# Patient Record
Sex: MALE | Race: WHITE | NOT HISPANIC OR LATINO | Employment: FULL TIME | URBAN - METROPOLITAN AREA
[De-identification: names, ages, dates, MRNs, and addresses within clinical notes are randomized per-mention and may not be internally consistent; named-entity substitution may affect disease eponyms.]

---

## 2017-02-02 ENCOUNTER — OFFICE VISIT (OUTPATIENT)
Dept: FAMILY MEDICINE | Facility: CLINIC | Age: 52
End: 2017-02-02
Payer: COMMERCIAL

## 2017-02-02 VITALS
HEART RATE: 98 BPM | BODY MASS INDEX: 35.74 KG/M2 | WEIGHT: 255.3 LBS | HEIGHT: 71 IN | TEMPERATURE: 98.2 F | OXYGEN SATURATION: 98 % | SYSTOLIC BLOOD PRESSURE: 122 MMHG | DIASTOLIC BLOOD PRESSURE: 88 MMHG

## 2017-02-02 DIAGNOSIS — E34.9 TESTOSTERONE DEFICIENCY: ICD-10-CM

## 2017-02-02 DIAGNOSIS — E78.2 MIXED HYPERLIPIDEMIA: ICD-10-CM

## 2017-02-02 DIAGNOSIS — L71.9 ROSACEA: ICD-10-CM

## 2017-02-02 DIAGNOSIS — M25.561 ACUTE PAIN OF RIGHT KNEE: Primary | ICD-10-CM

## 2017-02-02 DIAGNOSIS — R68.82 LOW LIBIDO: ICD-10-CM

## 2017-02-02 DIAGNOSIS — E78.2 MIXED HYPERLIPIDEMIA: Primary | ICD-10-CM

## 2017-02-02 DIAGNOSIS — J01.01 ACUTE RECURRENT MAXILLARY SINUSITIS: ICD-10-CM

## 2017-02-02 PROCEDURE — 84403 ASSAY OF TOTAL TESTOSTERONE: CPT | Performed by: FAMILY MEDICINE

## 2017-02-02 PROCEDURE — 84270 ASSAY OF SEX HORMONE GLOBUL: CPT | Performed by: FAMILY MEDICINE

## 2017-02-02 PROCEDURE — 99214 OFFICE O/P EST MOD 30 MIN: CPT | Performed by: FAMILY MEDICINE

## 2017-02-02 PROCEDURE — 36415 COLL VENOUS BLD VENIPUNCTURE: CPT | Performed by: FAMILY MEDICINE

## 2017-02-02 RX ORDER — DOXYCYCLINE 50 MG/1
50 CAPSULE ORAL 2 TIMES DAILY
Qty: 60 CAPSULE | Refills: 11 | Status: SHIPPED | OUTPATIENT
Start: 2017-02-02 | End: 2017-08-07

## 2017-02-02 RX ORDER — ROSUVASTATIN CALCIUM 10 MG/1
10 TABLET, COATED ORAL DAILY
Qty: 30 TABLET | Refills: 11 | Status: SHIPPED | OUTPATIENT
Start: 2017-02-02 | End: 2017-02-02

## 2017-02-02 RX ORDER — ROSUVASTATIN CALCIUM 10 MG/1
TABLET, COATED ORAL
Qty: 90 TABLET | Refills: 3 | Status: SHIPPED | OUTPATIENT
Start: 2017-02-02 | End: 2017-02-25

## 2017-02-02 NOTE — PROGRESS NOTES
SUBJECTIVE:                                                    David Marino is a 52 year old male who presents to clinic today for the following health issues:      Chief Complaint   Patient presents with     Recheck Medication     discuss BP and depression medications     Knee Pain     R knee, took anti-inflammatory with some improvement     Eye Problem     issue with both eyes     Medication Request     topical testosterone     Several issues today  First of all he needs a recheck on blood pressure and depression medications,   He has hyperlipidemia and we discussed the use of Crestor    He's been having more problems with his right knee, and discussed the use of anti-inflammatories  He had surgery on this knee and still not great  He be interested in referral for physical therapy    He needs a refill on the medication he uses for rosacea, starting this seems to have resolved the inflammatory problem he was having with both of his eyes which is good    on previous visits we discussed low testosterone  He has a history of hypo-testosterone and for years was maintaining with injections  He hasn't done this for quite a long time and since then has noticed some decreased libido, decreased erections and muscle mass  We reviewed the indications for testosterone replacement including true low testosterone levels and symptoms of hypo-androgenemia including low libido decrease muscle mass and constitutional symptoms such as fatigue  We reviewed the potential risks of testosterone therapy, which according to studies may include a theoretical increased risk of heart disease  Because of this we reviewed cardiovascular risk factors today      Current Outpatient Prescriptions   Medication     doxycycline Monohydrate 50 MG CAPS capsule     buPROPion (WELLBUTRIN XL) 150 MG 24 hr tablet     valsartan (DIOVAN) 160 MG tablet     Desvenlafaxine Succinate (PRISTIQ) 100 MG TB24 24 hr tablet     traZODone (DESYREL) 50 MG tablet      "montelukast (SINGULAIR) 10 MG tablet     metroNIDAZOLE (METROGEL) 0.75 % gel     glycopyrrolate (ROBINUL-FORTE) 2 MG TABS     NONFORMULARY     TRANSDERM-SCOP, 1.5 MG, (1.5mg base/3day) patch     testosterone (ANDROGEL) 50 MG/5GM (1%) topical gel     cyclobenzaprine (FLEXERIL) 10 MG tablet     rosuvastatin (CRESTOR) 10 MG tablet     No current facility-administered medications for this visit.      I have reviewed the patient's medical history in detail; there are no changes to the history as noted in EpicCare.    ROS: As per HPI.  Constitutional: no fevers/sweats/chills  : no dysuria, normal urinary pattern    EXAM  /88  Pulse 98  Temp 98.2  F (36.8  C) (Oral)  Ht 5' 11\" (1.803 m)  Wt 255 lb 4.8 oz (115.8 kg)  SpO2 98%  BMI 35.61 kg/m2  Gen: Healthy appearing male in no apparent distress  Head: Normocephalic, Atraumatic  Skin: No rashes  Heme: No bruising or petechiae  Lymph: No adenopathy  MS: Antalgic gait    ASSESSMENT/PLAN:    ICD-10-CM    1. Acute pain of right knee M25.561 [facility] PT, HAND, AND CHIROPRACTIC REFERRAL   2. Rosacea L71.9 doxycycline Monohydrate 50 MG CAPS capsule   3. Testosterone deficiency E29.1 Testosterone Free and Total   4. Low libido R68.82 Testosterone Free and Total   5. Mixed hyperlipidemia E78.2 DISCONTINUED: rosuvastatin (CRESTOR) 10 MG tablet   6. Acute recurrent maxillary sinusitis J01.01      Referral to physical therapy for worsening right knee problem even after surgery  Renewal of medication for rosacea  Discussed the use of lipid medication  And discussed the possibility of restarting testosterone  He had one previous low testosterone and we will repeat  2 hypo-gonadal level testosterones should be enough to justify treatment    Jus Oswald MD MPH    "

## 2017-02-02 NOTE — NURSING NOTE
"Chief Complaint   Patient presents with     Recheck Medication     discuss BP and depression medications     Knee Pain     R knee, took anti-inflammatory with some improvement     Eye Problem     issue with both eyes     Medication Request     topical testosterone     /88 mmHg  Pulse 98  Temp(Src) 98.2  F (36.8  C) (Oral)  Ht 5' 11\" (1.803 m)  Wt 255 lb 4.8 oz (115.803 kg)  BMI 35.62 kg/m2  SpO2 98% Estimated body mass index is 35.62 kg/(m^2) as calculated from the following:    Height as of this encounter: 5' 11\" (1.803 m).    Weight as of this encounter: 255 lb 4.8 oz (115.803 kg).  BP completed using cuff size: large       Health Maintenance due pending provider review:  NONE    n/a    Alissa Burnett CMA    "

## 2017-02-02 NOTE — MR AVS SNAPSHOT
After Visit Summary   2/2/2017    David Marino    MRN: [id number]           Patient Information     Date Of Birth          1965        Visit Information        Provider Department      2/2/2017 12:30 PM Jus Oswald MD [facility]        Today's Diagnoses     Acute pain of right knee    -  1     Rosacea         Testosterone deficiency         Low libido         Mixed hyperlipidemia         Acute recurrent maxillary sinusitis            Follow-ups after your visit        Additional Services     [facility] PT, HAND, AND CHIROPRACTIC REFERRAL       **This order will print in the [facility] Scheduling Office**    Physical Therapy, Hand Therapy and Chiropractic Care are available through:    *[facility]  *[facility]  *[facility]    Call one number to schedule at any of the above locations: [phone].    Your provider has referred you to: Physical Therapy at [facility] or [facility]    Indication/Reason for Referral: Knee Pain  Onset of Illness:   Therapy Orders: Evaluate and Treat  Special Programs: None  Special Request: None    Rehan Van      Additional Comments for the Therapist or Chiropractor:     Please be aware that coverage of these services is subject to the terms and limitations of your health insurance plan.  Call member services at your health plan with any benefit or coverage questions.      Please bring the following to your appointment:    *Your personal calendar for scheduling future appointments  *Comfortable clothing                  Who to contact     If you have questions or need follow up information about today's clinic visit or your schedule please contact [facility] directly at [phone].  Normal or non-critical lab and imaging results will be communicated to you by MyChart, letter or phone within 4 business days after the clinic has received the results. If you do not hear from us within 7 days,  "please contact the clinic through Appfolio or phone. If you have a critical or abnormal lab result, we will notify you by phone as soon as possible.  Submit refill requests through Appfolio or call your pharmacy and they will forward the refill request to us. Please allow 3 business days for your refill to be completed.          Additional Information About Your Visit        CoreDialharAdvebs Information     Appfolio gives you secure access to your electronic health record. If you see a primary care provider, you can also send messages to your care team and make appointments. If you have questions, please call your primary care clinic.  If you do not have a primary care provider, please call [phone] and they will assist you.        Care EveryWhere ID     This is your Care EveryWhere ID. This could be used by other organizations to access your [facility] medical records  [id number]        Your Vitals Were     Pulse Temperature Height BMI (Body Mass Index) Pulse Oximetry       98 98.2  F (36.8  C) (Oral) 5' 11\" (1.803 m) 35.62 kg/m2 98%        Blood Pressure from Last 3 Encounters:   02/02/17 122/88   11/28/16 142/101   10/14/16 138/99    Weight from Last 3 Encounters:   02/02/17 255 lb 4.8 oz (115.803 kg)   11/28/16 244 lb (110.678 kg)   10/14/16 248 lb 11.2 oz (112.81 kg)              We Performed the Following     [facility] PT, HAND, AND CHIROPRACTIC REFERRAL     Testosterone Free and Total          Today's Medication Changes          These changes are accurate as of: 2/2/17  1:04 PM.  If you have any questions, ask your nurse or doctor.               Start taking these medicines.        Dose/Directions    rosuvastatin 10 MG tablet   Commonly known as:  CRESTOR   Used for:  Mixed hyperlipidemia   Replaces:  pravastatin 20 MG tablet   Started by:  Jus Oswald MD        Dose:  10 mg   Take 1 tablet (10 mg) by mouth daily   Quantity:  30 tablet   Refills:  11         These medicines have changed or have updated " prescriptions.        Dose/Directions    doxycycline Monohydrate 50 MG Caps capsule   This may have changed:  See the new instructions.   Used for:  Rosacea   Changed by:  Jus Oswald MD        Dose:  50 mg   Take 1 capsule (50 mg) by mouth 2 times daily   Quantity:  60 capsule   Refills:  11         Stop taking these medicines if you haven't already. Please contact your care team if you have questions.     meloxicam 15 MG tablet   Commonly known as:  MOBIC   Stopped by:  Jus Oswald MD           pravastatin 20 MG tablet   Commonly known as:  PRAVACHOL   Replaced by:  rosuvastatin 10 MG tablet   Stopped by:  Jus Oswald MD           tobramycin 0.3 % ophthalmic solution   Commonly known as:  TOBREX   Stopped by:  Jus Oswald MD                Where to get your medicines      These medications were sent to [id number] - [place], [place] - [address] AT [facility] [address]  [facility] [address], [place] [address]    Hours:  24-hours Phone:  [phone]    - doxycycline Monohydrate 50 MG Caps capsule  - rosuvastatin 10 MG tablet             Primary Care Provider Office Phone # Fax #    Jus Oswald [phone]       [facility] [address] [address]        Thank you!     Thank you for choosing [facility]  for your care. Our goal is always to provide you with excellent care. Hearing back from our patients is one way we can continue to improve our services. Please take a few minutes to complete the written survey that you may receive in the mail after your visit with us. Thank you!             Your Updated Medication List - Protect others around you: Learn how to safely use, store and throw away your medicines at www.disposemymeds.org.          This list is accurate as of: 2/2/17  1:04 PM.  Always use your most recent med list.                   Brand Name Dispense  Instructions for use    buPROPion 150 MG 24 hr tablet    WELLBUTRIN XL    30 tablet    Take 1 tablet (150 mg) by mouth every morning       desvenlafaxine succinate  MG 24 hr tablet    PRISTIQ    90 tablet    Take 1 tablet (100 mg) by mouth daily       doxycycline Monohydrate 50 MG Caps capsule     60 capsule    Take 1 capsule (50 mg) by mouth 2 times daily       glycopyrrolate 2 MG Tabs    ROBINUL-FORTE    90 tablet    1 tab po q day       metroNIDAZOLE 0.75 % topical gel    METROGEL    45 g    Apply topically 2 times daily       montelukast 10 MG tablet    SINGULAIR    90 tablet    Take 1 tablet (10 mg) by mouth At Bedtime       NONFORMULARY          rosuvastatin 10 MG tablet    CRESTOR    30 tablet    Take 1 tablet (10 mg) by mouth daily       TRANSDERM-SCOP (1.5 MG) 72 hr patch   Generic drug:  scopolamine          traZODone 50 MG tablet    DESYREL    90 tablet    Take 1 tablet (50 mg) by mouth At Bedtime       valsartan 160 MG tablet    DIOVAN    90 tablet    Take 1 tablet (160 mg) by mouth daily

## 2017-02-02 NOTE — PATIENT INSTRUCTIONS
Pharmacy requesting 90 day supply instead.  Prescription approved per [facility] Refill Protocol.  Monika RODRIGUES RN    Pending Prescriptions:                       Disp   Refills    rosuvastatin (CRESTOR) 10 MG tablet [Pharm*90 tab*11       Sig: TAKE 1 TABLET(10 MG) BY MOUTH DAILY         Last Written Prescription Date: 2/2/2017  Last Fill Quantity: 30, # refills: 11  Last Office Visit with [facility], [facility] or [facility] prescribing provider:     Future Office Visit:      CHOLESTEROL   Date Value Ref Range Status   09/13/2016 226* <200 mg/dL Final     Comment:     Desirable:       <200 mg/dl     HDL CHOLESTEROL   Date Value Ref Range Status   09/13/2016 40 >39 mg/dL Final     LDL CHOLESTEROL CALCULATED   Date Value Ref Range Status   09/13/2016 134* <100 mg/dL Final     Comment:     Above desirable:  100-129 mg/dl   Borderline High:  130-159 mg/dL   High:             160-189 mg/dL   Very high:       >189 mg/dl       TRIGLYCERIDES   Date Value Ref Range Status   09/13/2016 262* <150 mg/dL Final     Comment:     Borderline high:  150-199 mg/dl   High:             200-499 mg/dl   Very high:       >499 mg/dl

## 2017-02-04 LAB
SHBG SERPL-SCNC: 16 NMOL/L (ref 11–80)
TESTOST FREE SERPL-MCNC: 5.33 NG/DL (ref 4.7–24.4)
TESTOST SERPL-MCNC: 197 NG/DL (ref 240–950)

## 2017-02-13 ENCOUNTER — OFFICE VISIT (OUTPATIENT)
Dept: FAMILY MEDICINE | Facility: CLINIC | Age: 52
End: 2017-02-13
Payer: COMMERCIAL

## 2017-02-13 VITALS
SYSTOLIC BLOOD PRESSURE: 143 MMHG | HEART RATE: 80 BPM | WEIGHT: 253 LBS | BODY MASS INDEX: 35.42 KG/M2 | TEMPERATURE: 98 F | HEIGHT: 71 IN | DIASTOLIC BLOOD PRESSURE: 101 MMHG

## 2017-02-13 DIAGNOSIS — S39.012A LOW BACK STRAIN, INITIAL ENCOUNTER: Primary | ICD-10-CM

## 2017-02-13 DIAGNOSIS — I10 BENIGN ESSENTIAL HYPERTENSION: ICD-10-CM

## 2017-02-13 DIAGNOSIS — E34.9 TESTOSTERONE DEFICIENCY: ICD-10-CM

## 2017-02-13 PROCEDURE — 99214 OFFICE O/P EST MOD 30 MIN: CPT | Performed by: FAMILY MEDICINE

## 2017-02-13 RX ORDER — CYCLOBENZAPRINE HCL 10 MG
5-10 TABLET ORAL 3 TIMES DAILY PRN
Qty: 30 TABLET | Refills: 1 | Status: SHIPPED | OUTPATIENT
Start: 2017-02-13 | End: 2017-08-07

## 2017-02-13 RX ORDER — TESTOSTERONE 12.5 MG/1.25G
50 GEL TOPICAL DAILY
Qty: 150 G | Refills: 1 | Status: SHIPPED | OUTPATIENT
Start: 2017-02-13 | End: 2017-02-15

## 2017-02-13 NOTE — NURSING NOTE
"Chief Complaint   Patient presents with     Musculoskeletal Problem     lower right back.     Other     Testosterone      BP (!) 143/101 (Cuff Size: Adult Large)  Pulse 80  Temp 98  F (36.7  C) (Oral)  Ht 5' 11\" (1.803 m)  Wt 253 lb (114.8 kg)  BMI 35.29 kg/m2 Estimated body mass index is 35.29 kg/(m^2) as calculated from the following:    Height as of this encounter: 5' 11\" (1.803 m).    Weight as of this encounter: 253 lb (114.8 kg).  BP completed using cuff size: large       Health Maintenance due pending provider review:  BP was high, used pink card, recheck manually    n/a      Regine Coffman CMA  "

## 2017-02-13 NOTE — PROGRESS NOTES
SUBJECTIVE:                                                    David Marino is a 52 year old male who presents to clinic today for the following health issues:    Musculoskeletal problem/pain      Duration: 8-9 days    Description  Location: lower back right side    Intensity:  mild    Accompanying signs and symptoms: dull pain - worse with movement     History  Previous similar problem: no   Previous evaluation:  none    Precipitating or alleviating factors:  Trauma or overuse: YES-  walking on treadmill,  Maybe over compensated with balance with knee  Aggravating factors include: sitting, standing, walking and bending     Therapies tried and outcome: [facility] 2/8/2017  - RX  steroid pack, Diazepam - they helped       Also Chiro, and then Urgent care    Also on previous visits we discussed low testosterone  He has a history of hypo-testosterone and for years was maintaining with injections  He hasn't done this for quite a long time and since then has noticed some decreased libido, decreased erections and muscle mass  We reviewed the indications for testosterone replacement including true low testosterone levels and symptoms of hypo-androgenemia including low libido decrease muscle mass and constitutional symptoms such as fatigue  We reviewed the potential risks of testosterone therapy, which according to studies may include a theoretical increased risk of heart disease  Because of this we reviewed cardiovascular risk factors today      ROS: As per HPI.  Skin: no changing lesions, no other concerns  Heme: no abnormal bruising or bleeding problems  Neuro: no significant weakness, numbness, tingling.  Patient denies red flag history elements:  Cancer, Unexplained weight loss,, Fever, Bladder or bowel incontinence, Urinary retention (with overflow incontinence)    Current Outpatient Prescriptions   Medication     doxycycline Monohydrate 50 MG CAPS capsule     rosuvastatin (CRESTOR) 10 MG tablet     buPROPion  "(WELLBUTRIN XL) 150 MG 24 hr tablet     valsartan (DIOVAN) 160 MG tablet     Desvenlafaxine Succinate (PRISTIQ) 100 MG TB24 24 hr tablet     traZODone (DESYREL) 50 MG tablet     montelukast (SINGULAIR) 10 MG tablet     metroNIDAZOLE (METROGEL) 0.75 % gel     glycopyrrolate (ROBINUL-FORTE) 2 MG TABS     TRANSDERM-SCOP, 1.5 MG, (1.5mg base/3day) patch     NONFORMULARY     No current facility-administered medications for this visit.      I have reviewed the patient's medical history in detail; there are no changes to the history as noted in EpicCare.  Social History     Social History     Marital status: Single     Spouse name: N/A     Number of children: N/A     Years of education: N/A     Social History Main Topics     Smoking status: Former Smoker     Smokeless tobacco: Never Used      Comment: smoker 30+ years ago     Alcohol use 0.0 oz/week     0 Standard drinks or equivalent per week      Comment: occ 1-2 x month     Drug use: No     Sexual activity: Yes     Partners: Male     Other Topics Concern     Parent/Sibling W/ Cabg, Mi Or Angioplasty Before 65f 55m? No     Social History Narrative       EXAM  BP (!) 143/101 (Cuff Size: Adult Large)  Pulse 80  Temp 98  F (36.7  C) (Oral)  Ht 5' 11\" (1.803 m)  Wt 253 lb (114.8 kg)  BMI 35.29 kg/m2  Gen: Healthy appearing male in no apparent distress  Spine:Painful and reduced LS ROM noted.   Extremities: Normal R & L lower extremity joints for ROM symmetry & tone/ no tenderness/ no effusions/ no crepitus or deformities.  Neurological exam reveals normal without focal findings, muscle tone and strength normal and symmetric, reflexes normal and symmetric, sensation grossly normal, gait and station normal    ASSESSMENT/PLAN:    ICD-10-CM    1. Low back strain, initial encounter S39.012A MASSAGE THERAPY REFERRAL     cyclobenzaprine (FLEXERIL) 10 MG tablet   2. Testosterone deficiency E29.1 testosterone (ANDROGEL) 50 MG/5GM (1%) topical gel   3. Benign essential hypertension " I10      Standard patient information handout given with instructions on home treatment including OTC medications and follow up.  Prescription for massage and cyclobenzaprine  Call or return to clinic as needed if these symptoms worsen or fail to improve as anticipated.    Reviewed indications for testosterone therapy, and increased risk for potential for heart side effects  Even so he would like to proceed  We will try AndroGel or similar topical gel first  No previously has had some success with injectable    Blood pressure little higher today but probably because he is in pain  We will recheck at the next visit    After starting testosterone lab work within 1-3 months    Jus Oswald MD MPH

## 2017-02-13 NOTE — MR AVS SNAPSHOT
After Visit Summary   2/13/2017    David Marino    MRN: [id number]           Patient Information     Date Of Birth          1965        Visit Information        Provider Department      2/13/2017 12:30 PM Jus Oswald MD [facility]        Today's Diagnoses     Low back strain, initial encounter    -  1    Testosterone deficiency        Benign essential hypertension           Follow-ups after your visit        Additional Services     MASSAGE THERAPY REFERRAL       Massage twice weekly 12 sessions, with renewal possible                  Follow-up notes from your care team     Return in about 3 months (around 5/13/2017), or if symptoms worsen or fail to improve, for Lab Work.      Who to contact     If you have questions or need follow up information about today's clinic visit or your schedule please contact [facility] directly at [phone].  Normal or non-critical lab and imaging results will be communicated to you by Twigmorehart, letter or phone within 4 business days after the clinic has received the results. If you do not hear from us within 7 days, please contact the clinic through Twigmorehart or phone. If you have a critical or abnormal lab result, we will notify you by phone as soon as possible.  Submit refill requests through StatusPage or call your pharmacy and they will forward the refill request to us. Please allow 3 business days for your refill to be completed.          Additional Information About Your Visit        MyChart Information     StatusPage gives you secure access to your electronic health record. If you see a primary care provider, you can also send messages to your care team and make appointments. If you have questions, please call your primary care clinic.  If you do not have a primary care provider, please call [phone] and they will assist you.        Care EveryWhere ID     This is your Care EveryWhere ID. This could be used by other  "organizations to access your [facility] medical records  [id number]        Your Vitals Were     Pulse Temperature Height BMI (Body Mass Index)          80 98  F (36.7  C) (Oral) 5' 11\" (1.803 m) 35.29 kg/m2         Blood Pressure from Last 3 Encounters:   02/13/17 (!) 143/101   02/02/17 122/88   11/28/16 (!) 142/101    Weight from Last 3 Encounters:   02/13/17 253 lb (114.8 kg)   02/02/17 255 lb 4.8 oz (115.8 kg)   11/28/16 244 lb (110.7 kg)              We Performed the Following     MASSAGE THERAPY REFERRAL          Today's Medication Changes          These changes are accurate as of: 2/13/17  4:22 PM.  If you have any questions, ask your nurse or doctor.               Start taking these medicines.        Dose/Directions    cyclobenzaprine 10 MG tablet   Commonly known as:  FLEXERIL   Used for:  Low back strain, initial encounter   Started by:  Jus Oswald MD        Dose:  5-10 mg   Take 0.5-1 tablets (5-10 mg) by mouth 3 times daily as needed for muscle spasms   Quantity:  30 tablet   Refills:  1       testosterone 50 MG/5GM (1%) topical gel   Commonly known as:  ANDROGEL   Used for:  Testosterone deficiency   Started by:  Jus Oswald MD        Dose:  50 mg   Place 1 packet (50 mg) onto the skin daily Apply to the clean, dry intact skin of the shoulders, upper arms or abdomen.   Quantity:  150 g   Refills:  1            Where to get your medicines      Some of these will need a paper prescription and others can be bought over the counter.  Ask your nurse if you have questions.     Bring a paper prescription for each of these medications     cyclobenzaprine 10 MG tablet    testosterone 50 MG/5GM (1%) topical gel                Primary Care Provider Office Phone # Fax #    Jus Oswald [phone]       [facility] [address] [place] [address]        Thank you!     Thank you for choosing [facility]  for your care. Our goal is " always to provide you with excellent care. Hearing back from our patients is one way we can continue to improve our services. Please take a few minutes to complete the written survey that you may receive in the mail after your visit with us. Thank you!             Your Updated Medication List - Protect others around you: Learn how to safely use, store and throw away your medicines at www.disposemymeds.org.          This list is accurate as of: 2/13/17  4:22 PM.  Always use your most recent med list.                   Brand Name Dispense Instructions for use    buPROPion 150 MG 24 hr tablet    WELLBUTRIN XL    30 tablet    Take 1 tablet (150 mg) by mouth every morning       cyclobenzaprine 10 MG tablet    FLEXERIL    30 tablet    Take 0.5-1 tablets (5-10 mg) by mouth 3 times daily as needed for muscle spasms       desvenlafaxine succinate  MG 24 hr tablet    PRISTIQ    90 tablet    Take 1 tablet (100 mg) by mouth daily       doxycycline Monohydrate 50 MG Caps capsule     60 capsule    Take 1 capsule (50 mg) by mouth 2 times daily       glycopyrrolate 2 MG Tabs    ROBINUL-FORTE    90 tablet    1 tab po q day       metroNIDAZOLE 0.75 % topical gel    METROGEL    45 g    Apply topically 2 times daily       montelukast 10 MG tablet    SINGULAIR    90 tablet    Take 1 tablet (10 mg) by mouth At Bedtime       NONFORMULARY          rosuvastatin 10 MG tablet    CRESTOR    90 tablet    TAKE 1 TABLET(10 MG) BY MOUTH DAILY       testosterone 50 MG/5GM (1%) topical gel    ANDROGEL    150 g    Place 1 packet (50 mg) onto the skin daily Apply to the clean, dry intact skin of the shoulders, upper arms or abdomen.       TRANSDERM-SCOP (1.5 MG) 72 hr patch   Generic drug:  scopolamine          traZODone 50 MG tablet    DESYREL    90 tablet    Take 1 tablet (50 mg) by mouth At Bedtime       valsartan 160 MG tablet    DIOVAN    90 tablet    Take 1 tablet (160 mg) by mouth daily

## 2017-02-15 DIAGNOSIS — E34.9 TESTOSTERONE DEFICIENCY: ICD-10-CM

## 2017-02-15 NOTE — TELEPHONE ENCOUNTER
MACIEL,  Pharm is requesting a 90 day supply and they notes this as a request for dispense 450 instead of 150.  Please authorize/advise if appropriate.  Thanks,  Jennifer Arias RN

## 2017-02-16 ENCOUNTER — TELEPHONE (OUTPATIENT)
Dept: FAMILY MEDICINE | Facility: CLINIC | Age: 52
End: 2017-02-16

## 2017-02-16 RX ORDER — TESTOSTERONE 12.5 MG/1.25G
50 GEL TOPICAL DAILY
Qty: 450 G | Refills: 3 | Status: SHIPPED | OUTPATIENT
Start: 2017-02-16 | End: 2017-08-07

## 2017-02-16 NOTE — TELEPHONE ENCOUNTER
Completed form faxed to [employer] at [phone] and sent to scanning. Will await reply.MICHAEL Cox, CMA

## 2017-02-16 NOTE — TELEPHONE ENCOUNTER
Form faxed to us with questions and other medication options.  Put in JF's box.  Vesta West M.A.

## 2017-02-25 DIAGNOSIS — E78.2 MIXED HYPERLIPIDEMIA: ICD-10-CM

## 2017-02-27 RX ORDER — ROSUVASTATIN CALCIUM 10 MG/1
TABLET, COATED ORAL
Qty: 90 TABLET | Refills: 1 | Status: SHIPPED | OUTPATIENT
Start: 2017-02-27 | End: 2017-08-07

## 2017-02-27 NOTE — TELEPHONE ENCOUNTER
Crestor      Last Written Prescription Date: 02/02/2017  Last Fill Quantity: 90, # refills: 3  [place] [facility] [place] [facility] or [facility] prescribing provider: 02/13/2017       Lab Results   Component Value Date    CHOL 226 09/13/2016     Lab Results   Component Value Date    HDL 40 09/13/2016     Lab Results   Component Value Date     09/13/2016     Lab Results   Component Value Date    TRIG 262 09/13/2016     No results found for: CHOLARMINDA

## 2017-02-27 NOTE — TELEPHONE ENCOUNTER
Prescription approved per [facility] Refill Protocol.  Brenda Massey RN

## 2017-04-11 DIAGNOSIS — E34.9 TESTOSTERONE DEFICIENCY: ICD-10-CM

## 2017-04-11 NOTE — TELEPHONE ENCOUNTER
Pending Prescriptions:                       Disp   Refills    testosterone (ANDROGEL) 50 MG/5GM (1%) to*450 g  3            Sig: Place 1 packet (50 mg) onto the skin daily Apply           to the clean, dry intact skin of the shoulders,           upper arms or abdomen.          Last Written Prescription Date: 02/16/2017  Last Fill Quantity: 450g,  # refills: 3   Last Office Visit with [facility] or [facility] prescribing provider: 02/13/2017

## 2017-04-12 RX ORDER — TESTOSTERONE 12.5 MG/1.25G
50 GEL TOPICAL DAILY
Start: 2017-04-12

## 2017-07-11 ENCOUNTER — OFFICE VISIT (OUTPATIENT)
Dept: SURGERY | Facility: CLINIC | Age: 52
End: 2017-07-11
Payer: COMMERCIAL

## 2017-07-11 VITALS
OXYGEN SATURATION: 97 % | TEMPERATURE: 97.5 F | SYSTOLIC BLOOD PRESSURE: 144 MMHG | DIASTOLIC BLOOD PRESSURE: 102 MMHG | WEIGHT: 248.6 LBS | HEART RATE: 86 BPM | HEIGHT: 70 IN | BODY MASS INDEX: 35.59 KG/M2

## 2017-07-11 DIAGNOSIS — Z13.29 SCREENING FOR ENDOCRINE, NUTRITIONAL, METABOLIC AND IMMUNITY DISORDER: ICD-10-CM

## 2017-07-11 DIAGNOSIS — E66.9 OBESITY (BMI 30-39.9): Primary | ICD-10-CM

## 2017-07-11 DIAGNOSIS — Z13.21 SCREENING FOR ENDOCRINE, NUTRITIONAL, METABOLIC AND IMMUNITY DISORDER: ICD-10-CM

## 2017-07-11 DIAGNOSIS — E66.9 OBESITY (BMI 30-39.9): ICD-10-CM

## 2017-07-11 DIAGNOSIS — Z13.0 SCREENING FOR IRON DEFICIENCY ANEMIA: ICD-10-CM

## 2017-07-11 DIAGNOSIS — G47.9 SLEEP DISTURBANCE: ICD-10-CM

## 2017-07-11 DIAGNOSIS — Z13.0 SCREENING FOR ENDOCRINE, NUTRITIONAL, METABOLIC AND IMMUNITY DISORDER: ICD-10-CM

## 2017-07-11 DIAGNOSIS — Z13.228 SCREENING FOR ENDOCRINE, NUTRITIONAL, METABOLIC AND IMMUNITY DISORDER: ICD-10-CM

## 2017-07-11 PROCEDURE — 99204 OFFICE O/P NEW MOD 45 MIN: CPT | Performed by: PHYSICIAN ASSISTANT

## 2017-07-11 PROCEDURE — 97802 MEDICAL NUTRITION INDIV IN: CPT | Performed by: DIETITIAN, REGISTERED

## 2017-07-11 RX ORDER — GLYCOPYRROLATE 1 MG/5ML
SOLUTION ORAL ONCE
COMMUNITY
End: 2017-08-07

## 2017-07-11 NOTE — PROGRESS NOTES
[facility]  [address]  [place] [address]  Phone [phone] Fax [phone]    Date: 2017  RE: AMADO MALIK  MR#: [id number]  : 1965    Dear Jus Oswald MD,   I had the pleasure of seeing your patient, AMADO MALIK, to evaluate his obesity and consider him for possible weight loss surgery. As you know, AMADO is 52 years old. He has been overweight since late teens early 20's. He has been obese for the last 0 years and has been unable to achieve long-term success with weight loss attempts, such as: Weight Watchers, HCG, or HMR Fasting.   Comorbidities of obesity from his past medical history include: Asthma, High cholesterol - on meds, High blood pressure - on meds, Chest pain (non-angina), GERD - on daily meds, Low back pain, Plantar fasciitis, Obstructive sleep apnea (see below).  The symptoms related to his obesity include Reflux, heartburn, Knee pain, Swelling of legs, Back pain, Arthritis, Awaken from sleep to catch breath, Morning headache, Loud snoring, Shortness of Breath with activity, Stop breathing while asleep, Leg swelling. The following ADLs are hindered due to his weight: Transfer in/out of shower/tub, Lower body dressing, Not enough energy to complete routine daily tasks, Shortness of breath with little activity.    Non-Obesity Related Past Medical History:  Anxiety  Depression - on 2 daily meds. Denies any history of SI or suicidal attempts. Has seen therapists in the past. The last time was 4 year ago. Does not feel like needs to see one now. Stable.  Binge Eating - Feels like this means there is no restriction to eating. Feels like does this once weekly.  Past Surgical History:  Appendix removal  Other, Tonsillectomy, [place] Teeth extracted, Knee surgery  Family History:  Father- Cancer;High blood pressure;High cholesterol;Obese: Cancer type: Prostate and skin cancer  Mother- Diabetes;gall bladder problems;High blood pressure;High  cholesterol;Obese  PGF- High blood pressure;High cholesterol;Obese  PGM- gall bladder problems;High blood pressure;High cholesterol;Obese  MGF- High blood pressure;High cholesterol  MGM- gall bladder problems;High blood pressure;High cholesterol;Obese  Sibling 1- gall bladder problems;High blood pressure;High cholesterol;Obese  Sibling 2- High blood pressure;High cholesterol;Obese  Sibling 3- Obese  Sibling 4- High cholesterol    Social History:  He is Single. 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  Ethnicity: white/  ETOH: Monthly - will have one beer or alcoholic drink  Illicit Drug Use: None  Tobacco Use: No - quit 1999  Support system: boyfriend, younger brother, my friend Shahida, and my friend Panfilo will be available to help the patient in the early post op period. My boyfriend, my brother, and several friends is who the patient can count on for support throughout his weight loss journey.  Can you afford three meals per day? Yes  ?Can you afford the $50-60 per month for vitamins? Yes    A 14 point review of system shows:  Pulmonary: Shortness of Breath with activity,  Gastrointestinal: Reflux / heartburn - takes twice daily otc Prilosec   Neurologic: Dizziness,  Musculoskeletal: Knee pain, Swelling of legs, Back pain, Arthritis,  Psychological: Anxiety, Depression,  Pulmonary: Awaken from sleep to catch breath, Morning headaches, Snoring, Stop breathing while asleep, - Was diagnosed with ZARIA about 15 years ago. Used the CPAP just for a couple of weeks. Never again.   Skin: Leg swelling    Vital Signs: Ht: 70 in, Wt: 248.6 lbs., BMI: 35.6, BP:144 /100 , Pulse: 86, RR: 20, Temp: 97.5 O2 Sat: 97%  PHYSICAL EXAMINATION:  GENERAL: Alert and oriented x3. NAD  HEENT: Normocephalic, atraumatic, EOMI, Oral dentition adequate for chewing  CARDIOVASCULAR: Regular rate and rhythm, No murmurs, rubs or gallops  RESPIRATORY: Lung sounds clear to auscultation bilaterally  GASTROINTESTINAL: Soft, Obese, Nontender  LOWER  EXTREMITIES: No edema  MUSCULOSKELETAL: Examination gait was normal  NEUROLOGIC: Alert and oriented x 3, no gross defect  SKIN: dry, warm to touch    In summary, AMADO is obese with a body mass index of 35.6 kg/m2 and the comorbidities stated above. He attended an informational seminar and is a candidate for Laparoscopic Yamilet-en-Y Gastric Bypass. He will have to complete the following pre-requisites:    Received weight loss goal of 5 lb prior to surgery.  Attend a bariatric support group  A total of 6 structured dietitian weight loss visits are required due to your insurance. Please schedule these with our dietitians.  Achieve clearance from dietitian to see surgeon.  Follow up with your primary care provider regarding hypertension  Get Sleep study.  Have preoperative laboratory tests drawn.  Psychological Evaluation with MMPI and clearance for weight loss surgery.     Discussed patients medical history and the process to surgery. Will have him return next month and discuss the surgeries themselves. A goal sheet and support group handout were given to the patient.  Once the patient has completed the requirements set forth in paragraph one, and there are no further recommendations, he will be allowed to see the surgeon of his choice for consultation on the Laparoscopic Yamilet-en-Y Gastric Bypass or the gastric sleeve surgery.     Thank you for allowing us to participate in his care.  Time spent with patient 45 minutes. More than half in consultation    Sincerely,  Cosme Foote MS PAWesleyC    At [facility] we are committed to providing you exceptional care. Our surgeons specialize in performing the following minimally invasive weight-loss surgeries:  Yamilet-en-Y gastric bypass  Laparoscopic adjustable gastric band  Sleeve gastrectomy    If you would like to review aspects of our weight loss surgery program, please visit our website at [email or web address]. If you have any questions, please do not hesitate to  contact us at [phone].

## 2017-07-11 NOTE — MR AVS SNAPSHOT
MRN:[id number]                      After Visit Summary   7/11/2017    David Marino    MRN: [id number]           Visit Information        Provider Department      7/11/2017 3:00 PM 3, Sh Wl Diet, RD [facility]      Your next 10 appointments already scheduled     Aug 16, 2017  1:00 PM CDT   Weight Loss Visit with Cosme Foote PA-C   [facility] - [facility])    [facility] [address] [id number] [phone]            Aug 16, 2017  1:30 PM CDT   Weight Loss Visit with Saranya Wl Diet 1, RD   [facility] [place] ([facility])    [facility] [address] [id number] [phone]              MyChart Information     [employer] gives you secure access to your electronic health record. If you see a primary care provider, you can also send messages to your care team and make appointments. If you have questions, please call your primary care clinic.  If you do not have a primary care provider, please call [phone] and they will assist you.        Care EveryWhere ID     This is your Care EveryWhere ID. This could be used by other organizations to access your [place] medical records  [id number]        Equal Access to Services     SATINDER XIE : Hadii jaye pope hadasho Soomaali, waaxda luqadaha, qaybta kaalmada adelawson, lara moody. So [facility] [phone].    ATENCIÓN: Si habla español, tiene a obrien disposición servicios gratuitos de asistencia lingüística. Llame al [phone].    We comply with applicable federal civil rights laws and [place] laws. We do not discriminate on the basis of race, color, national origin, age, disability sex, sexual orientation or gender identity.

## 2017-07-11 NOTE — MR AVS SNAPSHOT
After Visit Summary   7/11/2017    David Marino    MRN: [id number]           Patient Information     Date Of Birth          1965        Visit Information        Provider Department      7/11/2017 2:00 PM Cosme Foote PA-C [facility]      Today's Diagnoses     Obesity (BMI 30-39.9)    -  1    Sleep disturbance        Screening for iron deficiency anemia        Screening for endocrine, nutritional, metabolic and immunity disorder          Care Instructions    PLAN:    1.  Start working on task list items  2.  Schedule with PA and diet for next month          Follow-ups after your visit        Additional Services     NUTRITION REFERRAL       Type of nutrition instruction needed: Weight Loss  Your provider has referred you to:     [facility] Dieticians            SLEEP EVALUATION & MANAGEMENT REFERRAL - ADULT       Bariatric Fast Track  BMI:35  Symptoms:  obesity, witnessed apnea, snoring, ZARIA, nocturnal dyspnea, AM headaches, Elevated BP, HTN, Diabetes  SpO2:   CMP is pending in EPIC                  Follow-up notes from your care team     Return in 1 month (on 8/11/2017).      Future tests that were ordered for you today     Open Future Orders        Priority Expected Expires Ordered    CBC with platelets Routine 7/11/2017 1/7/2018 7/11/2017    Comprehensive metabolic panel Routine 7/11/2017 1/7/2018 7/11/2017    Hemoglobin A1c Routine 7/11/2017 1/7/2018 7/11/2017    Vitamin D Deficiency Routine 7/11/2017 1/7/2018 7/11/2017    SLEEP EVALUATION & MANAGEMENT REFERRAL - ADULT Routine  7/11/2018 7/11/2017            Who to contact     If you have questions or need follow up information about today's clinic visit or your schedule please contact [place] [facility] directly at [phone].  Normal or non-critical lab and imaging results will be communicated to you by  "MyChart, letter or phone within 4 business days after the clinic has received the results. If you do not hear from us within 7 days, please contact the clinic through [employer] or phone. If you have a critical or abnormal lab result, we will notify you by phone as soon as possible.  Submit refill requests through [employer] or call your pharmacy and they will forward the refill request to us. Please allow 3 business days for your refill to be completed.          Additional Information About Your Visit        [employer] Information     [employer] gives you secure access to your electronic health record. If you see a primary care provider, you can also send messages to your care team and make appointments. If you have questions, please call your primary care clinic.  If you do not have a primary care provider, please call [phone] and they will assist you.        Care EveryWhere ID     This is your Care EveryWhere ID. This could be used by other organizations to access your [place] medical records  [id number]        Your Vitals Were     Pulse Temperature Height Pulse Oximetry BMI (Body Mass Index)       86 97.5  F (36.4  C) 5' 10\" (1.778 m) 97% 35.67 kg/m2        Blood Pressure from Last 3 Encounters:   07/11/17 (!) 144/102   02/13/17 (!) 143/101   02/02/17 122/88    Weight from Last 3 Encounters:   07/11/17 248 lb 9.6 oz (112.8 kg)   02/13/17 253 lb (114.8 kg)   02/02/17 255 lb 4.8 oz (115.8 kg)              We Performed the Following     NUTRITION REFERRAL     OP ROOMING NOTE TO MICHELLE        Primary Care Provider Office Phone # Fax #    Jus Woodrow Oswald [phone]       [facility] [address] [address]        Equal Access to Services     SATINDER XIE : Jaime Barnes, favio ying, hetal walkerallady branch, lara moody. So [facility] [phone].    ATENCIÓN: Si habla español, tiene a obrien disposición servicios gratuitos " de asistencia lingüística. Karen al [phone].    We comply with applicable federal civil rights laws and [place] laws. We do not discriminate on the basis of race, color, national origin, age, disability sex, sexual orientation or gender identity.            Thank you!     Thank you for choosing [place] [facility]  for your care. Our goal is always to provide you with excellent care. Hearing back from our patients is one way we can continue to improve our services. Please take a few minutes to complete the written survey that you may receive in the mail after your visit with us. Thank you!             Your Updated Medication List - Protect others around you: Learn how to safely use, store and throw away your medicines at www.disposemymeds.org.          This list is accurate as of: 7/11/17  3:36 PM.  Always use your most recent med list.                   Brand Name Dispense Instructions for use Diagnosis    buPROPion 150 MG 24 hr tablet    WELLBUTRIN XL    30 tablet    Take 1 tablet (150 mg) by mouth every morning    Adjustment disorder with depressed mood       cyclobenzaprine 10 MG tablet    FLEXERIL    30 tablet    Take 0.5-1 tablets (5-10 mg) by mouth 3 times daily as needed for muscle spasms    Low back strain, initial encounter       desvenlafaxine succinate 100 MG 24 hr tablet    PRISTIQ    90 tablet    Take 1 tablet (100 mg) by mouth daily    Adjustment disorder with depressed mood       doxycycline Monohydrate 50 MG Caps capsule     60 capsule    Take 1 capsule (50 mg) by mouth 2 times daily    Rosacea       * glycopyrrolate 1 MG/5ML solution    CUVPOSA     Take by mouth once        * glycopyrrolate 2 MG Tabs    ROBINUL-FORTE    90 tablet    1 tab po q day    Generalized hyperhidrosis       Ipratropium-Albuterol  MCG/ACT inhaler    COMBIVENT RESPIMAT     Inhale 1 puff into the lungs 4 times daily        metroNIDAZOLE 0.75 % topical gel    METROGEL    45 g    Apply  topically 2 times daily    Rosacea       montelukast 10 MG tablet    SINGULAIR    90 tablet    Take 1 tablet (10 mg) by mouth At Bedtime    Environmental allergies       PRILOSEC OTC PO      Take 20 capsules by mouth 2 times daily        rosuvastatin 10 MG tablet    CRESTOR    90 tablet    TAKE 1 TABLET(10 MG) BY MOUTH DAILY    Mixed hyperlipidemia       testosterone 50 MG/5GM (1%) topical gel    ANDROGEL    450 g    Place 1 packet (50 mg) onto the skin daily Apply to the clean, dry intact skin of the shoulders, upper arms or abdomen.    Testosterone deficiency       TRANSDERM-SCOP (1.5 MG) 72 hr patch   Generic drug:  scopolamine           traZODone 50 MG tablet    DESYREL    90 tablet    Take 1 tablet (50 mg) by mouth At Bedtime    Other insomnia       valsartan 160 MG tablet    DIOVAN    90 tablet    Take 1 tablet (160 mg) by mouth daily    Benign essential hypertension       * Notice:  This list has 2 medication(s) that are the same as other medications prescribed for you. Read the directions carefully, and ask your doctor or other care provider to review them with you.

## 2017-07-12 NOTE — PROGRESS NOTES
"Name: AMADO MALIK  : 1965  Gender: Male  MRN: [id number]  Age: 52  INITIAL BARIATRIC NUTRITION ASSESSMENT  REASON FOR VISIT:  AMADO MALIK is a 52 year old Male presents today for initial nutrition visit for bariatric surgery.  DIAGNOSIS:  Class II Obesity  ANTHROPOMETRICS:  Height: 70 inches  Weight: 248 lbs  BMI: 35.6 kg/m2  CURRENT SUPPLEMENTS:  Multivitamin/Mineral: Multivitamin and Vitamin D   WEIGHT LOSS ATTEMPTS:  Other, Weight Watchers, HCG  Prescription diet medications:  Adipex (phentermine);Other  NUTRITION HISTORY:  Meals per day: 4  Snacking: Yes  Breakfast: McDonalds - Mcgriddle, hashbrown, donut or muffin, coffee, soda  Lunch: Lasagna, breadsticks, salad, cookie   Supper: pizza, chicken wings, ice cream cone  Snacks: donuts, candy bars, starbursts, peanuts/pistachios, chips  Drinks: coffee, soda, water  Emotional eating: Yes  Binge eating: Yes  Night eating: No  Can you afford three meals per day? Yes  Can you afford the $50-60 per month for vitamins? Yes  Comments: Emotional eating d/t stress and boredom. Answered \"yes\" to binge eating however seems to be simply overeating. Denies any feelings of compulsiveness, shame, etc associated with episodes. Has been thinking about surgery for about a year, knows a couple other people who have had surgery and been fairly successful.  DINING OUT HISTORY:  Frequency per week: Nearly every day  Location: fast food  Type of food: burgers, malts, fries, chicken sandwiches,   PHYSICAL ACTIVITY:  Type: Walking;Other  Frequency: < 1x/week  Duration (min): 30  NUTRITION DIAGNOSIS:  Patient with excessive oral food/beverage intake related to intake of calorically dense food/beverages at meals and/or snacks as evidenced by BMI of 35.6kg/m2.   INTERVENTION:  Nutrition Prescription: Recommend nutrient/ energy modification   Goals:  Begin taking calcium per guidelines.   Check multivitamin and vitamin D for appropriateness.   Do not exceed 4 cups of coffee/day. " Eliminate soda.   Aim for 64oz of water each day.   walk for 30 minutes, 3x/week in additional to playing softball each week.   Implementation:  Provided written guidelines for Laparoscopic Yamilet-en-Y Gastric Bypass surgery.  Provided weight loss suggestions.  Explained diet changes that would occur after surgery.  Emphasized importance of diet and lifestyle modifications.  Discussed necessity for chewable multivitamin/ mineral supplements, calcium with vitamin D, vitamin B-12, vitamin D, Iron and vitamin C supplements.  NUTRITION MONITORING AND EVALUATION:  Verbalizes understanding of surgery diet guidelines.  Anticipated compliance: Good    FOLLOW UP: 1 month  Recommend 5 follow up visits to assist with lifestyle changes, per insurance requirements.  RD name and number provided.  TIME SPENT WITH PATIENT: 55 minutes  Lizbeth Campos RD, LD  Clinical Dietitian

## 2017-07-14 DIAGNOSIS — L71.9 ROSACEA: ICD-10-CM

## 2017-07-14 RX ORDER — METRONIDAZOLE 7.5 MG/G
GEL TOPICAL
Qty: 45 G | Refills: 0 | Status: SHIPPED | OUTPATIENT
Start: 2017-07-14 | End: 2017-11-13

## 2017-07-14 RX ORDER — METRONIDAZOLE 7.5 MG/G
GEL TOPICAL
Qty: 45 G | Refills: 0 | Status: SHIPPED | OUTPATIENT
Start: 2017-07-14 | End: 2017-08-07

## 2017-07-14 NOTE — TELEPHONE ENCOUNTER
Pt returning call, notified that he needs to make an appointment for further refills.     Anu Jacques MA

## 2017-08-07 ENCOUNTER — OFFICE VISIT (OUTPATIENT)
Dept: FAMILY MEDICINE | Facility: CLINIC | Age: 52
End: 2017-08-07
Payer: COMMERCIAL

## 2017-08-07 VITALS
WEIGHT: 252 LBS | HEIGHT: 70 IN | BODY MASS INDEX: 36.08 KG/M2 | HEART RATE: 87 BPM | SYSTOLIC BLOOD PRESSURE: 140 MMHG | RESPIRATION RATE: 16 BRPM | OXYGEN SATURATION: 98 % | DIASTOLIC BLOOD PRESSURE: 90 MMHG | TEMPERATURE: 97.8 F

## 2017-08-07 DIAGNOSIS — Z13.0 SCREENING FOR ENDOCRINE, NUTRITIONAL, METABOLIC AND IMMUNITY DISORDER: ICD-10-CM

## 2017-08-07 DIAGNOSIS — L71.9 ROSACEA: ICD-10-CM

## 2017-08-07 DIAGNOSIS — Z13.29 SCREENING FOR ENDOCRINE, NUTRITIONAL, METABOLIC AND IMMUNITY DISORDER: ICD-10-CM

## 2017-08-07 DIAGNOSIS — F43.21 ADJUSTMENT DISORDER WITH DEPRESSED MOOD: ICD-10-CM

## 2017-08-07 DIAGNOSIS — I10 BENIGN ESSENTIAL HYPERTENSION: Primary | ICD-10-CM

## 2017-08-07 DIAGNOSIS — Z13.21 SCREENING FOR ENDOCRINE, NUTRITIONAL, METABOLIC AND IMMUNITY DISORDER: ICD-10-CM

## 2017-08-07 DIAGNOSIS — G47.09 OTHER INSOMNIA: ICD-10-CM

## 2017-08-07 DIAGNOSIS — R61 GENERALIZED HYPERHIDROSIS: ICD-10-CM

## 2017-08-07 DIAGNOSIS — K21.9 GASTROESOPHAGEAL REFLUX DISEASE WITHOUT ESOPHAGITIS: ICD-10-CM

## 2017-08-07 DIAGNOSIS — Z91.09 ENVIRONMENTAL ALLERGIES: ICD-10-CM

## 2017-08-07 DIAGNOSIS — Z13.228 SCREENING FOR ENDOCRINE, NUTRITIONAL, METABOLIC AND IMMUNITY DISORDER: ICD-10-CM

## 2017-08-07 DIAGNOSIS — E78.2 MIXED HYPERLIPIDEMIA: ICD-10-CM

## 2017-08-07 DIAGNOSIS — Z13.0 SCREENING FOR IRON DEFICIENCY ANEMIA: ICD-10-CM

## 2017-08-07 LAB
ERYTHROCYTE [DISTWIDTH] IN BLOOD BY AUTOMATED COUNT: 14 % (ref 10–15)
HBA1C MFR BLD: 5.6 % (ref 4.3–6)
HCT VFR BLD AUTO: 41 % (ref 40–53)
HGB BLD-MCNC: 13.8 G/DL (ref 13.3–17.7)
MCH RBC QN AUTO: 28.5 PG (ref 26.5–33)
MCHC RBC AUTO-ENTMCNC: 33.7 G/DL (ref 31.5–36.5)
MCV RBC AUTO: 85 FL (ref 78–100)
PLATELET # BLD AUTO: 172 10E9/L (ref 150–450)
RBC # BLD AUTO: 4.84 10E12/L (ref 4.4–5.9)
WBC # BLD AUTO: 6.2 10E9/L (ref 4–11)

## 2017-08-07 PROCEDURE — 82306 VITAMIN D 25 HYDROXY: CPT | Performed by: FAMILY MEDICINE

## 2017-08-07 PROCEDURE — 80053 COMPREHEN METABOLIC PANEL: CPT | Performed by: FAMILY MEDICINE

## 2017-08-07 PROCEDURE — 85027 COMPLETE CBC AUTOMATED: CPT | Performed by: FAMILY MEDICINE

## 2017-08-07 PROCEDURE — 36415 COLL VENOUS BLD VENIPUNCTURE: CPT | Performed by: FAMILY MEDICINE

## 2017-08-07 PROCEDURE — 99214 OFFICE O/P EST MOD 30 MIN: CPT | Performed by: FAMILY MEDICINE

## 2017-08-07 PROCEDURE — 83036 HEMOGLOBIN GLYCOSYLATED A1C: CPT | Performed by: FAMILY MEDICINE

## 2017-08-07 RX ORDER — OMEPRAZOLE 40 MG/1
40 CAPSULE, DELAYED RELEASE ORAL DAILY
Qty: 90 CAPSULE | Refills: 3 | Status: SHIPPED | OUTPATIENT
Start: 2017-08-07 | End: 2018-03-05

## 2017-08-07 RX ORDER — MONTELUKAST SODIUM 10 MG/1
10 TABLET ORAL AT BEDTIME
Qty: 90 TABLET | Refills: 3 | Status: SHIPPED | OUTPATIENT
Start: 2017-08-07 | End: 2017-12-13

## 2017-08-07 RX ORDER — METRONIDAZOLE 7.5 MG/G
GEL TOPICAL 2 TIMES DAILY
Qty: 45 G | Refills: 11 | Status: SHIPPED | OUTPATIENT
Start: 2017-08-07 | End: 2018-03-05

## 2017-08-07 RX ORDER — DESVENLAFAXINE 100 MG/1
100 TABLET, EXTENDED RELEASE ORAL DAILY
Qty: 90 TABLET | Refills: 3 | Status: SHIPPED | OUTPATIENT
Start: 2017-08-07 | End: 2017-11-13

## 2017-08-07 RX ORDER — DOXYCYCLINE 50 MG/1
50 CAPSULE ORAL 2 TIMES DAILY
Qty: 60 CAPSULE | Refills: 11 | Status: SHIPPED | OUTPATIENT
Start: 2017-08-07 | End: 2018-03-05

## 2017-08-07 RX ORDER — TRAZODONE HYDROCHLORIDE 50 MG/1
50 TABLET, FILM COATED ORAL AT BEDTIME
Qty: 90 TABLET | Refills: 3 | Status: SHIPPED | OUTPATIENT
Start: 2017-08-07 | End: 2018-03-05

## 2017-08-07 RX ORDER — ROSUVASTATIN CALCIUM 10 MG/1
TABLET, COATED ORAL
Qty: 90 TABLET | Refills: 3 | Status: SHIPPED | OUTPATIENT
Start: 2017-08-07 | End: 2018-03-05

## 2017-08-07 RX ORDER — GLYCOPYRROLATE 2 MG/1
TABLET ORAL
Qty: 90 TABLET | Refills: 3 | Status: SHIPPED | OUTPATIENT
Start: 2017-08-07 | End: 2017-12-13

## 2017-08-07 RX ORDER — BUPROPION HYDROCHLORIDE 150 MG/1
150 TABLET ORAL EVERY MORNING
Qty: 90 TABLET | Refills: 3 | Status: SHIPPED | OUTPATIENT
Start: 2017-08-07 | End: 2017-12-13

## 2017-08-07 RX ORDER — VALSARTAN 320 MG/1
320 TABLET ORAL DAILY
Qty: 90 TABLET | Refills: 3 | Status: SHIPPED | OUTPATIENT
Start: 2017-08-07 | End: 2018-03-05

## 2017-08-07 ASSESSMENT — PATIENT HEALTH QUESTIONNAIRE - PHQ9: SUM OF ALL RESPONSES TO PHQ QUESTIONS 1-9: 4

## 2017-08-07 NOTE — PROGRESS NOTES
SUBJECTIVE:                                                    David Marino is a 52 year old male who presents to clinic today for the following health issues:      Recheck bp for bariatric surgery and medication refills  Previously BP has been high.    BP Readings from Last 6 Encounters:   08/07/17 140/90   07/11/17 (!) 144/102   02/13/17 (!) 143/101   02/02/17 122/88   11/28/16 (!) 142/101   10/14/16 (!) 138/99     Could use some improvement certainly    Also reviewed other chronic conditions, see below      ROS: As per HPI.  Constitutional: no recent illness, no fevers/sweats/chills  Skin: no rash    I have reviewed and updated the patient's past medical history in the EMR. Current problems are:    Patient Active Problem List   Diagnosis     Chromhidrosis     Environmental allergies     Gastroesophageal reflux disease     Hiatal hernia     Mixed hyperlipidemia     Hypertriglyceridemia     Benign essential hypertension     Insomnia     Testosterone deficiency     Gastric polyposis     Adiposity     Rib pain     Rosacea     Adjustment disorder with depressed mood     Low back strain, initial encounter     Obesity (BMI 30-39.9)     Sleep disturbance     Past Surgical History:   Procedure Laterality Date     APPENDECTOMY       AS REMOVAL OF TONSILS,<14 Y/O       C SYMPATHECTOMY,CERVICAL  2003       Social History   Substance Use Topics     Smoking status: Former Smoker     Smokeless tobacco: Never Used      Comment: smoker 30+ years ago     Alcohol use 0.0 oz/week     0 Standard drinks or equivalent per week      Comment: occ 1-2 x month     Family History   Problem Relation Age of Onset     DIABETES Mother      Coronary Artery Disease Mother      Hypertension Mother      Depression Mother      Asthma Mother      Coronary Artery Disease Father      Hypertension Father      Hyperlipidemia Father      Depression Maternal Grandmother      CEREBROVASCULAR DISEASE No family hx of      Breast Cancer No family hx of       Colon Cancer No family hx of      Prostate Cancer No family hx of      Other Cancer No family hx of      Anxiety Disorder No family hx of      MENTAL ILLNESS No family hx of      Substance Abuse No family hx of      Anesthesia Reaction No family hx of      OSTEOPOROSIS No family hx of      Genetic Disorder No family hx of      Thyroid Disease No family hx of      Obesity No family hx of      Unknown/Adopted No family hx of          Allergies, reviewed:     Allergies   Allergen Reactions     Dust Mite Extract      Honey      Pollen Extract      Sulfa Drugs        Current Outpatient Prescriptions   Medication Sig Dispense Refill     rosuvastatin (CRESTOR) 10 MG tablet TAKE 1 TABLET(10 MG) BY MOUTH DAILY 90 tablet 3     doxycycline Monohydrate 50 MG CAPS capsule Take 1 capsule (50 mg) by mouth 2 times daily 60 capsule 11     buPROPion (WELLBUTRIN XL) 150 MG 24 hr tablet Take 1 tablet (150 mg) by mouth every morning 90 tablet 3     valsartan (DIOVAN) 320 MG tablet Take 1 tablet (320 mg) by mouth daily 90 tablet 3     desvenlafaxine succinate (PRISTIQ) 100 MG 24 hr tablet Take 1 tablet (100 mg) by mouth daily 90 tablet 3     traZODone (DESYREL) 50 MG tablet Take 1 tablet (50 mg) by mouth At Bedtime 90 tablet 3     montelukast (SINGULAIR) 10 MG tablet Take 1 tablet (10 mg) by mouth At Bedtime 90 tablet 3     metroNIDAZOLE (METROGEL) 0.75 % topical gel Apply topically 2 times daily 45 g 11     omeprazole (PRILOSEC) 40 MG capsule Take 1 capsule (40 mg) by mouth daily Take 30-60 minutes before a meal. 90 capsule 3     glycopyrrolate (ROBINUL-FORTE) 2 MG TABS 1 tab po q day 90 tablet 3     metroNIDAZOLE (METROGEL) 0.75 % topical gel APPLY TWICE DAILY 45 g 0     [DISCONTINUED] rosuvastatin (CRESTOR) 10 MG tablet TAKE 1 TABLET(10 MG) BY MOUTH DAILY 90 tablet 1     [DISCONTINUED] buPROPion (WELLBUTRIN XL) 150 MG 24 hr tablet Take 1 tablet (150 mg) by mouth every morning 30 tablet 6     [DISCONTINUED] valsartan (DIOVAN) 160  "MG tablet Take 1 tablet (160 mg) by mouth daily 90 tablet 3     [DISCONTINUED] Desvenlafaxine Succinate (PRISTIQ) 100 MG TB24 24 hr tablet Take 1 tablet (100 mg) by mouth daily 90 tablet 3     [DISCONTINUED] traZODone (DESYREL) 50 MG tablet Take 1 tablet (50 mg) by mouth At Bedtime 90 tablet 3     [DISCONTINUED] montelukast (SINGULAIR) 10 MG tablet Take 1 tablet (10 mg) by mouth At Bedtime 90 tablet 3     TRANSDERM-SCOP, 1.5 MG, (1.5mg base/3day) patch   3       Objective:  /90 (BP Location: Left arm, Cuff Size: Adult Large)  Pulse 87  Temp 97.8  F (36.6  C) (Oral)  Resp 16  Ht 5' 10\" (1.778 m)  Wt 252 lb (114.3 kg)  SpO2 98%  BMI 36.16 kg/m2  General Appearance: Pleasant, alert, WN/WD in no acute respiratory distress.  Neurologic Exam: Nonfocal, no tremor. Normal gait.  Psychiatric Exam: Alert - appropriate, normal affect    ASSESSMENT/PLAN:  1. Benign essential hypertension    Needs improvement increase    - valsartan (DIOVAN) 320 MG tablet; Take 1 tablet (320 mg) by mouth daily  Dispense: 90 tablet; Refill: 3    2. Mixed hyperlipidemia  Stable  - rosuvastatin (CRESTOR) 10 MG tablet; TAKE 1 TABLET(10 MG) BY MOUTH DAILY  Dispense: 90 tablet; Refill: 3    3. Rosacea  Stable  - doxycycline Monohydrate 50 MG CAPS capsule; Take 1 capsule (50 mg) by mouth 2 times daily  Dispense: 60 capsule; Refill: 11  - metroNIDAZOLE (METROGEL) 0.75 % topical gel; Apply topically 2 times daily  Dispense: 45 g; Refill: 11    4. Adjustment disorder with depressed mood  Stable  - buPROPion (WELLBUTRIN XL) 150 MG 24 hr tablet; Take 1 tablet (150 mg) by mouth every morning  Dispense: 90 tablet; Refill: 3  - desvenlafaxine succinate (PRISTIQ) 100 MG 24 hr tablet; Take 1 tablet (100 mg) by mouth daily  Dispense: 90 tablet; Refill: 3    5. Other insomnia  Stable  - traZODone (DESYREL) 50 MG tablet; Take 1 tablet (50 mg) by mouth At Bedtime  Dispense: 90 tablet; Refill: 3    6. Environmental allergies  Stable  - montelukast " (SINGULAIR) 10 MG tablet; Take 1 tablet (10 mg) by mouth At Bedtime  Dispense: 90 tablet; Refill: 3    7. Gastroesophageal reflux disease without esophagitis  Stable  - omeprazole (PRILOSEC) 40 MG capsule; Take 1 capsule (40 mg) by mouth daily Take 30-60 minutes before a meal.  Dispense: 90 capsule; Refill: 3    8. Generalized hyperhidrosis  Stable  - glycopyrrolate (ROBINUL-FORTE) 2 MG TABS; 1 tab po q day  Dispense: 90 tablet; Refill: 3    Return to clinic as needed or for follow-up in preparation for bariatric surgery      Jus Oswald MD MPH

## 2017-08-07 NOTE — MR AVS SNAPSHOT
After Visit Summary   8/7/2017    David Marino    MRN: [id number]           Patient Information     Date Of Birth          1965        Visit Information        Provider Department      8/7/2017 3:00 PM Jus Oswald MD [facility]        Today's Diagnoses     Benign essential hypertension    -  1    Mixed hyperlipidemia        Rosacea        Adjustment disorder with depressed mood        Other insomnia        Environmental allergies        Gastroesophageal reflux disease without esophagitis        Generalized hyperhidrosis           Follow-ups after your visit        Follow-up notes from your care team     Return if symptoms worsen or fail to improve.      Your next 10 appointments already scheduled     Aug 22, 2017  1:30 PM CDT   Weight Loss Visit with Saranya Schroeder Diet 1, RD   [facility] ([facility]    [address] [place] [id number] [phone]            Aug 22, 2017  2:00 PM CDT   Weight Loss Visit with Cosme Foote PA-C   [facility] ([facility])    [address] [place] [id number] [phone]              Who to contact     If you have questions or need follow up information about today's clinic visit or your schedule please contact [facility] directly at [phone].  Normal or non-critical lab and imaging results will be communicated to you by MyChart, letter or phone within 4 business days after the clinic has received the results. If you do not hear from us within 7 days, please contact the clinic through MyChart or phone. If you have a critical or abnormal lab result, we will notify you by phone as soon as possible.  Submit refill requests through [employer] or call your pharmacy and they will forward the refill request to us. Please allow 3 business days for your refill to be  "completed.          Additional Information About Your Visit        "[employer]hart Information     [employer] gives you secure access to your electronic health record. If you see a primary care provider, you can also send messages to your care team and make appointments. If you have questions, please call your primary care clinic.  If you do not have a primary care provider, please call [phone] and they will assist you.        Care EveryWhere ID     This is your Care EveryWhere ID. This could be used by other organizations to access your [facility] medical records  [id number]        Your Vitals Were     Pulse Temperature Respirations Height Pulse Oximetry BMI (Body Mass Index)    87 97.8  F (36.6  C) (Oral) 16 5' 10\" (1.778 m) 98% 36.16 kg/m2       Blood Pressure from Last 3 Encounters:   08/07/17 140/90   07/11/17 (!) 144/102   02/13/17 (!) 143/101    Weight from Last 3 Encounters:   08/07/17 252 lb (114.3 kg)   07/11/17 248 lb 9.6 oz (112.8 kg)   02/13/17 253 lb (114.8 kg)              Today, you had the following     No orders found for display         Today's Medication Changes          These changes are accurate as of: 8/7/17  3:35 PM.  If you have any questions, ask your nurse or doctor.               Start taking these medicines.        Dose/Directions    omeprazole 40 MG capsule   Commonly known as:  priLOSEC   Used for:  Gastroesophageal reflux disease without esophagitis   Replaces:  PRILOSEC OTC PO   Started by:  Jus Oswald MD        Dose:  40 mg   Take 1 capsule (40 mg) by mouth daily Take 30-60 minutes before a meal.   Quantity:  90 capsule   Refills:  3         These medicines have changed or have updated prescriptions.        Dose/Directions    glycopyrrolate 2 MG Tabs   Commonly known as:  ROBINUL-FORTE   This may have changed:  Another medication with the same name was removed. Continue taking this medication, and follow the directions you see here.   Used for:  Generalized hyperhidrosis "   Changed by:  Jus Oswald MD        1 tab po q day   Quantity:  90 tablet   Refills:  3       * metroNIDAZOLE 0.75 % topical gel   Commonly known as:  METROGEL   This may have changed:  Another medication with the same name was changed. Make sure you understand how and when to take each.   Used for:  Rosacea   Changed by:  Meron Quijano MD        APPLY TWICE DAILY   Quantity:  45 g   Refills:  0       * metroNIDAZOLE 0.75 % topical gel   Commonly known as:  METROGEL   This may have changed:  See the new instructions.   Used for:  Rosacea   Changed by:  Jus Oswald MD        Apply topically 2 times daily   Quantity:  45 g   Refills:  11       rosuvastatin 10 MG tablet   Commonly known as:  CRESTOR   This may have changed:  See the new instructions.   Used for:  Mixed hyperlipidemia   Changed by:  Jus Oswald MD        TAKE 1 TABLET(10 MG) BY MOUTH DAILY   Quantity:  90 tablet   Refills:  3       valsartan 320 MG tablet   Commonly known as:  DIOVAN   This may have changed:    - medication strength  - how much to take   Used for:  Benign essential hypertension   Changed by:  Jus Oswald MD        Dose:  320 mg   Take 1 tablet (320 mg) by mouth daily   Quantity:  90 tablet   Refills:  3       * Notice:  This list has 2 medication(s) that are the same as other medications prescribed for you. Read the directions carefully, and ask your doctor or other care provider to review them with you.      Stop taking these medicines if you haven't already. Please contact your care team if you have questions.     cyclobenzaprine 10 MG tablet   Commonly known as:  FLEXERIL   Stopped by:  Jus Oswald MD           Ipratropium-Albuterol  MCG/ACT inhaler   Commonly known as:  COMBIVENT RESPIMAT   Stopped by:  Jus Oswald MD           PRILOSEC OTC PO   Replaced by:  omeprazole 40 MG capsule   Stopped by:  Jus Oswald MD            testosterone 50 MG/5GM (1%) topical gel   Commonly known as:  ANDROGEL   Stopped by:  Jus Oswald MD                Where to get your medicines      These medications were sent to [id number] - [place], [place] - [address] AT [facility]  [address], [place] [facility] [address]    Hours:  24-hours Phone:  [phone]     buPROPion 150 MG 24 hr tablet    desvenlafaxine succinate 100 MG 24 hr tablet    doxycycline Monohydrate 50 MG Caps capsule    glycopyrrolate 2 MG Tabs    metroNIDAZOLE 0.75 % topical gel    montelukast 10 MG tablet    omeprazole 40 MG capsule    rosuvastatin 10 MG tablet    traZODone 50 MG tablet    valsartan 320 MG tablet                Primary Care Provider Office Phone # Fax #    Jus Woodrow Oswald [phone]       [facility] [address] [facility] [address]        Equal Access to Services     EMY XIE AH: Hadii aad ku hadasho Soomaali, waaxda luqadaha, qaybta kaalmada adeegyada, waxay idiin hayaan adeeg kharash lailana . So [facility] [phone].    ATENCIÓN: Si habla español, tiene a obrien disposición servicios gratuitos de asistencia lingüística. [facility] [phone].    We comply with applicable federal civil rights laws and [place] laws. We do not discriminate on the basis of race, color, national origin, age, disability sex, sexual orientation or gender identity.            Thank you!     Thank you for choosing [facility]  for your care. Our goal is always to provide you with excellent care. Hearing back from our patients is one way we can continue to improve our services. Please take a few minutes to complete the written survey that you may receive in the mail after your visit with us. Thank you!             Your Updated Medication List - Protect others around you: Learn how to safely use, store and throw away your medicines at www.disposemymeds.org.          This list is accurate  as of: 8/7/17  3:35 PM.  Always use your most recent med list.                   Brand Name Dispense Instructions for use Diagnosis    buPROPion 150 MG 24 hr tablet    WELLBUTRIN XL    90 tablet    Take 1 tablet (150 mg) by mouth every morning    Adjustment disorder with depressed mood       desvenlafaxine succinate 100 MG 24 hr tablet    PRISTIQ    90 tablet    Take 1 tablet (100 mg) by mouth daily    Adjustment disorder with depressed mood       doxycycline Monohydrate 50 MG Caps capsule     60 capsule    Take 1 capsule (50 mg) by mouth 2 times daily    Rosacea       glycopyrrolate 2 MG Tabs    ROBINUL-FORTE    90 tablet    1 tab po q day    Generalized hyperhidrosis       * metroNIDAZOLE 0.75 % topical gel    METROGEL    45 g    APPLY TWICE DAILY    Rosacea       * metroNIDAZOLE 0.75 % topical gel    METROGEL    45 g    Apply topically 2 times daily    Rosacea       montelukast 10 MG tablet    SINGULAIR    90 tablet    Take 1 tablet (10 mg) by mouth At Bedtime    Environmental allergies       omeprazole 40 MG capsule    priLOSEC    90 capsule    Take 1 capsule (40 mg) by mouth daily Take 30-60 minutes before a meal.    Gastroesophageal reflux disease without esophagitis       rosuvastatin 10 MG tablet    CRESTOR    90 tablet    TAKE 1 TABLET(10 MG) BY MOUTH DAILY    Mixed hyperlipidemia       TRANSDERM-SCOP (1.5 MG) 72 hr patch   Generic drug:  scopolamine           traZODone 50 MG tablet    DESYREL    90 tablet    Take 1 tablet (50 mg) by mouth At Bedtime    Other insomnia       valsartan 320 MG tablet    DIOVAN    90 tablet    Take 1 tablet (320 mg) by mouth daily    Benign essential hypertension       * Notice:  This list has 2 medication(s) that are the same as other medications prescribed for you. Read the directions carefully, and ask your doctor or other care provider to review them with you.

## 2017-08-07 NOTE — NURSING NOTE
"Chief Complaint   Patient presents with     Hypertension     Refill Request     initial /90 (BP Location: Left arm, Cuff Size: Adult Large)  Pulse 87  Temp 97.8  F (36.6  C) (Oral)  Resp 16  Ht 5' 10\" (1.778 m)  Wt 252 lb (114.3 kg)  SpO2 98%  BMI 36.16 kg/m2 Estimated body mass index is 36.16 kg/(m^2) as calculated from the following:    Height as of this encounter: 5' 10\" (1.778 m).    Weight as of this encounter: 252 lb (114.3 kg).  BP completed using cuff size: large.  L  arm      Health Maintenance that is potentially due pending provider review:  NONE    n/a    Jose Ha ma  "

## 2017-08-08 LAB
ALBUMIN SERPL-MCNC: 4.1 G/DL (ref 3.4–5)
ALP SERPL-CCNC: 91 U/L (ref 40–150)
ALT SERPL W P-5'-P-CCNC: 84 U/L (ref 0–70)
ANION GAP SERPL CALCULATED.3IONS-SCNC: 9 MMOL/L (ref 3–14)
AST SERPL W P-5'-P-CCNC: 39 U/L (ref 0–45)
BILIRUB SERPL-MCNC: 0.5 MG/DL (ref 0.2–1.3)
BUN SERPL-MCNC: 15 MG/DL (ref 7–30)
CALCIUM SERPL-MCNC: 9.4 MG/DL (ref 8.5–10.1)
CHLORIDE SERPL-SCNC: 105 MMOL/L (ref 94–109)
CO2 SERPL-SCNC: 26 MMOL/L (ref 20–32)
CREAT SERPL-MCNC: 1.15 MG/DL (ref 0.66–1.25)
DEPRECATED CALCIDIOL+CALCIFEROL SERPL-MC: 62 UG/L (ref 20–75)
GFR SERPL CREATININE-BSD FRML MDRD: 67 ML/MIN/1.7M2
GLUCOSE SERPL-MCNC: 100 MG/DL (ref 70–99)
POTASSIUM SERPL-SCNC: 4.3 MMOL/L (ref 3.4–5.3)
PROT SERPL-MCNC: 7.5 G/DL (ref 6.8–8.8)
SODIUM SERPL-SCNC: 140 MMOL/L (ref 133–144)

## 2017-08-08 ASSESSMENT — ASTHMA QUESTIONNAIRES: ACT_TOTALSCORE: 23

## 2017-08-11 ENCOUNTER — MYC MEDICAL ADVICE (OUTPATIENT)
Dept: SURGERY | Facility: CLINIC | Age: 52
End: 2017-08-11

## 2017-08-15 NOTE — TELEPHONE ENCOUNTER
Called and left message for patient stating a letter is ready for her and if she has a fax number it can be sent over.  Asked that she call clinic.

## 2017-08-22 ENCOUNTER — OFFICE VISIT (OUTPATIENT)
Dept: SURGERY | Facility: CLINIC | Age: 52
End: 2017-08-22
Payer: COMMERCIAL

## 2017-08-22 VITALS
SYSTOLIC BLOOD PRESSURE: 140 MMHG | HEART RATE: 73 BPM | DIASTOLIC BLOOD PRESSURE: 92 MMHG | BODY MASS INDEX: 36.45 KG/M2 | WEIGHT: 254.6 LBS | HEIGHT: 70 IN

## 2017-08-22 VITALS — BODY MASS INDEX: 36.53 KG/M2 | WEIGHT: 254.6 LBS

## 2017-08-22 DIAGNOSIS — E66.9 OBESITY (BMI 30-39.9): ICD-10-CM

## 2017-08-22 DIAGNOSIS — E66.9 OBESITY (BMI 30-39.9): Primary | ICD-10-CM

## 2017-08-22 PROCEDURE — 97803 MED NUTRITION INDIV SUBSEQ: CPT | Performed by: DIETITIAN, REGISTERED

## 2017-08-22 PROCEDURE — 99214 OFFICE O/P EST MOD 30 MIN: CPT | Performed by: PHYSICIAN ASSISTANT

## 2017-08-22 NOTE — PROGRESS NOTES
"PRE-SURGICAL WEIGHT LOSS NUTRITION APPOINTMENT  DATE OF VISIT: 2017  Name: AMADO MALIK  : 1965  Gender: Male  MRN: [id number]  Age: 52  ASSESSMENT  REASON FOR VISIT:  AMADO MALIK is a 52 year old Male presents today for a pre-surgical weight loss follow-up appointment.  DIAGNOSIS:  Class II Obesity.    ANTHROPOMETRICS:  Height: 70 inches  Initial Weight: 248 lbs  Weight last visit: 248 lbs  Current Weight: 254.6 lbs  BMI: 36.5 kg/m2    NUTRITION HISTORY:  Breakfast: [employer] - Mcgriddle, hash brown (less often) or oatmeal with brown sugar and skim milk-within 1 hour of waking  Lunch: Lasagna, breadsticks, salad, cookie or salad bar with light dressing work  Supper: pizza, chicken wings, ice cream cone  Snacks: 1 handful of starbursts or 1 handful peanuts/pistachios   Drinks: coffee, water  Consuming liquid calories: rare  Eating 3 meals per day: Yes  Eating slower: No  Chewing foods thoroughly: Chew foods thoroughly  Take 30 minutes to consume each meal: No  Fluids and meals separate by at least 30 minutes: No  Comments: Emotional eating d/t stress and boredom. Answered \"yes\" to binge eating however seems to be simply overeating. Denies any feelings of compulsiveness, shame, etc associated with episodes. Has been thinking about surgery for about a year, knows a couple other people who have had surgery and been fairly successful.  17: patient admits he has been struggling with thinking there will be some food he may never be able to eat again; over the past month pt has done extensive research on weight loss surgery; says he is in the contemplation phase of thinking about weight loss surgery; soft ball has ended so pt wants to start intentional exercise this month; shared that he is glad he will need 6 months of structured weight loss to prepare for surgery  Desired Surgical Procedure: gastric bypass  PHYSICAL ACTIVITY:  Type: Walking;Other  Frequency: 3-4x/week  Duration (min): " 30  DIAGNOSIS:  Previous Nutrition Diagnosis: Obesity related to excess energy intake as evidenced by BMI of 35.6  no change, modified below  Previous goals:  Begin taking calcium per guidelines-not met  Check multivitamin and vitamin D for appropriateness-not met  Do not exceed 4 cups of coffee/day-met  Eliminate soda-met  Aim for 64oz of water each day-not met  walk for 30 minutes, 3x/week in additional to playing softball each week-not met  Current Nutrition Diagnosis: Obesity related to excess energy intake as evidenced by BMI of 36.5  IMPLEMENTATION:  Nutrition Prescription: Recommended energy/nutrient modification  Goals:  Start 9605-6062 mg calcium with vitamin D (2-3 separate doses)  Aim to drink 48-64 oz of fluid per day  Exercise 3 X per week for 30 minutes  Implementation:  - Discussed progress towards previous goals  - Reinforced importance of making behavior changes in preparation for bariatric surgery.  NUTRITION MONITORING AND EVALUATION:  Anticipated compliance: Fair to good  Patient verbalized good understanding of bariatric diet guidelines.    Follow up: 1 month  # of visits needed: 4  Cleared by RD: No  TIME SPENT WITH PATIENT: 25 minutes  Bryan Aiken RD, LD  [facility]  [phone]

## 2017-08-22 NOTE — MR AVS SNAPSHOT
After Visit Summary   8/22/2017    David Marino    MRN: [id number]           Patient Information     Date Of Birth          1965        Visit Information        Provider Department      8/22/2017 2:00 PM Cosme Foote PA-C [facility]      Today's Diagnoses     Obesity (BMI 30-39.9)    -  1       Follow-ups after your visit        Your next 10 appointments already scheduled     Sep 25, 2017  8:30 AM CDT   Weight Loss Visit with Saranya Schroeder Diet 1, RD   [facility] ([facility])    [address] [place] [address] [phone]              Who to contact     If you have questions or need follow up information about today's clinic visit or your schedule please contact [facility] directly at [phone].  Normal or non-critical lab and imaging results will be communicated to you by [employer], letter or phone within 4 business days after the clinic has received the results. If you do not hear from us within 7 days, please contact the clinic through [employer] or phone. If you have a critical or abnormal lab result, we will notify you by phone as soon as possible.  Submit refill requests through [employer] or call your pharmacy and they will forward the refill request to us. Please allow 3 business days for your refill to be completed.          Additional Information About Your Visit        MyChart Information     [employer] gives you secure access to your electronic health record. If you see a primary care provider, you can also send messages to your care team and make appointments. If you have questions, please call your primary care clinic.  If you do not have a primary care provider, please call [phone] and they will assist you.        Care EveryWhere ID     This is your Care EveryWhere ID. This could be  "used by other organizations to access your [facility] medical records  [id number]        Your Vitals Were     Pulse Height BMI (Body Mass Index)             73 5' 10\" (1.778 m) 36.53 kg/m2          Blood Pressure from Last 3 Encounters:   08/22/17 (!) 140/92   08/07/17 140/90   07/11/17 (!) 144/102    Weight from Last 3 Encounters:   08/22/17 254 lb 9.6 oz (115.5 kg)   08/22/17 254 lb 9.6 oz (115.5 kg)   08/07/17 252 lb (114.3 kg)              We Performed the Following     OP ROOMING NOTE TO MICHELLE        Primary Care Provider Office Phone # Fax #    Jus Woodrow Oswald [phone] [address] [facility] [address]        Equal Access to Services     [facility]: Hadii jaye pope hadasho [place], waaxda luqadaha, qaybta kaalmada adeegyada, lara haney haykushal danielson . So [facility] [phone].    ATENCIÓN: Si habla español, tiene a obrien disposición servicios gratuitos de asistencia lingüística. Llame al [phone].    We comply with applicable federal civil rights laws and [place] laws. We do not discriminate on the basis of race, color, national origin, age, disability sex, sexual orientation or gender identity.            Thank you!     Thank you for choosing [facility]  for your care. Our goal is always to provide you with excellent care. Hearing back from our patients is one way we can continue to improve our services. Please take a few minutes to complete the written survey that you may receive in the mail after your visit with us. Thank you!             Your Updated Medication List - Protect others around you: Learn how to safely use, store and throw away your medicines at www.disposemymeds.org.          This list is accurate as of: 8/22/17  2:52 PM.  Always use your most recent med list.                   Brand Name Dispense Instructions for use Diagnosis    buPROPion 150 MG 24 hr tablet    WELLBUTRIN XL    90 tablet    Take 1 " tablet (150 mg) by mouth every morning    Adjustment disorder with depressed mood       desvenlafaxine succinate 100 MG 24 hr tablet    PRISTIQ    90 tablet    Take 1 tablet (100 mg) by mouth daily    Adjustment disorder with depressed mood       doxycycline Monohydrate 50 MG Caps capsule     60 capsule    Take 1 capsule (50 mg) by mouth 2 times daily    Rosacea       glycopyrrolate 2 MG Tabs    ROBINUL-FORTE    90 tablet    1 tab po q day    Generalized hyperhidrosis       * metroNIDAZOLE 0.75 % topical gel    METROGEL    45 g    APPLY TWICE DAILY    Rosacea       * metroNIDAZOLE 0.75 % topical gel    METROGEL    45 g    Apply topically 2 times daily    Rosacea       montelukast 10 MG tablet    SINGULAIR    90 tablet    Take 1 tablet (10 mg) by mouth At Bedtime    Environmental allergies       omeprazole 40 MG capsule    priLOSEC    90 capsule    Take 1 capsule (40 mg) by mouth daily Take 30-60 minutes before a meal.    Gastroesophageal reflux disease without esophagitis       rosuvastatin 10 MG tablet    CRESTOR    90 tablet    TAKE 1 TABLET(10 MG) BY MOUTH DAILY    Mixed hyperlipidemia       TRANSDERM-SCOP (1.5 MG) 72 hr patch   Generic drug:  scopolamine           traZODone 50 MG tablet    DESYREL    90 tablet    Take 1 tablet (50 mg) by mouth At Bedtime    Other insomnia       valsartan 320 MG tablet    DIOVAN    90 tablet    Take 1 tablet (320 mg) by mouth daily    Benign essential hypertension       * Notice:  This list has 2 medication(s) that are the same as other medications prescribed for you. Read the directions carefully, and ask your doctor or other care provider to review them with you.

## 2017-08-22 NOTE — PROGRESS NOTES
"[facility]    2017    RE: AMADO MALIK  MR#: [id number]  : 1965    Patient is in today to continue the discussion regarding bariatric surgery that began last month at his initial evaluation.  Today reviewed his task list items below:    Received weight loss goal of 5 lb prior to surgery. -COMPLETED  Attend a bariatric support group  A total of 6 structured dietitian weight loss visits are required due to your insurance. Please schedule these with our dietitians.  Achieve clearance from dietitian to see surgeon.  Follow up with your primary care provider regarding hypertension - COMPLETED. Just started increased medication dose today.  Get Sleep study.  Have preoperative laboratory tests drawn. - COMPLETED  Psychological Evaluation with MMPI and clearance for weight loss surgery.  Scheduled with SUSAN Green  Patient switched to omeprazole bid and not working. Needs to see primary - ADDED TODAY    BP (!) 140/92  Pulse 73  Ht 5' 10\" (1.778 m)  Wt 254 lb 9.6 oz (115.5 kg)  BMI 36.53 kg/m2  General:  NAD.  Alert and oriented    Today in the office we discussed laparoscopic gastric bypass. Preoperative, perioperative, and postoperative processes, management, and follow up were addressed. Risks and benefits were outlined including the risk of death, PE, DVT, ulcer, N/V, stricture, hernia, wound infection, and vitamin deficiencies. All questions were answered.   Once the patient has completed the requirements set forth in paragraph one, and there are no further recommendations, he will be allowed to see the surgeon of her choice for consultation on the Laparoscopic Gastric Sleeve surgery. Patient verbalizes understanding of the process to surgery and expectations for the postoperative period including the need for lifelong lifestyle changes, vitamin supplementation, and laboratory monitoring.    This was a 25 minute visit with greater than 50% spent on counseling.    "

## 2017-08-22 NOTE — MR AVS SNAPSHOT
MRN:[id number]                      After Visit Summary   8/22/2017    David Marino    MRN: [id number]           Visit Information        Provider Department      8/22/2017 1:30 PM 1, Saranya Barton, TAVARES [facility]      Your next 10 appointments already scheduled     Sep 25, 2017  8:30 AM CDT   Weight Loss Visit with Sh Wl Diet 1, RD   [facility] ([facility])    [address] [place] [address] [phone]              MyChart Information     [email or web address] gives you secure access to your electronic health record. If you see a primary care provider, you can also send messages to your care team and make appointments. If you have questions, please call your primary care clinic.  If you do not have a primary care provider, please call [phone] and they will assist you.        Care EveryWhere ID     This is your Care EveryWhere ID. This could be used by other organizations to access your [facility] medical records  [id number]        Equal Access to Services     [facility] : Hadii jaye pope hadasho [place], waaxda luqadaha, qaybta kaalmada adeegcari, lara moody. So [facility] [phone].    ATENCIÓN: Si habla español, tiene a obrien disposición servicios gratuitos de asistencia lingüística. Llame al [phone].    We comply with applicable federal civil rights laws and [place] laws. We do not discriminate on the basis of race, color, national origin, age, disability sex, sexual orientation or gender identity.

## 2017-08-28 ENCOUNTER — MYC MEDICAL ADVICE (OUTPATIENT)
Dept: FAMILY MEDICINE | Facility: CLINIC | Age: 52
End: 2017-08-28

## 2017-09-13 ENCOUNTER — MYC MEDICAL ADVICE (OUTPATIENT)
Dept: FAMILY MEDICINE | Facility: CLINIC | Age: 52
End: 2017-09-13

## 2017-09-13 NOTE — TELEPHONE ENCOUNTER
JF,  Please see below.  Are you able to make sence of this or the next step?  When I spoke to pt 9/11 he had already been told many different things but it appeared to have been resolved with the behavioral assessment order from PCP.  Does info below, ordering code, sound familiar?  Please advise.  Thanks,  Jennifer Arias RN

## 2017-09-15 NOTE — TELEPHONE ENCOUNTER
Patient needed referral sent to [employer] for pre counseling before weight loss surgery. Sent referral to [employer].    Kecia San  Referral Coordinator

## 2017-09-25 ENCOUNTER — OFFICE VISIT (OUTPATIENT)
Dept: SURGERY | Facility: CLINIC | Age: 52
End: 2017-09-25
Payer: COMMERCIAL

## 2017-09-25 DIAGNOSIS — E66.9 OBESITY (BMI 30-39.9): ICD-10-CM

## 2017-09-25 PROCEDURE — 97803 MED NUTRITION INDIV SUBSEQ: CPT | Performed by: DIETITIAN, REGISTERED

## 2017-09-25 NOTE — PROGRESS NOTES
"PRE-SURGICAL WEIGHT LOSS NUTRITION APPOINTMENT  DATE OF VISIT: 2017  Name: AMADO MALIK  : 1965  Gender: Male  MRN: [id number]  Age: 52  ASSESSMENT  REASON FOR VISIT:  AMADO MALIK is a 52 year old Male presents today for a pre-surgical weight loss follow-up appointment.  DIAGNOSIS:  Class III Obesity.    ANTHROPOMETRICS:  Height: 70 inches  Initial Weight: 248 lbs  Weight last visit: 254 lbs  Current Weight: 252.2 lbs  BMI: 36.2 kg/m2    NUTRITION HISTORY:  Breakfast: oatmeal + brown sugar + skim milk-within 1 hour of waking  Lunch: salad bar with lots of veggies, chicken breast, light dressing work  Supper: pizza or chicken wings  Snacks: black licorice or hard candies-less snacking in the past month  Drinks: 2 cups decaf coffee, water  Consuming liquid calories: coffee creamer  Eating 3 meals per day: Yes  Eating slower: No  Chewing foods thoroughly: No  Take 30 minutes to consume each meal: Sometimes  Fluids and meals separate by at least 30 minutes: No  Comments: Emotional eating d/t stress and boredom. Answered \"yes\" to binge eating however seems to be simply overeating. Denies any feelings of compulsiveness, shame, etc associated with episodes. Has been thinking about surgery for about a year, knows a couple other people who have had surgery and been fairly successful.  17: patient admits he has been struggling with thinking there will be some food he may never be able to eat again; over the past month pt has done extensive research on weight loss surgery; says he is in the contemplation phase of thinking about weight loss surgery; soft ball has ended so pt wants to start intentional exercise this month  17- pt feels like he has been more focused on moving forward with weight loss surgery his past month' less \"food funerals;\" pt and his partner have eliminated all fast food  Desired Surgical Procedure: gastric bypass  PHYSICAL ACTIVITY:  Type: Walking;Other  Frequency: " 3-4x/week  Duration (min): 30  DIAGNOSIS:  Previous Nutrition Diagnosis: Obesity related to excess energy intake as evidenced by BMI of 36.4  no change, modified below  Previous goals:  Start 3020-6858 mg calcium with vitamin D (2-3 separate doses)-met  Aim to drink 48-64 oz of fluid per day-met  Exercise 3 X per week for 30 minutes-met  Current Nutrition Diagnosis: Obesity related to excess energy intake as evidenced by BMI of 36.2  IMPLEMENTATION:  Nutrition Prescription: Recommended energy/nutrient modification  Goals:  Preplan 4 dinner meals for the week on the weekend  Consistently take 20 minute for each meal  Chew all food to applesauce texture  Implementation:  - Discussed progress towards previous goals  - Reinforced importance of making behavior changes in preparation for bariatric surgery.  NUTRITION MONITORING AND EVALUATION:  Anticipated compliance: good  Patient verbalized good understanding of bariatric diet guidelines.  Patient has met prebariatric surgery diet requirements.  Follow up: 1 month  # of visits needed: 3  Cleared by RD: No  TIME SPENT WITH PATIENT: 25 minutes  Bryan Aiken RD, LD  [facility]  [phone]

## 2017-09-25 NOTE — MR AVS SNAPSHOT
MRN:[id number]                      After Visit Summary   9/25/2017    David Marino    MRN: [id number]           Visit Information        Provider Department      9/25/2017 8:30 AM 1, Sh Elda Diet, RD [facility]      Your next 10 appointments already scheduled     Sep 29, 2017 10:00 AM CDT   MyChart Short with Jus Oswald MD   [facility] ([facility])    [address] [facility] [address] [phone]            Oct 27, 2017 11:30 AM CDT   Weight Loss Visit with Sh Wl Diet 2, RD   [facility] ([facility])    [facility] [address] [phone]              MyChart Information     [employer] gives you secure access to your electronic health record. If you see a primary care provider, you can also send messages to your care team and make appointments. If you have questions, please call your primary care clinic.  If you do not have a primary care provider, please call [phone] and they will assist you.        Care EveryWhere ID     This is your Care EveryWhere ID. This could be used by other organizations to access your [facility] medical records  [id number]        Equal Access to Services     [facility] : Hadii jaye pope hadasho [place], waaxda luqadaha, qaybta kaalmada adeegyada, lara moody. So [facility] [phone].    ATENCIÓN: Si habla español, tiene a obrien disposición servicios gratuitos de asistencia lingüística. Llame al [phone].    We comply with applicable federal civil rights laws and [place] laws. We do not discriminate on the basis of race, color, national origin, age, disability sex, sexual orientation or gender identity.

## 2017-10-18 ENCOUNTER — OFFICE VISIT (OUTPATIENT)
Dept: SLEEP MEDICINE | Facility: CLINIC | Age: 52
End: 2017-10-18
Attending: PHYSICIAN ASSISTANT
Payer: COMMERCIAL

## 2017-10-18 ENCOUNTER — TRANSFERRED RECORDS (OUTPATIENT)
Dept: HEALTH INFORMATION MANAGEMENT | Facility: CLINIC | Age: 52
End: 2017-10-18

## 2017-10-18 VITALS
HEART RATE: 64 BPM | OXYGEN SATURATION: 95 % | RESPIRATION RATE: 16 BRPM | HEIGHT: 70 IN | SYSTOLIC BLOOD PRESSURE: 135 MMHG | BODY MASS INDEX: 35.93 KG/M2 | WEIGHT: 251 LBS | DIASTOLIC BLOOD PRESSURE: 91 MMHG

## 2017-10-18 DIAGNOSIS — G47.9 SLEEP DISTURBANCE: ICD-10-CM

## 2017-10-18 DIAGNOSIS — G47.33 OSA (OBSTRUCTIVE SLEEP APNEA): Primary | ICD-10-CM

## 2017-10-18 PROCEDURE — 99211 OFF/OP EST MAY X REQ PHY/QHP: CPT | Mod: ZF

## 2017-10-18 NOTE — MR AVS SNAPSHOT
After Visit Summary   10/18/2017    David Marino    MRN: [id number]           Patient Information     Date Of Birth          1965        Visit Information        Provider Department      10/18/2017 8:00 AM David Ellis MD [facility], [place], Sleep Study        Today's Diagnoses     ZARIA (obstructive sleep apnea)    -  1    Sleep disturbance          Care Instructions      Your BMI is Body mass index is 36.01 kg/(m^2).  Weight management is a personal decision.  If you are interested in exploring weight loss strategies, the following discussion covers the approaches that may be successful. Body mass index (BMI) is one way to tell whether you are at a healthy weight, overweight, or obese. It measures your weight in relation to your height.  A BMI of 18.5 to 24.9 is in the healthy range. A person with a BMI of 25 to 29.9 is considered overweight, and someone with a BMI of 30 or greater is considered obese. More than two-thirds of American adults are considered overweight or obese.  Being overweight or obese increases the risk for further weight gain. Excess weight may lead to heart disease and diabetes.  Creating and following plans for healthy eating and physical activity may help you improve your health.  Weight control is part of healthy lifestyle and includes exercise, emotional health, and healthy eating habits. Careful eating habits lifelong are the mainstay of weight control. Though there are significant health benefits from weight loss, long-term weight loss with diet alone may be very difficult to achieve- studies show long-term success with dietary management in less than 10% of people. Attaining a healthy weight may be especially difficult to achieve in those with severe obesity. In some cases, medications, devices and surgical management might be considered.  What can you do?  If you are overweight or obese and are interested in methods for weight loss, you should  discuss this with your provider.     Consider reducing daily calorie intake by 500 calories.     Keep a food journal.     Avoiding skipping meals, consider cutting portions instead.    Diet combined with exercise helps maintain muscle while optimizing fat loss. Strength training is particularly important for building and maintaining muscle mass. Exercise helps reduce stress, increase energy, and improves fitness. Increasing exercise without diet control, however, may not burn enough calories to loose weight.       Start walking three days a week 10-20 minutes at a time    Work towards walking thirty minutes five days a week     Eventually, increase the speed of your walking for 1-2 minutes at time    In addition, we recommend that you review healthy lifestyles and methods for weight loss available through the National Institutes of Health patient information sites:  http://win.niddk.nih.gov/publications/index.htm    And look into health and wellness programs that may be available through your health insurance provider, employer, local community center, or gabriella club.    Weight management plan: Patient was referred to their PCP to discuss a diet and exercise plan.     Your blood pressure was checked while you were in clinic today.  Please read the guidelines below about what these numbers mean and what you should do about them.  Your systolic blood pressure is the top number.  This is the pressure when the heart is pumping.  Your diastolic blood pressure is the bottom number.  This is the pressure in between beats.  If your systolic blood pressure is less than 120 and your diastolic blood pressure is less than 80, then your blood pressure is normal. There is nothing more that you need to do about it  If your systolic blood pressure is 120-139 or your diastolic blood pressure is 80-89, your blood pressure may be higher than it should be.  You should have your blood pressure re-checked within a year by a primary care  "provider.  If your systolic blood pressure is 140 or greater or your diastolic blood pressure is 90 or greater, you may have high blood pressure.  High blood pressure is treatable, but if left untreated over time it can put you at risk for heart attack, stroke, or kidney failure.  You should have your blood pressure re-checked by a primary care provider within the next four weeks.      MY TREATMENT INFORMATION FOR SLEEP APNEA-  David Marino    DOCTOR : David Ellis  SLEEP CENTER :      MY CONTACT NUMBER:     Am I having a sleep study at a sleep center?  Make sure you have an appointment for the study before you leave!    Am I having a home sleep study?  Watch this video:  https://www.AllFreed.com/watch?v=CteI_GhyP9g&list=PLC4F_[email or web address]    Frequently asked questions:  1. What is Obstructive Sleep Apnea (ZARIA)? ZARIA is the most common type of sleep apnea. Apnea means, \"without breath.\"  Apnea is most often caused by narrowing or collapse of the upper airway as muscles relax during sleep.   Almost everyone has occasional apneas. Most people with sleep apnea have had brief interruptions at night frequently for many years.  The severity of sleep apnea is related to how frequent and severe the events are.   2. What are the consequences of ZARIA? Symptoms include: feeling sleepy during the day, snoring loudly, gasping or stopping of breathing, trouble sleeping, and occasionally morning headaches or heartburn at night.  Sleepiness can be serious and even increase the risk of falling asleep while driving. Other health consequences may include development of high blood pressure and other cardiovascular disease in persons who are susceptible. Untreated ZARIA  can contribute to heart disease, stroke and diabetes.   3. What are the treatment options? In most situations, sleep apnea is a lifelong disease that must be managed with daily therapy. Medications are not effective for sleep apnea " and surgery is generally not considered until other therapies have been tried. Your treatment is your choice . Continuous Positive Airway (CPAP) works right away and is the therapy that is effective in nearly everyone. An oral device to hold your jaw forward is usually the next most reliable option. Other options include postioning devices (to keep you off your back), weight loss, and surgery including a tongue pacing device. There is more detail about some of these options below.    Important tips for using CPAP and similar devices   Know your equipment:  CPAP is continuous positive airway pressure that prevents obstructive sleep apnea by keeping the throat from collapsing while you are sleeping. In most cases, the device is  smart  and can slowly self-adjusts if your throat collapses and keeps a record every day of how well you are treated-this information is available to you and your care team.  BPAP is bilevel positive airway pressure that keeps your throat open and also assists each breath with a pressure boost to maintain adequate breathing.  Special kinds of BPAP are used in patients who have inadequate breathing from lung or heart disease. In most cases, the device is  smart  and can slowly self-adjusts to assist breathing. Like CPAP, the device keeps a record of how well you are treated.  Your mask is your connection to the device. You get to choose what feels most comfortable and the staff will help to make sure if fits. Here: are some examples of the different masks that are available:       Key points to remember on your journey with sleep apnea:  1. Sleep study.  PAP devices often need to be adjusted during a sleep study to show that they are effective and adjusted right.  2. Good tips to remember: Try wearing just the mask during a quiet time during the day so your body adapts to wearing it. A humidifier is recommended for comfort in most cases to prevent drying of your nose and throat. Allergy  medication from your provider may help you if you are having nasal congestion.  3. Getting settled-in. It takes more than one night for most of us to get used to wearing a mask. Try wearing just the mask during a quiet time during the day so your body adapts to wearing it. A humidifier is recommended for comfort in most cases. Our team will work with you carefully on the first day and will be in contact within 4 days and again at 2 and 4 weeks for advice and remote device adjustments. Your therapy is evaluated by the device each day.   4. Use it every night. The more you are able to sleep naturally for 7-8 hours, the more likely you will have good sleep and to prevent health risks or symptoms from sleep apnea. Even if you use it 4 hours it helps. Occasionally all of us are unable to use a medical therapy, in sleep apnea, it is not dangerous to miss one night.   5. Communicate. Call our skilled team on the number provided on the first day if your visit for problems that make it difficult to wear the device. Over 2 out of 3 patients can learn to wear the device long-term with help from our team. Remember to call our team or your sleep providers if you are unable to wear the device as we may have other solutions for those who cannot adapt to mask CPAP therapy. It is recommended that you sleep your sleep provider within the first 3 months and yearly after that if you are not having problems.   Take care of your equipment. Make sure you clean your mask and tubing using directions every day and that your filter and mask are replaced as recommended or if they are not working.     BESIDES CPAP, WHAT OTHER THERAPIES ARE THERE?    Positioning Device  Positioning devices are generally used when sleep apnea is mild and only occurs on your back.This example shows a pillow that straps around the waist. It may be appropriate for those whose sleep study shows milder sleep apnea that occurs primarily when lying flat on one's back.  Preliminary studies have shown benefit but effectiveness at home may need to be verified by a home sleep test. These devices are generally not covered by medical insurance.  Examples of devices that maintain sleeping on the back to prevent snoring and mild sleep apnea.    Belt type body positioner  Http://[employer]/    Electronic reminder  Http://nightshifttherapy.com/  http://www.[facility].Style on Screen.au/product.html    http://[employer]/    Oral Appliance  What is oral appliance therapy?  An oral appliance device fits on your teeth at night like a retainer used after having braces. The device is made by a specialized dentist and requires several visits over 1-2 months before a manufactured device is made to fit your teeth and is adjusted to prevent your sleep apnea. Once an oral device is working properly, snoring should be improved. A home sleep test may be recommended at that time if to determine whether the sleep apnea is adequately treated.       Some things to remember:  -Oral devices are often, but not always, covered by your medical insurance. Be sure to check with your insurance provider.   -If you are referred for oral therapy, you will be given a list of specialized dentists to consider or you may choose to visit the Web site of the American Academy of Dental Sleep Medicine  -Oral devices are less likely to work if you have severe sleep apnea or are extremely overweight.     More detailed information  An oral appliance is a small acrylic device that fits over the upper and lower teeth  (similar to a retainer or a mouth guard). This device slightly moves jaw forward, which moves the base of the tongue forward, opens the airway, improves breathing for effective treat snoring and obstructive sleep apnea in perhaps 7 out of 10 people .  The best working devices are custom-made by a dental device  after a mold is made of the teeth 1, 2, 3.  When is an oral appliance indicated?  Oral appliance therapy  is recommended as a first-line treatment for patients with primary snoring, mild sleep apnea, and for patients with moderate sleep apnea who prefer appliance therapy to use of CPAP4, 5. Severity of sleep apnea is determined by sleep testing and is based on the number of respiratory events per hour of sleep.   How successful is oral appliance therapy?  The success rate of oral appliance therapy in patients with mild sleep apnea is 75-80% while in patients with moderate sleep apnea it is 50-70%. The chance of success in patients with severe sleep apnea is 40-50%. The research also shows that oral appliances have a beneficial effect on the cardiovascular health of ZARIA patients at the same magnitude as CPAP therapy7.  Oral appliances should be a second-line treatment in cases of severe sleep apnea, but if not completely successful then a combination therapy utilizing CPAP plus oral appliance therapy may be effective. Oral appliances tend to be effective in a broad range of patients although studies show that the patients who have the highest success are females, younger patients, those with milder disease, and less severe obesity. 3, 6.   Finding a dentist that practices dental sleep medicine  Specific training is available through the American Academy of Dental Sleep Medicine for dentists interested in working in the field of sleep. To find a dentist who is educated in the field of sleep and the use of oral appliances, near you, visit the Web site of the American Academy of Dental Sleep Medicine.    References  1. Yazan et al. Objectively measured vs self-reported compliance during oral appliance therapy for sleep-disordered breathing. Chest 2013; 144(5): 0882-4189.  2. Audra et al. Objective measurement of compliance during oral appliance therapy for sleep-disordered breathing. Thorax 2013; 68(1): 91-96.  3. David et al. Mandibular advancement devices in 620 men and women with ZARIA and snoring:  tolerability and predictors of treatment success. Chest 2004; 125: 7405-2073.  4. Alisa et al. Oral appliances for snoring and ZARIA: a review. Sleep 2006; 29: 244-262.  5. Patti et al. Oral appliance treatment for ZARIA: an update. J Clin Sleep Med 2014; 10(2): 215-227.  6. Corin et al. Predictors of OSAH treatment outcome. J Dent Res 2007; 86: 3256-0616.      Weight Loss:    Weight loss is a long-term strategy that may improve sleep apnea in some patients.    Weight management is a personal decision.  If you are interested in exploring weight loss strategies, the following discussion covers the impact on weight loss on sleep apnea and the approaches that may be successful.    Weight loss decreases severity of sleep apnea in most people with obesity. For those with mild obesity who have developed snoring with weight gain, even 15-30 pound weight loss can improve and occasionally eliminate sleep apnea.  Structured and life-long dietary and health habits are necessary to lose weight and keep healthier weight levels.     Though there may be significant health benefits from weight loss, long-term weight loss is very difficult to achieve- studies show success with dietary management in less than 10% of people. In addition, substantial weight loss may require years of dietary control and may be difficult if patients have severe obesity. In these cases, surgical management may be considered.  Finally, older individuals who have tolerated obesity without health complications may be less likely to benefit from weight loss strategies.        Your BMI is Body mass index is 36.01 kg/(m^2).  Weight management is a personal decision.  If you are interested in exploring weight loss strategies, the following discussion covers the approaches that may be successful. Body mass index (BMI) is one way to tell whether you are at a healthy weight, overweight, or obese. It measures your weight in relation to your height.  A BMI  of 18.5 to 24.9 is in the healthy range. A person with a BMI of 25 to 29.9 is considered overweight, and someone with a BMI of 30 or greater is considered obese. More than two-thirds of American adults are considered overweight or obese.  Being overweight or obese increases the risk for further weight gain. Excess weight may lead to heart disease and diabetes.  Creating and following plans for healthy eating and physical activity may help you improve your health.  Weight control is part of healthy lifestyle and includes exercise, emotional health, and healthy eating habits. Careful eating habits lifelong are the mainstay of weight control. Though there are significant health benefits from weight loss, long-term weight loss with diet alone may be very difficult to achieve- studies show long-term success with dietary management in less than 10% of people. Attaining a healthy weight may be especially difficult to achieve in those with severe obesity. In some cases, medications, devices and surgical management might be considered.  What can you do?  If you are overweight or obese and are interested in methods for weight loss, you should discuss this with your provider.     Consider reducing daily calorie intake by 500 calories.     Keep a food journal.     Avoiding skipping meals, consider cutting portions instead.    Diet combined with exercise helps maintain muscle while optimizing fat loss. Strength training is particularly important for building and maintaining muscle mass. Exercise helps reduce stress, increase energy, and improves fitness. Increasing exercise without diet control, however, may not burn enough calories to loose weight.       Start walking three days a week 10-20 minutes at a time    Work towards walking thirty minutes five days a week     Eventually, increase the speed of your walking for 1-2 minutes at time    In addition, we recommend that you review healthy lifestyles and methods for weight loss  available through the National Institutes of Health patient information sites:  http://win.niddk.nih.gov/publications/index.htm    And look into health and wellness programs that may be available through your health insurance provider, employer, local community center, or gabriella club.          Surgery:    Surgery for obstructive sleep apnea is considered generally only when other therapies fail to work. Surgery may be discussed with you if you are having a difficult time tolerating CPAP and or when there is an abnormal structure that requires surgical correction.  Nose and throat surgeries often enlarge the airway to prevent collapse.  Most of these surgeries create pain for 1-2 weeks and up to half of the most common surgeries are not effective throughout life.  You should carefully discuss the benefits and drawbacks to surgery with your sleep provider and surgeon to determine if it is the best solution for you.   More information  Surgery for ZARIA is directed at areas that are responsible for narrowing or complete obstruction of the airway during sleep.  There are a wide range of procedures available to enlarge and/or stabilize the airway to prevent blockage of breathing in the three major areas where it can occur: the palate, tongue, and nasal regions.  Successful surgical treatment depends on the accurate identification of the factors responsible for obstructive sleep apnea in each person.  A personalized approach is required because there is no single treatment that works well for everyone.  Because of anatomic variation, consultation with an examination by a sleep surgeon is a critical first step in determining what surgical options are best for each patient.  In some cases, examination during sedation may be recommended in order to guide the selection of procedures.  Patients will be counseled about risks and benefits as well as the typical recovery course after surgery. Surgery is typically not a cure for a  person s ZARIA.  However, surgery will often significantly improve one s ZARIA severity (termed  success rate ).  Even in the absence of a cure, surgery will decrease the cardiovascular risk associated with OSA7; improve overall quality of life8 (sleepiness, functionality, sleep quality, etc).      Palate Procedures:  Patients with ZARIA often have narrowing of their airway in the region of their tonsils and uvula.  The goals of palate procedures are to widen the airway in this region as well as to help the tissues resist collapse.  Modern palate procedure techniques focus on tissue conservation and soft tissue rearrangement, rather than tissue removal.  Often the uvula is preserved in this procedure. Residual sleep apnea is common in patient after pharyngoplasty with an average reduction in sleep apnea events of 33%2.      Tongue Procedures:  ExamWhile patients are awake, the muscles that surround the throat are active and keep this region open for breathing. These muscles relax during sleep, allowing the tongue and other structures to collapse and block breathing.  There are several different tongue procedures available.  Selection of a tongue base procedure depends on characteristics seen on physical exam.  Generally, procedures are aimed at removing bulky tissues in this area or preventing the back of the tongue from falling back during sleep.  Success rates for tongue surgery range from 50-62%3.    Hypoglossal Nerve Stimulation:  Hypoglossal nerve stimulation has recently received approval from the [place] Food and Drug Administration for the treatment of obstructive sleep apnea.  This is based on research showing that the system was safe and effective in treating sleep apnea6.  Results showed that the median AHI score decreased 68%, from 29.3 to 9.0. This therapy uses an implant system that senses breathing patterns and delivers mild stimulation to airway muscles, which keeps the airway open during sleep.  The  system consists of three fully implanted components: a small generator (similar in size to a pacemaker), a breathing sensor, and a stimulation lead.  Using a small handheld remote, a patient turns the therapy on before bed and off upon awakening.    Candidates for this device must be greater than 22 years of age, have moderate to severe ZARIA (AHI between 20-65), BMI less than 32, have tried CPAP/oral appliance without success, and have appropriate upper airway anatomy (determined by a sleep endoscopy performed by Dr. Mckay).    Hypoglossal Nerve Stimulation Pathway:    The sleep surgeon s office will work with the patient through the insurance prior-authorization process (including communications and appeals).    Nasal Procedures:  Nasal obstruction can interfere with nasal breathing during the day and night.  Studies have shown that relief of nasal obstruction can improve the ability of some patients to tolerate positive airway pressure therapy for obstructive sleep apnea1.  Treatment options include medications such as nasal saline, topical corticosteroid and antihistamine sprays, and oral medications such as antihistamines or decongestants. Non-surgical treatments can include external nasal dilators for selected patients. If these are not successful by themselves, surgery can improve the nasal airway either alone or in combination with these other options.      Combination Procedures:  Combination of surgical procedures and other treatments may be recommended, particularly if patients have more than one area of narrowing or persistent positional disease.  The success rate of combination surgery ranges from 66-80%2,3.    References  1. Veronica HERRERA. The Role of the Nose in Snoring and Obstructive Sleep Apnoea: An Update.  Eur Arch Otorhinolaryngol. 2011; 268: 1365-73.  2.  Dagoberto SM; Jasmine JA; Christina JR; Pallanch JF; Supriya FORBES; Jorge BHATT; Harvinder KNUTSON. Surgical modifications of the upper airway for obstructive sleep  apnea in adults: a systematic review and meta-analysis. SLEEP 2010;33(10):5700-1169. Reed BHATT. Hypopharyngeal surgery in obstructive sleep apnea: an evidence-based medicine review.  Arch Otolaryngol Head Neck Surg. 2006 Feb;132(2):206-13.  3. Thomas CORDON, Massiel Y, Francis GIOVANNI. The efficacy of anatomically based multilevel surgery for obstructive sleep apnea. Otolaryngol Head Neck Surg. 2003 Oct;129(4):327-35.  4. Kezirian E, Goldberg A. Hypopharyngeal Surgery in Obstructive Sleep Apnea: An Evidence-Based Medicine Review. Arch Otolaryngol Head Neck Surg. 2006 Feb;132(2):206-13.  5. Augustin SOTOMAYOR et al. Upper-Airway Stimulation for Obstructive Sleep Apnea.  N Engl J Med. 2014 Jan 9;370(2):139-49.  6. Radha Y et al. Increased Incidence of Cardiovascular Disease in Middle-aged Men with Obstructive Sleep Apnea. Am J Respir Crit Care Med; 2002 166: 159-165  7. Diandra CHAUHAN et al. Studying Life Effects and Effectiveness of Palatopharyngoplasty (SLEEP) study: Subjective Outcomes of Isolated Uvulopalatopharyngoplasty. Otolaryngol Head Neck Surg. 2011; 144: 623-631.  Please do not drive if drowsy or sleepy;  pull over if drowsy.                    Follow-ups after your visit        Follow-up notes from your care team     Return in about 1 week (around 10/25/2017).      Your next 10 appointments already scheduled     Oct 27, 2017 11:30 AM CDT   Weight Loss Visit with Saranya Barton 2, RD   [facility] ([facility])    [address] [place] [id number] [phone]              Future tests that were ordered for you today     Open Future Orders        Priority Expected Expires Ordered    HST-Home Sleep Apnea Test Routine  4/19/2018 10/18/2017            Who to contact     If you have questions or need follow up information about today's clinic visit or your schedule please contact [facility], [place] directly at [phone].  Normal or non-critical  "lab and imaging results will be communicated to you by MyChart, letter or phone within 4 business days after the clinic has received the results. If you do not hear from us within 7 days, please contact the clinic through [employer] or phone. If you have a critical or abnormal lab result, we will notify you by phone as soon as possible.  Submit refill requests through [employer] or call your pharmacy and they will forward the refill request to us. Please allow 3 business days for your refill to be completed.          Additional Information About Your Visit        [employer] Information     [employer] gives you secure access to your electronic health record. If you see a primary care provider, you can also send messages to your care team and make appointments. If you have questions, please call your primary care clinic.  If you do not have a primary care provider, please call [phone] and they will assist you.        Care EveryWhere ID     This is your Care EveryWhere ID. This could be used by other organizations to access your [facility] medical records  [id number]        Your Vitals Were     Pulse Respirations Height Pulse Oximetry BMI (Body Mass Index)       64 16 1.778 m (5' 10\") 95% 36.01 kg/m2        Blood Pressure from Last 3 Encounters:   10/18/17 (!) 135/91   08/22/17 (!) 140/92   08/07/17 140/90    Weight from Last 3 Encounters:   10/18/17 113.9 kg (251 lb)   08/22/17 115.5 kg (254 lb 9.6 oz)   08/22/17 115.5 kg (254 lb 9.6 oz)              We Performed the Following     SLEEP EVALUATION & MANAGEMENT REFERRAL - ADULT        Primary Care Provider Office Phone # Fax #    Jus Woodrow Oswald [phone] [address] [facility] [address]        Equal Access to Services     SATINDER XIE : Jaime Barnes, favio ying, lara ruffin. So [facility] [phone].    ATENCIÓN: Si habla español, tiene a obrien disposición " servicios gratuitos de asistencia lingüística. Karen garza [phone].    We comply with applicable federal civil rights laws and [place] laws. We do not discriminate on the basis of race, color, national origin, age, disability, sex, sexual orientation, or gender identity.            Thank you!     Thank you for choosing [facility], [place], [facility]EP [facility]  for your care. Our goal is always to provide you with excellent care. Hearing back from our patients is one way we can continue to improve our services. Please take a few minutes to complete the written survey that you may receive in the mail after your visit with us. Thank you!             Your Updated Medication List - Protect others around you: Learn how to safely use, store and throw away your medicines at www.disposemymeds.org.          This list is accurate as of: 10/18/17  8:55 AM.  Always use your most recent med list.                   Brand Name Dispense Instructions for use Diagnosis    buPROPion 150 MG 24 hr tablet    WELLBUTRIN XL    90 tablet    Take 1 tablet (150 mg) by mouth every morning    Adjustment disorder with depressed mood       desvenlafaxine succinate 100 MG 24 hr tablet    PRISTIQ    90 tablet    Take 1 tablet (100 mg) by mouth daily    Adjustment disorder with depressed mood       doxycycline monohydrate 50 MG capsule     60 capsule    Take 1 capsule (50 mg) by mouth 2 times daily    Rosacea       glycopyrrolate 2 MG Tabs    ROBINUL-FORTE    90 tablet    1 tab po q day    Generalized hyperhidrosis       * metroNIDAZOLE 0.75 % topical gel    METROGEL    45 g    APPLY TWICE DAILY    Rosacea       * metroNIDAZOLE 0.75 % topical gel    METROGEL    45 g    Apply topically 2 times daily    Rosacea       montelukast 10 MG tablet    SINGULAIR    90 tablet    Take 1 tablet (10 mg) by mouth At Bedtime    Environmental allergies       omeprazole 40 MG capsule    priLOSEC    90 capsule    Take 1 capsule (40 mg) by mouth daily Take 30-60 minutes before a  meal.    Gastroesophageal reflux disease without esophagitis       rosuvastatin 10 MG tablet    CRESTOR    90 tablet    TAKE 1 TABLET(10 MG) BY MOUTH DAILY    Mixed hyperlipidemia       TRANSDERM-SCOP (1.5 MG) 72 hr patch   Generic drug:  scopolamine           traZODone 50 MG tablet    DESYREL    90 tablet    Take 1 tablet (50 mg) by mouth At Bedtime    Other insomnia       valsartan 320 MG tablet    DIOVAN    90 tablet    Take 1 tablet (320 mg) by mouth daily    Benign essential hypertension       * Notice:  This list has 2 medication(s) that are the same as other medications prescribed for you. Read the directions carefully, and ask your doctor or other care provider to review them with you.

## 2017-10-18 NOTE — PROGRESS NOTES
"Sleep Consultation Note:    Date on this visit: 10/18/2017    David Marino is sent by Cosme Foote for a sleep consultation regarding re-evaluation of previously diagnosed obstructive sleep apnea, as pre-operative requirement prior to bariatric surgery.    Primary Physician: Jus Oswald     CC:  \"re-evaluation of previously diagnosed obstructive sleep apnea, as pre-operative requirement prior to bariatric surgery\".      David Marino 52 year old with PMH of ZARIA , hypertension dysthymia and hyperlipidemia who presents to the sleep clinic today for re-evaluation of previously diagnosed obstructive sleep apnea, as pre-operative requirement prior to bariatric surgery.    He had a previous sleep study at [facility] in [facility] in 1999/2000 the reports of which are currently unavailable. He was then diagnosed with obstructive sleep apnea and was provided with the CPAP device. He used the CPAP device is intermittently for a few months and discontinued the treatment due to intolerance. In 2009 he underwent upper airway surgery tonsillectomy/uvulectomy. Following the surgery, the symptoms of snoring and apnea episodes have not been of concern.    Sleep Disordered Breathing  David does not complain of snoring, snort arousals, choking/gasping for air, witnessed apneas, dry mouth, morning headaches, non-refreshing sleep, daytime sleepiness/fatigue.  David has gained significant weight in the past 4 years. At the time of his previous sleep study he reports that he weighed 180 pounds.    Sleep Schedule/Sleep Complaints  He does not complain of difficulty with falling asleep. During the weekdays, David goes to bed at 930 PM, and it usually takes 30 minutes to fall asleep. He wakes up at 6:30 AM.  During that days off he goes to bed at 930 PM, and wakes up at 7:30 AM. He reports feeling refreshed most mornings.     David doesnot complain of restlessness feelings in the legs.  He does not " complain of spontaneous leg movements/jerks in the middle of the night.    Patient does use electronics in bed.   Patient does have a regular bed partner.  Patient sleeps on his back and side.  Patient does not have any pets in the bedroom at night during sleep.  He does not use a sleep aid.    He does not complain of difficulty with staying asleep.  He wakes up once throughout the night,  to void.    Patient does not complain of chronic pain, ruminating thoughts, stress/anxiety, depression, which affects the maintenance of sleep.  After awakening, He is able to fall back asleep after 15 minutes.    Patient is a morning person.  He would naturally to go to sleep at 9:30 PM and wake up at 6:30 AM.    Sleep Behaviors  He denies any cataplexy, sleep paralysis, sleep hallucinations.    He denies any night time behaviors - sleep walking, sleep talking, sleep eating.    He does not complain acting out dreams.    Daytime Functioning  David naps once a week for 1.5 hours, feels refreshed after naps.    He does not doze off during the day.     He denies dozing while driving.    Patient's [place] Sleepiness score 4/24.    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  He works Monday through Friday, 7:30 AM to 630 or 7 PM almost working 60 hours per week.   He has cut down on caffeine consumption since June 2017 in the process of preparing for bariatric surgery does only drink one cup of coffee in the morning . Last caffeine intake is  not within 6 hours of bed time.  He does not drink alcohol.  Patient is a  former smoker - for 10 years, quit at age 30.  Patient does not use illicit drugs.  Bariatric surgery tentatively planned for January 2018.    Allergies:    Allergies   Allergen Reactions     Dust Mite Extract      Honey      Pollen Extract      Sulfa Drugs        Medications:    Current Outpatient Prescriptions   Medication Sig Dispense Refill     rosuvastatin  (CRESTOR) 10 MG tablet TAKE 1 TABLET(10 MG) BY MOUTH DAILY 90 tablet 3     doxycycline Monohydrate 50 MG CAPS capsule Take 1 capsule (50 mg) by mouth 2 times daily 60 capsule 11     buPROPion (WELLBUTRIN XL) 150 MG 24 hr tablet Take 1 tablet (150 mg) by mouth every morning 90 tablet 3     valsartan (DIOVAN) 320 MG tablet Take 1 tablet (320 mg) by mouth daily 90 tablet 3     desvenlafaxine succinate (PRISTIQ) 100 MG 24 hr tablet Take 1 tablet (100 mg) by mouth daily 90 tablet 3     traZODone (DESYREL) 50 MG tablet Take 1 tablet (50 mg) by mouth At Bedtime 90 tablet 3     montelukast (SINGULAIR) 10 MG tablet Take 1 tablet (10 mg) by mouth At Bedtime 90 tablet 3     metroNIDAZOLE (METROGEL) 0.75 % topical gel Apply topically 2 times daily 45 g 11     omeprazole (PRILOSEC) 40 MG capsule Take 1 capsule (40 mg) by mouth daily Take 30-60 minutes before a meal. 90 capsule 3     glycopyrrolate (ROBINUL-FORTE) 2 MG TABS 1 tab po q day 90 tablet 3     metroNIDAZOLE (METROGEL) 0.75 % topical gel APPLY TWICE DAILY 45 g 0     TRANSDERM-SCOP, 1.5 MG, (1.5mg base/3day) patch   3       Problem List:  Patient Active Problem List    Diagnosis Date Noted     Obesity (BMI 30-39.9) 07/11/2017     Priority: Medium     Sleep disturbance 07/11/2017     Priority: Medium     Low back strain, initial encounter 02/13/2017     Priority: Medium     Adjustment disorder with depressed mood 05/06/2016     Priority: Medium     Rosacea 04/15/2015     Priority: Medium     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  Failed robinul, capsaicin.       Environmental allergies 02/04/2015     Priority: Medium     Mixed hyperlipidemia 02/04/2015     Priority: Medium     Hypertriglyceridemia 02/04/2015     Priority: Medium     Benign essential hypertension 02/04/2015     Priority: Medium     Insomnia 02/04/2015     Priority: Medium     Testosterone deficiency 02/04/2015     Priority: Medium      Adiposity 02/04/2015     Priority: Medium     Rib pain 02/04/2015     Priority: Medium     Gastroesophageal reflux disease 03/20/2013     Priority: Medium     Hiatal hernia 03/20/2013     Priority: Medium     Overview:   small       Gastric polyposis 03/20/2013     Priority: Medium        Past Medical/Surgical History:  Past Medical History:   Diagnosis Date     Hypertension      Past Surgical History:   Procedure Laterality Date     APPENDECTOMY       AS REMOVAL OF TONSILS,<12 Y/O       C SYMPATHECTOMY,CERVICAL  2003       Social History:  Social History     Social History     Marital status: Single     Spouse name: N/A     Number of children: N/A     Years of education: N/A     Occupational History     Not on file.     Social History Main Topics     Smoking status: Former Smoker     Smokeless tobacco: Never Used      Comment: smoker 30+ years ago     Alcohol use 0.0 oz/week     0 Standard drinks or equivalent per week      Comment: occ 1-2 x month     Drug use: No     Sexual activity: Yes     Partners: Male     Other Topics Concern     Parent/Sibling W/ Cabg, Mi Or Angioplasty Before 65f 55m? No     Social History Narrative       Family History:  Family History   Problem Relation Age of Onset     DIABETES Mother      Coronary Artery Disease Mother      Hypertension Mother      Depression Mother      Asthma Mother      Coronary Artery Disease Father      Hypertension Father      Hyperlipidemia Father      Depression Maternal Grandmother      CEREBROVASCULAR DISEASE No family hx of      Breast Cancer No family hx of      Colon Cancer No family hx of      Prostate Cancer No family hx of      Other Cancer No family hx of      Anxiety Disorder No family hx of      MENTAL ILLNESS No family hx of      Substance Abuse No family hx of      Anesthesia Reaction No family hx of      OSTEOPOROSIS No family hx of      Genetic Disorder No family hx of      Thyroid Disease No family hx of      Obesity No family hx of       "Unknown/Adopted No family hx of    Family history pertinent to sleep disorders:  nothing contradictory    Review of Systems:  A complete review of systems reviewed by me is negative with the exeption of what has been mentioned in the history of present illness and symptoms highlighted in red below:  CONSTITUTIONAL: weight gain; NEGATIVE for /loss, fever, chills, sweats or night sweats, drug allergies.  EYES: NEGATIVE for changes in vision, blind spots, double vision.  ENT: NEGATIVE for ear pain, sore throat, sinus pain, post-nasal drip, runny nose, bloody nose  CARDIAC: high blood pressure ; NEGATIVE for fast heartbeats or fluttering in chest, chest pain or pressure, breathlessness when lying flat, swollen legs or swollen feet.  NEUROLOGIC: NEGATIVE headaches, weakness or numbness in the arms or legs.  DERMATOLOGIC: NEGATIVE for rashes, new moles or change in mole(s)  PULMONARY: NEGATIVE SOB at rest, SOB with activity, dry cough, productive cough, coughing up blood, wheezing or whistling when breathing.    GASTROINTESTINAL: NEGATIVE for nausea or vomitting, loose or watery stools, fat or grease in stools, constipation, abdominal pain, bowel movements black in color or blood noted.  GENITOURINARY: NEGATIVE for pain during urination, blood in urine, urinating more frequently than usual  MUSCULOSKELETAL: NEGATIVE for muscle pain, bone or joint pain, swollen joints.  ENDOCRINE: NEGATIVE for increased thirst or urination, diabetes.  LYMPHATIC: NEGATIVE for swollen lymph nodes, lumps or bumps in the breasts or nipple discharge.  PSYCHE:depression which he reports is controlled well with the current medications;  NEGATIVE for anxiety    Physical Examination:  Vitals: BP (!) 135/91  Pulse 64  Resp 16  Ht 1.778 m (5' 10\")  Wt 113.9 kg (251 lb)  SpO2 95%  BMI 36.01 kg/m2  BMI= Body mass index is 36.01 kg/(m^2).    Neck Cir (cm): 46 cm    [place] Total Score 10/18/2017   Total score - [place] 4       General: No apparent " distress, appropriately groomed  Head: Normocephalic, atraumatic  Eyes: no icterus, PERRL  Nose: Nares patent. No exudate/erythema. No septal deviation noted.  Mouth: op pink and moist  Orophraynx: Opening is narrowed, uvula: absent(surgically removed)   Mallampati Class: II.   Tonsillar Stage: 0  Neck: Supple, Circumference: 18 inches  Cardiac: Regular rate and rhythm  Chest: Symmetric air movement, lungs clear to auscultation bilaterally  Extremities: no calf tenderness / edema   Skin: Warm, dry, intact  Psych: Mood pleasant, affect congruent  Neuro: Awake, alert, attentive, oriented. Speech: normal  Gait: normal; no focal neurological deficit.    Impression/Plan:    1. Previously diagnosed obstructive sleep apnea, with h/o previous intolerance to CPAP and is status post upper airway surgery 2009. Though the patient reports significant weight gain since his PSG, he denies any symptoms suggestive of obstructive sleep apnea. He presents for a re-evaluation of the ZARIA as a preoperative requirement for the upcoming bariatric surgery.    STOP BANG score is 5.    Recommend HST as patient is high risk for ZARIA without any significant comorbid conditions. We discussed the home sleep test. We also discussed that in the HST is inconclusive we will consider pursuing a supervised sleep study for evaluation of the sleep apnea.    Diagnosis and treatment for ZAIRA have been discussed. Complications of untreated ZARIA have also been discussed.    2. Hypertension: patient reports compliant with the medications and  he was recommended to continue monitoring his blood pressure readings and  follow-up with the primary care provider for optimizing the management of hypertension. We discussed the association of ZARIA and hypertension.    We discussed optimizing sleep hygiene.    Patient is a  former smoker and has been encouraged to continue to not smoke.    Encourage weight loss, healthy diet, and exercise.    Patient was strongly advised  "to avoid driving, operating any heavy machinery or other hazardous situations while drowsy or sleepy.  Patient was counseled on the importance of driving while alert, to pull over if drowsy, or nap before getting into the vehicle if sleepy.      The results of the home sleep test will be communicated via My Chart.    CC: Cosme Foote    Chart documentation done in part with Dragon Voice recognition Software. Although reviewed after completion, some word and grammatical error may remain.    \"I spent a total of 60  minutes face to face with David Marino during today's office visit. Over 50% of this time was spent counseling the patient and  coordinating care regarding obstructive sleep apnea , home sleep test, the need for the preoperative assessment for sleep apnea\"       David Ellis MD   of Medicine,  Division of Pulmonary/Sleep Medicine  [facility].              "

## 2017-10-18 NOTE — PATIENT INSTRUCTIONS
Your BMI is Body mass index is 36.01 kg/(m^2).  Weight management is a personal decision.  If you are interested in exploring weight loss strategies, the following discussion covers the approaches that may be successful. Body mass index (BMI) is one way to tell whether you are at a healthy weight, overweight, or obese. It measures your weight in relation to your height.  A BMI of 18.5 to 24.9 is in the healthy range. A person with a BMI of 25 to 29.9 is considered overweight, and someone with a BMI of 30 or greater is considered obese. More than two-thirds of American adults are considered overweight or obese.  Being overweight or obese increases the risk for further weight gain. Excess weight may lead to heart disease and diabetes.  Creating and following plans for healthy eating and physical activity may help you improve your health.  Weight control is part of healthy lifestyle and includes exercise, emotional health, and healthy eating habits. Careful eating habits lifelong are the mainstay of weight control. Though there are significant health benefits from weight loss, long-term weight loss with diet alone may be very difficult to achieve- studies show long-term success with dietary management in less than 10% of people. Attaining a healthy weight may be especially difficult to achieve in those with severe obesity. In some cases, medications, devices and surgical management might be considered.  What can you do?  If you are overweight or obese and are interested in methods for weight loss, you should discuss this with your provider.     Consider reducing daily calorie intake by 500 calories.     Keep a food journal.     Avoiding skipping meals, consider cutting portions instead.    Diet combined with exercise helps maintain muscle while optimizing fat loss. Strength training is particularly important for building and maintaining muscle mass. Exercise helps reduce stress, increase energy, and improves fitness.  Increasing exercise without diet control, however, may not burn enough calories to loose weight.       Start walking three days a week 10-20 minutes at a time    Work towards walking thirty minutes five days a week     Eventually, increase the speed of your walking for 1-2 minutes at time    In addition, we recommend that you review healthy lifestyles and methods for weight loss available through the National Institutes of Health patient information sites:  http://win.niddk.nih.gov/publications/index.htm    And look into health and wellness programs that may be available through your health insurance provider, employer, local community center, or gabriella club.    Weight management plan: Patient was referred to their PCP to discuss a diet and exercise plan.     Your blood pressure was checked while you were in clinic today.  Please read the guidelines below about what these numbers mean and what you should do about them.  Your systolic blood pressure is the top number.  This is the pressure when the heart is pumping.  Your diastolic blood pressure is the bottom number.  This is the pressure in between beats.  If your systolic blood pressure is less than 120 and your diastolic blood pressure is less than 80, then your blood pressure is normal. There is nothing more that you need to do about it  If your systolic blood pressure is 120-139 or your diastolic blood pressure is 80-89, your blood pressure may be higher than it should be.  You should have your blood pressure re-checked within a year by a primary care provider.  If your systolic blood pressure is 140 or greater or your diastolic blood pressure is 90 or greater, you may have high blood pressure.  High blood pressure is treatable, but if left untreated over time it can put you at risk for heart attack, stroke, or kidney failure.  You should have your blood pressure re-checked by a primary care provider within the next four weeks.      MY TREATMENT INFORMATION FOR  "SLEEP APNEA-  David Marino    DOCTOR : [facility]  SLEEP CENTER :      MY CONTACT NUMBER:     Am I having a sleep study at a sleep center?  Make sure you have an appointment for the study before you leave!    Am I having a home sleep study?  Watch this video:  https://www.Che[employer].com/watch?v=CteI_GhyP9g&list=PLC4F_[email or web address]    Frequently asked questions:  1. What is Obstructive Sleep Apnea (ZARIA)? ZARIA is the most common type of sleep apnea. Apnea means, \"without breath.\"  Apnea is most often caused by narrowing or collapse of the upper airway as muscles relax during sleep.   Almost everyone has occasional apneas. Most people with sleep apnea have had brief interruptions at night frequently for many years.  The severity of sleep apnea is related to how frequent and severe the events are.   2. What are the consequences of ZARIA? Symptoms include: feeling sleepy during the day, snoring loudly, gasping or stopping of breathing, trouble sleeping, and occasionally morning headaches or heartburn at night.  Sleepiness can be serious and even increase the risk of falling asleep while driving. Other health consequences may include development of high blood pressure and other cardiovascular disease in persons who are susceptible. Untreated ZARIA  can contribute to heart disease, stroke and diabetes.   3. What are the treatment options? In most situations, sleep apnea is a lifelong disease that must be managed with daily therapy. Medications are not effective for sleep apnea and surgery is generally not considered until other therapies have been tried. Your treatment is your choice . Continuous Positive Airway (CPAP) works right away and is the therapy that is effective in nearly everyone. An oral device to hold your jaw forward is usually the next most reliable option. Other options include postioning devices (to keep you off your back), weight loss, and surgery including a tongue " pacing device. There is more detail about some of these options below.    Important tips for using CPAP and similar devices   Know your equipment:  CPAP is continuous positive airway pressure that prevents obstructive sleep apnea by keeping the throat from collapsing while you are sleeping. In most cases, the device is  smart  and can slowly self-adjusts if your throat collapses and keeps a record every day of how well you are treated-this information is available to you and your care team.  BPAP is bilevel positive airway pressure that keeps your throat open and also assists each breath with a pressure boost to maintain adequate breathing.  Special kinds of BPAP are used in patients who have inadequate breathing from lung or heart disease. In most cases, the device is  smart  and can slowly self-adjusts to assist breathing. Like CPAP, the device keeps a record of how well you are treated.  Your mask is your connection to the device. You get to choose what feels most comfortable and the staff will help to make sure if fits. Here: are some examples of the different masks that are available:       Key points to remember on your journey with sleep apnea:  1. Sleep study.  PAP devices often need to be adjusted during a sleep study to show that they are effective and adjusted right.  2. Good tips to remember: Try wearing just the mask during a quiet time during the day so your body adapts to wearing it. A humidifier is recommended for comfort in most cases to prevent drying of your nose and throat. Allergy medication from your provider may help you if you are having nasal congestion.  3. Getting settled-in. It takes more than one night for most of us to get used to wearing a mask. Try wearing just the mask during a quiet time during the day so your body adapts to wearing it. A humidifier is recommended for comfort in most cases. Our team will work with you carefully on the first day and will be in contact within 4 days  and again at 2 and 4 weeks for advice and remote device adjustments. Your therapy is evaluated by the device each day.   4. Use it every night. The more you are able to sleep naturally for 7-8 hours, the more likely you will have good sleep and to prevent health risks or symptoms from sleep apnea. Even if you use it 4 hours it helps. Occasionally all of us are unable to use a medical therapy, in sleep apnea, it is not dangerous to miss one night.   5. Communicate. Call our skilled team on the number provided on the first day if your visit for problems that make it difficult to wear the device. Over 2 out of 3 patients can learn to wear the device long-term with help from our team. Remember to call our team or your sleep providers if you are unable to wear the device as we may have other solutions for those who cannot adapt to mask CPAP therapy. It is recommended that you sleep your sleep provider within the first 3 months and yearly after that if you are not having problems.   Take care of your equipment. Make sure you clean your mask and tubing using directions every day and that your filter and mask are replaced as recommended or if they are not working.     BESIDES CPAP, WHAT OTHER THERAPIES ARE THERE?    Positioning Device  Positioning devices are generally used when sleep apnea is mild and only occurs on your back.This example shows a pillow that straps around the waist. It may be appropriate for those whose sleep study shows milder sleep apnea that occurs primarily when lying flat on one's back. Preliminary studies have shown benefit but effectiveness at home may need to be verified by a home sleep test. These devices are generally not covered by medical insurance.  Examples of devices that maintain sleeping on the back to prevent snoring and mild sleep apnea.    Belt type body positioner  Http://iValidate.me/    Electronic  reminder  Http://nightshifttherapy.com/  http://www.Gel[employer]iasco.[employer].au/product.html    http://[employer]/    Oral Appliance  What is oral appliance therapy?  An oral appliance device fits on your teeth at night like a retainer used after having braces. The device is made by a specialized dentist and requires several visits over 1-2 months before a manufactured device is made to fit your teeth and is adjusted to prevent your sleep apnea. Once an oral device is working properly, snoring should be improved. A home sleep test may be recommended at that time if to determine whether the sleep apnea is adequately treated.       Some things to remember:  -Oral devices are often, but not always, covered by your medical insurance. Be sure to check with your insurance provider.   -If you are referred for oral therapy, you will be given a list of specialized dentists to consider or you may choose to visit the Web site of the American Academy of Dental Sleep Medicine  -Oral devices are less likely to work if you have severe sleep apnea or are extremely overweight.     More detailed information  An oral appliance is a small acrylic device that fits over the upper and lower teeth  (similar to a retainer or a mouth guard). This device slightly moves jaw forward, which moves the base of the tongue forward, opens the airway, improves breathing for effective treat snoring and obstructive sleep apnea in perhaps 7 out of 10 people .  The best working devices are custom-made by a dental device  after a mold is made of the teeth 1, 2, 3.  When is an oral appliance indicated?  Oral appliance therapy is recommended as a first-line treatment for patients with primary snoring, mild sleep apnea, and for patients with moderate sleep apnea who prefer appliance therapy to use of CPAP4, 5. Severity of sleep apnea is determined by sleep testing and is based on the number of respiratory events per hour of sleep.   How successful is oral  appliance therapy?  The success rate of oral appliance therapy in patients with mild sleep apnea is 75-80% while in patients with moderate sleep apnea it is 50-70%. The chance of success in patients with severe sleep apnea is 40-50%. The research also shows that oral appliances have a beneficial effect on the cardiovascular health of ZARIA patients at the same magnitude as CPAP therapy7.  Oral appliances should be a second-line treatment in cases of severe sleep apnea, but if not completely successful then a combination therapy utilizing CPAP plus oral appliance therapy may be effective. Oral appliances tend to be effective in a broad range of patients although studies show that the patients who have the highest success are females, younger patients, those with milder disease, and less severe obesity. 3, 6.   Finding a dentist that practices dental sleep medicine  Specific training is available through the American Academy of Dental Sleep Medicine for dentists interested in working in the field of sleep. To find a dentist who is educated in the field of sleep and the use of oral appliances, near you, visit the Web site of the American Academy of Dental Sleep Medicine.    References  1. Yazan et al. Objectively measured vs self-reported compliance during oral appliance therapy for sleep-disordered breathing. Chest 2013; 144(5): 9959-2049.  2. Audra et al. Objective measurement of compliance during oral appliance therapy for sleep-disordered breathing. Thorax 2013; 68(1): 91-96.  3. David, et al. Mandibular advancement devices in 620 men and women with ZARIA and snoring: tolerability and predictors of treatment success. Chest 2004; 125: 2075-5749.  4. Alisa, et al. Oral appliances for snoring and ZARIA: a review. Sleep 2006; 29: 244-262.  5. Patti et al. Oral appliance treatment for ZARIA: an update. J Clin Sleep Med 2014; 10(2): 215-227.  6. Corin et al. Predictors of OSAH treatment outcome. J  Dent Res 2007; 86: 9496-5671.      Weight Loss:    Weight loss is a long-term strategy that may improve sleep apnea in some patients.    Weight management is a personal decision.  If you are interested in exploring weight loss strategies, the following discussion covers the impact on weight loss on sleep apnea and the approaches that may be successful.    Weight loss decreases severity of sleep apnea in most people with obesity. For those with mild obesity who have developed snoring with weight gain, even 15-30 pound weight loss can improve and occasionally eliminate sleep apnea.  Structured and life-long dietary and health habits are necessary to lose weight and keep healthier weight levels.     Though there may be significant health benefits from weight loss, long-term weight loss is very difficult to achieve- studies show success with dietary management in less than 10% of people. In addition, substantial weight loss may require years of dietary control and may be difficult if patients have severe obesity. In these cases, surgical management may be considered.  Finally, older individuals who have tolerated obesity without health complications may be less likely to benefit from weight loss strategies.        Your BMI is Body mass index is 36.01 kg/(m^2).  Weight management is a personal decision.  If you are interested in exploring weight loss strategies, the following discussion covers the approaches that may be successful. Body mass index (BMI) is one way to tell whether you are at a healthy weight, overweight, or obese. It measures your weight in relation to your height.  A BMI of 18.5 to 24.9 is in the healthy range. A person with a BMI of 25 to 29.9 is considered overweight, and someone with a BMI of 30 or greater is considered obese. More than two-thirds of American adults are considered overweight or obese.  Being overweight or obese increases the risk for further weight gain. Excess weight may lead to  heart disease and diabetes.  Creating and following plans for healthy eating and physical activity may help you improve your health.  Weight control is part of healthy lifestyle and includes exercise, emotional health, and healthy eating habits. Careful eating habits lifelong are the mainstay of weight control. Though there are significant health benefits from weight loss, long-term weight loss with diet alone may be very difficult to achieve- studies show long-term success with dietary management in less than 10% of people. Attaining a healthy weight may be especially difficult to achieve in those with severe obesity. In some cases, medications, devices and surgical management might be considered.  What can you do?  If you are overweight or obese and are interested in methods for weight loss, you should discuss this with your provider.     Consider reducing daily calorie intake by 500 calories.     Keep a food journal.     Avoiding skipping meals, consider cutting portions instead.    Diet combined with exercise helps maintain muscle while optimizing fat loss. Strength training is particularly important for building and maintaining muscle mass. Exercise helps reduce stress, increase energy, and improves fitness. Increasing exercise without diet control, however, may not burn enough calories to loose weight.       Start walking three days a week 10-20 minutes at a time    Work towards walking thirty minutes five days a week     Eventually, increase the speed of your walking for 1-2 minutes at time    In addition, we recommend that you review healthy lifestyles and methods for weight loss available through the National Institutes of Health patient information sites:  http://win.niddk.nih.gov/publications/index.htm    And look into health and wellness programs that may be available through your health insurance provider, employer, local community center, or gabriella club.          Surgery:    Surgery for obstructive  sleep apnea is considered generally only when other therapies fail to work. Surgery may be discussed with you if you are having a difficult time tolerating CPAP and or when there is an abnormal structure that requires surgical correction.  Nose and throat surgeries often enlarge the airway to prevent collapse.  Most of these surgeries create pain for 1-2 weeks and up to half of the most common surgeries are not effective throughout life.  You should carefully discuss the benefits and drawbacks to surgery with your sleep provider and surgeon to determine if it is the best solution for you.   More information  Surgery for ZARIA is directed at areas that are responsible for narrowing or complete obstruction of the airway during sleep.  There are a wide range of procedures available to enlarge and/or stabilize the airway to prevent blockage of breathing in the three major areas where it can occur: the palate, tongue, and nasal regions.  Successful surgical treatment depends on the accurate identification of the factors responsible for obstructive sleep apnea in each person.  A personalized approach is required because there is no single treatment that works well for everyone.  Because of anatomic variation, consultation with an examination by a sleep surgeon is a critical first step in determining what surgical options are best for each patient.  In some cases, examination during sedation may be recommended in order to guide the selection of procedures.  Patients will be counseled about risks and benefits as well as the typical recovery course after surgery. Surgery is typically not a cure for a person s ZARIA.  However, surgery will often significantly improve one s ZARIA severity (termed  success rate ).  Even in the absence of a cure, surgery will decrease the cardiovascular risk associated with OSA7; improve overall quality of life8 (sleepiness, functionality, sleep quality, etc).      Palate Procedures:  Patients with  ZARIA often have narrowing of their airway in the region of their tonsils and uvula.  The goals of palate procedures are to widen the airway in this region as well as to help the tissues resist collapse.  Modern palate procedure techniques focus on tissue conservation and soft tissue rearrangement, rather than tissue removal.  Often the uvula is preserved in this procedure. Residual sleep apnea is common in patient after pharyngoplasty with an average reduction in sleep apnea events of 33%2.      Tongue Procedures:  ExamWhile patients are awake, the muscles that surround the throat are active and keep this region open for breathing. These muscles relax during sleep, allowing the tongue and other structures to collapse and block breathing.  There are several different tongue procedures available.  Selection of a tongue base procedure depends on characteristics seen on physical exam.  Generally, procedures are aimed at removing bulky tissues in this area or preventing the back of the tongue from falling back during sleep.  Success rates for tongue surgery range from 50-62%3.    Hypoglossal Nerve Stimulation:  Hypoglossal nerve stimulation has recently received approval from the [place] Food and Drug Administration for the treatment of obstructive sleep apnea.  This is based on research showing that the system was safe and effective in treating sleep apnea6.  Results showed that the median AHI score decreased 68%, from 29.3 to 9.0. This therapy uses an implant system that senses breathing patterns and delivers mild stimulation to airway muscles, which keeps the airway open during sleep.  The system consists of three fully implanted components: a small generator (similar in size to a pacemaker), a breathing sensor, and a stimulation lead.  Using a small handheld remote, a patient turns the therapy on before bed and off upon awakening.    Candidates for this device must be greater than 22 years of age, have moderate  to severe ZARIA (AHI between 20-65), BMI less than 32, have tried CPAP/oral appliance without success, and have appropriate upper airway anatomy (determined by a sleep endoscopy performed by Dr. Mckay).    Hypoglossal Nerve Stimulation Pathway:    The sleep surgeon s office will work with the patient through the insurance prior-authorization process (including communications and appeals).    Nasal Procedures:  Nasal obstruction can interfere with nasal breathing during the day and night.  Studies have shown that relief of nasal obstruction can improve the ability of some patients to tolerate positive airway pressure therapy for obstructive sleep apnea1.  Treatment options include medications such as nasal saline, topical corticosteroid and antihistamine sprays, and oral medications such as antihistamines or decongestants. Non-surgical treatments can include external nasal dilators for selected patients. If these are not successful by themselves, surgery can improve the nasal airway either alone or in combination with these other options.      Combination Procedures:  Combination of surgical procedures and other treatments may be recommended, particularly if patients have more than one area of narrowing or persistent positional disease.  The success rate of combination surgery ranges from 66-80%2,3.    References  1. Veronica HERRERA. The Role of the Nose in Snoring and Obstructive Sleep Apnoea: An Update.  Eur Arch Otorhinolaryngol. 2011; 268: 1365-73.  2.  Dagoberto SM; Jasmine JA; Christina JR; Pallanch JF; Supriya MB; Jorge SG; Harvinder JENKINSD. Surgical modifications of the upper airway for obstructive sleep apnea in adults: a systematic review and meta-analysis. SLEEP 2010;33(10):8967-8236. Reed BHATT. Hypopharyngeal surgery in obstructive sleep apnea: an evidence-based medicine review.  Arch Otolaryngol Head Neck Surg. 2006 Feb;132(2):206-13.  3. Thomas YH1, Massiel Y, Francis GIOVANNI. The efficacy of anatomically based multilevel surgery  for obstructive sleep apnea. Otolaryngol Head Neck Surg. 2003 Oct;129(4):327-35.  4. Reed BHATT, Goldberg A. Hypopharyngeal Surgery in Obstructive Sleep Apnea: An Evidence-Based Medicine Review. Arch Otolaryngol Head Neck Surg. 2006 Feb;132(2):206-13.  5. Augustin SOTOMAYOR et al. Upper-Airway Stimulation for Obstructive Sleep Apnea.  N Engl J Med. 2014 Jan 9;370(2):139-49.  6. Radha Y et al. Increased Incidence of Cardiovascular Disease in Middle-aged Men with Obstructive Sleep Apnea. Am J Respir Crit Care Med; 2002 166: 159-165  7. Jim EM et al. Studying Life Effects and Effectiveness of Palatopharyngoplasty (SLEEP) study: Subjective Outcomes of Isolated Uvulopalatopharyngoplasty. Otolaryngol Head Neck Surg. 2011; 144: 623-631.  Please do not drive if drowsy or sleepy;  pull over if drowsy.

## 2017-10-18 NOTE — NURSING NOTE
"Chief Complaint   Patient presents with     Consult       Initial BP (!) 135/91  Pulse 64  Resp 16  Ht 1.778 m (5' 10\")  Wt 113.9 kg (251 lb)  SpO2 95%  BMI 36.01 kg/m2 Estimated body mass index is 36.01 kg/(m^2) as calculated from the following:    Height as of this encounter: 1.778 m (5' 10\").    Weight as of this encounter: 113.9 kg (251 lb).  Medication Reconciliation: complete   Ana Lilia Ta CMA       "

## 2017-10-23 ENCOUNTER — MYC MEDICAL ADVICE (OUTPATIENT)
Dept: FAMILY MEDICINE | Facility: CLINIC | Age: 52
End: 2017-10-23

## 2017-10-27 ENCOUNTER — OFFICE VISIT (OUTPATIENT)
Dept: SURGERY | Facility: CLINIC | Age: 52
End: 2017-10-27
Payer: COMMERCIAL

## 2017-10-27 DIAGNOSIS — E66.9 OBESITY (BMI 30-39.9): ICD-10-CM

## 2017-10-27 PROCEDURE — 97803 MED NUTRITION INDIV SUBSEQ: CPT | Performed by: DIETITIAN, REGISTERED

## 2017-10-27 NOTE — MR AVS SNAPSHOT
MRN:[id number]                      After Visit Summary   10/27/2017    David Marino    MRN: [id number]           Visit Information        Provider Department      10/27/2017 11:30 AM 2, Saranya Schroeder Diet, RD [facility]      Your next 10 appointments already scheduled     Oct 31, 2017 11:00 AM CDT   Office Visit with Jus Oswald MD   [facility]    [facility] [address] [phone]           Bring a current list of meds and any records pertaining to this visit. For Physicals, please bring immunization records and any forms needing to be filled out. Please arrive 10 minutes early to complete paperwork.            Nov 27, 2017  8:30 AM CST   Weight Loss Visit with Sh Wl Diet 1, RD   [facility] ([facility])    [address] [place] [address] [phone]            Dec 26, 2017  8:30 AM CST   Weight Loss Visit with Sh Wl Diet 3, RD   [facility] ([facility])    [address] [place] [id number] [phone]              MyChart Information     [facility] gives you secure access to your electronic health record. If you see a primary care provider, you can also send messages to your care team and make appointments. If you have questions, please call your primary care clinic.  If you do not have a primary care provider, please call [phone] and they will assist you.        Care EveryWhere ID     This is your Care EveryWhere ID. This could be used by other organizations to access your [facility] medical records  [id number]        Equal Access to Services     SATINDER XIE : Jaime Barnes, favio ying, hetal branch, lara leach  la'kushal ah. So [facility] [phone].    ATENCIÓN: Si habla español, tiene a obrien disposición servicios gratuitos de asistencia lingüística. Llame al [phone].    We comply with applicable federal civil rights laws and [place] laws. We do not discriminate on the basis of race, color, national origin, age, disability, sex, sexual orientation, or gender identity.

## 2017-10-27 NOTE — PROGRESS NOTES
"DATE OF VISIT: 10/27/2017  Name: AMADO MALIK  : 1965  Gender: Male  MRN: [id number]  Age: 52  ASSESSMENT  REASON FOR VISIT:  AMADO MALIK is a 52 year old Male presents today for a pre-surgical weight loss follow-up appointment.  DIAGNOSIS:  Class II  Obesity.    ANTHROPOMETRICS:  Height: 70 inches  Initial Weight: 248 lbs  Weight last visit: 252.2 lbs  Current Weight: 252.8 lbs  BMI: 36.3 kg/m2    NUTRITION HISTORY:  Breakfast: oatmeal + brown sugar + skim milk-within 1 hour of waking  Lunch: salad bar with lots of veggies, chicken breast, light dressing work  Supper: pizza or chicken wings  Snacks: black licorice or hard candies-less snacking in the past month  Drinks: 2 cups decaf coffee, water  Consuming liquid calories: coffee creamer  Eating 3 meals per day: Yes  Eating slower: No  Chewing foods thoroughly: Yes  Take 30 minutes to consume each meal: Sometimes  Fluids and meals separate by at least 30 minutes: No  Comments: Emotional eating d/t stress and boredom. Answered \"yes\" to binge eating however seems to be simply overeating. Denies any feelings of compulsiveness, shame, etc associated with episodes. Has been thinking about surgery for about a year, knows a couple other people who have had surgery and been fairly successful. 17: patient admits he has been struggling with thinking there will be some food he may never be able to eat again; over the past month pt has done extensive research on weight loss surgery; says he is in the contemplation phase of thinking about weight loss surgery; soft ball has ended so pt wants to start intentional exercise this month 17- pt feels like he has been more focused on moving forward with weight loss surgery his past month' less \"food funerals;\" pt and his partner have eliminated all fast food  10/27/17 Patient attended bariatric weight loss support group and found it very helpful. Patient making mindful choices about food and decisions regarding " surgery.   Desired Surgical Procedure: gastric bypass  PHYSICAL ACTIVITY:  Type: Walking;Other  Frequency: 3-4x/week  Duration (min): 30  DIAGNOSIS:  Previous Nutrition Diagnosis: Obesity related to excess energy intake as evidenced by BMI of 36.2 kg/m2-no change   Previous goals:  Preplan 4 dinner meals for the week on the weekend-improving  Consistently take 20minute for each meal-met  Chew all food to applesauce texture-improving  Current Nutrition Diagnosis: Obesity related to excess energy intake as evidenced by BMI of 36.3 kg/m2  IMPLEMENTATION:  Nutrition Prescription: Recommended energy/nutrient modification  Goals:  Aim for 10 servings of fruits and vegetables per day (pt determined)  Drink 64 oz. water per day  Drink protein shake for breakfast  Exercise 3-4 times per week  Implementation:  - Discussed progress towards previous goals  - Reinforced importance of making behavior changes in preparation for bariatric surgery.  NUTRITION MONITORING AND EVALUATION:  Anticipated compliance: Fair to good  Patient verbalized good understanding of bariatric diet guidelines.    Follow up: 1 month  # of visits needed: 2  Cleared by RD: No  TIME SPENT WITH PATIENT: 25 minutes  Jesse Ortiz, RD, LD  [facility] Outpatient Dietitian  [phone] (office phone)

## 2017-11-13 ENCOUNTER — OFFICE VISIT (OUTPATIENT)
Dept: FAMILY MEDICINE | Facility: CLINIC | Age: 52
End: 2017-11-13
Payer: COMMERCIAL

## 2017-11-13 VITALS
BODY MASS INDEX: 36.58 KG/M2 | SYSTOLIC BLOOD PRESSURE: 135 MMHG | WEIGHT: 255.5 LBS | TEMPERATURE: 97.4 F | HEART RATE: 75 BPM | HEIGHT: 70 IN | OXYGEN SATURATION: 98 % | DIASTOLIC BLOOD PRESSURE: 87 MMHG

## 2017-11-13 DIAGNOSIS — L75.1 CHROMHIDROSIS: ICD-10-CM

## 2017-11-13 DIAGNOSIS — M79.675 PAIN OF TOE OF LEFT FOOT: ICD-10-CM

## 2017-11-13 DIAGNOSIS — F43.21 ADJUSTMENT DISORDER WITH DEPRESSED MOOD: Primary | ICD-10-CM

## 2017-11-13 DIAGNOSIS — Z23 FLU VACCINE NEED: ICD-10-CM

## 2017-11-13 DIAGNOSIS — H04.123 DRY EYES: ICD-10-CM

## 2017-11-13 PROCEDURE — 90471 IMMUNIZATION ADMIN: CPT | Performed by: FAMILY MEDICINE

## 2017-11-13 PROCEDURE — 99214 OFFICE O/P EST MOD 30 MIN: CPT | Mod: 25 | Performed by: FAMILY MEDICINE

## 2017-11-13 PROCEDURE — 90686 IIV4 VACC NO PRSV 0.5 ML IM: CPT | Performed by: FAMILY MEDICINE

## 2017-11-13 RX ORDER — DULOXETIN HYDROCHLORIDE 30 MG/1
30 CAPSULE, DELAYED RELEASE ORAL 2 TIMES DAILY
Qty: 60 CAPSULE | Refills: 6 | Status: SHIPPED | OUTPATIENT
Start: 2017-11-13 | End: 2017-12-10

## 2017-11-13 ASSESSMENT — ANXIETY QUESTIONNAIRES
GAD7 TOTAL SCORE: 3
1. FEELING NERVOUS, ANXIOUS, OR ON EDGE: NOT AT ALL
2. NOT BEING ABLE TO STOP OR CONTROL WORRYING: SEVERAL DAYS
7. FEELING AFRAID AS IF SOMETHING AWFUL MIGHT HAPPEN: NOT AT ALL
5. BEING SO RESTLESS THAT IT IS HARD TO SIT STILL: NOT AT ALL
IF YOU CHECKED OFF ANY PROBLEMS ON THIS QUESTIONNAIRE, HOW DIFFICULT HAVE THESE PROBLEMS MADE IT FOR YOU TO DO YOUR WORK, TAKE CARE OF THINGS AT HOME, OR GET ALONG WITH OTHER PEOPLE: SOMEWHAT DIFFICULT
3. WORRYING TOO MUCH ABOUT DIFFERENT THINGS: SEVERAL DAYS
6. BECOMING EASILY ANNOYED OR IRRITABLE: SEVERAL DAYS

## 2017-11-13 ASSESSMENT — PATIENT HEALTH QUESTIONNAIRE - PHQ9
SUM OF ALL RESPONSES TO PHQ QUESTIONS 1-9: 4
5. POOR APPETITE OR OVEREATING: NOT AT ALL

## 2017-11-13 NOTE — PROGRESS NOTES
SUBJECTIVE:   David Marino is a 52 year old male who presents to clinic today for the following health issues:      Depression Followup    Status since last visit: Improved with meds     See PHQ-9 for current symptoms.  Other associated symptoms: increased appetite, would like to change meds     Complicating factors:   Significant life event:  No   Current substance abuse:  None  Anxiety or Panic symptoms:  No    PHQ-9 Score and MyChart F/U Questions 10/14/2016 12/13/2016 8/7/2017   Total Score 19 3 4   Q9: Suicide Ideation Not at all Not at all Not at all       PHQ-9  English  PHQ-9   Any Language  Suicide Assessment Five-step Evaluation and Treatment (SAFE-T)      Amount of exercise or physical activity: 2-3 days/week for an average of 15-30 minutes    Problems taking medications regularly: No    Medication side effects: increased appetite     Diet: regular (no restrictions)    Hx of being on wellbutrin and Pristiq  Stopped wellbutrin 5 weeks ago  Depression is well managed, but blames increased appetite on it    On 6 month period for bariatric surgery.  Has gained 5 pounds    BP Readings from Last 6 Encounters:   11/13/17 135/87   10/18/17 (!) 135/91   08/22/17 (!) 140/92   08/07/17 140/90   07/11/17 (!) 144/102   02/13/17 (!) 143/101       Also bump on head, benign thing, keeps coming back    Had a pain in left toe and wonders if it is gout    We also discussed whether or not there is a oral medication he could take for dry eyes        Current Outpatient Prescriptions   Medication     DULoxetine (CYMBALTA) 30 MG EC capsule     rosuvastatin (CRESTOR) 10 MG tablet     doxycycline Monohydrate 50 MG CAPS capsule     valsartan (DIOVAN) 320 MG tablet     traZODone (DESYREL) 50 MG tablet     metroNIDAZOLE (METROGEL) 0.75 % topical gel     omeprazole (PRILOSEC) 40 MG capsule     TRANSDERM-SCOP, 1.5 MG, (1.5mg base/3day) patch     buPROPion (WELLBUTRIN XL) 150 MG 24 hr tablet     montelukast (SINGULAIR) 10 MG tablet  "    glycopyrrolate (ROBINUL-FORTE) 2 MG TABS     No current facility-administered medications for this visit.      I have reviewed the patient's medical history in detail; there are no changes to the history as noted in EpicCare.    ROS: As per HPI.  Constitutional: no fevers/sweats/chills  CV: no chest pain  RESP: no shortness of breath/wheezing  GI: no nausea/vomiting/diarrhea  : no dysuria, normal urinary pattern    EXAM  /87  Pulse 75  Temp 97.4  F (36.3  C) (Oral)  Ht 5' 10\" (1.778 m)  Wt 255 lb 8 oz (115.9 kg)  SpO2 98%  BMI 36.66 kg/m2  Gen: Healthy appearing male in no apparent distress  Head: Normocephalic, Atraumatic  Eyes: PERRL, no photophobia, no icterus  Skin: No rashes  Heme: No bruising or petechiae  Lymph: No adenopathy  Psych: anxious    ASSESSMENT/PLAN:    ICD-10-CM    1. Adjustment disorder with depressed mood F43.21 DULoxetine (CYMBALTA) 30 MG EC capsule   2. Dry eyes H04.123    3. Pain of toe of left foot M79.675    4. 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 Flu vaccine need Z23 HC FLU VAC PRESRV FREE QUAD SPLIT VIR 3+YRS IM    patient be interested in making a lateral switch her medication we could try Cymbalta which is somewhat similar to the Pristiq that he previously found helpful  We discussed using different kinds of eyedrops for dry eyes and oral medication unlikely to be very helpful and/or might cause more side effects than good  Pain in his toe doesn't appear to be gout he is reassured by this  Discussed chromhidrosis and current treatment  Also flu vaccine      Jus Oswald MD MPH      "

## 2017-11-13 NOTE — MR AVS SNAPSHOT
After Visit Summary   11/13/2017    David Marino    MRN: [id number]           Patient Information     Date Of Birth          1965        Visit Information        Provider Department      11/13/2017 1:30 PM Jus Oswald MD [facility]        Today's Diagnoses     Adjustment disorder with depressed mood    -  1    Dry eyes        Pain of toe of left foot        Chromhidrosis        Flu vaccine need           Follow-ups after your visit        Follow-up notes from your care team     Return in about 1 year (around 11/13/2018), or if symptoms worsen or fail to improve.      Your next 10 appointments already scheduled     Nov 27, 2017  8:30 AM CST   Weight Loss Visit with Saranya Wl Diet 1, RD   [facility] ([facility])    [address] [place] [id number] [phone]            Dec 26, 2017  8:30 AM CST   Weight Loss Visit with Saranya Wl Diet 3, RD   [facility] ([facility])    [address] [place] [id number] [phone]              Who to contact     If you have questions or need follow up information about today's clinic visit or your schedule please contact [facility] directly at [phone].  Normal or non-critical lab and imaging results will be communicated to you by MyChart, letter or phone within 4 business days after the clinic has received the results. If you do not hear from us within 7 days, please contact the clinic through MyChart or phone. If you have a critical or abnormal lab result, we will notify you by phone as soon as possible.  Submit refill requests through [employer] or call your pharmacy and they will forward the refill request to us. Please allow 3 business days for your refill to be completed.          Additional Information About Your Visit        MyChart Information      "[facility] gives you secure access to your electronic health record. If you see a primary care provider, you can also send messages to your care team and make appointments. If you have questions, please call your primary care clinic.  If you do not have a primary care provider, please call [phone] and they will assist you.        Care EveryWhere ID     This is your Care EveryWhere ID. This could be used by other organizations to access your [facility] medical records  [id number]        Your Vitals Were     Pulse Temperature Height Pulse Oximetry BMI (Body Mass Index)       75 97.4  F (36.3  C) (Oral) 5' 10\" (1.778 m) 98% 36.66 kg/m2        Blood Pressure from Last 3 Encounters:   11/13/17 135/87   10/18/17 (!) 135/91   08/22/17 (!) 140/92    Weight from Last 3 Encounters:   11/13/17 255 lb 8 oz (115.9 kg)   10/18/17 251 lb (113.9 kg)   08/22/17 254 lb 9.6 oz (115.5 kg)              We Performed the Following     HC FLU VAC PRESRV FREE QUAD SPLIT VIR 3+YRS IM          Today's Medication Changes          These changes are accurate as of: 11/13/17 11:59 PM.  If you have any questions, ask your nurse or doctor.               Start taking these medicines.        Dose/Directions    DULoxetine 30 MG EC capsule   Commonly known as:  CYMBALTA   Used for:  Adjustment disorder with depressed mood   Replaces:  desvenlafaxine succinate 100 MG 24 hr tablet   Started by:  Jus Oswald MD        Dose:  30 mg   Take 1 capsule (30 mg) by mouth 2 times daily   Quantity:  60 capsule   Refills:  6         Stop taking these medicines if you haven't already. Please contact your care team if you have questions.     desvenlafaxine succinate 100 MG 24 hr tablet   Commonly known as:  PRISTIQ   Replaced by:  DULoxetine 30 MG EC capsule   Stopped by:  Jus Oswald MD                Where to get your medicines      These medications were sent to [id number] - [place], [place] - [address] AT [facility] [address]  [address], [place] [address]    Hours:  24-hours Phone:  [phone]     DULoxetine 30 MG EC capsule                Primary Care Provider Office Phone # Fax #    Jus Woodrow Oswald [phone] [address] [facility] [address]        Equal Access to Services     [facility] HospitalADRY : Hadii aad ku hadasho Soomaali, waaxda luqadaha, qaybta kaalmada adeegyada, waxay idiin hayaan adeeg kharash la'aan ah. So [facility] [phone].    ATENCIÓN: Si habla español, tiene a obrien disposición servicios gratuitos de asistencia lingüística. [facility] [phone].    We comply with applicable federal civil rights laws and [place] laws. We do not discriminate on the basis of race, color, national origin, age, disability, sex, sexual orientation, or gender identity.            Thank you!     Thank you for choosing [facility]  for your care. Our goal is always to provide you with excellent care. Hearing back from our patients is one way we can continue to improve our services. Please take a few minutes to complete the written survey that you may receive in the mail after your visit with us. Thank you!             Your Updated Medication List - Protect others around you: Learn how to safely use, store and throw away your medicines at www.disposemymeds.org.          This list is accurate as of: 11/13/17 11:59 PM.  Always use your most recent med list.                   Brand Name Dispense Instructions for use Diagnosis    buPROPion 150 MG 24 hr tablet    WELLBUTRIN XL    90 tablet    Take 1 tablet (150 mg) by mouth every morning    Adjustment disorder with depressed mood       doxycycline monohydrate 50 MG capsule     60 capsule    Take 1 capsule (50 mg) by mouth 2 times daily    Rosacea       DULoxetine 30 MG EC capsule    CYMBALTA    60 capsule    Take 1 capsule (30 mg) by mouth 2 times daily    Adjustment disorder with depressed mood       glycopyrrolate 2 MG  Tabs    ROBINUL-FORTE    90 tablet    1 tab po q day    Generalized hyperhidrosis       metroNIDAZOLE 0.75 % topical gel    METROGEL    45 g    Apply topically 2 times daily    Rosacea       montelukast 10 MG tablet    SINGULAIR    90 tablet    Take 1 tablet (10 mg) by mouth At Bedtime    Environmental allergies       omeprazole 40 MG capsule    priLOSEC    90 capsule    Take 1 capsule (40 mg) by mouth daily Take 30-60 minutes before a meal.    Gastroesophageal reflux disease without esophagitis       rosuvastatin 10 MG tablet    CRESTOR    90 tablet    TAKE 1 TABLET(10 MG) BY MOUTH DAILY    Mixed hyperlipidemia       TRANSDERM-SCOP (1.5 MG) 72 hr patch   Generic drug:  scopolamine           traZODone 50 MG tablet    DESYREL    90 tablet    Take 1 tablet (50 mg) by mouth At Bedtime    Other insomnia       valsartan 320 MG tablet    DIOVAN    90 tablet    Take 1 tablet (320 mg) by mouth daily    Benign essential hypertension

## 2017-11-13 NOTE — NURSING NOTE
"Chief Complaint   Patient presents with     Depression       Initial /87  Pulse 75  Temp 97.4  F (36.3  C) (Oral)  Ht 5' 10\" (1.778 m)  Wt 255 lb 8 oz (115.9 kg)  SpO2 98%  BMI 36.66 kg/m2 Estimated body mass index is 36.66 kg/(m^2) as calculated from the following:    Height as of this encounter: 5' 10\" (1.778 m).    Weight as of this encounter: 255 lb 8 oz (115.9 kg).  Medication Reconciliation: complete      Health Maintenance that is potentially due pending provider review:  NONE    n/a    AURELIANO Davis  "

## 2017-11-14 ASSESSMENT — ANXIETY QUESTIONNAIRES: GAD7 TOTAL SCORE: 3

## 2017-11-27 ENCOUNTER — OFFICE VISIT (OUTPATIENT)
Dept: SURGERY | Facility: CLINIC | Age: 52
End: 2017-11-27
Payer: COMMERCIAL

## 2017-11-27 DIAGNOSIS — E66.9 OBESITY (BMI 30-39.9): ICD-10-CM

## 2017-11-27 PROCEDURE — 97803 MED NUTRITION INDIV SUBSEQ: CPT | Performed by: DIETITIAN, REGISTERED

## 2017-11-27 NOTE — PROGRESS NOTES
"PRE-SURGICAL WEIGHT LOSS NUTRITION APPOINTMENT  DATE OF VISIT: 2017  Name: AMADO MALIK  : 1965  Gender: Male  MRN: [id number]  Age: 52  ASSESSMENT  REASON FOR VISIT:  AMADO MALIK is a 52 year old Male presents today for a pre-surgical weight loss follow-up appointment.  DIAGNOSIS:  Class II Obesity.    ANTHROPOMETRICS:  Height: 70 inches  Initial Weight: 248 lbs  Weight last visit: 252.8 lbs  Current Weight: 258.4 lbs  BMI: 37.1 kg/m2    NUTRITION HISTORY:  Breakfast: oatmeal + brown sugar + skim milk-within 1 hour of waking  Lunch: salad bar with lots of veggies, chicken breast, light dressing work or leftovers from dinner  Supper: chicken or pork chop, cooked veggie  Snacks: protein bar  Drinks: 2 cups decaf coffee, water, herbal tea, cranberry juice, occasional coffee   Consuming liquid calories: coffee creamer  Eating 3 meals per day: Yes  Eating slower: No  Chewing foods thoroughly: Yes  Take 30 minutes to consume each meal: Yes  Fluids and meals separate by at least 30 minutes: Yes  Comments: Emotional eating d/t stress and boredom. Answered \"yes\" to binge eating however seems to be simply overeating. Denies any feelings of compulsiveness, shame, etc associated with episodes. Has been thinking about surgery for about a year, knows a couple other people who have had surgery and been fairly successful. 17: patient admits he has been struggling with thinking there will be some food he may never be able to eat again; over the past month pt has done extensive research on weight loss surgery; says he is in the contemplation phase of thinking about weight loss surgery; soft ball has ended so pt wants to start intentional exercise this month 17- pt feels like he has been more focused on moving forward with weight loss surgery his past month' less \"food funerals;\" pt and his partner have eliminated all fast food 17- Pt. feels like his weight is up due to 3 days of over eating during " the Thanksgiving holiday; started a new anti depressant this past month (pt not sure what it is); pt is frustrated weight trending up over the past several months; suggested trail of pre-surgery eight loss meal plan to help expedite weight loss  Desired Surgical Procedure: gastric bypass  PHYSICAL ACTIVITY:  Type: Walking;Other  Frequency: 3-4x/week  Duration (min): 30  DIAGNOSIS:  Previous Nutrition Diagnosis: Obesity related to excess energy intake as evidenced by BMI of 36.3  no change, modified below  Previous goals:  Aim for 10 servings of fruits and vegetables per day (pt determined)-not met  Drink 64 oz. water per day-met  Drink protein shake for breakfast -not met  Exercise 3-4 times per week-met  Current Nutrition Diagnosis: Obesity related to excess energy intake as evidenced by BMI of 37.1  IMPLEMENTATION:  Nutrition Prescription: Recommended energy/nutrient modification  Goals:  Follow pre-surgery weight loss meal plan (written information provided)  Focus on pre- surgical weight goal of 243.6 pounds  Schedule sleep study   Implementation:  - Discussed progress towards previous goals  - Reinforced importance of making behavior changes in preparation for bariatric surgery.  NUTRITION MONITORING AND EVALUATION:  Anticipated compliance: fair-good  Patient verbalized fair-good understanding of bariatric diet guidelines.    Follow up: 1 month  # of visits needed: 1 (will depend on progress with weight loss)  Cleared by RD: No  TIME SPENT WITH PATIENT: 23 minutes  Bryan Aiken RD, LD  [facility]  [phone]

## 2017-11-27 NOTE — MR AVS SNAPSHOT
MRN:[id number]                      After Visit Summary   11/27/2017    David Marino    MRN: [id number]           Visit Information        Provider Department      11/27/2017 8:30 AM 1, Saranya Barton, TAVARES [facility]      Your next 10 appointments already scheduled     Dec 26, 2017  8:30 AM CST   Weight Loss Visit with Sh Wl Diet 3, RD   [facility] ([facility])    [address] [place] [id number] [phone]              MyChart Information     [facility] gives you secure access to your electronic health record. If you see a primary care provider, you can also send messages to your care team and make appointments. If you have questions, please call your primary care clinic.  If you do not have a primary care provider, please call [phone] and they will assist you.        Care EveryWhere ID     This is your Care EveryWhere ID. This could be used by other organizations to access your [facility] medical records  [id number]        Equal Access to Services     SATINDER XIE : Hadii aad ku hadasho [place], waaxda luqadaha, qaybta kaalmada adeegcari, lara moody. So [facility] [phone].    ATENCIÓN: Si habla español, tiene a obrien disposición servicios gratuitos de asistencia lingüística. Llame al [phone].    We comply with applicable federal civil rights laws and [place] laws. We do not discriminate on the basis of race, color, national origin, age, disability, sex, sexual orientation, or gender identity.

## 2017-12-06 ENCOUNTER — OFFICE VISIT (OUTPATIENT)
Dept: SLEEP MEDICINE | Facility: CLINIC | Age: 52
End: 2017-12-06
Attending: INTERNAL MEDICINE
Payer: COMMERCIAL

## 2017-12-06 DIAGNOSIS — G47.33 OSA (OBSTRUCTIVE SLEEP APNEA): ICD-10-CM

## 2017-12-06 PROCEDURE — G0399 HOME SLEEP TEST/TYPE 3 PORTA: HCPCS | Mod: ZF

## 2017-12-06 PROCEDURE — G0399 HOME SLEEP TEST/TYPE 3 PORTA: HCPCS | Mod: ZF | Performed by: INTERNAL MEDICINE

## 2017-12-06 NOTE — Clinical Note
URGENT HST with AHI 51.6 is ready for interp. It is a Jaxon pt but she will not be able to interp until next Monday.

## 2017-12-06 NOTE — PATIENT INSTRUCTIONS
My home sleep study:    ______I will activate the device as shown on the video    ___X___My device is programmed to start automatically at     ______9:30 pm________ Time   on ___12/06/17___________  Day/Date    My contact number if I have problems is _______[phone]____________________________      I will watch the video before I hook it up at night: https://[email or web address]    In case of an emergency call 911

## 2017-12-06 NOTE — PROGRESS NOTES
"Patient presented to clinic for  and demonstration of the \"HST Device\". Patient was set up and instructed use. Patient verbalized understanding and will be returning device after 10 am.       Patient was given HST sleep logs and written instructions for use.        AURELIANO Corbin                      "

## 2017-12-07 ENCOUNTER — APPOINTMENT (OUTPATIENT)
Dept: SLEEP MEDICINE | Facility: CLINIC | Age: 52
End: 2017-12-07
Attending: INTERNAL MEDICINE
Payer: COMMERCIAL

## 2017-12-07 ENCOUNTER — MYC MEDICAL ADVICE (OUTPATIENT)
Dept: SLEEP MEDICINE | Facility: CLINIC | Age: 52
End: 2017-12-07

## 2017-12-07 DIAGNOSIS — G47.33 OSA (OBSTRUCTIVE SLEEP APNEA): ICD-10-CM

## 2017-12-07 NOTE — PROGRESS NOTES
This HST performed using a Noxturnal T3 device which recorded snore, sound, movement activity, body position, nasal pressure, oronasal thermal airflow, pulse, oximetry and both chest and abdominal respiratory effort. HST data was confined to the time patients states they were in bed.   Patient was score using 1B rules. Patient respiratory events showed an AHI of 51.6 with frequent loud snoring. Bed partner's snoring was also heard and seen within snore signal while pt was having obstructive apneas. Overall signal quality was good.    Pt will follow up with sleep provider to determine appropriate therapy.

## 2017-12-07 NOTE — PROCEDURES
"HOME SLEEP STUDY INTERPRETATION    Patient: David Marino  MRN: [id number]  YOB: 1965  Study Date: 2017  Referring Provider: Jus Oswald  Ordering Provider: Mariaelena Ellis MD     Indications for Home Study: David Marino is a 52 year old male with a history of obstructive sleep apnea (not on therapy), hypertension, obesity,  who presents with symptoms suggestive of obstructive sleep apnea-pre bariatric surgery evaluation.    Estimated body mass index is 36.66 kg/(m^2) as calculated from the following:    Height as of 17: 1.778 m (5' 10\").    Weight as of 17: 115.9 kg (255 lb 8 oz).  Total score - Lindsay: 4 (10/18/2017  8:02 AM)  STOP-BAN/8    Data: A full night home sleep study was performed recording the standard physiologic parameters including body position, movement, sound, nasal pressure, thermal oral airflow, chest and abdominal movements with respiratory inductance plethysmography, and oxygen saturation by pulse oximetry. Pulse rate was estimated by oximetry recording. This study was considered adequate based on > 4 hours of quality oximetry and respiratory recording. As specified by the AASM Manual for the Scoring of Sleep and Associated events, version 2.3, Rule VIII.D 1B, 4% oxygen desaturation scoring for hypopneas is used as a standard of care on all home sleep apnea testing.    Analysis Time:  449 minutes    Respiration:   Sleep Associated Hypoxemia: sustained hypoxemia was present. Baseline oxygen saturation was 94%.  Time with saturation less than or equal to 88% was 16 minutes. The lowest oxygen saturation was 82%.   Snoring: Snoring was present.  Respiratory events: The home study revealed a presence of 139 obstructive apneas and 160 mixed and central apneas. There were 87 hypopneas resulting in a combined apnea/hypopnea index [AHI] of 51.6 events per hour.  AHI was 61.1 per hour supine, 48.3 per hour on left side, and 53.2 per hour on " right side.   Pattern: Excluding events noted above, respiratory rate and pattern was Normal.    Position: Percent of time spent: supine - 4%, prone - 0%, on left - 39%, on right - 57%.    Heart Rate: By pulse oximetry normal rate was noted.     Assessment:   Severe obstructive sleep apnea.  Sleep associated hypoxemia was present.    Recommendations:  Consider auto-CPAP at 5-16 cmH2O.  Suggest optimizing sleep hygiene and avoiding sleep deprivation.  Weight management.    Diagnosis Code(s): Obstructive Sleep Apnea G47.33, Hypoxemia G47.36      Akanksha Beth MD, December 7, 2017   Diplomate, American Board of Internal Medicine, Sleep Medicine

## 2017-12-08 ENCOUNTER — TELEPHONE (OUTPATIENT)
Dept: SLEEP MEDICINE | Facility: CLINIC | Age: 52
End: 2017-12-08

## 2017-12-08 ENCOUNTER — DOCUMENTATION ONLY (OUTPATIENT)
Dept: SLEEP MEDICINE | Facility: CLINIC | Age: 52
End: 2017-12-08

## 2017-12-08 PROBLEM — G47.33 OSA (OBSTRUCTIVE SLEEP APNEA): Status: ACTIVE | Noted: 2017-12-08

## 2017-12-08 NOTE — TELEPHONE ENCOUNTER
Pt called back-review psg. Pt interested in CPAP, needs machine, mask fitting. Bariatric surgery may be as soon as Feb.    Needs urgent set up [facility].    Akanksha Beth M.D.  Pulmonary/Critical Care/Sleep Medicine

## 2017-12-08 NOTE — TELEPHONE ENCOUNTER
Patient returned call. Scheduled setup for today at 2 pm in [facility].

## 2017-12-08 NOTE — TELEPHONE ENCOUNTER
Left patient voicemail to try to get patient into [facility] for setup today. Informed patient to call me directly as soon as possible.

## 2017-12-08 NOTE — PROGRESS NOTES
Patient was offered choice of vendor and chose [facility].  Patient David Marino was set up at [place] on December 8, 2017. Patient received a Resmed AirSense 10 Auto. Pressures were set at 5-16 cm H2O.   Patient s ramp is 5 cm H2O for Off and FLEX/EPR is 2.  Patient received a Resmed Mask name: Airfit N20  Nasal mask Size Large, heated tubing and heated humidifier.  Patient is enrolled in the STM Program and does need to meet compliance. Patient has a follow up on TBD with Dr. Ellis.    Tricia Greenberg

## 2017-12-10 DIAGNOSIS — F43.21 ADJUSTMENT DISORDER WITH DEPRESSED MOOD: ICD-10-CM

## 2017-12-11 ENCOUNTER — DOCUMENTATION ONLY (OUTPATIENT)
Dept: SLEEP MEDICINE | Facility: CLINIC | Age: 52
End: 2017-12-11

## 2017-12-11 RX ORDER — DULOXETIN HYDROCHLORIDE 30 MG/1
CAPSULE, DELAYED RELEASE ORAL
Qty: 180 CAPSULE | Refills: 1 | Status: SHIPPED | OUTPATIENT
Start: 2017-12-11 | End: 2018-03-05

## 2017-12-11 NOTE — PROGRESS NOTES
3 DAY STM VISIT    Patient contacted for 3 day STM visit  Message left for patient to return call     Device type: Auto-CPAP  PAP settings: CPAP min 5 cm  H20     CPAP max 16 cm  H20    Assessment: Nightly usage, most nights over four hours, patient AHI elevated will wait for returned call.   Action plan: Pt to have f/u 14 day STM visit.  Diagnostic AHI:  51.6

## 2017-12-11 NOTE — NURSING NOTE
"Chief Complaint   Patient presents with     Sleep Problem     STM        Initial There were no vitals taken for this visit. Estimated body mass index is 36.66 kg/(m^2) as calculated from the following:    Height as of 11/13/17: 1.778 m (5' 10\").    Weight as of 11/13/17: 115.9 kg (255 lb 8 oz).  Medication Reconciliation: unable or not appropriate to perform     AURELIANO Corbin        "

## 2017-12-11 NOTE — TELEPHONE ENCOUNTER
Rx sent 11/13/2017 #60 with 6 refills  Pharmacy requesting 90 day Rx  Prescription approved per [facility] Refill Protocol.  Monika RODRIGUES RN    Requested Prescriptions   Pending Prescriptions Disp Refills     DULoxetine (CYMBALTA) 30 MG EC capsule [Pharmacy Med Name: DULOXETINE DR 30MG CAPSULES] 180 capsule 6     Sig: TAKE 1 CAPSULE(30 MG) BY MOUTH TWICE DAILY    Serotonin-Norepinephrine Reuptake Inhibitors  Passed    12/10/2017 10:35 PM       Passed - Blood pressure under 140/90    BP Readings from Last 3 Encounters:   11/13/17 135/87   10/18/17 (!) 135/91   08/22/17 (!) 140/92                Passed - Recent or future visit with authorizing provider's specialty    Patient had office visit in the last year or has a visit in the next 30 days with authorizing provider.  See chart review.              Passed - Patient is age 18 or older

## 2017-12-12 NOTE — PROGRESS NOTES
Patient called back and left message stating everything was going well with CPAP and he had no questions or concerns.

## 2017-12-13 ENCOUNTER — OFFICE VISIT (OUTPATIENT)
Dept: SLEEP MEDICINE | Facility: CLINIC | Age: 52
End: 2017-12-13
Attending: INTERNAL MEDICINE
Payer: COMMERCIAL

## 2017-12-13 VITALS
DIASTOLIC BLOOD PRESSURE: 91 MMHG | OXYGEN SATURATION: 96 % | WEIGHT: 262 LBS | SYSTOLIC BLOOD PRESSURE: 132 MMHG | HEIGHT: 70 IN | HEART RATE: 83 BPM | BODY MASS INDEX: 37.51 KG/M2

## 2017-12-13 DIAGNOSIS — G47.33 OSA (OBSTRUCTIVE SLEEP APNEA): Primary | ICD-10-CM

## 2017-12-13 PROCEDURE — 99211 OFF/OP EST MAY X REQ PHY/QHP: CPT | Mod: ZF

## 2017-12-13 NOTE — MR AVS SNAPSHOT
After Visit Summary   12/13/2017    David Marino    MRN: [id number]           Patient Information     Date Of Birth          1965        Visit Information        Provider Department      12/13/2017 8:00 AM David Ellis MD [facility] [place], Sleep Study        Today's Diagnoses     ZARIA (obstructive sleep apnea)    -  1      Care Instructions      Your BMI is Body mass index is 37.59 kg/(m^2).  Weight management is a personal decision.  If you are interested in exploring weight loss strategies, the following discussion covers the approaches that may be successful. Body mass index (BMI) is one way to tell whether you are at a healthy weight, overweight, or obese. It measures your weight in relation to your height.  A BMI of 18.5 to 24.9 is in the healthy range. A person with a BMI of 25 to 29.9 is considered overweight, and someone with a BMI of 30 or greater is considered obese. More than two-thirds of American adults are considered overweight or obese.  Being overweight or obese increases the risk for further weight gain. Excess weight may lead to heart disease and diabetes.  Creating and following plans for healthy eating and physical activity may help you improve your health.  Weight control is part of healthy lifestyle and includes exercise, emotional health, and healthy eating habits. Careful eating habits lifelong are the mainstay of weight control. Though there are significant health benefits from weight loss, long-term weight loss with diet alone may be very difficult to achieve- studies show long-term success with dietary management in less than 10% of people. Attaining a healthy weight may be especially difficult to achieve in those with severe obesity. In some cases, medications, devices and surgical management might be considered.  What can you do?  If you are overweight or obese and are interested in methods for weight loss, you should discuss this with your  provider.     Consider reducing daily calorie intake by 500 calories.     Keep a food journal.     Avoiding skipping meals, consider cutting portions instead.    Diet combined with exercise helps maintain muscle while optimizing fat loss. Strength training is particularly important for building and maintaining muscle mass. Exercise helps reduce stress, increase energy, and improves fitness. Increasing exercise without diet control, however, may not burn enough calories to loose weight.       Start walking three days a week 10-20 minutes at a time    Work towards walking thirty minutes five days a week     Eventually, increase the speed of your walking for 1-2 minutes at time    In addition, we recommend that you review healthy lifestyles and methods for weight loss available through the National Institutes of Health patient information sites:  http://win.niddk.nih.gov/publications/index.htm    And look into health and wellness programs that may be available through your health insurance provider, employer, local community center, or gabriella club.    Weight management plan: Patient was referred to their PCP to discuss a diet and exercise plan.     Your blood pressure was checked while you were in clinic today.  Please read the guidelines below about what these numbers mean and what you should do about them.  Your systolic blood pressure is the top number.  This is the pressure when the heart is pumping.  Your diastolic blood pressure is the bottom number.  This is the pressure in between beats.  If your systolic blood pressure is less than 120 and your diastolic blood pressure is less than 80, then your blood pressure is normal. There is nothing more that you need to do about it  If your systolic blood pressure is 120-139 or your diastolic blood pressure is 80-89, your blood pressure may be higher than it should be.  You should have your blood pressure re-checked within a year by a primary care provider.  If your  systolic blood pressure is 140 or greater or your diastolic blood pressure is 90 or greater, you may have high blood pressure.  High blood pressure is treatable, but if left untreated over time it can put you at risk for heart attack, stroke, or kidney failure.  You should have your blood pressure re-checked by a primary care provider within the next four weeks.                Follow-ups after your visit        Follow-up notes from your care team     Return in about 1 month (around 1/13/2018).      Your next 10 appointments already scheduled     Dec 29, 2017  9:00 AM CST   Weight Loss Visit with Saranya Schroeder Diet 3, RD   [facility] ([facility])    [address]  [address]  [facility] [id number] [phone]            Jan 24, 2018  9:00 AM CST   Return Sleep Patient with David Ellis MD   [facility]    [address] [place] [id number] [phone]              Future tests that were ordered for you today     Open Future Orders        Priority Expected Expires Ordered    Overnight oximetry study Routine  6/11/2018 12/13/2017            Who to contact     If you have questions or need follow up information about today's clinic visit or your schedule please contact [facility] directly at [phone].  Normal or non-critical lab and imaging results will be communicated to you by MyChart, letter or phone within 4 business days after the clinic has received the results. If you do not hear from us within 7 days, please contact the clinic through MyChart or phone. If you have a critical or abnormal lab result, we will notify you by phone as soon as possible.  Submit refill requests through [employer] or call your pharmacy and they will forward the refill request to us. Please allow 3 business days for your refill to be completed.     "      Additional Information About Your Visit        MyChart Information     [facility] gives you secure access to your electronic health record. If you see a primary care provider, you can also send messages to your care team and make appointments. If you have questions, please call your primary care clinic.  If you do not have a primary care provider, please call [phone] and they will assist you.        Care EveryWhere ID     This is your Care EveryWhere ID. This could be used by other organizations to access your [facility] medical records  [id number]        Your Vitals Were     Pulse Height Pulse Oximetry BMI (Body Mass Index)          83 1.778 m (5' 10\") 96% 37.59 kg/m2         Blood Pressure from Last 3 Encounters:   12/13/17 (!) 132/91   11/13/17 135/87   10/18/17 (!) 135/91    Weight from Last 3 Encounters:   12/13/17 118.8 kg (262 lb)   11/13/17 115.9 kg (255 lb 8 oz)   10/18/17 113.9 kg (251 lb)              We Performed the Following     Comprehensive DME        Primary Care Provider Office Phone # Fax #    Jus Woodrow Oswald [phone] [address] [address]        Equal Access to Services     SATINDER XIE : Hadii aad ku hadasho Soomaali, waaxda luqadaha, qaybta kaalmada adeegyada, waxay idiin hayaan antelmoeg hany la'carolinen ah. So [facility] [phone].    ATENCIÓN: Si habla español, tiene a obrien disposición servicios gratuitos de asistencia lingüística. Llluisa al [phone].    We comply with applicable federal civil rights laws and [place] laws. We do not discriminate on the basis of race, color, national origin, age, disability, sex, sexual orientation, or gender identity.            Thank you!     Thank you for choosing [facility], SLEEP STUDY  for your care. Our goal is always to provide you with excellent care. Hearing back from our patients is one way we can continue to improve our services. Please take a few minutes to complete the written survey " that you may receive in the mail after your visit with us. Thank you!             Your Updated Medication List - Protect others around you: Learn how to safely use, store and throw away your medicines at www.disposemymeds.org.          This list is accurate as of: 12/13/17  1:37 PM.  Always use your most recent med list.                   Brand Name Dispense Instructions for use Diagnosis    doxycycline monohydrate 50 MG capsule     60 capsule    Take 1 capsule (50 mg) by mouth 2 times daily    Rosacea       DULoxetine 30 MG EC capsule    CYMBALTA    180 capsule    TAKE 1 CAPSULE(30 MG) BY MOUTH TWICE DAILY    Adjustment disorder with depressed mood       metroNIDAZOLE 0.75 % topical gel    METROGEL    45 g    Apply topically 2 times daily    Rosacea       omeprazole 40 MG capsule    priLOSEC    90 capsule    Take 1 capsule (40 mg) by mouth daily Take 30-60 minutes before a meal.    Gastroesophageal reflux disease without esophagitis       rosuvastatin 10 MG tablet    CRESTOR    90 tablet    TAKE 1 TABLET(10 MG) BY MOUTH DAILY    Mixed hyperlipidemia       TRANSDERM-SCOP (1.5 MG) 72 hr patch   Generic drug:  scopolamine           traZODone 50 MG tablet    DESYREL    90 tablet    Take 1 tablet (50 mg) by mouth At Bedtime    Other insomnia       valsartan 320 MG tablet    DIOVAN    90 tablet    Take 1 tablet (320 mg) by mouth daily    Benign essential hypertension

## 2017-12-13 NOTE — NURSING NOTE
"Chief Complaint   Patient presents with     Study Results     Follow up to discuss HST Results       Initial BP (!) 132/91  Pulse 83  Ht 1.778 m (5' 10\")  Wt 118.8 kg (262 lb)  SpO2 96%  BMI 37.59 kg/m2 Estimated body mass index is 37.59 kg/(m^2) as calculated from the following:    Height as of this encounter: 1.778 m (5' 10\").    Weight as of this encounter: 118.8 kg (262 lb).  Medication Reconciliation: complete     AURELIANO Corbin        "

## 2017-12-13 NOTE — PATIENT INSTRUCTIONS
Your BMI is Body mass index is 37.59 kg/(m^2).  Weight management is a personal decision.  If you are interested in exploring weight loss strategies, the following discussion covers the approaches that may be successful. Body mass index (BMI) is one way to tell whether you are at a healthy weight, overweight, or obese. It measures your weight in relation to your height.  A BMI of 18.5 to 24.9 is in the healthy range. A person with a BMI of 25 to 29.9 is considered overweight, and someone with a BMI of 30 or greater is considered obese. More than two-thirds of American adults are considered overweight or obese.  Being overweight or obese increases the risk for further weight gain. Excess weight may lead to heart disease and diabetes.  Creating and following plans for healthy eating and physical activity may help you improve your health.  Weight control is part of healthy lifestyle and includes exercise, emotional health, and healthy eating habits. Careful eating habits lifelong are the mainstay of weight control. Though there are significant health benefits from weight loss, long-term weight loss with diet alone may be very difficult to achieve- studies show long-term success with dietary management in less than 10% of people. Attaining a healthy weight may be especially difficult to achieve in those with severe obesity. In some cases, medications, devices and surgical management might be considered.  What can you do?  If you are overweight or obese and are interested in methods for weight loss, you should discuss this with your provider.     Consider reducing daily calorie intake by 500 calories.     Keep a food journal.     Avoiding skipping meals, consider cutting portions instead.    Diet combined with exercise helps maintain muscle while optimizing fat loss. Strength training is particularly important for building and maintaining muscle mass. Exercise helps reduce stress, increase energy, and improves fitness.  Increasing exercise without diet control, however, may not burn enough calories to loose weight.       Start walking three days a week 10-20 minutes at a time    Work towards walking thirty minutes five days a week     Eventually, increase the speed of your walking for 1-2 minutes at time    In addition, we recommend that you review healthy lifestyles and methods for weight loss available through the National Institutes of Health patient information sites:  http://win.niddk.nih.gov/publications/index.htm    And look into health and wellness programs that may be available through your health insurance provider, employer, local community center, or gabriella club.    Weight management plan: Patient was referred to their PCP to discuss a diet and exercise plan.     Your blood pressure was checked while you were in clinic today.  Please read the guidelines below about what these numbers mean and what you should do about them.  Your systolic blood pressure is the top number.  This is the pressure when the heart is pumping.  Your diastolic blood pressure is the bottom number.  This is the pressure in between beats.  If your systolic blood pressure is less than 120 and your diastolic blood pressure is less than 80, then your blood pressure is normal. There is nothing more that you need to do about it  If your systolic blood pressure is 120-139 or your diastolic blood pressure is 80-89, your blood pressure may be higher than it should be.  You should have your blood pressure re-checked within a year by a primary care provider.  If your systolic blood pressure is 140 or greater or your diastolic blood pressure is 90 or greater, you may have high blood pressure.  High blood pressure is treatable, but if left untreated over time it can put you at risk for heart attack, stroke, or kidney failure.  You should have your blood pressure re-checked by a primary care provider within the next four weeks.

## 2017-12-13 NOTE — PROGRESS NOTES
SLEEP CLINIC FOLLOW UP VISIT:     Date of visit: 12/13/17    CC: Review results of HST, follow-up CPAP use    HST results:  Study Date: 12/6/2017    Analysis Time:  449 minutes     Respiration:   Sleep Associated Hypoxemia: sustained hypoxemia was present. Baseline oxygen saturation was 94%.  Time with saturation less than or equal to 88% was 16 minutes. The lowest oxygen saturation was 82%.   Snoring: Snoring was present.  Respiratory events: The home study revealed a presence of 139 obstructive apneas and 160 mixed and central apneas. There were 87 hypopneas resulting in a combined apnea/hypopnea index [AHI] of 51.6 events per hour.  AHI was 61.1 per hour supine, 48.3 per hour on left side, and 53.2 per hour on right side.   Pattern: Excluding events noted above, respiratory rate and pattern was Normal.  Position: Percent of time spent: supine - 4%, prone - 0%, on left - 39%, on right - 57%.  Heart Rate: By pulse oximetry normal rate was noted.      The HST results were discussed with the patient in detail.  We discussed the consequences of untreated sleep apnea. Following the HST patient was started on CPAP treatment. He was set up at [facility] on December 8, 2017. Patient received a Resmed AirSense 10 Auto. Pressures were set at 5-16 cm H2O.    He reports using the CPAP device regularly during sleep.  There have not been any reports of snoring or awakenings due to gasping for air or choking with the CPAP device.  The last 2 nights he has also noticed that he has not been waking up at night which he used to before usually to void.  He also reports that he has been waking up somewhat more refreshed and he denies any fatigue or daytime sleepiness.  He endorses an [place] sleepiness score of 3 out of 24.  Based on the compliance data as of December 12, 2017 the residual AHI was 14.7/h majority of which were central apneas however there has been gradual improvement in the central apneas.    Current meds, Past medical  "history, Past surgical history, Allergies, Social history, Family history: reviewed, per EMR    Physical exam:  BP (!) 132/91  Pulse 83  Ht 1.778 m (5' 10\")  Wt 118.8 kg (262 lb)  SpO2 96%  BMI 37.59 kg/m2  General appearance:  in no apparent distress  Pt is dressed casually, cooperative with good eye contact.   Speech is spontaneous with regular rate and volume.   Mood: euthymic; affect congruent with full range and intensity.   Sensorium: awake, alert and oriented to person, place, time, and situation.    Assessment/Plan:   Severe obstructive sleep apnea with sleep associated hypoxemia. Patient reports using the CPAP device regularly during sleep and has started noticing positive benefits with improvement in sleep quality and also waking up feeling comparatively more refreshed.  Since he does not have an underlying cardiovascular disease and also is not on any opioid medication,  it is very likely that as he continues to get acclimated to the CPAP treatment the central apneas will eventually resolve.  We will continue to monitor for the central apnea as per the compliance measures via modem.  Based on the pressure settings per the compliance download, recommended revising the pressure settings increasing the minimum pressure setting to  8 while the max pressure will remain the same at 16 cm of water.  Patient was recommended to continue using the device regularly during sleep.  He will be followed through the STM program.  Recommend obtaining an overnight oximetry with the CPAP device along with a parallel compliance download,  the oximetry is to check for resolution of the hypoxemia that was noted during the HST.  He will follow up at the sleep clinic in 1 month, when the compliance measures will be reviewed.  He is considering bariatric surgery in February 2018.  He was recommended to use the device postoperatively due to the likelihood of worsening of the sleep apnea under the influence of anesthesia, " "supine sleep position as well as the narcotic analgesics.  We also discussed the need for repeat home sleep study approximately 6 months after the bariatric surgery for re-evaluation.  Patient was counseled on avoiding driving  if drowsy or sleepy.      The above note was dictated using voice recognition software. Although reviewed after completion, some word and grammatical error may remain . Please contact the author for any clarifications.    \"I spent a total of 25 minutes face to face with David Marino during today's office visit. Over 50% of this time was spent counseling the patient and  coordinating care regarding sleep apnea, CPAP treatment,need to use the device postoperatively .\"       David Ellis MD   of Medicine,  Division of Pulmonary/Sleep Medicine  [facility].        "

## 2017-12-20 ENCOUNTER — TELEPHONE (OUTPATIENT)
Dept: SLEEP MEDICINE | Facility: CLINIC | Age: 52
End: 2017-12-20

## 2017-12-20 NOTE — TELEPHONE ENCOUNTER
Patient left message with his Provider asking for increased pressure. I called patient and left message.

## 2017-12-22 DIAGNOSIS — L75.1 CHROMHIDROSIS: Primary | ICD-10-CM

## 2017-12-26 RX ORDER — GLYCOPYRROLATE 1 MG/5ML
SOLUTION ORAL ONCE
Status: CANCELLED | OUTPATIENT
Start: 2017-12-26 | End: 2017-12-26

## 2017-12-26 NOTE — TELEPHONE ENCOUNTER
JF  Refill request for Glycopyrrolate 5%  Not on current medication list.  On history medication list states historical for solution.   Last OV 11/13/17  Please advise.  Thanks, Brenda Massey RN

## 2017-12-26 NOTE — TELEPHONE ENCOUNTER
E visit perhaps I have not prescribed this before I do not even know what formulation or dose    Previously was on tablets

## 2017-12-27 ENCOUNTER — DOCUMENTATION ONLY (OUTPATIENT)
Dept: SLEEP MEDICINE | Facility: CLINIC | Age: 52
End: 2017-12-27

## 2017-12-27 NOTE — PROGRESS NOTES
14 DAY STM VISIT    Subjective measures:   Pt now feels that the pressure is too much.  Discussed use of ramp and changing device to soft response.   He is still reporting that he is feeling benefit from usage.     Assessment: Pt not meeting objective benchmarks for AHI and leak Patient failing following subjective benchmarks: pressure issues  Action plan: pt to have 30 day STM visit.  Chart routed to provider due to central index.     Ramp turned on to start at 6.0 for 30 min.  Device changed to soft response.   Device type: Auto-CPAP  PAP settings: CPAP min 8 cm  H20     CPAP max 16 cm  H20    95th% pressure 12 cm  H20   Objective measures: 14 day rolling measures      Compliance  85 %      Leak  7.2 lpm  last  upload      AHI 28.08   last  Upload-central apneas       Average number of minutes 310    Diagnostic AHI:   51.6    Objective measure goal  Compliance   Goal >70%  Leak   Goal < 24 lpm  AHI  Goal < 5  Usage  Goal >240

## 2017-12-29 ENCOUNTER — OFFICE VISIT (OUTPATIENT)
Dept: SURGERY | Facility: CLINIC | Age: 52
End: 2017-12-29
Payer: COMMERCIAL

## 2017-12-29 VITALS — BODY MASS INDEX: 36.86 KG/M2 | WEIGHT: 257.5 LBS | HEIGHT: 70 IN

## 2017-12-29 DIAGNOSIS — E66.9 OBESITY (BMI 30-39.9): ICD-10-CM

## 2017-12-29 PROCEDURE — 97803 MED NUTRITION INDIV SUBSEQ: CPT | Performed by: DIETITIAN, REGISTERED

## 2017-12-29 NOTE — PROGRESS NOTES
"PRE SURGICAL WEIGHT LOSS NUTRITION APPOINTMENT    David Marino  1965  male  [id number]  52 year old    ASSESSMENT    Desired Surgical Procedure: gastric bypass    REASON FOR VISIT:  David Marino is a 52 year old year old male presents today for a pre-surgical weight loss follow-up appointment. Patient accompanied by self.    DIAGNOSIS:  Weight Status Obesity Grade II BMI 35-39.9    ANTHROPOMETRICS:  Height: 177.8 cm (5' 10\")  Initial Weight: 248 lbs   Weight last visit: 258.4 lbs    Current weight: 116.8 kg (257 lb 8 oz)  BMI: 37.02 kg/(m^2).    MULTIVITAMIN/MINERALS:  Multivitamin - daily (AM)  Calcium - 600mg BID (AM +PM)  Vitamin D - 5000 international units daily     NUTRITION HISTORY:  Breakfast: (within 1 hour of waking) 2 hard boiled eggs OR oatmeal OR occasional breakfast sandwich OR occasional cereal OR protein drink sometimes    Lunch: cafeteria at work - salads (\"I probably put a little more blue cheese dressing than I should\") OR burgers with sweet potato fries OR ham and potatoes OR fish   Supper: meatless lasagna OR chicken and potatoes OR chicken and squash   Snacks: over the holidays was snacking more, otherwise fruit occasionally    Fluids Consumed: Protein drinks (4-5/week), water (64oz+), enhanced water, small amount of cranberry juice   Eating slower: Yes  Chewing foods thoroughly: Yes  Take 20-30 minutes to consume each meal: Yes  Fluids and meals separate by at least 30 minutes: Yes  Carbonation: none  Caffeine: none  Additional Information: Emotional eating d/t stress and boredom. Answered \"yes\" to binge eating however seems to be simply overeating. Denies any feelings of compulsiveness, shame, etc associated with episodes. Has been thinking about surgery for about a year, knows a couple other people who have had surgery and been fairly successful. 8/22/17: patient admits he has been struggling with thinking there will be some food he may never be able to eat again; over the past " "month pt has done extensive research on weight loss surgery; says he is in the contemplation phase of thinking about weight loss surgery; soft ball has ended so pt wants to start intentional exercise this month 9/25/17- pt feels like he has been more focused on moving forward with weight loss surgery his past month' less \"food funerals;\" pt and his partner have eliminated all fast food 11/27/17- Pt. feels like his weight is up due to 3 days of over eating during the Thanksgiving holiday; started a new anti depressant this past month (pt not sure what it is); pt is frustrated weight trending up over the past several months; suggested trail of pre-surgery eight loss meal plan to help expedite weight loss  12/29/17: Pt shares he wasn't focused on weight loss over the holidays, states now that these are over he's ready to commit to pre-op weight loss.     PHYSICAL ACTIVITY:  Type: walking, [employer] membership  Frequency: 3 (days per week)  Duration: 20 (minutes)     DIAGNOSIS:  Previous Nutrition Diagnosis: Obesity related to long history of self- monitoring deficit and excessive energy intake evidenced by BMI of 37.1 kg/m2  No change, modified below    Previous goals:   Follow pre-surgery weight loss meal plan (written information provided) - not met  Focus on pre- surgical weight goal of 243.6 pounds - not met  Schedule sleep study - met    Current Nutrition Diagnosis: Obesity related to long history of self-monitoring deficit and excessive energy intake as evidenced by BMI of 36.95 kg/m2.    INTERVENTION:  Nutrition Prescription: Recommended energy/nutrient modification.    GOALS:  Continue to practice all pre-op guidelines  Purchase chewable calcium (pt determined this goal)  Increase exercise to 30 minutes, 3-4x/week  Reduce portions of starchy foods by 50%    Follow-Up:   Recommend 1 follow up visits to assist with lifestyle changes or per insurance.    Intervention:  - Discussed progress towards previous goals.  - " Reinforced importance of making behavior changes in preparation for bariatric surgery.   - Assessed learning needs and learning preferences       NUTRITION MONITORING AND EVALUATION:  Anticipated compliance: good  Patient demonstrated good understanding.     Follow up: Continue to monitor patient closely regarding weight loss and diet.  # of visits needed: 1  Cleared by RD: No     TIME SPENT WITH PATIENT: 20 minutes    Lizbeth Campos RD, LD  Clinical Dietitian

## 2017-12-29 NOTE — MR AVS SNAPSHOT
MRN:[id number]                      After Visit Summary   12/29/2017    David Marino    MRN: [id number]           Visit Information        Provider Department      12/29/2017 9:00 AM 3, Saranya Barton, TAVARES [facility]      Your next 10 appointments already scheduled     Jan 17, 2018  8:30 AM CST   Weight Loss Visit with Saranya Schroeder Diet 3, RD   [facility] [place] ([facility])    [address]  [place] [id number] [phone]            Jan 24, 2018  9:00 AM CST   Return Sleep Patient with David Ellis MD   [facility] ([facility])    [address] [place] [id number] [phone]              MyChart Information     [employer] gives you secure access to your electronic health record. If you see a primary care provider, you can also send messages to your care team and make appointments. If you have questions, please call your primary care clinic.  If you do not have a primary care provider, please call [phone] and they will assist you.        Care EveryWhere ID     This is your Care EveryWhere ID. This could be used by other organizations to access your [facility] medical records  [id number]        Equal Access to Services     SATINDER XIE : Hadii jaye pope hadasho Soomaali, waaxda luqadaha, qaybta kaalmada adelawson, lara moody. So [facility] [phone].    ATENCIÓN: Si habla español, tiene a obrien disposición servicios gratuitos de asistencia lingüística. Llame al [phone].    We comply with applicable federal civil rights laws and [place] laws. We do not discriminate on the basis of race, color, national origin, age, disability, sex, sexual orientation, or gender identity.

## 2018-01-08 ENCOUNTER — DOCUMENTATION ONLY (OUTPATIENT)
Dept: SLEEP MEDICINE | Facility: CLINIC | Age: 53
End: 2018-01-08

## 2018-01-08 NOTE — Clinical Note
STM FYI please review.  Patient continues to have increase in central events.  He has a follow up visit with you scheduled  1*24/2018  Thanks  Milla

## 2018-01-08 NOTE — PROGRESS NOTES
30 DAY [facility] VISIT    Message left for patient to return call     Assessment: Pt not meeting objective benchmarks for compliance     Action plan: waiting for patient to return call.   Patient has a follow up visit with Dr. Ellis on 1/24/2018.   Device type: Auto-CPAP  PAP settings: CPAP min 8 cm  H20     CPAP max 16 cm  H20    95th% pressure 12.2 cm  H20   Objective measures: 14 day rolling measures      Compliance  35 %      Leak  15.6 lpm  last  upload      AHI 32.93   last  Upload-primary central events.       Average number of minutes 136    Diagnostic AHI:   51.6        Objective measure goal  Compliance   Goal >70%  Leak   Goal < 24 lpm  AHI  Goal < 5  Usage  Goal >240

## 2018-01-09 ENCOUNTER — TELEPHONE (OUTPATIENT)
Dept: SLEEP MEDICINE | Facility: CLINIC | Age: 53
End: 2018-01-09

## 2018-01-09 NOTE — PROGRESS NOTES
Patient called back and left a voicemail stating that he is still waking to too much pressure.  VM left with patient.      Changed his device to a soft response.

## 2018-01-28 ENCOUNTER — OFFICE VISIT (OUTPATIENT)
Dept: URGENT CARE | Facility: URGENT CARE | Age: 53
End: 2018-01-28
Payer: COMMERCIAL

## 2018-01-28 VITALS
BODY MASS INDEX: 36.51 KG/M2 | SYSTOLIC BLOOD PRESSURE: 133 MMHG | WEIGHT: 255 LBS | OXYGEN SATURATION: 95 % | TEMPERATURE: 99.1 F | HEART RATE: 107 BPM | DIASTOLIC BLOOD PRESSURE: 88 MMHG | HEIGHT: 70 IN

## 2018-01-28 DIAGNOSIS — J11.1 INFLUENZA-LIKE ILLNESS: Primary | ICD-10-CM

## 2018-01-28 PROCEDURE — 99213 OFFICE O/P EST LOW 20 MIN: CPT | Performed by: FAMILY MEDICINE

## 2018-01-28 RX ORDER — OSELTAMIVIR PHOSPHATE 75 MG/1
75 CAPSULE ORAL 2 TIMES DAILY
Qty: 10 CAPSULE | Refills: 0 | Status: SHIPPED | OUTPATIENT
Start: 2018-01-28 | End: 2018-03-05

## 2018-01-28 NOTE — LETTER
[facility]  [address] [address]  [phone]      January 28, 2018    RE:  David Marino                                                                                                                                                       [address]  [place] [address]            To whom it may concern:    David Marino is under my professional care for    Fever  Influenza-like illness.     May return to work    with no restrictions when shortness of breath, severe cough, fevers and chills are resolved.  Influenza is usually infectious for 7-10 days.      Sincerely,        Kim Jewell MD    [facility]

## 2018-01-28 NOTE — PROGRESS NOTES
SUBJECTIVE:   Chief Complaint   Patient presents with     Urgent Care     Pt in clinic c/o sudden onset of  HA, fever, cough, aches, sweats, and LBP.     Flu     David Marino is a 53 year old male who present complaining of flu-like symptoms: fevers, chills, myalgias, headache,  congestion, sore throat and cough for 2 days.   Reports some  dyspnea /  wheezing.  Has no known exposure to people with influenza  Has used inhaler with little improvement- no history of asthma    Past Medical History:   Diagnosis Date     Hypertension      ZARIA (obstructive sleep apnea) Severe AHI 51 12/8/2017    HST 12/6/2017 Severe       ALLERGIES:  Honey; Pollen extract; and Sulfa drugs        Current Outpatient Prescriptions on File Prior to Visit:  DULoxetine (CYMBALTA) 30 MG EC capsule TAKE 1 CAPSULE(30 MG) BY MOUTH TWICE DAILY   rosuvastatin (CRESTOR) 10 MG tablet TAKE 1 TABLET(10 MG) BY MOUTH DAILY   valsartan (DIOVAN) 320 MG tablet Take 1 tablet (320 mg) by mouth daily   traZODone (DESYREL) 50 MG tablet Take 1 tablet (50 mg) by mouth At Bedtime   metroNIDAZOLE (METROGEL) 0.75 % topical gel Apply topically 2 times daily   omeprazole (PRILOSEC) 40 MG capsule Take 1 capsule (40 mg) by mouth daily Take 30-60 minutes before a meal.   TRANSDERM-SCOP, 1.5 MG, (1.5mg base/3day) patch    doxycycline Monohydrate 50 MG CAPS capsule Take 1 capsule (50 mg) by mouth 2 times daily (Patient not taking: Reported on 1/28/2018)     No current facility-administered medications on file prior to visit.     Social History   Substance Use Topics     Smoking status: Former Smoker     Smokeless tobacco: Never Used      Comment: smoker 30+ years ago     Alcohol use 0.0 oz/week     0 Standard drinks or equivalent per week      Comment: occ 1-2 x month       Family History   Problem Relation Age of Onset     DIABETES Mother      Coronary Artery Disease Mother      Hypertension Mother      Depression Mother      Asthma Mother      Coronary Artery Disease  "Father      Hypertension Father      Hyperlipidemia Father      Depression Maternal Grandmother      CEREBROVASCULAR DISEASE No family hx of      Breast Cancer No family hx of      Colon Cancer No family hx of      Prostate Cancer No family hx of      Other Cancer No family hx of      Anxiety Disorder No family hx of      MENTAL ILLNESS No family hx of      Substance Abuse No family hx of      Anesthesia Reaction No family hx of      OSTEOPOROSIS No family hx of      Genetic Disorder No family hx of      Thyroid Disease No family hx of      Obesity No family hx of      Unknown/Adopted No family hx of          ROS:  CONSTITUTIONAL:  fever, chills,    INTEGUMENTARY/SKIN: NEGATIVE for worrisome rashes,    EYES: NEGATIVE for vision changes or irritation  ENT/MOUTH: NEGATIVE for ear, mouth   Problems  Has throat pain  GI: NEGATIVE for nausea, abdominal pain, heartburn, or change in bowel habits     OBJECTIVE  :/88  Pulse 107  Temp 99.1  F (37.3  C) (Tympanic)  Ht 5' 10\" (1.778 m)  Wt 255 lb (115.7 kg)  SpO2 95%  BMI 36.59 kg/m2  GENERAL APPEARANCE: alert, moderate distress, flushed and fatigued,  congested cough  EYES: EOMI,  PERRL, conjunctiva clear  HENT: ear canals and TM's normal.  Nose and mouth without ulcers, erythema or lesions  NECK: supple, nontender, no lymphadenopathy  RESP: rhonchi bilateral and throughout  CV: regular rates and rhythm, normal S1 S2, no murmur noted  NEURO: Normal strength and tone, sensory exam grossly normal,  normal speech and mentation  SKIN: no suspicious lesions or rashes     ASSESSMENT:          Influenza-like illness      - oseltamivir (TAMIFLU) 75 MG capsule; Take 1 capsule (75 mg) by mouth 2 times daily       We discussed the limitations of influenza testing and limitations of  antiviral therapy against influenza, that treatment should usually be initiated within 2 days of the start of symptoms and is most critical for persons with weak immunity and chronic respiratory " illnesses     Symptomatic therapy suggested:  Rest, drink lots of fluids,  Acetaminophen / ibuprofen for body aches and fever,  Salt water gargles for sore throat,  OTC anesthetic lozenges for sore throat,  OTC cough medications.   Call or return to clinic prn if these symptoms worsen or fail to improve as anticipated.    Treatment and prophylaxis for close contacts  was discussed  Advised that influenza illness usually lasts a week, sometimes more and that the patient should avoid contact with others, and cover the mouth when coughing to limit spread of the infection.    Discussed that influenza is a potent virus that can cause damage to airways making increased risk for developing bronchitis or pneumonia.  In severe cases of chest symptoms and antibiotic can treat the bacterial superinfection, but I explained that the antibiotic is not effective against the influenza virus.    Note for work     Rx of tamiflu prophylaxis for spouse of patient

## 2018-01-28 NOTE — NURSING NOTE
"Chief Complaint   Patient presents with     Urgent Care     Pt in clinic c/o sudden onset of  HA, fever, cough, aches, sweats, and LBP.     Flu       Initial /88  Pulse 107  Temp 99.1  F (37.3  C) (Tympanic)  Ht 5' 10\" (1.778 m)  Wt 255 lb (115.7 kg)  SpO2 95%  BMI 36.59 kg/m2 Estimated body mass index is 36.59 kg/(m^2) as calculated from the following:    Height as of this encounter: 5' 10\" (1.778 m).    Weight as of this encounter: 255 lb (115.7 kg).  Medication Reconciliation: complete   Tisha Zepeda/ MA    "

## 2018-01-28 NOTE — PATIENT INSTRUCTIONS
The Flu (Influenza)     The virus that causes the flu spreads through the air in droplets when someone who has the flu coughs, sneezes, laughs, or talks.   The flu (influenza) is an infection that affects your respiratory tract. This tract is made up of your mouth, nose, and lungs, and the passages between them. Unlike a cold, the flu can make you very ill. And it can lead to pneumonia, a serious lung infection. The flu can have serious complications and even cause death.  Who is at risk for the flu?  Anyone can get the flu. But you are more likely to become infected if you:    Have a weakened immune system    Work in a healthcare setting where you may be exposed to flu germs    Live or work with someone who has the flu    Haven t had an annual flu shot  How does the flu spread?  The flu is caused by a virus. The virus spreads through the air in droplets when someone who has the flu coughs, sneezes, laughs, or talks. You can become infected when you inhale these viruses directly. You can also become infected when you touch a surface on which the droplets have landed and then transfer the germs to your eyes, nose, or mouth. Touching used tissues, or sharing utensils, drinking glasses, or a toothbrush from an infected person can expose you to flu viruses, too.  What are the symptoms of the flu?  Flu symptoms tend to come on quickly and may last a few days to a few weeks. They include:    Fever usually higher than 100.4 F  (38 C) and chills    Sore throat and headache    Dry cough    Runny nose    Tiredness and weakness    Muscle aches  Who is at risk for flu complications?  For some people, the flu can be very serious. The risk for complications is greater for:    Children younger than age 5    Adults ages 65 and older    People with a chronic illness such as diabetes or heart, kidney, or lung disease    People who live in a nursing home or long-term care facility   How is the flu treated?  The flu usually gets  better after 7 days or so. In some cases, your healthcare provider may prescribe an antiviral medicine. This may help you get well a little sooner. For the medicine to help, you need to take it as soon as possible (ideally within 48 hours) after your symptoms start. If you develop pneumonia or other serious illness, you may need to stay in the hospital.  Easing flu symptoms    Drink lots of fluids such as water, juice, and warm soup. A good rule is to drink enough so that you urinate your normal amount.    Get plenty of rest.    Ask your healthcare provider what to take for fever and pain.    Call your provider if your fever is 100.4 F (38 C) or higher, or you become dizzy, lightheaded, or short of breath.  Taking steps to protect others    Wash your hands often, especially after coughing or sneezing. Or clean your hands with an alcohol-based hand  containing at least 60% alcohol.    Cough or sneeze into a tissue. Then throw the tissue away and wash your hands. If you don t have a tissue, cough and sneeze into your elbow.    Stay home until at least 24 hours after you no longer have a fever or chills. Be sure the fever isn t being hidden by fever-reducing medicine.    Don t share food, utensils, drinking glasses, or a toothbrush with others.    Ask your healthcare provider if others in your household should get antiviral medicine to help them avoid infection.  How can the flu be prevented?    One of the best ways to avoid the flu is to get a flu vaccine each year. The virus that causes the flu changes from year to year. For that reason, healthcare providers recommend getting the flu vaccine each year, as soon as it's available in your area. The vaccine is given as a shot. Your healthcare provider can tell you which vaccine is right for you. A nasal spray is also available but is not recommended for the 2781-3860 flu season. The CDC says this is because the nasal spray did not seem to protect against the flu  over the last several flu seasons. In the past, it was meant for people ages 2 to 49.    Wash your hands often. Frequent handwashing is a proven way to help prevent infection.    Carry an alcohol-based hand gel containing at least 60% alcohol. Use it when you can't use soap and water. Then wash your hands as soon as you can.    Avoid touching your eyes, nose, and mouth.    At home and work, clean phones, computer keyboards, and toys often with disinfectant wipes.    If possible, avoid close contact with others who have the flu or symptoms of the flu.  Handwashing tips  Handwashing is one of the best ways to prevent many common infections. If you are caring for or visiting someone with the flu, wash your hands each time you enter and leave the room. Follow these steps:    Use warm water and plenty of soap. Rub your hands together well.    Clean the whole hand, including under your nails, between your fingers, and up the wrists.    Wash for at least 15 seconds.    Rinse, letting the water run down your fingers, not up your wrists.    Dry your hands well. Use a paper towel to turn off the faucet and open the door.  Using alcohol-based hand   Alcohol-based hand  are also a good choice. Use them when you can't use soap and water. Follow these steps:    Squeeze about a tablespoon of gel into the palm of one hand.    Rub your hands together briskly, cleaning the backs of your hands, the palms, between your fingers, and up the wrists.    Rub until the gel is gone and your hands are completely dry.  Preventing the flu in healthcare settings  The flu is a special concern for people in hospitals and long-term care facilities. To help prevent the spread of flu, many hospitals and nursing homes take these steps:    Healthcare providers wash their hands or use an alcohol-based hand  before and after treating each patient.    People with the flu have private rooms and bathrooms or share a room with someone  with the same infection.    People who are at high risk for the flu but don't have it are encouraged to get the flu and pneumonia vaccines.    All healthcare workers are encouraged or required to get flu shots.   Date Last Reviewed: 12/1/2016 2000-2017 [employer]. [address] [place] [address]. All rights reserved. This information is not intended as a substitute for professional medical care. Always follow your healthcare professional's instructions.

## 2018-01-28 NOTE — MR AVS SNAPSHOT
After Visit Summary   1/28/2018    David Marino    MRN: [id number]           Patient Information     Date Of Birth          1965        Visit Information        Provider Department      1/28/2018 10:35 AM Kim Jewell MD [facility]        Today's Diagnoses     Fever    -  1    Influenza-like illness          Care Instructions      The Flu (Influenza)     The virus that causes the flu spreads through the air in droplets when someone who has the flu coughs, sneezes, laughs, or talks.   The flu (influenza) is an infection that affects your respiratory tract. This tract is made up of your mouth, nose, and lungs, and the passages between them. Unlike a cold, the flu can make you very ill. And it can lead to pneumonia, a serious lung infection. The flu can have serious complications and even cause death.  Who is at risk for the flu?  Anyone can get the flu. But you are more likely to become infected if you:    Have a weakened immune system    Work in a healthcare setting where you may be exposed to flu germs    Live or work with someone who has the flu    Haven t had an annual flu shot  How does the flu spread?  The flu is caused by a virus. The virus spreads through the air in droplets when someone who has the flu coughs, sneezes, laughs, or talks. You can become infected when you inhale these viruses directly. You can also become infected when you touch a surface on which the droplets have landed and then transfer the germs to your eyes, nose, or mouth. Touching used tissues, or sharing utensils, drinking glasses, or a toothbrush from an infected person can expose you to flu viruses, too.  What are the symptoms of the flu?  Flu symptoms tend to come on quickly and may last a few days to a few weeks. They include:    Fever usually higher than 100.4 F  (38 C) and chills    Sore throat and headache    Dry cough    Runny nose    Tiredness and weakness    Muscle aches  Who  is at risk for flu complications?  For some people, the flu can be very serious. The risk for complications is greater for:    Children younger than age 5    Adults ages 65 and older    People with a chronic illness such as diabetes or heart, kidney, or lung disease    People who live in a nursing home or long-term care facility   How is the flu treated?  The flu usually gets better after 7 days or so. In some cases, your healthcare provider may prescribe an antiviral medicine. This may help you get well a little sooner. For the medicine to help, you need to take it as soon as possible (ideally within 48 hours) after your symptoms start. If you develop pneumonia or other serious illness, you may need to stay in the hospital.  Easing flu symptoms    Drink lots of fluids such as water, juice, and warm soup. A good rule is to drink enough so that you urinate your normal amount.    Get plenty of rest.    Ask your healthcare provider what to take for fever and pain.    Call your provider if your fever is 100.4 F (38 C) or higher, or you become dizzy, lightheaded, or short of breath.  Taking steps to protect others    Wash your hands often, especially after coughing or sneezing. Or clean your hands with an alcohol-based hand  containing at least 60% alcohol.    Cough or sneeze into a tissue. Then throw the tissue away and wash your hands. If you don t have a tissue, cough and sneeze into your elbow.    Stay home until at least 24 hours after you no longer have a fever or chills. Be sure the fever isn t being hidden by fever-reducing medicine.    Don t share food, utensils, drinking glasses, or a toothbrush with others.    Ask your healthcare provider if others in your household should get antiviral medicine to help them avoid infection.  How can the flu be prevented?    One of the best ways to avoid the flu is to get a flu vaccine each year. The virus that causes the flu changes from year to year. For that reason,  healthcare providers recommend getting the flu vaccine each year, as soon as it's available in your area. The vaccine is given as a shot. Your healthcare provider can tell you which vaccine is right for you. A nasal spray is also available but is not recommended for the 0255-7955 flu season. The CDC says this is because the nasal spray did not seem to protect against the flu over the last several flu seasons. In the past, it was meant for people ages 2 to 49.    Wash your hands often. Frequent handwashing is a proven way to help prevent infection.    Carry an alcohol-based hand gel containing at least 60% alcohol. Use it when you can't use soap and water. Then wash your hands as soon as you can.    Avoid touching your eyes, nose, and mouth.    At home and work, clean phones, computer keyboards, and toys often with disinfectant wipes.    If possible, avoid close contact with others who have the flu or symptoms of the flu.  Handwashing tips  Handwashing is one of the best ways to prevent many common infections. If you are caring for or visiting someone with the flu, wash your hands each time you enter and leave the room. Follow these steps:    Use warm water and plenty of soap. Rub your hands together well.    Clean the whole hand, including under your nails, between your fingers, and up the wrists.    Wash for at least 15 seconds.    Rinse, letting the water run down your fingers, not up your wrists.    Dry your hands well. Use a paper towel to turn off the faucet and open the door.  Using alcohol-based hand   Alcohol-based hand  are also a good choice. Use them when you can't use soap and water. Follow these steps:    Squeeze about a tablespoon of gel into the palm of one hand.    Rub your hands together briskly, cleaning the backs of your hands, the palms, between your fingers, and up the wrists.    Rub until the gel is gone and your hands are completely dry.  Preventing the flu in healthcare  settings  The flu is a special concern for people in hospitals and long-term care facilities. To help prevent the spread of flu, many hospitals and nursing homes take these steps:    Healthcare providers wash their hands or use an alcohol-based hand  before and after treating each patient.    People with the flu have private rooms and bathrooms or share a room with someone with the same infection.    People who are at high risk for the flu but don't have it are encouraged to get the flu and pneumonia vaccines.    All healthcare workers are encouraged or required to get flu shots.   Date Last Reviewed: 12/1/2016 2000-2017 [employer]. [address] [place], [place] [id number]. All rights reserved. This information is not intended as a substitute for professional medical care. Always follow your healthcare professional's instructions.                Follow-ups after your visit        Your next 10 appointments already scheduled     Jan 30, 2018  8:30 AM CST   Weight Loss Visit with Saranya Schroeder Diet 1, RD   [facility] [place] ([facility])    [address] [place] [id number] [phone]            Feb 05, 2018  9:30 AM CST   Return Sleep Patient with David Ellis MD   [facility], [place], Sleep Study ([facility])    [address] [place] [id number] [phone]              Who to contact     If you have questions or need follow up information about today's clinic visit or your schedule please contact [facility] directly at [phone].  Normal or non-critical lab and imaging results will be communicated to you by MyChart, letter or phone within 4 business days after the clinic has received the results. If you do not hear from us within 7 days, please contact the clinic through MyChart or phone. If you have a critical or  "abnormal lab result, we will notify you by phone as soon as possible.  Submit refill requests through [employer] or call your pharmacy and they will forward the refill request to us. Please allow 3 business days for your refill to be completed.          Additional Information About Your Visit        [employer] Information     [employer] gives you secure access to your electronic health record. If you see a primary care provider, you can also send messages to your care team and make appointments. If you have questions, please call your primary care clinic.  If you do not have a primary care provider, please call [phone] and they will assist you.        Care EveryWhere ID     This is your Care EveryWhere ID. This could be used by other organizations to access your [facility] medical records  [id number]        Your Vitals Were     Pulse Temperature Height Pulse Oximetry BMI (Body Mass Index)       107 99.1  F (37.3  C) (Tympanic) 5' 10\" (1.778 m) 95% 36.59 kg/m2        Blood Pressure from Last 3 Encounters:   01/28/18 133/88   12/13/17 (!) 132/91   11/13/17 135/87    Weight from Last 3 Encounters:   01/28/18 255 lb (115.7 kg)   12/29/17 257 lb 8 oz (116.8 kg)   12/13/17 262 lb (118.8 kg)              We Performed the Following     Influenza A/B antigen          Today's Medication Changes          These changes are accurate as of 1/28/18 11:59 AM.  If you have any questions, ask your nurse or doctor.               Start taking these medicines.        Dose/Directions    oseltamivir 75 MG capsule   Commonly known as:  TAMIFLU   Used for:  Influenza-like illness   Started by:  Kim Jewell MD        Dose:  75 mg   Take 1 capsule (75 mg) by mouth 2 times daily   Quantity:  10 capsule   Refills:  0            Where to get your medicines      These medications were sent to [facility] [address] [place], [place] - [address] [facility]  [address] [facility] [address]     Phone:  [phone]     oseltamivir 75 MG capsule    "             Primary Care Provider Office Phone # Fax #    Jus Woodrow Oswald [phone] [address] [facility] [address]        Equal Access to Services     SATINDER XIE : Hadii jaye ku hadkaylano Soomaali, waaxda luqadaha, qaybta kaalmada adelawson, lara begumkushal moody. So [facility] [phone].    ATENCIÓN: Si habla español, tiene a obrien disposición servicios gratuitos de asistencia lingüística. Llame al [phone].    We comply with applicable federal civil rights laws and [place] laws. We do not discriminate on the basis of race, color, national origin, age, disability, sex, sexual orientation, or gender identity.            Thank you!     Thank you for choosing [facility]  for your care. Our goal is always to provide you with excellent care. Hearing back from our patients is one way we can continue to improve our services. Please take a few minutes to complete the written survey that you may receive in the mail after your visit with us. Thank you!             Your Updated Medication List - Protect others around you: Learn how to safely use, store and throw away your medicines at www.disposemymeds.org.          This list is accurate as of 1/28/18 11:59 AM.  Always use your most recent med list.                   Brand Name Dispense Instructions for use Diagnosis    dextromethorphan-guaiFENesin  MG per 12 hr tablet    MUCINEX DM     Take 1 tablet by mouth every 12 hours        doxycycline monohydrate 50 MG capsule     60 capsule    Take 1 capsule (50 mg) by mouth 2 times daily    Rosacea       DULoxetine 30 MG EC capsule    CYMBALTA    180 capsule    TAKE 1 CAPSULE(30 MG) BY MOUTH TWICE DAILY    Adjustment disorder with depressed mood       metroNIDAZOLE 0.75 % topical gel    METROGEL    45 g    Apply topically 2 times daily    Rosacea       NYQUIL PO           omeprazole 40 MG capsule    priLOSEC    90 capsule    Take 1 capsule  (40 mg) by mouth daily Take 30-60 minutes before a meal.    Gastroesophageal reflux disease without esophagitis       oseltamivir 75 MG capsule    TAMIFLU    10 capsule    Take 1 capsule (75 mg) by mouth 2 times daily    Influenza-like illness       rosuvastatin 10 MG tablet    CRESTOR    90 tablet    TAKE 1 TABLET(10 MG) BY MOUTH DAILY    Mixed hyperlipidemia       TRANSDERM-SCOP (1.5 MG) 72 hr patch   Generic drug:  scopolamine           traZODone 50 MG tablet    DESYREL    90 tablet    Take 1 tablet (50 mg) by mouth At Bedtime    Other insomnia       valsartan 320 MG tablet    DIOVAN    90 tablet    Take 1 tablet (320 mg) by mouth daily    Benign essential hypertension

## 2018-01-30 ENCOUNTER — OFFICE VISIT (OUTPATIENT)
Dept: SURGERY | Facility: CLINIC | Age: 53
End: 2018-01-30
Payer: COMMERCIAL

## 2018-01-30 VITALS — BODY MASS INDEX: 35.89 KG/M2 | WEIGHT: 250.7 LBS | HEIGHT: 70 IN

## 2018-01-30 DIAGNOSIS — E66.9 OBESITY (BMI 30-39.9): ICD-10-CM

## 2018-01-30 PROCEDURE — 97803 MED NUTRITION INDIV SUBSEQ: CPT | Performed by: DIETITIAN, REGISTERED

## 2018-01-30 NOTE — MR AVS SNAPSHOT
MRN:[id number]                      After Visit Summary   1/30/2018    David Marino    MRN: [id number]           Visit Information        Provider Department      1/30/2018 8:30 AM 1, Saranya Barton, TAVARES [facility]      Your next 10 appointments already scheduled     Feb 05, 2018  9:30 AM CST   Return Sleep Patient with David Ellis MD   [facility], [place], Sleep Study ([facility])    [address] [place] [id number] [phone]            Feb 23, 2018  8:30 AM CST   Weight Loss Visit with Saranya Schroeder Diet 3, RD   [facility] [place] ([facility])    [address] [facility] [address]  [facility] [id number] [phone]              MyChart Information     [employer] gives you secure access to your electronic health record. If you see a primary care provider, you can also send messages to your care team and make appointments. If you have questions, please call your primary care clinic.  If you do not have a primary care provider, please call [phone] and they will assist you.        Care EveryWhere ID     This is your Care EveryWhere ID. This could be used by other organizations to access your [facility] medical records  [id number]        Equal Access to Services     SATINDER XIE : Hadii jaye pope hadasho Somargaritaali, waaxda luqadaha, qaybta kaalmada sarina, lara moody. So [facility] [phone].    ATENCIÓN: Si habla español, tiene a obrien disposición servicios gratuitos de asistencia lingüística. Llame al [phone].    We comply with applicable federal civil rights laws and [place] laws. We do not discriminate on the basis of race, color, national origin, age, disability, sex, sexual orientation, or gender identity.

## 2018-01-30 NOTE — PROGRESS NOTES
"PRE SURGICAL WEIGHT LOSS NUTRITION APPOINTMENT    David Marino  1965  male  [id number]  53 year old    ASSESSMENT    Desired Surgical Procedure: gastric bypass    REASON FOR VISIT:  David Marino is a 53 year old year old male presents today for a pre-surgical weight loss follow-up appointment. Patient accompanied by self.    DIAGNOSIS:  Weight Status Obesity Grade II BMI 35-39.9    ANTHROPOMETRICS:  Height: 177.8 cm (5' 10\")  Initial Weight: 112.5 kg (248 lb)  Weight last visit:    Current weight: 113.7 kg (250 lb 11.2 oz)  BMI: 36.05 kg/(m^2).      NUTRITION HISTORY:  Breakfast: skipping (doing a weight loss plan where you eat only in a window of 8 hours)  Lunch: protein drink (30 minutes before eating), orange  Supper: protein drink (30 minutes before eating), tofu or chicken with cooked veggies  Snacks: apple or banana   Fluids Consumed: Water, Protein Drink and [place]  Eating slower: Yes  Chewing foods thoroughly: Yes  Take 20-30 minutes to consume each meal: Yes  Fluids and meals separate by at least 30 minutes: Yes  Carbonation: none  Caffeine: none  Additional Information: Patient and his  are on  A diet where you eat only within an 8 hour window. Explained to patient eating within 8 hour window may not work well post-op. Patient says he is doing whatever he can to help with weight loss pre-op and is fine with not following these guidelines. Post-op. patient feels like increased use of protein drinks are helping him with weight loss.    PHYSICAL ACTIVITY:  Type: [employer]-walking, few free weights  Frequency: 2 (days per week)  Duration: 30 (minutes)     DIAGNOSIS:  Previous Nutrition Diagnosis: Obesity related to long history of self- monitoring deficit and excessive energy intake evidenced by BMI of 37.02 kg/m2  No change, modified below    Previous goals:   Continue to practice all pre-op guidelines-met  Purchase chewable calcium (pt determined this goal)-met (ordered, but did not receive " yet)  Increase exercise to 30 minutes, 3-4x/week-not met  Reduce portions of starchy foods by 50%-met    Current Nutrition Diagnosis: Obesity related to long history of self-monitoring deficit and excessive energy intake as evidenced by BMI of 36.05 kg/m2.    INTERVENTION:  Nutrition Prescription: Recommended energy/nutrient modification.    GOALS:  Relating To Eating:  Continue to practice all pre-bariatric diet guidelines    Relating to dietary supplements:  Switch to a chewable calcium    Relating to activity:  Increase activity level.  Exercise 3-4 days/week for 30 minutes      Follow-Up:   Recommend 1-2 follow up visits to assist with lifestyle changes or per insurance.    Intervention:  - Discussed progress towards previous goals.  - Reinforced importance of making behavior changes in preparation for bariatric surgery.   -Assessed learning needs and learning preferences       NUTRITION MONITORING AND EVALUATION:  Anticipated compliance: fair-good  Patient demonstrated good understanding.       Follow up: Continue to monitor patient closely regarding weight loss and diet.  # of visits needed: 1-2  Cleared by RD: No     TIME SPENT WITH PATIENT: 20 minutes    Bryan Aiken RD, LD  [facility]  [phone]

## 2018-02-13 ENCOUNTER — MYC MEDICAL ADVICE (OUTPATIENT)
Dept: SLEEP MEDICINE | Facility: CLINIC | Age: 53
End: 2018-02-13

## 2018-02-13 NOTE — TELEPHONE ENCOUNTER
From: David Marino  To: David Ellis MD  Sent: 2/13/2018 11:20 AM CST  Subject: Do I need an appointment?    Good morning,    I had to cancel previous appointments because of a work commitment and also because I had influenza A and was sick for a period of time. I would like to please reschedule an appointment for Thursday, February 22nd around 7:30 a.m. or for Friday, February 23rd around 9:30 a.m. Do either of those dates/times work? Please let me know.     Thank you!    David

## 2018-02-17 DIAGNOSIS — L71.9 ROSACEA: ICD-10-CM

## 2018-02-19 RX ORDER — DOXYCYCLINE 50 MG/1
CAPSULE ORAL
Start: 2018-02-19

## 2018-02-19 NOTE — TELEPHONE ENCOUNTER
[facility]  Denied  Rx sent to [facility] ([phone]) 8/7/2017 for 1 year; transfer  Monika RODRIGUES RN    Requested Prescriptions   Pending Prescriptions Disp Refills     doxycycline monohydrate 50 MG capsule [Pharmacy Med Name: DOXYCYCLINE MONO 50 MG CAP] 60 capsule 0     Sig: TAKE 1 CAP BY MOUTH TWICE DAILY    There is no refill protocol information for this order

## 2018-03-04 DIAGNOSIS — L71.9 ROSACEA: ICD-10-CM

## 2018-03-04 DIAGNOSIS — E78.2 MIXED HYPERLIPIDEMIA: ICD-10-CM

## 2018-03-04 RX ORDER — ROSUVASTATIN CALCIUM 10 MG/1
TABLET, COATED ORAL
Qty: 90 TABLET | Refills: 0 | Status: CANCELLED | OUTPATIENT
Start: 2018-03-04

## 2018-03-04 RX ORDER — DOXYCYCLINE 50 MG/1
CAPSULE ORAL
Qty: 60 CAPSULE | Refills: 0 | Status: CANCELLED | OUTPATIENT
Start: 2018-03-04

## 2018-03-05 ENCOUNTER — OFFICE VISIT (OUTPATIENT)
Dept: FAMILY MEDICINE | Facility: CLINIC | Age: 53
End: 2018-03-05
Payer: COMMERCIAL

## 2018-03-05 ENCOUNTER — TELEPHONE (OUTPATIENT)
Dept: FAMILY MEDICINE | Facility: CLINIC | Age: 53
End: 2018-03-05

## 2018-03-05 VITALS
HEART RATE: 92 BPM | TEMPERATURE: 98.2 F | OXYGEN SATURATION: 98 % | SYSTOLIC BLOOD PRESSURE: 126 MMHG | RESPIRATION RATE: 16 BRPM | BODY MASS INDEX: 36.83 KG/M2 | WEIGHT: 256.7 LBS | DIASTOLIC BLOOD PRESSURE: 94 MMHG

## 2018-03-05 DIAGNOSIS — F43.21 ADJUSTMENT DISORDER WITH DEPRESSED MOOD: Primary | ICD-10-CM

## 2018-03-05 DIAGNOSIS — J32.9 VIRAL SINUSITIS: ICD-10-CM

## 2018-03-05 DIAGNOSIS — L75.1 CHROMHIDROSIS: ICD-10-CM

## 2018-03-05 DIAGNOSIS — E78.2 MIXED HYPERLIPIDEMIA: ICD-10-CM

## 2018-03-05 DIAGNOSIS — K21.9 GASTROESOPHAGEAL REFLUX DISEASE WITHOUT ESOPHAGITIS: ICD-10-CM

## 2018-03-05 DIAGNOSIS — I10 BENIGN ESSENTIAL HYPERTENSION: ICD-10-CM

## 2018-03-05 DIAGNOSIS — B97.89 VIRAL SINUSITIS: ICD-10-CM

## 2018-03-05 DIAGNOSIS — G47.09 OTHER INSOMNIA: ICD-10-CM

## 2018-03-05 DIAGNOSIS — L71.9 ROSACEA: ICD-10-CM

## 2018-03-05 PROCEDURE — 99214 OFFICE O/P EST MOD 30 MIN: CPT | Performed by: FAMILY MEDICINE

## 2018-03-05 RX ORDER — METRONIDAZOLE 7.5 MG/G
GEL TOPICAL 2 TIMES DAILY
Qty: 135 G | Refills: 3 | Status: SHIPPED | OUTPATIENT
Start: 2018-03-05 | End: 2018-05-15

## 2018-03-05 RX ORDER — GLYCOPYRROLATE 100 %
POWDER (GRAM) MISCELLANEOUS
Qty: 30 G | Refills: 11 | Status: SHIPPED | OUTPATIENT
Start: 2018-03-05 | End: 2018-03-05

## 2018-03-05 RX ORDER — OMEPRAZOLE 40 MG/1
40 CAPSULE, DELAYED RELEASE ORAL DAILY
Qty: 90 CAPSULE | Refills: 3 | Status: ON HOLD | OUTPATIENT
Start: 2018-03-05 | End: 2018-05-23

## 2018-03-05 RX ORDER — DOXYCYCLINE 50 MG/1
50 CAPSULE ORAL 2 TIMES DAILY
Qty: 180 CAPSULE | Refills: 3 | Status: ON HOLD | OUTPATIENT
Start: 2018-03-05 | End: 2018-05-23

## 2018-03-05 RX ORDER — TRAZODONE HYDROCHLORIDE 50 MG/1
50 TABLET, FILM COATED ORAL AT BEDTIME
Qty: 90 TABLET | Refills: 3 | Status: SHIPPED | OUTPATIENT
Start: 2018-03-05 | End: 2021-12-01

## 2018-03-05 RX ORDER — DULOXETIN HYDROCHLORIDE 30 MG/1
30 CAPSULE, DELAYED RELEASE ORAL 2 TIMES DAILY
Qty: 180 CAPSULE | Refills: 3 | Status: SHIPPED | OUTPATIENT
Start: 2018-03-05 | End: 2018-09-11

## 2018-03-05 RX ORDER — ROSUVASTATIN CALCIUM 10 MG/1
TABLET, COATED ORAL
Qty: 90 TABLET | Refills: 3 | Status: SHIPPED | OUTPATIENT
Start: 2018-03-05 | End: 2019-02-09

## 2018-03-05 RX ORDER — VALSARTAN 320 MG/1
320 TABLET ORAL DAILY
Qty: 90 TABLET | Refills: 3 | Status: SHIPPED | OUTPATIENT
Start: 2018-03-05 | End: 2018-06-04

## 2018-03-05 NOTE — TELEPHONE ENCOUNTER
JF,  Glycopyrrolate compounded Rx sent to [facility] in [employer] today  Pharmacy unable to compound this  They recommend Drysol as an alternative.    Otherwise can see if [facility] at [address] [place] [address] can compound this if pt ok with that pharmacy for this  Please advise  Monika RODRIGUES RN

## 2018-03-05 NOTE — TELEPHONE ENCOUNTER
Reason for Call:  Medication Compond    Do you use a [facility]?  Name of the pharmacy and phone number for the [facility] request[id number] [place] [address]       Name of the medication requested: Compond sent over today March 5th.                     Call taken on 3/5/2018 at 2:02 PM by Mee Kimbrough

## 2018-03-05 NOTE — MR AVS SNAPSHOT
After Visit Summary   3/5/2018    David Marino    MRN: [id number]           Patient Information     Date Of Birth          1965        Visit Information        Provider Department      3/5/2018 1:30 PM Jus Oswald MD [facility]        Today's Diagnoses     Adjustment disorder with depressed mood    -  1    Mixed hyperlipidemia        Rosacea        Benign essential hypertension        Other insomnia        Gastroesophageal reflux disease without esophagitis        Chromhidrosis        Viral sinusitis           Follow-ups after your visit        Follow-up notes from your care team     Return in about 1 year (around 3/5/2019), or if symptoms worsen or fail to improve.      Who to contact     If you have questions or need follow up information about today's clinic visit or your schedule please contact [facility] directly at [phone].  Normal or non-critical lab and imaging results will be communicated to you by MyChart, letter or phone within 4 business days after the clinic has received the results. If you do not hear from us within 7 days, please contact the clinic through MyChart or phone. If you have a critical or abnormal lab result, we will notify you by phone as soon as possible.  Submit refill requests through [employer] or call your pharmacy and they will forward the refill request to us. Please allow 3 business days for your refill to be completed.          Additional Information About Your Visit        MyChart Information     [employer] gives you secure access to your electronic health record. If you see a primary care provider, you can also send messages to your care team and make appointments. If you have questions, please call your primary care clinic.  If you do not have a primary care provider, please call [phone] and they will assist you.        Care EveryWhere ID     This is your Care EveryWhere ID. This could be used by other organizations  to access your [facility] medical records  [id number]        Your Vitals Were     Pulse Temperature Respirations Pulse Oximetry BMI (Body Mass Index)       92 98.2  F (36.8  C) (Oral) 16 98% 36.83 kg/m2        Blood Pressure from Last 3 Encounters:   03/05/18 (!) 126/94   01/28/18 133/88   12/13/17 (!) 132/91    Weight from Last 3 Encounters:   03/05/18 256 lb 11.2 oz (116.4 kg)   01/30/18 250 lb 11.2 oz (113.7 kg)   01/28/18 255 lb (115.7 kg)              Today, you had the following     No orders found for display         Today's Medication Changes          These changes are accurate as of 3/5/18  4:31 PM.  If you have any questions, ask your nurse or doctor.               Start taking these medicines.        Dose/Directions    Glycopyrrolate Powd   Used for:  Chromhidrosis   Started by:  Jus Oswald MD        Compounded.  every morning. 0.5% cream. For chromhidrosis, 30 gm jar   Quantity:  30 g   Refills:  11         These medicines have changed or have updated prescriptions.        Dose/Directions    DULoxetine 30 MG EC capsule   Commonly known as:  CYMBALTA   This may have changed:  See the new instructions.   Used for:  Adjustment disorder with depressed mood   Changed by:  Jus Oswald MD        Dose:  30 mg   Take 1 capsule (30 mg) by mouth 2 times daily   Quantity:  180 capsule   Refills:  3            Where to get your medicines      These medications were sent to [facility] [address] [place], [place] - [address] [place] [address]     Phone:  [phone]     doxycycline monohydrate 50 MG capsule    DULoxetine 30 MG EC capsule    metroNIDAZOLE 0.75 % topical gel    omeprazole 40 MG capsule    rosuvastatin 10 MG tablet    traZODone 50 MG tablet    valsartan 320 MG tablet         [facility] [place] [place] - [address]  [address] [facility] [place] [address]     Phone:  [phone]      Glycopyrrolate Powd                Primary Care Provider Office Phone # Fax #    Jus Woodrow Oswald [phone] [address] [facility] [address]        Equal Access to Services     SATINDER IXE : Hadii aad ku hadkaylano Soomaali, waaxda luqadaha, qaybta kaalmada adeegyada, lara pompan yared leach laHectorkushal moody. So [facility] [phone].    ATENCIÓN: Si habla español, tiene a obrien disposición servicios gratuitos de asistencia lingüística. Llame al [phone].    We comply with applicable federal civil rights laws and [place] laws. We do not discriminate on the basis of race, color, national origin, age, disability, sex, sexual orientation, or gender identity.            Thank you!     Thank you for choosing [facility]  for your care. Our goal is always to provide you with excellent care. Hearing back from our patients is one way we can continue to improve our services. Please take a few minutes to complete the written survey that you may receive in the mail after your visit with us. Thank you!             Your Updated Medication List - Protect others around you: Learn how to safely use, store and throw away your medicines at www.disposemymeds.org.          This list is accurate as of 3/5/18  4:31 PM.  Always use your most recent med list.                   Brand Name Dispense Instructions for use Diagnosis    dextromethorphan-guaiFENesin  MG per 12 hr tablet    MUCINEX DM     Take 1 tablet by mouth every 12 hours        doxycycline monohydrate 50 MG capsule     180 capsule    Take 1 capsule (50 mg) by mouth 2 times daily    Rosacea       DULoxetine 30 MG EC capsule    CYMBALTA    180 capsule    Take 1 capsule (30 mg) by mouth 2 times daily    Adjustment disorder with depressed mood       Glycopyrrolate Powd     30 g    Compounded.  every morning. 0.5% cream. For chromhidrosis, 30 gm jar    Chromhidrosis       metroNIDAZOLE 0.75 % topical gel    METROGEL    135 g     Apply topically 2 times daily    Rosacea       NYQUIL PO           omeprazole 40 MG capsule    priLOSEC    90 capsule    Take 1 capsule (40 mg) by mouth daily Take 30-60 minutes before a meal.    Gastroesophageal reflux disease without esophagitis       rosuvastatin 10 MG tablet    CRESTOR    90 tablet    TAKE 1 TABLET(10 MG) BY MOUTH DAILY    Mixed hyperlipidemia       TRANSDERM-SCOP (1.5 MG) 72 hr patch   Generic drug:  scopolamine           traZODone 50 MG tablet    DESYREL    90 tablet    Take 1 tablet (50 mg) by mouth At Bedtime    Other insomnia       valsartan 320 MG tablet    DIOVAN    90 tablet    Take 1 tablet (320 mg) by mouth daily    Benign essential hypertension

## 2018-03-05 NOTE — TELEPHONE ENCOUNTER
MACIEL,  Sent to [address] [place] [address] Pharmacy  Then spoke with pharmacist there to make sure Rx looks ok  They need an amount pt to apply  Options are: pea sized amount, 1/4g, 1/2g, or 1g per application  Please advise  Will then resend Rx and inform pt of pharmacy info  [facility] pharmacy will call pt and setup time to deliver med to him  Moinka RODRIGUES RN

## 2018-03-05 NOTE — TELEPHONE ENCOUNTER
"Requested Prescriptions   Pending Prescriptions Disp Refills     rosuvastatin (CRESTOR) 10 MG tablet [Pharmacy Med Name: ROSUVASTATIN CALCIUM 10 MG TAB] 90 tablet 0     Sig: TAKE 1 TAB BY MOUTH DAILY    Statins Protocol Failed    3/4/2018 12:20 PM       Failed - LDL on file in past 12 months    Recent Labs   Lab Test  09/13/16   0721   LDL  134*            Passed - No abnormal creatine kinase in past 12 months    No lab results found.         Passed - Recent (12 mo) or future (30 days) visit within the authorizing provider's specialty    Patient had office visit in the last year or has a visit in the next 30 days with authorizing provider.  See \"Patient Info\" tab in inbasket, or \"Choose Columns\" in Meds & Orders section of the refill encounter.            Passed - Patient is age 18 or older        doxycycline monohydrate 50 MG capsule [Pharmacy Med Name: DOXYCYCLINE MONO 50 MG CAP] 60 capsule 0     Sig: TAKE 1 CAP BY MOUTH TWICE DAILY    There is no refill protocol information for this order        "

## 2018-03-05 NOTE — NURSING NOTE
"Chief Complaint   Patient presents with     Pharyngitis     Initial BP (!) 126/94  Pulse 92  Temp 98.2  F (36.8  C) (Oral)  Resp 16  Wt 256 lb 11.2 oz (116.4 kg)  SpO2 98%  BMI 36.83 kg/m2 Estimated body mass index is 36.83 kg/(m^2) as calculated from the following:    Height as of 1/30/18: 5' 10\" (1.778 m).    Weight as of this encounter: 256 lb 11.2 oz (116.4 kg).  BP completed using cuff size: large. R arm       Health Maintenance that is potentially due pending provider review:   PHQ9    PHQ--Gave pt questionnare       Eliza Wilcox CMA     "

## 2018-03-05 NOTE — PROGRESS NOTES
SUBJECTIVE:   David Marino is a 53 year old male who presents to clinic today for the following health issues:      RESPIRATORY SYMPTOMS      Duration: X 1 WEEK AND TWO DAY      Description  rhinorrhea, sore throat, facial pain/pressure and nose dry and burning     Severity: moderate    Accompanying signs and symptoms: None    History (predisposing factors):  Just got over the flu in January     Precipitating or alleviating factors: None    Therapies tried and outcome:  acetaminophen      Pt stated that he needed medication refilled: Cymbalta, Omeprazole, and a glyco cream.     Reviewed and updated patient's medication list for chronic problems of adjustment disorder with depressed mood rosacea and chromh ydrosis    previously he had a compounded Glycopyrrolate cream    ROS: As per HPI.  Constitutional: , no fevers/sweats/chills  Resp: no shortness of breath, wheezing, or cough.      I have reviewed and updated the patient's past medical history in the EMR. Current problems are:    Patient Active Problem List   Diagnosis     Chromhidrosis     Environmental allergies     Gastroesophageal reflux disease     Hiatal hernia     Mixed hyperlipidemia     Hypertriglyceridemia     Benign essential hypertension     Insomnia     Testosterone deficiency     Gastric polyposis     Adiposity     Rib pain     Rosacea     Adjustment disorder with depressed mood     Low back strain, initial encounter     Obesity (BMI 30-39.9)     Sleep disturbance     ZARIA (obstructive sleep apnea) Severe AHI 51     Past Surgical History:   Procedure Laterality Date     APPENDECTOMY       ARTHROSCOPY KNEE Right 09/28/2016    Procedure: Arthroscopic partial medial meniscectomy, right knee. Surgeon:  Keith Ayala MD  [facility]: [facility]     AS REMOVAL OF TONSILS,<14 Y/O       C SYMPATHECTOMY,CERVICAL  2003       Social History   Substance Use Topics     Smoking status: Former Smoker     Smokeless tobacco: Never Used       Comment: smoker 30+ years ago     Alcohol use 0.0 oz/week     0 Standard drinks or equivalent per week      Comment: occ 1-2 x month     Family History   Problem Relation Age of Onset     DIABETES Mother      Coronary Artery Disease Mother      Hypertension Mother      Depression Mother      Asthma Mother      Coronary Artery Disease Father      Hypertension Father      Hyperlipidemia Father      Depression Maternal Grandmother      CEREBROVASCULAR DISEASE No family hx of      Breast Cancer No family hx of      Colon Cancer No family hx of      Prostate Cancer No family hx of      Other Cancer No family hx of      Anxiety Disorder No family hx of      MENTAL ILLNESS No family hx of      Substance Abuse No family hx of      Anesthesia Reaction No family hx of      OSTEOPOROSIS No family hx of      Genetic Disorder No family hx of      Thyroid Disease No family hx of      Obesity No family hx of      Unknown/Adopted No family hx of          Allergies, reviewed:     Allergies   Allergen Reactions     Honey      Pollen Extract      Sulfa Drugs        Current Outpatient Prescriptions   Medication Sig Dispense Refill     DULoxetine (CYMBALTA) 30 MG EC capsule Take 1 capsule (30 mg) by mouth 2 times daily 180 capsule 3     rosuvastatin (CRESTOR) 10 MG tablet TAKE 1 TABLET(10 MG) BY MOUTH DAILY 90 tablet 3     doxycycline monohydrate 50 MG capsule Take 1 capsule (50 mg) by mouth 2 times daily 180 capsule 3     valsartan (DIOVAN) 320 MG tablet Take 1 tablet (320 mg) by mouth daily 90 tablet 3     traZODone (DESYREL) 50 MG tablet Take 1 tablet (50 mg) by mouth At Bedtime 90 tablet 3     metroNIDAZOLE (METROGEL) 0.75 % topical gel Apply topically 2 times daily 135 g 3     omeprazole (PRILOSEC) 40 MG capsule Take 1 capsule (40 mg) by mouth daily Take 30-60 minutes before a meal. 90 capsule 3     TRANSDERM-SCOP, 1.5 MG, (1.5mg base/3day) patch   3     Glycopyrrolate POWD Compounded.  every morning. 0.5% cream. For chromhidrosis,  30 gm jar 30 g 11     Pseudoeph-Doxylamine-DM-APAP (NYQUIL PO)        dextromethorphan-guaiFENesin (MUCINEX DM)  MG per 12 hr tablet Take 1 tablet by mouth every 12 hours       [DISCONTINUED] DULoxetine (CYMBALTA) 30 MG EC capsule TAKE 1 CAPSULE(30 MG) BY MOUTH TWICE DAILY 180 capsule 1     [DISCONTINUED] rosuvastatin (CRESTOR) 10 MG tablet TAKE 1 TABLET(10 MG) BY MOUTH DAILY 90 tablet 3     [DISCONTINUED] valsartan (DIOVAN) 320 MG tablet Take 1 tablet (320 mg) by mouth daily 90 tablet 3     [DISCONTINUED] traZODone (DESYREL) 50 MG tablet Take 1 tablet (50 mg) by mouth At Bedtime 90 tablet 3       Objective:  BP (!) 126/94  Pulse 92  Temp 98.2  F (36.8  C) (Oral)  Resp 16  Wt 256 lb 11.2 oz (116.4 kg)  SpO2 98%  BMI 36.83 kg/m2  General Appearance: Pleasant, alert, WN/WD in no acute respiratory distress.  Eye Exam: Normal external eye, conjunctiva, lids. ZAN.  Ear Exam: Normal auditory canals and external ears. Non-tender.  Left TM-normal. Right TM-normal.  OroPharynx Exam: Dental hygiene adequate. Normal buccal mucosa. Normal pharynx.  Skin: Rosacea  Neurologic Exam: Nonfocal, no tremor. Normal gait.  Psychiatric Exam: Alert - appropriate, normal affect    ASSESSMENT/PLAN:    ICD-10-CM    1. Adjustment disorder with depressed mood F43.21 DULoxetine (CYMBALTA) 30 MG EC capsule   2. Mixed hyperlipidemia E78.2 rosuvastatin (CRESTOR) 10 MG tablet   3. Rosacea L71.9 doxycycline monohydrate 50 MG capsule     metroNIDAZOLE (METROGEL) 0.75 % topical gel   4. Benign essential hypertension I10 valsartan (DIOVAN) 320 MG tablet   5. Other insomnia G47.09 traZODone (DESYREL) 50 MG tablet   6. Gastroesophageal reflux disease without esophagitis K21.9 omeprazole (PRILOSEC) 40 MG capsule   7. Chromhidrosis L75.1 DISCONTINUED: Glycopyrrolate POWD   8. Viral sinusitis J32.9     B97.89       Refill medications for chronic stable conditions until his next preventative visit    New rx for glycopyrrolate compounded  It is  likely that this may take some effort to find a place to make it    Mild sinusitis no treatment needed    Jus Oswald MD MPH

## 2018-03-05 NOTE — TELEPHONE ENCOUNTER
Too early; Rx sent 8/7/2017 for 1 year to [facility]  [facility]  Pt currently in the office being seen  Monika RODRIGUES RN

## 2018-03-06 RX ORDER — GLYCOPYRROLATE 100 %
POWDER (GRAM) MISCELLANEOUS
Qty: 30 G | Refills: 11 | Status: SHIPPED | OUTPATIENT
Start: 2018-03-06 | End: 2022-12-07

## 2018-03-06 ASSESSMENT — PATIENT HEALTH QUESTIONNAIRE - PHQ9: SUM OF ALL RESPONSES TO PHQ QUESTIONS 1-9: 4

## 2018-03-14 ENCOUNTER — OFFICE VISIT (OUTPATIENT)
Dept: FAMILY MEDICINE | Facility: CLINIC | Age: 53
End: 2018-03-14
Payer: COMMERCIAL

## 2018-03-14 VITALS
WEIGHT: 261 LBS | OXYGEN SATURATION: 97 % | BODY MASS INDEX: 37.45 KG/M2 | DIASTOLIC BLOOD PRESSURE: 94 MMHG | HEART RATE: 88 BPM | RESPIRATION RATE: 16 BRPM | TEMPERATURE: 98.1 F | SYSTOLIC BLOOD PRESSURE: 122 MMHG

## 2018-03-14 DIAGNOSIS — J98.01 ACUTE BRONCHOSPASM: ICD-10-CM

## 2018-03-14 DIAGNOSIS — R03.0 ELEVATED BLOOD PRESSURE READING WITHOUT DIAGNOSIS OF HYPERTENSION: ICD-10-CM

## 2018-03-14 DIAGNOSIS — J30.1 CHRONIC SEASONAL ALLERGIC RHINITIS DUE TO POLLEN: ICD-10-CM

## 2018-03-14 DIAGNOSIS — J45.21 MILD INTERMITTENT ASTHMA WITH ACUTE EXACERBATION: Primary | ICD-10-CM

## 2018-03-14 DIAGNOSIS — E66.09 CLASS 2 OBESITY DUE TO EXCESS CALORIES WITHOUT SERIOUS COMORBIDITY WITH BODY MASS INDEX (BMI) OF 36.0 TO 36.9 IN ADULT: ICD-10-CM

## 2018-03-14 DIAGNOSIS — E66.812 CLASS 2 OBESITY DUE TO EXCESS CALORIES WITHOUT SERIOUS COMORBIDITY WITH BODY MASS INDEX (BMI) OF 36.0 TO 36.9 IN ADULT: ICD-10-CM

## 2018-03-14 PROCEDURE — 99214 OFFICE O/P EST MOD 30 MIN: CPT | Performed by: FAMILY MEDICINE

## 2018-03-14 RX ORDER — PREDNISONE 20 MG/1
40 TABLET ORAL DAILY
Qty: 10 TABLET | Refills: 0 | Status: SHIPPED | OUTPATIENT
Start: 2018-03-14 | End: 2018-03-19

## 2018-03-14 RX ORDER — FLUTICASONE PROPIONATE 50 MCG
1-2 SPRAY, SUSPENSION (ML) NASAL DAILY
Qty: 1 G | Refills: 3 | Status: ON HOLD | OUTPATIENT
Start: 2018-03-14 | End: 2018-05-21

## 2018-03-14 RX ORDER — FLUTICASONE PROPIONATE AND SALMETEROL XINAFOATE 115; 21 UG/1; UG/1
2 AEROSOL, METERED RESPIRATORY (INHALATION) 2 TIMES DAILY
Qty: 12 G | Refills: 1 | Status: SHIPPED | OUTPATIENT
Start: 2018-03-14 | End: 2018-05-10

## 2018-03-14 NOTE — LETTER
My Asthma Action Plan  Name: David Marino   YOB: 1965  Date: 3/14/2018   My doctor: Rocío Haq MD   My clinic: [facility]        My Control Medicine: Fluticasone + salmeterol (Advair) -  /21 mcg twice a day  My Rescue Medicine: Albuterol (Proair/Ventolin/Proventil) inhaler as needed   My Asthma Severity: intermittent  Avoid your asthma triggers: upper respiratory infections, pollens, exercise or sports, cold air and spring & fall- change of season               GREEN ZONE   Good Control    I feel good    No cough or wheeze    Can work, sleep and play without asthma symptoms       Take your asthma control medicine every day.     1. If exercise triggers your asthma, take your rescue medication    15 minutes before exercise or sports, and    During exercise if you have asthma symptoms  2. Spacer to use with inhaler: If you have a spacer, make sure to use it with your inhaler             YELLOW ZONE Getting Worse  I have ANY of these:    I do not feel good    Cough or wheeze    Chest feels tight    Wake up at night   1. Keep taking your Green Zone medications  2. Start taking your rescue medicine:    every 20 minutes for up to 1 hour. Then every 4 hours for 24-48 hours.  3. If you stay in the Yellow Zone for more than 12-24 hours, contact your doctor.  4. If you do not return to the Green Zone in 12-24 hours or you get worse, start taking your oral steroid medicine if prescribed by your provider.           RED ZONE Medical Alert - Get Help  I have ANY of these:    I feel awful    Medicine is not helping    Breathing getting harder    Trouble walking or talking    Nose opens wide to breathe       1. Take your rescue medicine NOW  2. If your provider has prescribed an oral steroid medicine, start taking it NOW  3. Call your doctor NOW  4. If you are still in the Red Zone after 20 minutes and you have not reached your doctor:    Take your rescue medicine again and    Call [phone]  or go to the emergency room right away    See your regular doctor within 2 weeks of an Emergency Room or Urgent Care visit for follow-up treatment.        Electronically signed by: Rocío Haq, March 14, 2018    Annual Reminders:  Meet with Asthma Educator,  Flu Shot in the Fall, consider Pneumonia Vaccination for patients with asthma (aged 19 and older).    Pharmacy:    [id number] - [place], [place] - [address] AT [facility] [address]  [id number] [place] [place] [facility] [address] [facility] [place] [place] [facility] [address] [facility]                    Asthma Triggers  How To Control Things That Make Your Asthma Worse    Triggers are things that make your asthma worse.  Look at the list below to help you find your triggers and what you can do about them.  You can help prevent asthma flare-ups by staying away from your triggers.      Trigger                                                          What you can do   Cigarette Smoke  Tobacco smoke can make asthma worse. Do not allow smoking in your home, car or around you.  Be sure no one smokes at a child s day care or school.  If you smoke, ask your health care provider for ways to help you quit.  Ask family members to quit too.  Ask your health care provider for a referral to Quit Plan to help you quit smoking, or call 9-090-750-PLAN.     Colds, Flu, Bronchitis  These are common triggers of asthma. Wash your hands often.  Don t touch your eyes, nose or mouth.  Get a flu shot every year.     Dust Mites  These are tiny bugs that live in cloth or carpet. They are too small to see. Wash sheets and blankets in hot water every week.   Encase pillows and mattress in dust mite proof covers.  Avoid having carpet if you can. If you have carpet, vacuum weekly.   Use a dust mask and HEPA vacuum.   Pollen and Outdoor Mold  Some people are allergic to trees, grass, or weed pollen, or molds. Try to keep your windows  closed.  Limit time out doors when pollen count is high.   Ask you health care provider about taking medicine during allergy season.     Animal Dander  Some people are allergic to skin flakes, urine or saliva from pets with fur or feathers. Keep pets with fur or feathers out of your home.    If you can t keep the pet outdoors, then keep the pet out of your bedroom.  Keep the bedroom door closed.  Keep pets off cloth furniture and away from stuffed toys.     Mice, Rats, and Cockroaches  Some people are allergic to the waste from these pests.   Cover food and garbage.  Clean up spills and food crumbs.  Store grease in the refrigerator.   Keep food out of the bedroom.   Indoor Mold  This can be a trigger if your home has high moisture. Fix leaking faucets, pipes, or other sources of water.   Clean moldy surfaces.  Dehumidify basement if it is damp and smelly.   Smoke, Strong Odors, and Sprays  These can reduce air quality. Stay away from strong odors and sprays, such as perfume, powder, hair spray, paints, smoke incense, paint, cleaning products, candles and new carpet.   Exercise or Sports  Some people with asthma have this trigger. Be active!  Ask your doctor about taking medicine before sports or exercise to prevent symptoms.    Warm up for 5-10 minutes before and after sports or exercise.     Other Triggers of Asthma  Cold air:  Cover your nose and mouth with a scarf.  Sometimes laughing or crying can be a trigger.  Some medicines and food can trigger asthma.

## 2018-03-14 NOTE — PROGRESS NOTES
"    SUBJECTIVE:                                                    David Marino is a 53 year old male who presents to clinic today for the following health issues:  He reports he has a long standing history of asthma & lately been using   {additional problems for provider to add:506237}    Problem list and histories reviewed & adjusted, as indicated.  Additional history: {NONE - AS DOCUMENTED:435815::\"as documented\"}    {HIST REVIEW/ LINKS 2:000093}    {PROVIDER CHARTING PREFERENCE:351382}      "

## 2018-03-14 NOTE — PATIENT INSTRUCTIONS
Please start steroid tabs 40 mg once daily for 5 days  Add steroid inhaler twice daily for 2 weeks and with changing seasons- if asthma not well controlled- you may need it for longer  Add flonase  nasal spray once daily  Add hydrated  Ok to my chart us next week if persistent thick mucous in cough- than you may benefit from  Antibiotic- as long as you are not have chest pain, breathing problems but just worsening of the cough     1month or 6 weeks  follow up for asthma

## 2018-03-14 NOTE — PROGRESS NOTES
SUBJECTIVE:   David Marino is a 53 year old male who presents to clinic today for the following health issues:    He reports he has been unwell since 2/24/18- with coughing, wheezing, congestion in sinus & chest, hoarse voice & seen for symptoms a weeks ago and later was prescribed Zithromax on 3/9- and just finished but still coughing, thoroughout the day- wheezing often at night, using rescue inhaler multiple times all day long and reports he feels his asthma is not controlled at all  History of asthma all his life, was worse before but for past decade- seasonal changes, spring & fall , make it worse, or expsoure to cold or excercise   Does have post nasal dripping   sleep is affected worse as unable to breath through the nose & not able to use the CPAP for past few night  He reports he is annoyed by the need to be seen in the office visit- & may have to change the clinics or provider  ACT Total Scores 8/7/2017 3/14/2018   ACT TOTAL SCORE (Goal Greater than or Equal to 20) 23 5   In the past 12 months, how many times did you visit the emergency room for your asthma without being admitted to the hospital? 0 0   In the past 12 months, how many times were you hospitalized overnight because of your asthma? 0 0     RESPIRATORY SYMPTOMS      Duration: x 2/24/2018    Description  cough, wheezing, fever, fatigue/malaise, hoarse voice and congestion in the chest     Severity: moderate    Accompanying signs and symptoms: None    History (predisposing factors):  asthma    Precipitating or alleviating factors: none    Therapies tried and outcome:  oral decongestant acetaminophen guaifenesin      PROBLEMS TO ADD ON...    Problem list and histories reviewed & adjusted, as indicated.  Additional history: as documented    Patient Active Problem List   Diagnosis     Chromhidrosis     Environmental allergies     Gastroesophageal reflux disease     Hiatal hernia     Mixed hyperlipidemia     Hypertriglyceridemia     Benign  essential hypertension     Insomnia     Testosterone deficiency     Gastric polyposis     Adiposity     Rib pain     Rosacea     Adjustment disorder with depressed mood     Low back strain, initial encounter     Obesity (BMI 30-39.9)     Sleep disturbance     ZARIA (obstructive sleep apnea) Severe AHI 51     Mild intermittent asthma with acute exacerbation     Past Surgical History:   Procedure Laterality Date     APPENDECTOMY       ARTHROSCOPY KNEE Right 09/28/2016    Procedure: Arthroscopic partial medial meniscectomy, right knee. Surgeon:  Keith Ayala MD  Location: [facility]     AS REMOVAL OF TONSILS,<14 Y/O       C SYMPATHECTOMY,CERVICAL  2003       Social History   Substance Use Topics     Smoking status: Former Smoker     Smokeless tobacco: Never Used      Comment: smoker 30+ years ago     Alcohol use 0.0 oz/week     0 Standard drinks or equivalent per week      Comment: occ 1-2 x month     Family History   Problem Relation Age of Onset     DIABETES Mother      Coronary Artery Disease Mother      Hypertension Mother      Depression Mother      Asthma Mother      Coronary Artery Disease Father      Hypertension Father      Hyperlipidemia Father      Depression Maternal Grandmother      CEREBROVASCULAR DISEASE No family hx of      Breast Cancer No family hx of      Colon Cancer No family hx of      Prostate Cancer No family hx of      Other Cancer No family hx of      Anxiety Disorder No family hx of      MENTAL ILLNESS No family hx of      Substance Abuse No family hx of      Anesthesia Reaction No family hx of      OSTEOPOROSIS No family hx of      Genetic Disorder No family hx of      Thyroid Disease No family hx of      Obesity No family hx of      Unknown/Adopted No family hx of            Reviewed and updated as needed this visit by clinical staff  Tobacco  Allergies  Meds  Med Hx  Surg Hx  Fam Hx       Reviewed and updated as needed this visit by Provider          ROS:  Constitutional, HEENT, cardiovascular, pulmonary, gi and gu systems are negative, except as otherwise noted.    OBJECTIVE:     BP (!) 122/94  Pulse 88  Temp 98.1  F (36.7  C) (Oral)  Resp 16  Wt 261 lb (118.4 kg)  SpO2 97%  BMI 37.45 kg/m2  Body mass index is 37.45 kg/(m^2).  GENERAL: healthy, alert and no distress  HENT: normal cephalic/atraumatic, ear canals and TM's normal, nasal mucosa edematous , oropharynx clear and oral mucous membranes moist  NECK: no adenopathy, no asymmetry, masses, or scars and thyroid normal to palpation  RESP: expiratory wheezes bibasilar, no crackles, adequate air exchange  CV: regular rate and rhythm, normal S1 S2, no S3 or S4, no murmur, click or rub, no peripheral edema and peripheral pulses strong  ABDOMEN: soft, nontender, no hepatosplenomegaly, no masses and bowel sounds normal    ASSESSMENT/PLAN:     1. Mild intermittent asthma with acute exacerbation    Long standing history   Mostly intermittent previously managed ok on bronchodilator  Triggers change in weather, cold, upper respiraory tract infection, and excercise   Long discussion with patient about no need for adding antibiotic for now  And to control symptoms by adding short course of  prednisone orally - acute wheezing & bronchospasm  Also also start  Steroid inhaler for  Twice daily for prevention at least for 2 weeks  Follow up in a month to determine if asthma well control & if need to be on longer on preventative steroid inhaler    - predniSONE (DELTASONE) 20 MG tablet; Take 2 tablets (40 mg) by mouth daily for 5 days  Dispense: 10 tablet; Refill: 0  - fluticasone-salmeterol (ADVAIR-HFA) 115-21 MCG/ACT Inhaler; Inhale 2 puffs into the lungs 2 times daily  Dispense: 12 g; Refill: 1    2. Acute bronchospasm    - predniSONE (DELTASONE) 20 MG tablet; Take 2 tablets (40 mg) by mouth daily for 5 days  Dispense: 10 tablet; Refill: 0  - fluticasone-salmeterol (ADVAIR-HFA) 115-21 MCG/ACT Inhaler; Inhale 2  puffs into the lungs 2 times daily  Dispense: 12 g; Refill: 1    3. Chronic seasonal allergic rhinitis due to pollen    - fluticasone (FLONASE) 50 MCG/ACT spray; Spray 1-2 sprays into both nostrils daily  Dispense: 1 g; Refill: 3  - continue montelukast      4. Elevated Blood pressure without diagnoses of hypertension & obesity  Recheck is recommended in 4-6 weeks  Its possibly high because of above, but its been higher before as well- without upper respiratiry tract infection   Advised to decrease salt intake  He reports he is in the process of getting bariatric surgery- and trying to loose weight as well      Potential medication side effects were discussed with the patient; let me know if any occur.  The patient indicates understanding of these issues and agrees with the plan.        Rocío Haq MD  [facility]

## 2018-03-14 NOTE — MR AVS SNAPSHOT
After Visit Summary   3/14/2018    David Marino    MRN: [id number]           Patient Information     Date Of Birth          1965        Visit Information        Provider Department      3/14/2018 8:30 AM Rocío Haq MD [facility]        Today's Diagnoses     Mild intermittent asthma with acute exacerbation    -  1    Acute bronchospasm        Chronic seasonal allergic rhinitis due to pollen        Elevated blood pressure reading without diagnosis of hypertension        Class 2 obesity due to excess calories without serious comorbidity with body mass index (BMI) of 36.0 to 36.9 in adult          Care Instructions    Please start steroid tabs 40 mg once daily for 5 days  Add steroid inhaler twice daily for 2 weeks and with changing seasons- if asthma not well controlled- you may need it for longer  Add flonase  nasal spray once daily  Add hydrated  Ok to my chart us next week if persistent thick mucous in cough- than you may benefit from  Antibiotic- as long as you are not have chest pain, breathing problems but just worsening of the cough     1month or 6 weeks  follow up for asthma             Follow-ups after your visit        Your next 10 appointments already scheduled     Mar 30, 2018 11:00 AM CDT   Weight Loss Visit with Saranya Schroeder Diet 1, RD   [facility] ([facility])    [address] [place] [id number] [phone]              Who to contact     If you have questions or need follow up information about today's clinic visit or your schedule please contact [facility] directly at [phone].  Normal or non-critical lab and imaging results will be communicated to you by MyChart, letter or phone within 4 business days after the clinic has received the results. If you do not hear from us within 7 days, please contact the clinic through MyChart or phone. If you have a critical or  abnormal lab result, we will notify you by phone as soon as possible.  Submit refill requests through [employer] or call your pharmacy and they will forward the refill request to us. Please allow 3 business days for your refill to be completed.          Additional Information About Your Visit        [employer] Information     [employer] gives you secure access to your electronic health record. If you see a primary care provider, you can also send messages to your care team and make appointments. If you have questions, please call your primary care clinic.  If you do not have a primary care provider, please call [phone] and they will assist you.        Care EveryWhere ID     This is your Care EveryWhere ID. This could be used by other organizations to access your [place] medical records  [id number]        Your Vitals Were     Pulse Temperature Respirations Pulse Oximetry BMI (Body Mass Index)       88 98.1  F (36.7  C) (Oral) 16 97% 37.45 kg/m2        Blood Pressure from Last 3 Encounters:   03/14/18 (!) 122/94   03/05/18 (!) 126/94   01/28/18 133/88    Weight from Last 3 Encounters:   03/14/18 261 lb (118.4 kg)   03/05/18 256 lb 11.2 oz (116.4 kg)   01/30/18 250 lb 11.2 oz (113.7 kg)              Today, you had the following     No orders found for display         Today's Medication Changes          These changes are accurate as of 3/14/18 12:06 PM.  If you have any questions, ask your nurse or doctor.               Start taking these medicines.        Dose/Directions    fluticasone 50 MCG/ACT spray   Commonly known as:  FLONASE   Used for:  Chronic seasonal allergic rhinitis due to pollen   Started by:  Rocío Haq MD        Dose:  1-2 spray   Spray 1-2 sprays into both nostrils daily   Quantity:  1 g   Refills:  3       fluticasone-salmeterol 115-21 MCG/ACT Inhaler   Commonly known as:  ADVAIR-HFA   Used for:  Mild intermittent asthma with acute exacerbation, Acute bronchospasm   Started by:  Rocío Haq  MD Yojana        Dose:  2 puff   Inhale 2 puffs into the lungs 2 times daily   Quantity:  12 g   Refills:  1       predniSONE 20 MG tablet   Commonly known as:  DELTASONE   Used for:  Mild intermittent asthma with acute exacerbation, Acute bronchospasm   Started by:  Rocío Haq MD        Dose:  40 mg   Take 2 tablets (40 mg) by mouth daily for 5 days   Quantity:  10 tablet   Refills:  0            Where to get your medicines      These medications were sent to [place] [address] [place] [facility] [address] [facility]  [address] [facility] [address]     Phone:  [phone]     fluticasone 50 MCG/ACT spray    fluticasone-salmeterol 115-21 MCG/ACT Inhaler    predniSONE 20 MG tablet                Primary Care Provider Office Phone # Fax #    Ujs Woodrow Oswald [phone] [address] [facility] [address]        Equal Access to Services     [facility]: Hadii jaye pope hadasho [place], waaxda luqadaha, qaybta kaalmada adeegyada, lara liuin chato danielson . So [facility] [phone].    ATENCIÓN: Si habla español, tiene a obrien disposición servicios gratuitos de asistencia lingüística. Ll[facility] [phone].    We comply with applicable federal civil rights laws and [place] laws. We do not discriminate on the basis of race, color, national origin, age, disability, sex, sexual orientation, or gender identity.            Thank you!     Thank you for choosing [facility]  for your care. Our goal is always to provide you with excellent care. Hearing back from our patients is one way we can continue to improve our services. Please take a few minutes to complete the written survey that you may receive in the mail after your visit with us. Thank you!             Your Updated Medication List - Protect others around you: Learn how to safely use, store and throw away your medicines at www.disposemymeds.org.          This list is accurate as of 3/14/18 12:06  PM.  Always use your most recent med list.                   Brand Name Dispense Instructions for use Diagnosis    dextromethorphan-guaiFENesin  MG per 12 hr tablet    MUCINEX DM     Take 1 tablet by mouth every 12 hours        doxycycline monohydrate 50 MG capsule     180 capsule    Take 1 capsule (50 mg) by mouth 2 times daily    Rosacea       DULoxetine 30 MG EC capsule    CYMBALTA    180 capsule    Take 1 capsule (30 mg) by mouth 2 times daily    Adjustment disorder with depressed mood       fluticasone 50 MCG/ACT spray    FLONASE    1 g    Spray 1-2 sprays into both nostrils daily    Chronic seasonal allergic rhinitis due to pollen       fluticasone-salmeterol 115-21 MCG/ACT Inhaler    ADVAIR-HFA    12 g    Inhale 2 puffs into the lungs 2 times daily    Mild intermittent asthma with acute exacerbation, Acute bronchospasm       Glycopyrrolate Powd     30 g    Compounded. Apply pea size amount every morning. 0.5% cream. For chromhidrosis, 30 gm jar    Chromhidrosis       metroNIDAZOLE 0.75 % topical gel    METROGEL    135 g    Apply topically 2 times daily    Rosacea       NYQUIL PO           omeprazole 40 MG capsule    priLOSEC    90 capsule    Take 1 capsule (40 mg) by mouth daily Take 30-60 minutes before a meal.    Gastroesophageal reflux disease without esophagitis       predniSONE 20 MG tablet    DELTASONE    10 tablet    Take 2 tablets (40 mg) by mouth daily for 5 days    Mild intermittent asthma with acute exacerbation, Acute bronchospasm       rosuvastatin 10 MG tablet    CRESTOR    90 tablet    TAKE 1 TABLET(10 MG) BY MOUTH DAILY    Mixed hyperlipidemia       TRANSDERM-SCOP (1.5 MG) 72 hr patch   Generic drug:  scopolamine           traZODone 50 MG tablet    DESYREL    90 tablet    Take 1 tablet (50 mg) by mouth At Bedtime    Other insomnia       valsartan 320 MG tablet    DIOVAN    90 tablet    Take 1 tablet (320 mg) by mouth daily    Benign essential hypertension

## 2018-03-14 NOTE — NURSING NOTE
"Chief Complaint   Patient presents with     URI     cough      Initial BP (!) 122/94  Pulse 88  Temp 98.1  F (36.7  C) (Oral)  Resp 16  Wt 261 lb (118.4 kg)  SpO2 97%  BMI 37.45 kg/m2 Estimated body mass index is 37.45 kg/(m^2) as calculated from the following:    Height as of 1/30/18: 5' 10\" (1.778 m).    Weight as of this encounter: 261 lb (118.4 kg).  BP completed using cuff size: large.  R arm       Health Maintenance that is potentially due pending provider review:   NONE      Eliza Wilcox CMA     "

## 2018-03-15 ASSESSMENT — ASTHMA QUESTIONNAIRES: ACT_TOTALSCORE: 5

## 2018-04-10 ENCOUNTER — OFFICE VISIT (OUTPATIENT)
Dept: SURGERY | Facility: CLINIC | Age: 53
End: 2018-04-10
Payer: COMMERCIAL

## 2018-04-10 VITALS — BODY MASS INDEX: 36.13 KG/M2 | WEIGHT: 252.4 LBS | HEIGHT: 70 IN

## 2018-04-10 DIAGNOSIS — E66.812 CLASS 2 OBESITY DUE TO EXCESS CALORIES WITHOUT SERIOUS COMORBIDITY WITH BODY MASS INDEX (BMI) OF 36.0 TO 36.9 IN ADULT: ICD-10-CM

## 2018-04-10 DIAGNOSIS — E66.9 OBESITY (BMI 30-39.9): ICD-10-CM

## 2018-04-10 DIAGNOSIS — E66.09 CLASS 2 OBESITY DUE TO EXCESS CALORIES WITHOUT SERIOUS COMORBIDITY WITH BODY MASS INDEX (BMI) OF 36.0 TO 36.9 IN ADULT: ICD-10-CM

## 2018-04-10 PROCEDURE — 97803 MED NUTRITION INDIV SUBSEQ: CPT | Performed by: DIETITIAN, REGISTERED

## 2018-04-10 NOTE — MR AVS SNAPSHOT
MRN:[id number]                      After Visit Summary   4/10/2018    David Marino    MRN: [id number]           Visit Information        Provider Department      4/10/2018 8:30 AM 3, Saranya Schroeder Diet, TAVARES [facility]      Your next 10 appointments already scheduled     Apr 11, 2018  3:30 PM CDT   Return Sleep Patient with David Ellis MD   [facility] [place] ([facility])    [address] [place] [id number]   [phone]            Apr 20, 2018  9:30 AM CDT   Weight Loss Visit with Saranya Schroeder Diet 3, RD   [facility] ([facility])    [address] [facility] [address]  [facility] [id number] [phone]              MyChart Information     [employer] gives you secure access to your electronic health record. If you see a primary care provider, you can also send messages to your care team and make appointments. If you have questions, please call your primary care clinic.  If you do not have a primary care provider, please call [phone] and they will assist you.        Care EveryWhere ID     This is your Care EveryWhere ID. This could be used by other organizations to access your [place] medical records  [id number]        Equal Access to Services     SATINDER XIE : Hadii jaye pope hadasho Somargaritaali, waaxda luqadaha, qaybta kaalmada sarina, lara moody. So [facility] [phone].    ATENCIÓN: Si habla español, tiene a obrien disposición servicios gratuitos de asistencia lingüística. Llame al [phone].    We comply with applicable federal civil rights laws and [place] laws. We do not discriminate on the basis of race, color, national origin, age, disability, sex, sexual orientation, or gender identity.

## 2018-04-10 NOTE — PROGRESS NOTES
"PRE SURGICAL WEIGHT LOSS NUTRITION APPOINTMENT    David Marino  1965  male  [id number]  53 year old    ASSESSMENT    Desired Surgical Procedure: gastric bypass    REASON FOR VISIT:  David Marino is a 53 year old year old male presents today for a pre-surgical weight loss follow-up appointment. Patient accompanied by self.    DIAGNOSIS:  Weight Status Obesity Grade II BMI 35-39.9    ANTHROPOMETRICS:  Height: 177.8 cm (5' 10\")  Initial Weight: 248 lbs    Weight last visit: 252.4 lbs   Current weight: 114.5 kg (252 lb 6.4 oz)  BMI: 36.29 kg/(m^2).    MULTIVITAMINS/MINERALS  Multivitamin - upon waking  Calcium - 600mg AM (once arriving at work) and PM  Vitamin D - 5000 international units daily    NUTRITION HISTORY:  Breakfast: protein shake  Lunch: salad   Supper: chicken w/pasta  Snacks: popcorn   Fluids Consumed: Water (62oz) or enhanced water   Eating slower: Yes  Chewing foods thoroughly: Yes  Take 20-30 minutes to consume each meal: Yes  Fluids and meals separate by at least 30 minutes: Yes  Carbonation: none  Caffeine: none  Additional Information: Emotional eating d/t stress and boredom. Answered \"yes\" to binge eating however seems to be simply overeating. Denies any feelings of compulsiveness, shame, etc associated with episodes. Has been thinking about surgery for about a year, knows a couple other people who have had surgery and been fairly successful. 8/22/17: patient admits he has been struggling with thinking there will be some food he may never be able to eat again; over the past month pt has done extensive research on weight loss surgery; says he is in the contemplation phase of thinking about weight loss surgery; soft ball has ended so pt wants to start intentional exercise this month 9/25/17- pt feels like he has been more focused on moving forward with weight loss surgery his past month' less \"food funerals;\" pt and his partner have eliminated all fast food 11/27/17- Pt. feels like his weight " is up due to 3 days of over eating during the Thanksgiving holiday; started a new anti depressant this past month (pt not sure what it is); pt is frustrated weight trending up over the past several months; suggested trail of pre-surgery eight loss meal plan to help expedite weight loss  12/29/17: Pt shares he wasn't focused on weight loss over the holidays, states now that these are over he's ready to commit to pre-op weight loss. 1/30/18 Patient and his  are on  A diet where you eat only within an 8 hour window. Explained to patient eating within 8 hour window may not work well post-op. Patient says he is doing whatever he can to help with weight loss pre-op and is fine with not following these guidelines. Post-op. patient feels like increased use of protein drinks are helping him with weight loss.  4/10/18: Pt returns after several months of cancelled visits; had influenza which further exacerbated his existing asthma leading to several months of respiratory issues. Feeling better the last few weeks. Weight increased to 262 lbs throughout illness d/t inactivity as well as changes to antidepressants, but has lost 9 lbs in the last 3 weeks. Frustrated that not at weight goal/timeline to surgery as will likely be switching jobs in the next few months and was hoping to be close to surgery before changes in insurance. Overall very discouraged with program but then at end of visit states that he's more frustrated with himself for not losing the weight. Reminded pt that his weight loss of 9 lbs in the last 3 weeks is significant and if he works hard, he should be able to get to his weight loss goal. Sent pt with pre-op weight loss diet, pt wants to return in 2 weeks for next appointment, ok'd this plan.     PHYSICAL ACTIVITY:  Type: softball, walking on treadmill at gym, swimming  Frequency: 5 (days per week)  Duration: 30-60 (minutes)     DIAGNOSIS:  Previous Nutrition Diagnosis: Obesity related to long history  of self- monitoring deficit and excessive energy intake evidenced by BMI of 36.05 kg/m2  No change, modified below    Previous goals:   Continue to practice all pre-bariatric diet guidelines - met  Switch to a chewable calcium - not met (wants to use up current supply)  Increase activity level.  Exercise 3-4 days/week for 30 minutes - met    Current Nutrition Diagnosis: Obesity related to long history of self-monitoring deficit and excessive energy intake as evidenced by BMI of 36.22 kg/m2.    INTERVENTION:  Nutrition Prescription: Recommended energy/nutrient modification.    GOALS:  Relating To Eating:  Continue to practice all guidelines  Follow pre-op weight loss diet.     Follow-Up:   Recommend  follow up visit in 2-4 weeks to assist with lifestyle changes or per insurance.    Intervention:  - Discussed progress towards previous goals.  - Reinforced importance of making behavior changes in preparation for bariatric surgery.   - Assessed learning needs and learning preferences       NUTRITION MONITORING AND EVALUATION:  Anticipated compliance: fair-good  Patient demonstrated fair-good understanding.     Follow up: Continue to monitor patient closely regarding weight loss and diet.  # of visits needed: 1-2 (depending on weight loss)  Cleared by RD: No     TIME SPENT WITH PATIENT: 23 minutes    Lizbeth Campos RD, LD  Clinical Dietitian

## 2018-04-11 ENCOUNTER — OFFICE VISIT (OUTPATIENT)
Dept: SLEEP MEDICINE | Facility: CLINIC | Age: 53
End: 2018-04-11
Payer: COMMERCIAL

## 2018-04-11 VITALS
HEIGHT: 70 IN | BODY MASS INDEX: 36.94 KG/M2 | OXYGEN SATURATION: 96 % | HEART RATE: 67 BPM | RESPIRATION RATE: 16 BRPM | DIASTOLIC BLOOD PRESSURE: 87 MMHG | SYSTOLIC BLOOD PRESSURE: 128 MMHG | WEIGHT: 258 LBS

## 2018-04-11 DIAGNOSIS — G47.33 OSA ON CPAP: Primary | ICD-10-CM

## 2018-04-11 PROCEDURE — 99214 OFFICE O/P EST MOD 30 MIN: CPT | Performed by: INTERNAL MEDICINE

## 2018-04-11 RX ORDER — CLONAZEPAM 0.5 MG/1
TABLET, ORALLY DISINTEGRATING ORAL
Qty: 15 TABLET | Refills: 0 | Status: SHIPPED | OUTPATIENT
Start: 2018-04-11 | End: 2018-05-01

## 2018-04-11 RX ORDER — MONTELUKAST SODIUM 10 MG/1
TABLET ORAL
Refills: 1 | COMMUNITY
Start: 2018-01-17 | End: 2018-07-09

## 2018-04-11 NOTE — MR AVS SNAPSHOT
After Visit Summary   4/11/2018    David Marino    MRN: [id number]           Patient Information     Date Of Birth          1965        Visit Information        Provider Department      4/11/2018 3:30 PM David Ellis MD [facility]        Today's Diagnoses     ZARIA on CPAP    -  1       Follow-ups after your visit        Follow-up notes from your care team     Return in about 2 weeks (around 4/25/2018).      Your next 10 appointments already scheduled     Apr 20, 2018  9:30 AM CDT   Weight Loss Visit with  Elda Diet 3, RD   [facility] [place] ([facility])    [address]  [facility] [id number] [phone]            Apr 23, 2018  2:45 PM CDT   Telephone Visit with David Ellis MD   [facility] ([facility])    [address] [place] [id number] [phone]           Note: this is not an onsite visit; there is no need to come to the facility.              Who to contact     If you have questions or need follow up information about today's clinic visit or your schedule please contact [facility] directly at [phone].  Normal or non-critical lab and imaging results will be communicated to you by [employer] Control[employer]art, letter or phone within 4 business days after the clinic has received the results. If you do not hear from us within 7 days, please contact the clinic through EnTouch Controlshart or phone. If you have a critical or abnormal lab result, we will notify you by phone as soon as possible.  Submit refill requests through enavu or call your pharmacy and they will forward the refill request to us. Please allow 3 business days for your refill to be completed.          Additional Information About Your Visit        En[employer] Control[employer]art Information     enavu gives you secure access to your  "electronic health record. If you see a primary care provider, you can also send messages to your care team and make appointments. If you have questions, please call your primary care clinic.  If you do not have a primary care provider, please call [phone] and they will assist you.        Care EveryWhere ID     This is your Care EveryWhere ID. This could be used by other organizations to access your [place] medical records  [id number]        Your Vitals Were     Pulse Respirations Height Pulse Oximetry BMI (Body Mass Index)       67 16 1.778 m (5' 10\") 96% 37.02 kg/m2        Blood Pressure from Last 3 Encounters:   04/11/18 128/87   03/14/18 (!) 122/94   03/05/18 (!) 126/94    Weight from Last 3 Encounters:   04/11/18 117 kg (258 lb)   04/10/18 114.5 kg (252 lb 6.4 oz)   03/14/18 118.4 kg (261 lb)              We Performed the Following     Comprehensive DME          Today's Medication Changes          These changes are accurate as of 4/11/18  4:20 PM.  If you have any questions, ask your nurse or doctor.               Start taking these medicines.        Dose/Directions    clonazePAM 0.5 MG ODT tab   Commonly known as:  klonoPIN        Take one half to one tab by mouth before bed   Quantity:  15 tablet   Refills:  0            Where to get your medicines      Some of these will need a paper prescription and others can be bought over the counter.  Ask your nurse if you have questions.     Bring a paper prescription for each of these medications     clonazePAM 0.5 MG ODT tab                Primary Care Provider Office Phone # Fax #    Jus Woodrow Oswald [phone] [address] [facility] [address]        Equal Access to Services     [facility] : Jaime Barnes, favio ying, hetal walkerallara castañeda. So [facility] [phone].    ATENCIÓN: Si habla español, tiene a obrien disposición servicios gratuitos de asistencia " lingüísticaVadim Jones al [phone].    We comply with applicable federal civil rights laws and [place] laws. We do not discriminate on the basis of race, color, national origin, age, disability, sex, sexual orientation, or gender identity.            Thank you!     Thank you for choosing [facility], SLEEP STUDY  for your care. Our goal is always to provide you with excellent care. Hearing back from our patients is one way we can continue to improve our services. Please take a few minutes to complete the written survey that you may receive in the mail after your visit with us. Thank you!             Your Updated Medication List - Protect others around you: Learn how to safely use, store and throw away your medicines at www.disposemymeds.org.          This list is accurate as of 4/11/18  4:20 PM.  Always use your most recent med list.                   Brand Name Dispense Instructions for use Diagnosis    clonazePAM 0.5 MG ODT tab    klonoPIN    15 tablet    Take one half to one tab by mouth before bed        doxycycline monohydrate 50 MG capsule     180 capsule    Take 1 capsule (50 mg) by mouth 2 times daily    Rosacea       DULoxetine 30 MG EC capsule    CYMBALTA    180 capsule    Take 1 capsule (30 mg) by mouth 2 times daily    Adjustment disorder with depressed mood       fluticasone 50 MCG/ACT spray    FLONASE    1 g    Spray 1-2 sprays into both nostrils daily    Chronic seasonal allergic rhinitis due to pollen       fluticasone-salmeterol 115-21 MCG/ACT Inhaler    ADVAIR-HFA    12 g    Inhale 2 puffs into the lungs 2 times daily    Mild intermittent asthma with acute exacerbation, Acute bronchospasm       Glycopyrrolate Powd     30 g    Compounded. Apply pea size amount every morning. 0.5% cream. For chromhidrosis, 30 gm jar    Chromhidrosis       metroNIDAZOLE 0.75 % topical gel    METROGEL    135 g    Apply topically 2 times daily    Rosacea       montelukast 10 MG tablet    SINGULAIR     TAKE 1 TAB BY  MOUTH AT BEDTIME        NYQUIL PO           omeprazole 40 MG capsule    priLOSEC    90 capsule    Take 1 capsule (40 mg) by mouth daily Take 30-60 minutes before a meal.    Gastroesophageal reflux disease without esophagitis       rosuvastatin 10 MG tablet    CRESTOR    90 tablet    TAKE 1 TABLET(10 MG) BY MOUTH DAILY    Mixed hyperlipidemia       TRANSDERM-SCOP (1.5 MG) 72 hr patch   Generic drug:  scopolamine           traZODone 50 MG tablet    DESYREL    90 tablet    Take 1 tablet (50 mg) by mouth At Bedtime    Other insomnia       valsartan 320 MG tablet    DIOVAN    90 tablet    Take 1 tablet (320 mg) by mouth daily    Benign essential hypertension

## 2018-04-11 NOTE — PROGRESS NOTES
SLEEP CLINIC FOLLOW UP VISIT:     Date of visit: April 11, 2018    Purpose of visit: follow-up of ZARIA/CPAP use    History of present illness: Patient is 53-year-old male, who was diagnosed with severe obstructive sleep apnea following home sleep apnea test that was obtained in December 2017 following which he was started with auto titrating CPAP treatment. His CPAP settings are 8- 16 cm of water. He did not use the CPAP device  since January through March 2018 due to his illness- he was diagnosed with type A influenza and he never could recover from coughing until end of March. He also had asthma exacerbation that precluded his CPAP usage. His symptoms have improved now. He restarted the CPAP use recently and reports the primary benefit that he noticed since he started the CPAP is that the  symptoms of acid reflux improved. According to his partner he does not snore. He denies awakenings to do gasping for air or choking sensation. He endorses and [place] sleepiness score of 6 out of 24. He does report anxiety, with difficulty relaxing, when he applies the mask on and has difficult time acclimating to the CPAP.     He is very eager to get the bariatric surgery scheduled and wants to obtain the clearance from sleep apnea standpoint. He was very frustrated that he needed to go through several steps to undergo the bariatric surgery.    Downloadable compliance data for the past one month shows that he has only use the device for 8 out of 30 days with an average use each of 5 hours and 57 minutes on the nights used. 95th percentile for air leak was 11.9 L per minute. Median pressure was 9.4, 95th percentile was 10.7 and Max pressure was 11.4 cm of water. Residual AHI was 18.5 per hour(obstructive apnea index 0.5, hypoxia index was 1.2 per hour, central apnea index was 16.6 per hour).    Current meds:  Current Outpatient Prescriptions   Medication Sig Dispense Refill     montelukast (SINGULAIR) 10 MG tablet TAKE 1 TAB BY  MOUTH AT BEDTIME  1     clonazePAM (KLONOPIN) 0.5 MG ODT tab Take one half to one tab by mouth before bed 15 tablet 0     fluticasone-salmeterol (ADVAIR-HFA) 115-21 MCG/ACT Inhaler Inhale 2 puffs into the lungs 2 times daily 12 g 1     fluticasone (FLONASE) 50 MCG/ACT spray Spray 1-2 sprays into both nostrils daily 1 g 3     Glycopyrrolate POWD Compounded. Apply pea size amount every morning. 0.5% cream. For chromhidrosis, 30 gm jar 30 g 11     DULoxetine (CYMBALTA) 30 MG EC capsule Take 1 capsule (30 mg) by mouth 2 times daily 180 capsule 3     rosuvastatin (CRESTOR) 10 MG tablet TAKE 1 TABLET(10 MG) BY MOUTH DAILY 90 tablet 3     doxycycline monohydrate 50 MG capsule Take 1 capsule (50 mg) by mouth 2 times daily 180 capsule 3     valsartan (DIOVAN) 320 MG tablet Take 1 tablet (320 mg) by mouth daily 90 tablet 3     traZODone (DESYREL) 50 MG tablet Take 1 tablet (50 mg) by mouth At Bedtime 90 tablet 3     metroNIDAZOLE (METROGEL) 0.75 % topical gel Apply topically 2 times daily 135 g 3     omeprazole (PRILOSEC) 40 MG capsule Take 1 capsule (40 mg) by mouth daily Take 30-60 minutes before a meal. 90 capsule 3     Pseudoeph-Doxylamine-DM-APAP (NYQUIL PO)        TRANSDERM-SCOP, 1.5 MG, (1.5mg base/3day) patch   3     Past medical history:  Past Medical History:   Diagnosis Date     Hypertension      ZARIA (obstructive sleep apnea) Severe AHI 51 12/8/2017    HST 12/6/2017 Severe     Patient Active Problem List   Diagnosis     Chromhidrosis     Environmental allergies     Gastroesophageal reflux disease     Hiatal hernia     Mixed hyperlipidemia     Hypertriglyceridemia     Benign essential hypertension     Insomnia     Testosterone deficiency     Gastric polyposis     Adiposity     Rib pain     Rosacea     Adjustment disorder with depressed mood     Low back strain, initial encounter     Obesity (BMI 30-39.9)     Sleep disturbance     ZARIA (obstructive sleep apnea) Severe AHI 51     Mild intermittent asthma with acute  "exacerbation     Class 2 obesity due to excess calories without serious comorbidity with body mass index (BMI) of 36.0 to 36.9 in adult       Past surgical history:  Past Surgical History:   Procedure Laterality Date     APPENDECTOMY       ARTHROSCOPY KNEE Right 09/28/2016    Procedure: Arthroscopic partial medial meniscectomy, right knee. Surgeon:  Keith Ayala MD  Location: [facility]     AS REMOVAL OF TONSILS,<13 Y/O       C SYMPATHECTOMY,CERVICAL  2003     Allergies:  Allergies   Allergen Reactions     Honey      Pollen Extract      Sulfa Drugs      Physical exam:  /87  Pulse 67  Resp 16  Ht 1.778 m (5' 10\")  Wt 117 kg (258 lb)  SpO2 96%  BMI 37.02 kg/m2  General appearance:  in no apparent distress  Pt is dressed casually, cooperative with good eye contact.   Speech is spontaneous with regular rate and volume.   Sensorium: awake, alert and oriented to person, place, time, and situation.    Assessment/Plan: Severe obstructive sleep apnea. Patient reports poor CPAP compliance which he attributed to his illnesses including flu, coughing, asthma exacerbation since January through March 2018. He reports some positive benefits with the restarting the CPAP treatment.  Per the compliance measures the residual AHI is increased majority of which appeared to be central apneas. Patient was not interested in the option of obtaining a supervised all night CPAP titration study to establish the optimum pressure to control the sleep apnea which was discussed. Based on the compliance measures, recommended revision of the pressure settings on the auto CPAP, increasing the minimum pressure to 10 cm of water and  the max pressure will remain the same. Prescription was provided for clonazepam 0.5 mg, he was instructed to take one half to one tablet by mouth before bed , dispensed# 15 tabs  without any refills. We discussed that the prescription was intended only for short-term use to help alleviate " "the anxiety with the CPAP, improve sleep consolidation thereby  alleviating the central apneas, which are most likely post arousal.   Will request our virtual care coordinators to communicate with the patient to make an STM call in the next week to check the tolerance to revised pressure settings and also to obtain an updated compliance download to see for any  Improvement in the central apnea index.  Recommended him to use the CPAP device regularly during sleep and discussed the consequences of untreated sleep apnea and the benefits from using the CPAP preoperatively and the need to use it post operatively as well.  Patient was counseled on avoiding driving/operating heavy machinery, if drowsy or sleepy.  We discussed weight management.   Plan is to follow-up via virtual visit in 2 weeks when the compliance measures will be reviewed.    The above note was dictated using voice recognition software. Although reviewed after completion, some word and grammatical error may remain . Please contact the author for any clarifications.    \"I spent a total of  25 minutes face to face with David Marino during today's office visit. Over 50% of this time was spent counseling the patient and  coordinating care regarding ZARIA, CPAP treatment, compliance.\"       David Ellis MD   of Medicine,  Division of Pulmonary/Sleep Medicine  [facility].            "

## 2018-04-20 ENCOUNTER — OFFICE VISIT (OUTPATIENT)
Dept: SURGERY | Facility: CLINIC | Age: 53
End: 2018-04-20
Payer: COMMERCIAL

## 2018-04-20 VITALS — HEIGHT: 70 IN | BODY MASS INDEX: 35.22 KG/M2 | WEIGHT: 246 LBS

## 2018-04-20 DIAGNOSIS — E66.09 CLASS 2 OBESITY DUE TO EXCESS CALORIES WITHOUT SERIOUS COMORBIDITY WITH BODY MASS INDEX (BMI) OF 36.0 TO 36.9 IN ADULT: ICD-10-CM

## 2018-04-20 DIAGNOSIS — E66.812 CLASS 2 OBESITY DUE TO EXCESS CALORIES WITHOUT SERIOUS COMORBIDITY WITH BODY MASS INDEX (BMI) OF 36.0 TO 36.9 IN ADULT: ICD-10-CM

## 2018-04-20 DIAGNOSIS — E66.9 OBESITY (BMI 30-39.9): ICD-10-CM

## 2018-04-20 PROCEDURE — 97803 MED NUTRITION INDIV SUBSEQ: CPT | Performed by: DIETITIAN, REGISTERED

## 2018-04-20 NOTE — PROGRESS NOTES
"PRE SURGICAL WEIGHT LOSS NUTRITION APPOINTMENT    David Marino  1965  male  [id number]  53 year old    ASSESSMENT    Desired Surgical Procedure: gastric bypass    REASON FOR VISIT:  David Marino is a 53 year old year old male presents today for a pre-surgical weight loss follow-up appointment. Patient accompanied by self.    DIAGNOSIS:  Weight Status Obesity Grade II BMI 35-39.9    ANTHROPOMETRICS:  Height: 177.8 cm (5' 10\")  Initial Weight: 248 lbs   Weight last visit: 252.4 lbs  Current weight: 111.6 kg (246 lb)  BMI: 35.37 kg/(m^2).       MULTIVITAMINS/MINERALS  Multivitamin - upon waking  Calcium - 600mg AM (once arriving at work) and PM  Vitamin D - 5000 international units daily    NUTRITION HISTORY:  Breakfast: Premier Protein  Lunch: Lean Cuisine/frozen meal  Supper: Lean Cuisine/frozen meal  Snacks: 1 cup raw vegetables mid-afternoon + SF jello   Fluids Consumed: Water and Protein Drink  Eating slower: Yes  Chewing foods thoroughly: Yes  Take 20-30 minutes to consume each meal: Yes  Fluids and meals separate by at least 30 minutes: Yes  Carbonation: none  Caffeine: none  Additional Information: Emotional eating d/t stress and boredom. Answered \"yes\" to binge eating however seems to be simply overeating. Denies any feelings of compulsiveness, shame, etc associated with episodes. Has been thinking about surgery for about a year, knows a couple other people who have had surgery and been fairly successful. 8/22/17: patient admits he has been struggling with thinking there will be some food he may never be able to eat again; over the past month pt has done extensive research on weight loss surgery; says he is in the contemplation phase of thinking about weight loss surgery; soft ball has ended so pt wants to start intentional exercise this month 9/25/17- pt feels like he has been more focused on moving forward with weight loss surgery his past month' less \"food funerals;\" pt and his partner have " eliminated all fast food 11/27/17- Pt. feels like his weight is up due to 3 days of over eating during the Thanksgiving holiday; started a new anti depressant this past month (pt not sure what it is); pt is frustrated weight trending up over the past several months; suggested trail of pre-surgery eight loss meal plan to help expedite weight loss  12/29/17: Pt shares he wasn't focused on weight loss over the holidays, states now that these are over he's ready to commit to pre-op weight loss. 1/30/18 Patient and his  are on  A diet where you eat only within an 8 hour window. Explained to patient eating within 8 hour window may not work well post-op. Patient says he is doing whatever he can to help with weight loss pre-op and is fine with not following these guidelines. Post-op. patient feels like increased use of protein drinks are helping him with weight loss.  4/10/18: Pt returns after several months of cancelled visits; had influenza which further exacerbated his existing asthma leading to several months of respiratory issues. Feeling better the last few weeks. Weight increased to 262 lbs throughout illness d/t inactivity as well as changes to antidepressants, but has lost 9 lbs in the last 3 weeks. Frustrated that not at weight goal/timeline to surgery as will likely be switching jobs in the next few months and was hoping to be close to surgery before changes in insurance. Overall very discouraged with program but then at end of visit states that he's more frustrated with himself for not losing the weight. Reminded pt that his weight loss of 9 lbs in the last 3 weeks is significant and if he works hard, he should be able to get to his weight loss goal. Sent pt with pre-op weight loss diet, pt wants to return in 2 weeks for next appointment, ok'd this plan.   4/20/2018: Pt followed pre-op WL diet over the last two weeks. Very happy with weight loss and ability to move forward. CPAP compliance f/u appointment  scheduled for 4/23. Reviewed post-op diet advancement.     PHYSICAL ACTIVITY:  Type: softball, walking on treadmill at gym, swimming  Frequency: 5 (days per week)  Duration: 30-60 (minutes)     DIAGNOSIS:  Previous Nutrition Diagnosis: Obesity related to long history of self- monitoring deficit and excessive energy intake evidenced by BMI of 36.29 kg/m2  No change, modified below    Previous goals:   Continue to practice all guidelines - met  Follow pre-op weight loss diet. - met    Current Nutrition Diagnosis: Obesity related to long history of self-monitoring deficit and excessive energy intake as evidenced by BMI of 35.30 kg/m2.    INTERVENTION:  Nutrition Prescription: Recommended energy/nutrient modification.    GOALS:  Continue to follow all pre-op guidelines    Follow-Up:   Recommend standard post-op follow up visits to assist with lifestyle changes or per insurance.    Intervention:  - Discussed progress towards previous goals.  - Reinforced importance of making behavior changes in preparation for bariatric surgery.   - Assessed learning needs and learning preferences       NUTRITION MONITORING AND EVALUATION:  Anticipated compliance: good  Patient demonstrated good understanding.   Patient has met pre bariatric surgery diet requirements    Follow up: Continue to monitor patient closely regarding weight loss and diet.  # of visits needed: 0  Cleared by RD: Yes     TIME SPENT WITH PATIENT: 15 minutes    Lizbeth Campos RD, LD  Clinical Dietitian

## 2018-04-20 NOTE — MR AVS SNAPSHOT
MRN:[id number]                      After Visit Summary   4/20/2018    David Marino    MRN: [id number]           Visit Information        Provider Department      4/20/2018 9:30 AM 3, Sh Elda Barton RD [facility]      Your next 10 appointments already scheduled     Apr 23, 2018  2:45 PM CDT   Telephone Visit with David Ellis MD   [facility], [place], Sleep Study ([facility])    [address] [place] [id number] [phone]           Note: this is not an onsite visit; there is no need to come to the facility.              [employer] Information     [employer] gives you secure access to your electronic health record. If you see a primary care provider, you can also send messages to your care team and make appointments. If you have questions, please call your primary care clinic.  If you do not have a primary care provider, please call [phone] and they will assist you.        Care EveryWhere ID     This is your Care EveryWhere ID. This could be used by other organizations to access your [place] medical records  [id number]        Equal Access to Services     SATINDER XIE AH: Hadii jaye Barnes, waaxda luqadaha, qaybta kaalmada adelawson, lara moody. So [facility] [phone].    ATENCIÓN: Si habla español, tiene a obrien disposición servicios gratuitos de asistencia lingüística. Karen al [phone].    We comply with applicable federal civil rights laws and [place] laws. We do not discriminate on the basis of race, color, national origin, age, disability, sex, sexual orientation, or gender identity.

## 2018-04-23 ENCOUNTER — VIRTUAL VISIT (OUTPATIENT)
Dept: SLEEP MEDICINE | Facility: CLINIC | Age: 53
End: 2018-04-23
Payer: COMMERCIAL

## 2018-04-23 DIAGNOSIS — G47.33 OSA ON CPAP: Primary | ICD-10-CM

## 2018-04-23 PROCEDURE — 99442 ZZC PHYSICIAN TELEPHONE EVALUATION 11-20 MIN: CPT | Performed by: INTERNAL MEDICINE

## 2018-04-23 NOTE — MR AVS SNAPSHOT
After Visit Summary   4/23/2018    David Marino    MRN: [id number]           Patient Information     Date Of Birth          1965        Visit Information        Provider Department      4/23/2018 2:45 PM David Ellis MD [facility]        Today's Diagnoses     ZARIA on CPAP    -  1       Follow-ups after your visit        Who to contact     If you have questions or need follow up information about today's clinic visit or your schedule please contact [facility] directly at [phone].  Normal or non-critical lab and imaging results will be communicated to you by [employer], letter or phone within 4 business days after the clinic has received the results. If you do not hear from us within 7 days, please contact the clinic through [employer] or phone. If you have a critical or abnormal lab result, we will notify you by phone as soon as possible.  Submit refill requests through [employer] or call your pharmacy and they will forward the refill request to us. Please allow 3 business days for your refill to be completed.          Additional Information About Your Visit        MyChart Information     [employer] gives you secure access to your electronic health record. If you see a primary care provider, you can also send messages to your care team and make appointments. If you have questions, please call your primary care clinic.  If you do not have a primary care provider, please call [phone] and they will assist you.        Care EveryWhere ID     This is your Care EveryWhere ID. This could be used by other organizations to access your [place] medical records  [id number]         Blood Pressure from Last 3 Encounters:   04/11/18 128/87   03/14/18 (!) 122/94   03/05/18 (!) 126/94    Weight from Last 3 Encounters:   04/20/18 111.6 kg (246 lb)   04/11/18 117 kg (258 lb)   04/10/18 114.5 kg (252 lb 6.4 oz)              Today, you had the following      No orders found for display       Primary Care Provider Office Phone # Fax #    Jus Woodrow Oswald [phone] [address] [facility] [address]        Equal Access to Services     SATINDER XIE : Hadmike jaye pope demetrao Somargaritaali, waaxda luqadaha, qaybta kaalmada adeegyada, lara pompan yared leach jagjit moody. So [facility] [phone].    ATENCIÓN: Si habla español, tiene a obrien disposición servicios gratuitos de asistencia lingüística. Llame al [phone].    We comply with applicable federal civil rights laws and [place] laws. We do not discriminate on the basis of race, color, national origin, age, disability, sex, sexual orientation, or gender identity.            Thank you!     Thank you for choosing [facility]  for your care. Our goal is always to provide you with excellent care. Hearing back from our patients is one way we can continue to improve our services. Please take a few minutes to complete the written survey that you may receive in the mail after your visit with us. Thank you!             Your Updated Medication List - Protect others around you: Learn how to safely use, store and throw away your medicines at www.disposemymeds.org.          This list is accurate as of 4/23/18 11:59 PM.  Always use your most recent med list.                   Brand Name Dispense Instructions for use Diagnosis    clonazePAM 0.5 MG ODT tab    klonoPIN    15 tablet    Take one half to one tab by mouth before bed        doxycycline monohydrate 50 MG capsule     180 capsule    Take 1 capsule (50 mg) by mouth 2 times daily    Rosacea       DULoxetine 30 MG EC capsule    CYMBALTA    180 capsule    Take 1 capsule (30 mg) by mouth 2 times daily    Adjustment disorder with depressed mood       fluticasone 50 MCG/ACT spray    FLONASE    1 g    Spray 1-2 sprays into both nostrils daily    Chronic seasonal allergic rhinitis due to pollen       fluticasone-salmeterol 115-21 MCG/ACT  Inhaler    ADVAIR-HFA    12 g    Inhale 2 puffs into the lungs 2 times daily    Mild intermittent asthma with acute exacerbation, Acute bronchospasm       Glycopyrrolate Powd     30 g    Compounded. Apply pea size amount every morning. 0.5% cream. For chromhidrosis, 30 gm jar    Chromhidrosis       metroNIDAZOLE 0.75 % topical gel    METROGEL    135 g    Apply topically 2 times daily    Rosacea       montelukast 10 MG tablet    SINGULAIR     TAKE 1 TAB BY MOUTH AT BEDTIME        NYQUIL PO           omeprazole 40 MG capsule    priLOSEC    90 capsule    Take 1 capsule (40 mg) by mouth daily Take 30-60 minutes before a meal.    Gastroesophageal reflux disease without esophagitis       rosuvastatin 10 MG tablet    CRESTOR    90 tablet    TAKE 1 TABLET(10 MG) BY MOUTH DAILY    Mixed hyperlipidemia       TRANSDERM-SCOP (1.5 MG) 72 hr patch   Generic drug:  scopolamine           traZODone 50 MG tablet    DESYREL    90 tablet    Take 1 tablet (50 mg) by mouth At Bedtime    Other insomnia       valsartan 320 MG tablet    DIOVAN    90 tablet    Take 1 tablet (320 mg) by mouth daily    Benign essential hypertension

## 2018-04-24 NOTE — PROGRESS NOTES
SLEEP CLINIC virtual visit  Note     Date of visit: April 23, 2018     Purpose of visit: follow-up of ZARIA/CPAP use    HPI:Patient is 53-year-old male, who was diagnosed with severe obstructive sleep apnea following home sleep apnea test that was obtained in December 2017 following which he was started with auto titrating CPAP treatment.    During the recent sleep clinic f/u visit on 4/11/18,  there were concerns about poor CPAP compliance and DL showing a high residual AHI  with  majority of central apneas. I had recommended revision of the pressure settings on the auto CPAP, increasing the minimum pressure to 10 cm of water and  keeping  the max pressure at 16 cm of water.  The virtual visit is scheduled  today to discuss the compliance with the CPAP treatment.    Patient reports that he feels comfortable with the revised pressure settings, and has been able to use the device pretty regularly since his last sleep clinic visit.  He denies awakenings due to gasping for air or choking with the CPAP and there have not been any reports of snoring with the device either.  He reports improved sleep quality and improvement in energy levels since he has restarted using the CPAP treatment regularly.   He did not need to use the clonazepam that was prescribed, because he was feeling comfortable with pressures and was able to sleep well.    Compliance measures were reviewed per synopsis from Epic.  The % of compliance, with device usage of greater than 4 hours-rolling average 14 days improved to 92%. Residual AHI has been <5 per hr. There were no significant centrals.    He was congratulated on the improvement in compliance. He is cleared for bariatric surgery from sleep apnea stand point. Recommended him to continue using the CPAP device regularly during sleep and getting the supplies replace regularly and using the CPAP preoperatively and post operatively. We also discussed the likelihood of reduction in pressure  "requirements, post operatively with the weight loss and instructed him to get back to us if he has trouble tolerating the CPAP pressures or develops symptoms of aerophagia(symptoms were explained) so that we can revise the settings and he agreed.  We discussed the plan that he will need a repeat HST to re-evaluate the ZARIA approximately 6 months after bariatric surgery.    Recommend continue eating healthy and exercising regularly.    Patient was counseled on avoiding driving/operarting heavy machinery,  if drowsy or sleepy.      The above note was dictated using voice recognition software. Although reviewed after completion, some word and grammatical error may remain . Please contact the author for any clarifications.    \"I spent a total of 15 minutes  with David Marino during today's virtual visit. Over 50% of this time was spent counseling the patient and  coordinating care regarding CPAP compliance, using the CPAP preoperatively and post operatively, weight management and repeat HST\".      David Ellis MD   of Medicine,  Division of Pulmonary/Sleep Medicine  [facility].      "

## 2018-05-01 ENCOUNTER — OFFICE VISIT (OUTPATIENT)
Dept: SURGERY | Facility: CLINIC | Age: 53
End: 2018-05-01
Payer: COMMERCIAL

## 2018-05-01 VITALS
HEART RATE: 75 BPM | SYSTOLIC BLOOD PRESSURE: 100 MMHG | HEIGHT: 70 IN | WEIGHT: 243.1 LBS | BODY MASS INDEX: 34.8 KG/M2 | DIASTOLIC BLOOD PRESSURE: 70 MMHG

## 2018-05-01 DIAGNOSIS — E66.9 OBESITY (BMI 30-39.9): ICD-10-CM

## 2018-05-01 DIAGNOSIS — I10 ESSENTIAL HYPERTENSION: Primary | ICD-10-CM

## 2018-05-01 PROCEDURE — 99215 OFFICE O/P EST HI 40 MIN: CPT | Performed by: SURGERY

## 2018-05-01 NOTE — MR AVS SNAPSHOT
"              After Visit Summary   5/1/2018    David Marino    MRN: [id number]           Patient Information     Date Of Birth          1965        Visit Information        Provider Department      5/1/2018 9:30 AM Noe Saunders MD [facility]      Today's Diagnoses     Essential hypertension    -  1    Obesity (BMI 30-39.9)           Follow-ups after your visit        Who to contact     If you have questions or need follow up information about today's clinic visit or your schedule please contact [facility] directly at [phone].  Normal or non-critical lab and imaging results will be communicated to you by MyChart, letter or phone within 4 business days after the clinic has received the results. If you do not hear from us within 7 days, please contact the clinic through [employer] or phone. If you have a critical or abnormal lab result, we will notify you by phone as soon as possible.  Submit refill requests through [employer] or call your pharmacy and they will forward the refill request to us. Please allow 3 business days for your refill to be completed.          Additional Information About Your Visit        MyChart Information     [employer] gives you secure access to your electronic health record. If you see a primary care provider, you can also send messages to your care team and make appointments. If you have questions, please call your primary care clinic.  If you do not have a primary care provider, please call [phone] and they will assist you.        Care EveryWhere ID     This is your Care EveryWhere ID. This could be used by other organizations to access your [place] medical records  [id number]        Your Vitals Were     Pulse Height BMI (Body Mass Index)             75 1.778 m (5' 10\") 34.88 kg/m2          Blood Pressure from Last 3 Encounters:   05/01/18 100/70   04/11/18 " 128/87   03/14/18 (!) 122/94    Weight from Last 3 Encounters:   05/01/18 110.3 kg (243 lb 1.6 oz)   04/20/18 111.6 kg (246 lb)   04/11/18 117 kg (258 lb)              Today, you had the following     No orders found for display       Primary Care Provider Office Phone # Fax #    Jus Woodrow Oswald [phone]       [address] [facility] [address]        Equal Access to Services     SATINDER XIE : Hadii aad ku hadasho Soomaali, waaxda luqadaha, qaybta kaalmada adeegyada, waxay idiin hayaan adegabriel danielson . So [facility] [phone].    ATENCIÓN: Si habla español, tiene a obrien disposición servicios gratuitos de asistencia lingüística. [facility] [phone].    We comply with applicable federal civil rights laws and [place] laws. We do not discriminate on the basis of race, color, national origin, age, disability, sex, sexual orientation, or gender identity.            Thank you!     Thank you for choosing [facility]  for your care. Our goal is always to provide you with excellent care. Hearing back from our patients is one way we can continue to improve our services. Please take a few minutes to complete the written survey that you may receive in the mail after your visit with us. Thank you!             Your Updated Medication List - Protect others around you: Learn how to safely use, store and throw away your medicines at www.disposemymeds.org.          This list is accurate as of 5/1/18 11:59 PM.  Always use your most recent med list.                   Brand Name Dispense Instructions for use Diagnosis    CALCIUM 500 PO           doxycycline monohydrate 50 MG capsule     180 capsule    Take 1 capsule (50 mg) by mouth 2 times daily    Rosacea       DULoxetine 30 MG EC capsule    CYMBALTA    180 capsule    Take 1 capsule (30 mg) by mouth 2 times daily    Adjustment disorder with depressed mood       FISH OIL OMEGA-3 PO           fluticasone 50  MCG/ACT spray    FLONASE    1 g    Spray 1-2 sprays into both nostrils daily    Chronic seasonal allergic rhinitis due to pollen       fluticasone-salmeterol 115-21 MCG/ACT Inhaler    ADVAIR-HFA    12 g    Inhale 2 puffs into the lungs 2 times daily    Mild intermittent asthma with acute exacerbation, Acute bronchospasm       Glycopyrrolate Powd     30 g    Compounded. Apply pea size amount every morning. 0.5% cream. For chromhidrosis, 30 gm jar    Chromhidrosis       metroNIDAZOLE 0.75 % topical gel    METROGEL    135 g    Apply topically 2 times daily    Rosacea       montelukast 10 MG tablet    SINGULAIR     TAKE 1 TAB BY MOUTH AT BEDTIME        MULTIVITAMIN ADULT PO           NYQUIL PO           omeprazole 40 MG capsule    priLOSEC    90 capsule    Take 1 capsule (40 mg) by mouth daily Take 30-60 minutes before a meal.    Gastroesophageal reflux disease without esophagitis       rosuvastatin 10 MG tablet    CRESTOR    90 tablet    TAKE 1 TABLET(10 MG) BY MOUTH DAILY    Mixed hyperlipidemia       TRANSDERM-SCOP (1.5 MG) 72 hr patch   Generic drug:  scopolamine           traZODone 50 MG tablet    DESYREL    90 tablet    Take 1 tablet (50 mg) by mouth At Bedtime    Other insomnia       valsartan 320 MG tablet    DIOVAN    90 tablet    Take 1 tablet (320 mg) by mouth daily    Benign essential hypertension       VITAMIN D (CHOLECALCIFEROL) PO      Take 5,000 Units by mouth daily

## 2018-05-01 NOTE — PROGRESS NOTES
"Dear Jus      Referring provider: Jus Oswald  I was asked to see the patient regarding obesity by the referring provider above.    I had the pleasure of meeting with your patient David Marino recently in our weight loss surgery office.  This patient is a 53 year old year old male who has been undergoing our thorough preoperative screening process in anticipation of potential bariatric surgery.    At initial evaluation we recorded David Marino's Initial BMI: 35.6 and Initial Weight: 248 lb (112.5 kg).  The patient has been unsuccessful with other methods of permanent weight loss and suffers from multiple weight related medical conditions.  Due to lack of success in achieving weight loss through other methods, he is interested in undergoing bariatric surgery.    PREVIOUS WEIGHT LOSS ATTEMPTS:  Weight Watchers, HCG, HMR fasting    CO-MORBIDITIES OF OBESITY INCLUDE:  Hypertension, hyperlipidemia, obstructive sleep apnea, low back pain, asthma     VITALS:  /70  Pulse 75  Ht 5' 10\" (1.778 m)  Wt 243 lb 1.6 oz (110.3 kg)  BMI 34.88 kg/m2    PE:  GENERAL: Alert and oriented x3. NAD  HEENT exam: Sclerae not icteric. Hearing good. Head normocephalic and atraumatic. No thyromegaly  CARDIOVASCULAR: No JVD  RESPIRATORY: Breathing unlabored  GASTROINTESTINAL: Obese  LOWER EXTREMITIES: no deformities  MUSCULOSKELETAL: Normal gait, Moves all 4 extremities equal and strong  NEUROLOGIC: no gross defect  SKIN: warm and dry to touch     In summary, he has undergone an appropriate medical evaluation, dietitian evaluation, as well as psychologic screening. The patient appears to be an appropriate candidate for bariatric surgery.    In the office today, I discussed the laparoscopic gastric bypass surgery.  Risks, benefits and anticipated outcomes were outlined including the risk of death, staple line leak (1-2%), PE, DVT, ulcer, worsening GERD, N/V, stricture, hernia, wound infection, weight regain, and vitamin " deficiencies. This patient has a good chance of sustaining permanent weight loss due to this procedure.  This should also allow improvement if not resolution of his/her weight related medical conditions.    At present we are going to present your patient's file for prior authorization to insurance.  Pending prior authorization, I anticipate a surgical date in the near future.  We will keep you updated on any progress.  If you have questions regarding care please feel free to contact me.  Total time spent in the clinic was 25 minutes with greater than 50% in face-to-face consultation.        Sincerely,    Noe Saunders    Please route or send letter to:  Primary Care Provider (PCP) and Referring Provider

## 2018-05-10 DIAGNOSIS — J45.21 MILD INTERMITTENT ASTHMA WITH ACUTE EXACERBATION: ICD-10-CM

## 2018-05-10 DIAGNOSIS — J98.01 ACUTE BRONCHOSPASM: ICD-10-CM

## 2018-05-10 NOTE — TELEPHONE ENCOUNTER
"Due for 1 month f/u for asthma  MyChart message sent to pt.  Monika RODRIGUES RN    Requested Prescriptions   Pending Prescriptions Disp Refills     ADVAIR -21 MCG/ACT Inhaler [Pharmacy Med Name: ADVAIR -21 MCG INHALER] 12 Inhaler 1     Sig: INHALE 2 PUFFS INTO THE LUNGS 2 TIMES DAILY    Inhaled Steroids Protocol Failed    5/10/2018  1:34 AM       Failed - Asthma control assessment score within normal limits in last 6 months    Please review ACT score.          Passed - Patient is age 12 or older       Passed - Recent (6 mo) or future (30 days) visit within the authorizing provider's specialty    Patient had office visit in the last 6 months or has a visit in the next 30 days with authorizing provider or within the authorizing provider's specialty.  See \"Patient Info\" tab in inbasket, or \"Choose Columns\" in Meds & Orders section of the refill encounter.                  "

## 2018-05-15 ENCOUNTER — OFFICE VISIT (OUTPATIENT)
Dept: FAMILY MEDICINE | Facility: CLINIC | Age: 53
End: 2018-05-15
Payer: COMMERCIAL

## 2018-05-15 VITALS
SYSTOLIC BLOOD PRESSURE: 126 MMHG | TEMPERATURE: 98.2 F | HEART RATE: 78 BPM | WEIGHT: 249.5 LBS | BODY MASS INDEX: 35.72 KG/M2 | RESPIRATION RATE: 15 BRPM | DIASTOLIC BLOOD PRESSURE: 76 MMHG | HEIGHT: 70 IN | OXYGEN SATURATION: 96 %

## 2018-05-15 DIAGNOSIS — Z01.818 PREOP GENERAL PHYSICAL EXAM: Primary | ICD-10-CM

## 2018-05-15 DIAGNOSIS — G47.33 OSA (OBSTRUCTIVE SLEEP APNEA): ICD-10-CM

## 2018-05-15 DIAGNOSIS — I10 BENIGN ESSENTIAL HYPERTENSION: ICD-10-CM

## 2018-05-15 DIAGNOSIS — L71.9 ROSACEA: ICD-10-CM

## 2018-05-15 DIAGNOSIS — J45.21 MILD INTERMITTENT ASTHMA WITH ACUTE EXACERBATION: ICD-10-CM

## 2018-05-15 LAB
BASOPHILS # BLD AUTO: 0.1 10E9/L (ref 0–0.2)
BASOPHILS NFR BLD AUTO: 1.2 %
DIFFERENTIAL METHOD BLD: NORMAL
EOSINOPHIL # BLD AUTO: 0.1 10E9/L (ref 0–0.7)
EOSINOPHIL NFR BLD AUTO: 2.4 %
ERYTHROCYTE [DISTWIDTH] IN BLOOD BY AUTOMATED COUNT: 13.7 % (ref 10–15)
HCT VFR BLD AUTO: 42.2 % (ref 40–53)
HGB BLD-MCNC: 14.4 G/DL (ref 13.3–17.7)
LYMPHOCYTES # BLD AUTO: 2 10E9/L (ref 0.8–5.3)
LYMPHOCYTES NFR BLD AUTO: 34.3 %
MCH RBC QN AUTO: 28.8 PG (ref 26.5–33)
MCHC RBC AUTO-ENTMCNC: 34.1 G/DL (ref 31.5–36.5)
MCV RBC AUTO: 84 FL (ref 78–100)
MONOCYTES # BLD AUTO: 0.4 10E9/L (ref 0–1.3)
MONOCYTES NFR BLD AUTO: 7.2 %
NEUTROPHILS # BLD AUTO: 3.1 10E9/L (ref 1.6–8.3)
NEUTROPHILS NFR BLD AUTO: 54.9 %
PLATELET # BLD AUTO: 172 10E9/L (ref 150–450)
RBC # BLD AUTO: 5 10E12/L (ref 4.4–5.9)
WBC # BLD AUTO: 5.7 10E9/L (ref 4–11)

## 2018-05-15 PROCEDURE — 36415 COLL VENOUS BLD VENIPUNCTURE: CPT | Performed by: FAMILY MEDICINE

## 2018-05-15 PROCEDURE — 99215 OFFICE O/P EST HI 40 MIN: CPT | Performed by: FAMILY MEDICINE

## 2018-05-15 PROCEDURE — 93000 ELECTROCARDIOGRAM COMPLETE: CPT | Performed by: FAMILY MEDICINE

## 2018-05-15 PROCEDURE — 80053 COMPREHEN METABOLIC PANEL: CPT | Performed by: FAMILY MEDICINE

## 2018-05-15 PROCEDURE — 85025 COMPLETE CBC W/AUTO DIFF WBC: CPT | Performed by: FAMILY MEDICINE

## 2018-05-15 RX ORDER — METRONIDAZOLE 7.5 MG/G
1 GEL TOPICAL 2 TIMES DAILY
Qty: 60 G | Refills: 11 | Status: SHIPPED | OUTPATIENT
Start: 2018-05-15 | End: 2021-09-13

## 2018-05-15 NOTE — MR AVS SNAPSHOT
After Visit Summary   5/15/2018    David Marino    MRN: [id number]           Patient Information     Date Of Birth          1965        Visit Information        Provider Department      5/15/2018 3:30 PM Jus Oswald MD [facility]        Today's Diagnoses     Preop general physical exam    -  1    Class 2 obesity due to excess calories with serious comorbidity and body mass index (BMI) of 36.0 to 36.9 in adult        ZARIA (obstructive sleep apnea) Severe AHI 51        Benign essential hypertension        Mild intermittent asthma with acute exacerbation        Rosacea          Care Instructions      Before Your Surgery      Call your surgeon if there is any change in your health. This includes signs of a cold or flu (such as a sore throat, runny nose, cough, rash or fever).    Do not smoke, drink alcohol or take over the counter medicine (unless your surgeon or primary care doctor tells you to) for the 24 hours before and after surgery.    If you take prescribed drugs: Follow your doctor s orders about which medicines to take and which to stop until after surgery.    Eating and drinking prior to surgery: follow the instructions from your surgeon    Take a shower or bath the night before surgery. Use the soap your surgeon gave you to gently clean your skin. If you do not have soap from your surgeon, use your regular soap. Do not shave or scrub the surgery site.  Wear clean pajamas and have clean sheets on your bed.           Follow-ups after your visit        Follow-up notes from your care team     Return if symptoms worsen or fail to improve.      Your next 10 appointments already scheduled     May 21, 2018  7:30 AM CDT   [facility] OR with Noe Saunders MD   [facility] ([facility])    [facility] ([facility])   [phone]            May 21, 2018   Procedure with Noe  Yao Saunders MD   [facility] PeriOP Services (--)    [address]  [place] [address]   [phone]            May 29, 2018 11:00 AM CDT   Post Op with Saranya Schroeder Rn, RN   [facility] [place] ([facility])    [address]  [address]  [place] [address] [phone]            May 29, 2018 11:20 AM CDT   Post Op with Cosme Foote PA-C   [facility] [place] ([facility])    [address]  [address]  [facility] [address] [phone]            Jun 04, 2018 10:00 AM CDT   Post Op with Saranya Schroeder Rn, RN   [facility] [place] ([facility])    [address]  [address]  [facility] [address] [phone]            Jun 22, 2018  9:30 AM CDT   Post Op with Saranya Schroeder Diet 1, RD   [facility] [place] ([facility])    [address]  [address]  [place] [address] [phone]              Who to contact     If you have questions or need follow up information about today's clinic visit or your schedule please contact [facility] directly at [phone].  Normal or non-critical lab and imaging results will be communicated to you by WishGeniehart, letter or phone within 4 business days after the clinic has received the results. If you do not hear from us within 7 days, please contact the clinic through WishGeniehart or phone. If you have a critical or abnormal lab result, we will notify you by phone as soon as possible.  Submit refill requests through Cloudnexa or call your pharmacy and they will forward the refill request to us. Please allow 3 business days for your refill to be completed.          Additional Information About Your Visit        [email or web address] Information     Cloudnexa gives you secure access to your electronic health record. If you see a primary care  "provider, you can also send messages to your care team and make appointments. If you have questions, please call your primary care clinic.  If you do not have a primary care provider, please call [phone] and they will assist you.        Care EveryWhere ID     This is your Care EveryWhere ID. This could be used by other organizations to access your [place] medical records  [id number]        Your Vitals Were     Pulse Temperature Respirations Height Pulse Oximetry BMI (Body Mass Index)    78 98.2  F (36.8  C) (Oral) 15 5' 10\" (1.778 m) 96% 35.8 kg/m2       Blood Pressure from Last 3 Encounters:   05/15/18 126/76   05/01/18 100/70   04/11/18 128/87    Weight from Last 3 Encounters:   05/15/18 249 lb 8 oz (113.2 kg)   05/01/18 243 lb 1.6 oz (110.3 kg)   04/20/18 246 lb (111.6 kg)              We Performed the Following     CBC with platelets differential     Comprehensive metabolic panel     EKG 12-lead complete w/read - Clinics          Today's Medication Changes          These changes are accurate as of 5/15/18 11:59 PM.  If you have any questions, ask your nurse or doctor.               These medicines have changed or have updated prescriptions.        Dose/Directions    metroNIDAZOLE 0.75 % topical gel   Commonly known as:  METROGEL   This may have changed:  how much to take   Used for:  Rosacea   Changed by:  Jus Oswald MD        Dose:  1 g   Apply 1 g topically 2 times daily   Quantity:  60 g   Refills:  11            Where to get your medicines      These medications were sent to [place] [address] [place] [address] [address]     Phone:  [phone]     metroNIDAZOLE 0.75 % topical gel                Primary Care Provider Office Phone # Fax #    Jus Oswald [phone] [address] [facility] [address]        Equal Access to Services     SATINDER XIE AH: Hadii jaye Barnes, favio ying, " hetal garcia antelmolara pathak ah. So [facility] [phone].    ATENCIÓN: Si doug almanzar, tiene a obrien disposición servicios gratuitos de asistencia lingüística. Karen al [phone].    We comply with applicable federal civil rights laws and [place] laws. We do not discriminate on the basis of race, color, national origin, age, disability, sex, sexual orientation, or gender identity.            Thank you!     Thank you for choosing [facility]  for your care. Our goal is always to provide you with excellent care. Hearing back from our patients is one way we can continue to improve our services. Please take a few minutes to complete the written survey that you may receive in the mail after your visit with us. Thank you!             Your Updated Medication List - Protect others around you: Learn how to safely use, store and throw away your medicines at www.disposemymeds.org.          This list is accurate as of 5/15/18 11:59 PM.  Always use your most recent med list.                   Brand Name Dispense Instructions for use Diagnosis    CALCIUM 500 PO           doxycycline monohydrate 50 MG capsule     180 capsule    Take 1 capsule (50 mg) by mouth 2 times daily    Rosacea       DULoxetine 30 MG EC capsule    CYMBALTA    180 capsule    Take 1 capsule (30 mg) by mouth 2 times daily    Adjustment disorder with depressed mood       FISH OIL OMEGA-3 PO           fluticasone 50 MCG/ACT spray    FLONASE    1 g    Spray 1-2 sprays into both nostrils daily    Chronic seasonal allergic rhinitis due to pollen       fluticasone-salmeterol 115-21 MCG/ACT Inhaler    ADVAIR-HFA    12 g    Inhale 2 puffs into the lungs 2 times daily    Mild intermittent asthma with acute exacerbation, Acute bronchospasm       Glycopyrrolate Powd     30 g    Compounded. Apply pea size amount every morning. 0.5% cream. For chromhidrosis, 30 gm jar    Chromhidrosis       metroNIDAZOLE 0.75 % topical gel     METROGEL    60 g    Apply 1 g topically 2 times daily    Rosacea       montelukast 10 MG tablet    SINGULAIR     TAKE 1 TAB BY MOUTH AT BEDTIME        MULTIVITAMIN ADULT PO           NYQUIL PO           omeprazole 40 MG capsule    priLOSEC    90 capsule    Take 1 capsule (40 mg) by mouth daily Take 30-60 minutes before a meal.    Gastroesophageal reflux disease without esophagitis       rosuvastatin 10 MG tablet    CRESTOR    90 tablet    TAKE 1 TABLET(10 MG) BY MOUTH DAILY    Mixed hyperlipidemia       TRANSDERM-SCOP (1.5 MG) 72 hr patch   Generic drug:  scopolamine           traZODone 50 MG tablet    DESYREL    90 tablet    Take 1 tablet (50 mg) by mouth At Bedtime    Other insomnia       valsartan 320 MG tablet    DIOVAN    90 tablet    Take 1 tablet (320 mg) by mouth daily    Benign essential hypertension       VITAMIN D (CHOLECALCIFEROL) PO      Take 5,000 Units by mouth daily

## 2018-05-15 NOTE — PROGRESS NOTES
[facility]  [address]  [facility] [address] [phone]  Dept: [phone]    PRE-OP EVALUATION:  Today's date: 5/15/2018    David Marino (: 1965) presents for pre-operative evaluation assessment as requested by Dr. Noe Suanders .  He requires evaluation and anesthesia risk assessment prior to undergoing surgery/procedure for treatment of Obesity .    Fax number for surgical facility: [facility]   Primary Physician: Jus Oswald  Type of Anesthesia Anticipated: General    Patient has a Health Care Directive or Living Will:  YES     Preop Questions 5/15/2018   Who is doing your surgery? [facility] - Dr. Saunders   What are you having done? bariatric   Date of Surgery/Procedure: 2018   Facility or Hospital where procedure/surgery will be performed: [facility]   1.  Do you have a history of Heart attack, stroke, stent, coronary bypass surgery, or other heart surgery? No   2.  Do you ever have any pain or discomfort in your chest? No   3.  Do you have a history of  Heart Failure? No   4.   Are you troubled by shortness of breath when:  walking on a level surface, or up a slight hill, or at night? No   5.  Do you currently have a cold, bronchitis or other respiratory infection? No   6.  Do you have a cough, shortness of breath, or wheezing? No   7.  Do you sometimes get pains in the calves of your legs when you walk? No   8. Do you or anyone in your family have previous history of blood clots? No   9.  Do you or does anyone in your family have a serious bleeding problem such as prolonged bleeding following surgeries or cuts? No   10. Have you ever had problems with anemia or been told to take iron pills? No   11. Have you had any abnormal blood loss such as black, tarry or bloody stools? No   12. Have you ever had a blood transfusion? No   13. Have you or any of your relatives ever had problems with anesthesia? No   14. Do you have sleep apnea,  excessive snoring or daytime drowsiness? YES -    15. Do you have any prosthetic heart valves? No   16. Do you have prosthetic joints? No         HPI:     HPI related to upcoming procedure: Patient has long-standing problem with difficult to control obesity, failing typical dietary measures as well as prescription drug management  He has comorbidities which are affected by the obesity  He is set up for a Yamilet-en-Y gastric bypass    ASTHMA - Patient has a longstanding history of moderate-severe Asthma . Patient has been doing well overall noting NO SYMPTOMS and continues on medication regimen consisting of Singulair without adverse reactions or side effects.                                                                                                                                               .  DEPRESSION - Patient has a long history of adjustment disorder with depressed mood of moderate severity requiring medication for control with recent symptoms being stable..Current symptoms of depression include none.                                                                                                                                                                                    .  SLEEP PROBLEM - Patient has a longstanding history of excessive daytime somnolence and has been treated for sleep apnea     he has hypertension which has been stable                                                                                                                                      .    MEDICAL HISTORY:     Patient Active Problem List    Diagnosis Date Noted     Mild intermittent asthma with acute exacerbation 03/14/2018     Priority: Medium     Class 2 obesity due to excess calories with serious comorbidity and body mass index (BMI) of 36.0 to 36.9 in adult 03/14/2018     Priority: Medium     ZARIA (obstructive sleep apnea) Severe AHI 51 12/08/2017     Priority: Medium     HST 12/6/2017 Severe       Obesity (BMI  30-39.9) 07/11/2017     Priority: Medium     Sleep disturbance 07/11/2017     Priority: Medium     Low back strain, initial encounter 02/13/2017     Priority: Medium     Adjustment disorder with depressed mood 05/06/2016     Priority: Medium     Rosacea 04/15/2015     Priority: Medium     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  Failed robinul, capsaicin.       Environmental allergies 02/04/2015     Priority: Medium     Mixed hyperlipidemia 02/04/2015     Priority: Medium     Hypertriglyceridemia 02/04/2015     Priority: Medium     Benign essential hypertension 02/04/2015     Priority: Medium     Insomnia 02/04/2015     Priority: Medium     Testosterone deficiency 02/04/2015     Priority: Medium     Adiposity 02/04/2015     Priority: Medium     Rib pain 02/04/2015     Priority: Medium     Gastroesophageal reflux disease 03/20/2013     Priority: Medium     Hiatal hernia 03/20/2013     Priority: Medium     Overview:   small       Gastric polyposis 03/20/2013     Priority: Medium      Past Medical History:   Diagnosis Date     Hypertension      ZARIA (obstructive sleep apnea) Severe AHI 51 12/8/2017    HST 12/6/2017 Severe     Past Surgical History:   Procedure Laterality Date     APPENDECTOMY       ARTHROSCOPY KNEE Right 09/28/2016    Procedure: Arthroscopic partial medial meniscectomy, right knee. Surgeon:  Keith Ayala MD  Location: [facility] [place]     AS REMOVAL OF TONSILS,<12 Y/O       C SYMPATHECTOMY,CERVICAL  2003     Current Outpatient Prescriptions   Medication Sig Dispense Refill     Calcium-Magnesium-Vitamin D (CALCIUM 500 PO)        doxycycline monohydrate 50 MG capsule Take 1 capsule (50 mg) by mouth 2 times daily 180 capsule 3     DULoxetine (CYMBALTA) 30 MG EC capsule Take 1 capsule (30 mg) by mouth 2 times daily 180 capsule 3     fluticasone (FLONASE) 50 MCG/ACT spray Spray 1-2 sprays into both nostrils daily 1 g 3      fluticasone-salmeterol (ADVAIR-HFA) 115-21 MCG/ACT Inhaler Inhale 2 puffs into the lungs 2 times daily 12 g 1     Glycopyrrolate POWD Compounded. Apply pea size amount every morning. 0.5% cream. For chromhidrosis, 30 gm jar 30 g 11     metroNIDAZOLE (METROGEL) 0.75 % topical gel Apply 1 g topically 2 times daily 60 g 11     montelukast (SINGULAIR) 10 MG tablet TAKE 1 TAB BY MOUTH AT BEDTIME  1     Multiple Vitamins-Minerals (MULTIVITAMIN ADULT PO)        Omega-3 Fatty Acids (FISH OIL OMEGA-3 PO)        omeprazole (PRILOSEC) 40 MG capsule Take 1 capsule (40 mg) by mouth daily Take 30-60 minutes before a meal. 90 capsule 3     Pseudoeph-Doxylamine-DM-APAP (NYQUIL PO)        rosuvastatin (CRESTOR) 10 MG tablet TAKE 1 TABLET(10 MG) BY MOUTH DAILY 90 tablet 3     TRANSDERM-SCOP, 1.5 MG, (1.5mg base/3day) patch   3     traZODone (DESYREL) 50 MG tablet Take 1 tablet (50 mg) by mouth At Bedtime 90 tablet 3     valsartan (DIOVAN) 320 MG tablet Take 1 tablet (320 mg) by mouth daily 90 tablet 3     VITAMIN D, CHOLECALCIFEROL, PO Take 5,000 Units by mouth daily       OTC products: None, except as noted above    Allergies   Allergen Reactions     Honey      Pollen Extract      Sulfa Drugs       Latex Allergy: NO    Social History   Substance Use Topics     Smoking status: Former Smoker     Smokeless tobacco: Never Used      Comment: smoker 30+ years ago     Alcohol use 0.0 oz/week     0 Standard drinks or equivalent per week      Comment: occ 1-2 x month     History   Drug Use No       REVIEW OF SYSTEMS:   CONSTITUTIONAL: NEGATIVE for fever, chills, change in weight  INTEGUMENTARY/SKIN: NEGATIVE for worrisome rashes, moles or lesions  EYES: NEGATIVE for vision changes or irritation  ENT/MOUTH: NEGATIVE for ear, mouth and throat problems  RESP: NEGATIVE for significant cough or SOB  BREAST: NEGATIVE for masses, tenderness or discharge  CV: NEGATIVE for chest pain, palpitations or peripheral edema  GI: NEGATIVE for nausea,  "abdominal pain, heartburn, or change in bowel habits  : NEGATIVE for frequency, dysuria, or hematuria  MUSCULOSKELETAL: NEGATIVE for significant arthralgias or myalgia  NEURO: NEGATIVE for weakness, dizziness or paresthesias  ENDOCRINE: NEGATIVE for temperature intolerance, skin/hair changes  HEME: NEGATIVE for bleeding problems  PSYCHIATRIC: NEGATIVE for changes in mood or affect    EXAM:   /76  Pulse 78  Temp 98.2  F (36.8  C) (Oral)  Resp 15  Ht 5' 10\" (1.778 m)  Wt 249 lb 8 oz (113.2 kg)  SpO2 96%  BMI 35.8 kg/m2    General Appearance: Pleasant, alert, in no acute respiratory distress.  Head Exam: Normal. Normocephalic, atraumatic. No sinus tenderness.  Eye Exam: Normal external eye, conjunctiva, lids. ZAN.  Ear Exam: Normal auditory canals and external ears. Non-tender.  Left TM-normal. Right TM-normal.  OroPharynx Exam: Dental hygiene adequate. Normal buccal mucosa. Normal pharynx.  Neck Exam: Supple, no masses or enlarged, tender nodes.  Thyroid Exam: No nodules or enlargement or tenderness.  Chest/Respiratory Exam: Normal, comfortable, easy respirations. Chest wall normal. Lungs are clear to auscultation. No wheezing, crackles, or rhonchi.  Cardiovascular Exam: Regular rate and rhythm. No murmur, gallop, or rubs. No pedal edema.  Gastrointestinal Exam: Soft, non-tender, no masses or organomegaly.  Musculoskeletal Exam: Back is non-tender, full ROM of upper and lower extremities.  Skin: no rash, warm and dry.    Neurologic Exam: Nonfocal, no tremor. Normal gait.  Psychiatric Exam: Alert - appropriate, normal affect      DIAGNOSTICS:   Symptoms at time of EKG: None   Rhythm: Normal sinus   Rate: bradycardia  Axis: Normal  Ectopy: None  Conduction: Normal  ST Segments/ T Waves: No ST-T wave changes and No acute ischemic changes  Q Waves: None  Comparison to prior: No old EKG available    Clinical Impression: normal EKG      Recent Labs   Lab Test  08/07/17   1435  09/13/16   0721   HGB  13.8   " --    PLT  172   --    NA  140  138   POTASSIUM  4.3  3.9   CR  1.15  1.00   A1C  5.6   --       Results for orders placed or performed in visit on 05/15/18   Comprehensive metabolic panel   Result Value Ref Range    Sodium 141 133 - 144 mmol/L    Potassium 4.2 3.4 - 5.3 mmol/L    Chloride 105 94 - 109 mmol/L    Carbon Dioxide 24 20 - 32 mmol/L    Anion Gap 12 3 - 14 mmol/L    Glucose 92 70 - 99 mg/dL    Urea Nitrogen 13 7 - 30 mg/dL    Creatinine 1.10 0.66 - 1.25 mg/dL    GFR Estimate 70 >60 mL/min/1.7m2    GFR Estimate If Black 85 >60 mL/min/1.7m2    Calcium 9.5 8.5 - 10.1 mg/dL    Bilirubin Total 0.8 0.2 - 1.3 mg/dL    Albumin 4.4 3.4 - 5.0 g/dL    Protein Total 7.1 6.8 - 8.8 g/dL    Alkaline Phosphatase 66 40 - 150 U/L    ALT 84 (H) 0 - 70 U/L    AST 42 0 - 45 U/L   CBC with platelets differential   Result Value Ref Range    WBC 5.7 4.0 - 11.0 10e9/L    RBC Count 5.00 4.4 - 5.9 10e12/L    Hemoglobin 14.4 13.3 - 17.7 g/dL    Hematocrit 42.2 40.0 - 53.0 %    MCV 84 78 - 100 fl    MCH 28.8 26.5 - 33.0 pg    MCHC 34.1 31.5 - 36.5 g/dL    RDW 13.7 10.0 - 15.0 %    Platelet Count 172 150 - 450 10e9/L    Diff Method Automated Method     % Neutrophils 54.9 %    % Lymphocytes 34.3 %    % Monocytes 7.2 %    % Eosinophils 2.4 %    % Basophils 1.2 %    Absolute Neutrophil 3.1 1.6 - 8.3 10e9/L    Absolute Lymphocytes 2.0 0.8 - 5.3 10e9/L    Absolute Monocytes 0.4 0.0 - 1.3 10e9/L    Absolute Eosinophils 0.1 0.0 - 0.7 10e9/L    Absolute Basophils 0.1 0.0 - 0.2 10e9/L       IMPRESSION:   Reason for surgery/procedure: morbid obesity  Diagnosis/reason for consult: Multiple medications, sleep apnea, hypertension    The proposed surgical procedure is considered INTERMEDIATE risk.    REVISED CARDIAC RISK INDEX  The patient has the following serious cardiovascular risks for perioperative complications such as (MI, PE, VFib and 3  AV Block):  No serious cardiac risks  INTERPRETATION: 1 risks: Class II (low risk - 0.9% complication  rate)    The patient has the following additional risks for perioperative complications:  No identified additional risks      ICD-10-CM    1. Preop general physical exam Z01.818    2. Class 2 obesity due to excess calories with serious comorbidity and body mass index (BMI) of 36.0 to 36.9 in adult E66.01     Z68.36    3. ZARIA (obstructive sleep apnea) Severe AHI 51 G47.33    4. Benign essential hypertension I10 EKG 12-lead complete w/read - Clinics     Comprehensive metabolic panel     CBC with platelets differential   5. Asthma, mild         RECOMMENDATIONS:     --Consult hospital rounder / IM to assist post-op medical management    Cardiovascular Risk  Performs 4 METs exercise without symptoms (Climb a flight of stairs) .       Pulmonary Risk  Incentive spirometry post op      Obstructive Sleep Apnea (or suspected sleep apnea)  Patient is to bring their home CPAP with them on the day of surgery      --Patient is to take all scheduled medications on the day of surgery EXCEPT for modifications listed below.    ACE Inhibitor or Angiotensin Receptor Blocker (ARB) Use  Ace inhibitor or Angiotensin Receptor Blocker (ARB) and will continue this medication      APPROVAL GIVEN to proceed with proposed procedure, without further diagnostic evaluation       Signed Electronically by: Jus Oswald MD    Copy of this evaluation report is provided to requesting physician.    [facility] Preop Guidelines    Revised Cardiac Risk Index

## 2018-05-15 NOTE — NURSING NOTE
"Chief Complaint   Patient presents with     Pre-Op Exam     Bariatric Surgery- 5/21/2018- [facility] Dr. Noe Saunders     /76  Pulse 78  Temp 98.2  F (36.8  C) (Oral)  Resp 15  Ht 5' 10\" (1.778 m)  Wt 249 lb 8 oz (113.2 kg)  SpO2 96%  BMI 35.8 kg/m2 Estimated body mass index is 35.8 kg/(m^2) as calculated from the following:    Height as of this encounter: 5' 10\" (1.778 m).    Weight as of this encounter: 249 lb 8 oz (113.2 kg).  Medication Reconciliation: complete        Health Maintenance Due Pending Provider Review:  NONE    n/a    Marley Mathews MA  [facility]  "

## 2018-05-16 LAB
ALBUMIN SERPL-MCNC: 4.4 G/DL (ref 3.4–5)
ALP SERPL-CCNC: 66 U/L (ref 40–150)
ALT SERPL W P-5'-P-CCNC: 84 U/L (ref 0–70)
ANION GAP SERPL CALCULATED.3IONS-SCNC: 12 MMOL/L (ref 3–14)
AST SERPL W P-5'-P-CCNC: 42 U/L (ref 0–45)
BILIRUB SERPL-MCNC: 0.8 MG/DL (ref 0.2–1.3)
BUN SERPL-MCNC: 13 MG/DL (ref 7–30)
CALCIUM SERPL-MCNC: 9.5 MG/DL (ref 8.5–10.1)
CHLORIDE SERPL-SCNC: 105 MMOL/L (ref 94–109)
CO2 SERPL-SCNC: 24 MMOL/L (ref 20–32)
CREAT SERPL-MCNC: 1.1 MG/DL (ref 0.66–1.25)
GFR SERPL CREATININE-BSD FRML MDRD: 70 ML/MIN/1.7M2
GLUCOSE SERPL-MCNC: 92 MG/DL (ref 70–99)
POTASSIUM SERPL-SCNC: 4.2 MMOL/L (ref 3.4–5.3)
PROT SERPL-MCNC: 7.1 G/DL (ref 6.8–8.8)
SODIUM SERPL-SCNC: 141 MMOL/L (ref 133–144)

## 2018-05-17 ENCOUNTER — ALLIED HEALTH/NURSE VISIT (OUTPATIENT)
Dept: SURGERY | Facility: CLINIC | Age: 53
End: 2018-05-17
Payer: COMMERCIAL

## 2018-05-17 DIAGNOSIS — E66.9 OBESITY (BMI 35.0-39.9 WITHOUT COMORBIDITY): Primary | ICD-10-CM

## 2018-05-17 PROCEDURE — 99207 ZZC NO CHARGE NURSE ONLY: CPT

## 2018-05-17 RX ORDER — FLUTICASONE PROPIONATE AND SALMETEROL XINAFOATE 115; 21 UG/1; UG/1
AEROSOL, METERED RESPIRATORY (INHALATION)
Qty: 12 INHALER | Refills: 1 | Status: ON HOLD | OUTPATIENT
Start: 2018-05-17 | End: 2018-05-21

## 2018-05-17 NOTE — PROGRESS NOTES
Bariatric pre op class completed.  Class power point and patient checklist information gone over with patient.  Patient verbalized understanding of tasks needed to complete before surgery.  Patient also verbalized understanding of the power point and patient checklist information.  All questions were answered.  Quiz was completed.  Patient was advised to call if further questions.  Dinora Medrano, MS, RD, RN

## 2018-05-17 NOTE — MR AVS SNAPSHOT
After Visit Summary   5/17/2018    David Marino    MRN: [id number]           Patient Information     Date Of Birth          1965        Visit Information        Provider Department      5/17/2018 9:15 AM Rn, Saranya Schroeder, RN [facility]      Today's Diagnoses     Obesity (BMI 35.0-39.9 without comorbidity)    -  1       Follow-ups after your visit        Your next 10 appointments already scheduled     May 21, 2018  7:30 AM CDT   [facility] OR with Noe Saunders MD   [facility] ([facility])    [facility] ([facility])   [phone]            May 21, 2018   Procedure with Noe Saunders MD   [facility] PeriOP Services (--)    [address] [place], [place] [address]  [facility] [address] [phone]            May 29, 2018 11:00 AM CDT   Post Op with Saranya Schroeder Rn, RN   [facility])    [address] [place] [address]   [phone]            May 29, 2018 11:20 AM CDT   Post Op with Cosme Foote PA-C   [facility] ([facility])    [address] [place] [id number] [phone]            Jun 04, 2018 10:00 AM CDT   Post Op with Saranya Schroeder Rn, RN   [facility] ([facility])    [address] [place] [id number] [phone]            Jun 22, 2018  9:30 AM CDT   Post Op with Saranya Schroeder Diet 1, RD   [facility] ([facility])    [address] [place] [address] [phone]              Who to contact     If you have questions or need follow up information about today's clinic visit or your  schedule please contact [facility] directly at [phone].  Normal or non-critical lab and imaging results will be communicated to you by MyChart, letter or phone within 4 business days after the clinic has received the results. If you do not hear from us within 7 days, please contact the clinic through [employer] or phone. If you have a critical or abnormal lab result, we will notify you by phone as soon as possible.  Submit refill requests through [employer] or call your pharmacy and they will forward the refill request to us. Please allow 3 business days for your refill to be completed.          Additional Information About Your Visit        [employer] Information     [employer] gives you secure access to your electronic health record. If you see a primary care provider, you can also send messages to your care team and make appointments. If you have questions, please call your primary care clinic.  If you do not have a primary care provider, please call [phone] and they will assist you.        Care EveryWhere ID     This is your Care EveryWhere ID. This could be used by other organizations to access your [place] medical records  [id number]         Blood Pressure from Last 3 Encounters:   05/15/18 126/76   05/01/18 100/70   04/11/18 128/87    Weight from Last 3 Encounters:   05/15/18 249 lb 8 oz (113.2 kg)   05/01/18 243 lb 1.6 oz (110.3 kg)   04/20/18 246 lb (111.6 kg)              Today, you had the following     No orders found for display       Primary Care Provider Office Phone # Fax #    Jus Woodrow Oswald [phone] [address] [facility] [address]        Equal Access to Services     [facility]: Hadii jaye palma [place], waaxda luqadaha, qaybta kaalmada sarina, lara danielson . So [facility] [phone].    ATENCIÓN: Si habla español, tiene a obrien disposición servicios gratuitos de asistencia lingüística.  Karen garza [phone].    We comply with applicable federal civil rights laws and [place] laws. We do not discriminate on the basis of race, color, national origin, age, disability, sex, sexual orientation, or gender identity.            Thank you!     Thank you for choosing [facility]  for your care. Our goal is always to provide you with excellent care. Hearing back from our patients is one way we can continue to improve our services. Please take a few minutes to complete the written survey that you may receive in the mail after your visit with us. Thank you!             Your Updated Medication List - Protect others around you: Learn how to safely use, store and throw away your medicines at www.disposemymeds.org.          This list is accurate as of 5/17/18  1:48 PM.  Always use your most recent med list.                   Brand Name Dispense Instructions for use Diagnosis    CALCIUM 500 PO           doxycycline monohydrate 50 MG capsule     180 capsule    Take 1 capsule (50 mg) by mouth 2 times daily    Rosacea       DULoxetine 30 MG EC capsule    CYMBALTA    180 capsule    Take 1 capsule (30 mg) by mouth 2 times daily    Adjustment disorder with depressed mood       FISH OIL OMEGA-3 PO           fluticasone 50 MCG/ACT spray    FLONASE    1 g    Spray 1-2 sprays into both nostrils daily    Chronic seasonal allergic rhinitis due to pollen       fluticasone-salmeterol 115-21 MCG/ACT Inhaler    ADVAIR-HFA    12 g    Inhale 2 puffs into the lungs 2 times daily    Mild intermittent asthma with acute exacerbation, Acute bronchospasm       Glycopyrrolate Powd     30 g    Compounded. Apply pea size amount every morning. 0.5% cream. For chromhidrosis, 30 gm jar    Chromhidrosis       metroNIDAZOLE 0.75 % topical gel    METROGEL    60 g    Apply 1 g topically 2 times daily    Rosacea       montelukast 10 MG tablet    SINGULAIR     TAKE 1 TAB BY MOUTH AT BEDTIME        MULTIVITAMIN ADULT  PO           NYQUIL PO           omeprazole 40 MG capsule    priLOSEC    90 capsule    Take 1 capsule (40 mg) by mouth daily Take 30-60 minutes before a meal.    Gastroesophageal reflux disease without esophagitis       rosuvastatin 10 MG tablet    CRESTOR    90 tablet    TAKE 1 TABLET(10 MG) BY MOUTH DAILY    Mixed hyperlipidemia       TRANSDERM-SCOP (1.5 MG) 72 hr patch   Generic drug:  scopolamine           traZODone 50 MG tablet    DESYREL    90 tablet    Take 1 tablet (50 mg) by mouth At Bedtime    Other insomnia       valsartan 320 MG tablet    DIOVAN    90 tablet    Take 1 tablet (320 mg) by mouth daily    Benign essential hypertension       VITAMIN D (CHOLECALCIFEROL) PO      Take 5,000 Units by mouth daily

## 2018-05-18 NOTE — H&P (VIEW-ONLY)
[facility]  [address] [place]  [facility] [address] [phone]  Dept: [phone]    PRE-OP EVALUATION:  Today's date: 5/15/2018    David Marino (: 1965) presents for pre-operative evaluation assessment as requested by Dr. Noe Saunders .  He requires evaluation and anesthesia risk assessment prior to undergoing surgery/procedure for treatment of Obesity .    Fax number for surgical facility: [facility]   Primary Physician: Jus Oswald  Type of Anesthesia Anticipated: General    Patient has a Health Care Directive or Living Will:  YES     Preop Questions 5/15/2018   Who is doing your surgery? [facility] - Dr. Saunders   What are you having done? bariatric   Date of Surgery/Procedure: 2018   Facility or Hospital where procedure/surgery will be performed: [facility]   1.  Do you have a history of Heart attack, stroke, stent, coronary bypass surgery, or other heart surgery? No   2.  Do you ever have any pain or discomfort in your chest? No   3.  Do you have a history of  Heart Failure? No   4.   Are you troubled by shortness of breath when:  walking on a level surface, or up a slight hill, or at night? No   5.  Do you currently have a cold, bronchitis or other respiratory infection? No   6.  Do you have a cough, shortness of breath, or wheezing? No   7.  Do you sometimes get pains in the calves of your legs when you walk? No   8. Do you or anyone in your family have previous history of blood clots? No   9.  Do you or does anyone in your family have a serious bleeding problem such as prolonged bleeding following surgeries or cuts? No   10. Have you ever had problems with anemia or been told to take iron pills? No   11. Have you had any abnormal blood loss such as black, tarry or bloody stools? No   12. Have you ever had a blood transfusion? No   13. Have you or any of your relatives ever had problems with anesthesia? No   14. Do you have sleep apnea,  excessive snoring or daytime drowsiness? YES -    15. Do you have any prosthetic heart valves? No   16. Do you have prosthetic joints? No         HPI:     HPI related to upcoming procedure: Patient has long-standing problem with difficult to control obesity, failing typical dietary measures as well as prescription drug management  He has comorbidities which are affected by the obesity  He is set up for a Yamilet-en-Y gastric bypass    ASTHMA - Patient has a longstanding history of moderate-severe Asthma . Patient has been doing well overall noting NO SYMPTOMS and continues on medication regimen consisting of Singulair without adverse reactions or side effects.                                                                                                                                               .  DEPRESSION - Patient has a long history of adjustment disorder with depressed mood of moderate severity requiring medication for control with recent symptoms being stable..Current symptoms of depression include none.                                                                                                                                                                                    .  SLEEP PROBLEM - Patient has a longstanding history of excessive daytime somnolence and has been treated for sleep apnea     he has hypertension which has been stable                                                                                                                                      .    MEDICAL HISTORY:     Patient Active Problem List    Diagnosis Date Noted     Mild intermittent asthma with acute exacerbation 03/14/2018     Priority: Medium     Class 2 obesity due to excess calories with serious comorbidity and body mass index (BMI) of 36.0 to 36.9 in adult 03/14/2018     Priority: Medium     ZARIA (obstructive sleep apnea) Severe AHI 51 12/08/2017     Priority: Medium     HST 12/6/2017 Severe       Obesity (BMI  30-39.9) 07/11/2017     Priority: Medium     Sleep disturbance 07/11/2017     Priority: Medium     Low back strain, initial encounter 02/13/2017     Priority: Medium     Adjustment disorder with depressed mood 05/06/2016     Priority: Medium     Rosacea 04/15/2015     Priority: Medium     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  Failed robinul, capsaicin.       Environmental allergies 02/04/2015     Priority: Medium     Mixed hyperlipidemia 02/04/2015     Priority: Medium     Hypertriglyceridemia 02/04/2015     Priority: Medium     Benign essential hypertension 02/04/2015     Priority: Medium     Insomnia 02/04/2015     Priority: Medium     Testosterone deficiency 02/04/2015     Priority: Medium     Adiposity 02/04/2015     Priority: Medium     Rib pain 02/04/2015     Priority: Medium     Gastroesophageal reflux disease 03/20/2013     Priority: Medium     Hiatal hernia 03/20/2013     Priority: Medium     Overview:   small       Gastric polyposis 03/20/2013     Priority: Medium      Past Medical History:   Diagnosis Date     Hypertension      ZARIA (obstructive sleep apnea) Severe AHI 51 12/8/2017    HST 12/6/2017 Severe     Past Surgical History:   Procedure Laterality Date     APPENDECTOMY       ARTHROSCOPY KNEE Right 09/28/2016    Procedure: Arthroscopic partial medial meniscectomy, right knee. Surgeon:  Keith Ayala MD  Location: [facility]     AS REMOVAL OF TONSILS,<14 Y/O       C SYMPATHECTOMY,CERVICAL  2003     Current Outpatient Prescriptions   Medication Sig Dispense Refill     Calcium-Magnesium-Vitamin D (CALCIUM 500 PO)        doxycycline monohydrate 50 MG capsule Take 1 capsule (50 mg) by mouth 2 times daily 180 capsule 3     DULoxetine (CYMBALTA) 30 MG EC capsule Take 1 capsule (30 mg) by mouth 2 times daily 180 capsule 3     fluticasone (FLONASE) 50 MCG/ACT spray Spray 1-2 sprays into both nostrils daily 1 g 3      fluticasone-salmeterol (ADVAIR-HFA) 115-21 MCG/ACT Inhaler Inhale 2 puffs into the lungs 2 times daily 12 g 1     Glycopyrrolate POWD Compounded. Apply pea size amount every morning. 0.5% cream. For chromhidrosis, 30 gm jar 30 g 11     metroNIDAZOLE (METROGEL) 0.75 % topical gel Apply 1 g topically 2 times daily 60 g 11     montelukast (SINGULAIR) 10 MG tablet TAKE 1 TAB BY MOUTH AT BEDTIME  1     Multiple Vitamins-Minerals (MULTIVITAMIN ADULT PO)        Omega-3 Fatty Acids (FISH OIL OMEGA-3 PO)        omeprazole (PRILOSEC) 40 MG capsule Take 1 capsule (40 mg) by mouth daily Take 30-60 minutes before a meal. 90 capsule 3     Pseudoeph-Doxylamine-DM-APAP (NYQUIL PO)        rosuvastatin (CRESTOR) 10 MG tablet TAKE 1 TABLET(10 MG) BY MOUTH DAILY 90 tablet 3     TRANSDERM-SCOP, 1.5 MG, (1.5mg base/3day) patch   3     traZODone (DESYREL) 50 MG tablet Take 1 tablet (50 mg) by mouth At Bedtime 90 tablet 3     valsartan (DIOVAN) 320 MG tablet Take 1 tablet (320 mg) by mouth daily 90 tablet 3     VITAMIN D, CHOLECALCIFEROL, PO Take 5,000 Units by mouth daily       OTC products: None, except as noted above    Allergies   Allergen Reactions     Honey      Pollen Extract      Sulfa Drugs       Latex Allergy: NO    Social History   Substance Use Topics     Smoking status: Former Smoker     Smokeless tobacco: Never Used      Comment: smoker 30+ years ago     Alcohol use 0.0 oz/week     0 Standard drinks or equivalent per week      Comment: occ 1-2 x month     History   Drug Use No       REVIEW OF SYSTEMS:   CONSTITUTIONAL: NEGATIVE for fever, chills, change in weight  INTEGUMENTARY/SKIN: NEGATIVE for worrisome rashes, moles or lesions  EYES: NEGATIVE for vision changes or irritation  ENT/MOUTH: NEGATIVE for ear, mouth and throat problems  RESP: NEGATIVE for significant cough or SOB  BREAST: NEGATIVE for masses, tenderness or discharge  CV: NEGATIVE for chest pain, palpitations or peripheral edema  GI: NEGATIVE for nausea,  "abdominal pain, heartburn, or change in bowel habits  : NEGATIVE for frequency, dysuria, or hematuria  MUSCULOSKELETAL: NEGATIVE for significant arthralgias or myalgia  NEURO: NEGATIVE for weakness, dizziness or paresthesias  ENDOCRINE: NEGATIVE for temperature intolerance, skin/hair changes  HEME: NEGATIVE for bleeding problems  PSYCHIATRIC: NEGATIVE for changes in mood or affect    EXAM:   /76  Pulse 78  Temp 98.2  F (36.8  C) (Oral)  Resp 15  Ht 5' 10\" (1.778 m)  Wt 249 lb 8 oz (113.2 kg)  SpO2 96%  BMI 35.8 kg/m2    General Appearance: Pleasant, alert, in no acute respiratory distress.  Head Exam: Normal. Normocephalic, atraumatic. No sinus tenderness.  Eye Exam: Normal external eye, conjunctiva, lids. ZAN.  Ear Exam: Normal auditory canals and external ears. Non-tender.  Left TM-normal. Right TM-normal.  OroPharynx Exam: Dental hygiene adequate. Normal buccal mucosa. Normal pharynx.  Neck Exam: Supple, no masses or enlarged, tender nodes.  Thyroid Exam: No nodules or enlargement or tenderness.  Chest/Respiratory Exam: Normal, comfortable, easy respirations. Chest wall normal. Lungs are clear to auscultation. No wheezing, crackles, or rhonchi.  Cardiovascular Exam: Regular rate and rhythm. No murmur, gallop, or rubs. No pedal edema.  Gastrointestinal Exam: Soft, non-tender, no masses or organomegaly.  Musculoskeletal Exam: Back is non-tender, full ROM of upper and lower extremities.  Skin: no rash, warm and dry.    Neurologic Exam: Nonfocal, no tremor. Normal gait.  Psychiatric Exam: Alert - appropriate, normal affect      DIAGNOSTICS:   Symptoms at time of EKG: None   Rhythm: Normal sinus   Rate: bradycardia  Axis: Normal  Ectopy: None  Conduction: Normal  ST Segments/ T Waves: No ST-T wave changes and No acute ischemic changes  Q Waves: None  Comparison to prior: No old EKG available    Clinical Impression: normal EKG      Recent Labs   Lab Test  08/07/17   1435  09/13/16   0721   HGB  13.8   " --    PLT  172   --    NA  140  138   POTASSIUM  4.3  3.9   CR  1.15  1.00   A1C  5.6   --       Results for orders placed or performed in visit on 05/15/18   Comprehensive metabolic panel   Result Value Ref Range    Sodium 141 133 - 144 mmol/L    Potassium 4.2 3.4 - 5.3 mmol/L    Chloride 105 94 - 109 mmol/L    Carbon Dioxide 24 20 - 32 mmol/L    Anion Gap 12 3 - 14 mmol/L    Glucose 92 70 - 99 mg/dL    Urea Nitrogen 13 7 - 30 mg/dL    Creatinine 1.10 0.66 - 1.25 mg/dL    GFR Estimate 70 >60 mL/min/1.7m2    GFR Estimate If Black 85 >60 mL/min/1.7m2    Calcium 9.5 8.5 - 10.1 mg/dL    Bilirubin Total 0.8 0.2 - 1.3 mg/dL    Albumin 4.4 3.4 - 5.0 g/dL    Protein Total 7.1 6.8 - 8.8 g/dL    Alkaline Phosphatase 66 40 - 150 U/L    ALT 84 (H) 0 - 70 U/L    AST 42 0 - 45 U/L   CBC with platelets differential   Result Value Ref Range    WBC 5.7 4.0 - 11.0 10e9/L    RBC Count 5.00 4.4 - 5.9 10e12/L    Hemoglobin 14.4 13.3 - 17.7 g/dL    Hematocrit 42.2 40.0 - 53.0 %    MCV 84 78 - 100 fl    MCH 28.8 26.5 - 33.0 pg    MCHC 34.1 31.5 - 36.5 g/dL    RDW 13.7 10.0 - 15.0 %    Platelet Count 172 150 - 450 10e9/L    Diff Method Automated Method     % Neutrophils 54.9 %    % Lymphocytes 34.3 %    % Monocytes 7.2 %    % Eosinophils 2.4 %    % Basophils 1.2 %    Absolute Neutrophil 3.1 1.6 - 8.3 10e9/L    Absolute Lymphocytes 2.0 0.8 - 5.3 10e9/L    Absolute Monocytes 0.4 0.0 - 1.3 10e9/L    Absolute Eosinophils 0.1 0.0 - 0.7 10e9/L    Absolute Basophils 0.1 0.0 - 0.2 10e9/L       IMPRESSION:   Reason for surgery/procedure: morbid obesity  Diagnosis/reason for consult: Multiple medications, sleep apnea, hypertension    The proposed surgical procedure is considered INTERMEDIATE risk.    REVISED CARDIAC RISK INDEX  The patient has the following serious cardiovascular risks for perioperative complications such as (MI, PE, VFib and 3  AV Block):  No serious cardiac risks  INTERPRETATION: 1 risks: Class II (low risk - 0.9% complication  rate)    The patient has the following additional risks for perioperative complications:  No identified additional risks      ICD-10-CM    1. Preop general physical exam Z01.818    2. Class 2 obesity due to excess calories with serious comorbidity and body mass index (BMI) of 36.0 to 36.9 in adult E66.01     Z68.36    3. ZARIA (obstructive sleep apnea) Severe AHI 51 G47.33    4. Benign essential hypertension I10 EKG 12-lead complete w/read - Clinics     Comprehensive metabolic panel     CBC with platelets differential   5. Asthma, mild         RECOMMENDATIONS:     --Consult hospital rounder / IM to assist post-op medical management    Cardiovascular Risk  Performs 4 METs exercise without symptoms (Climb a flight of stairs) .       Pulmonary Risk  Incentive spirometry post op      Obstructive Sleep Apnea (or suspected sleep apnea)  Patient is to bring their home CPAP with them on the day of surgery      --Patient is to take all scheduled medications on the day of surgery EXCEPT for modifications listed below.    ACE Inhibitor or Angiotensin Receptor Blocker (ARB) Use  Ace inhibitor or Angiotensin Receptor Blocker (ARB) and will continue this medication      APPROVAL GIVEN to proceed with proposed procedure, without further diagnostic evaluation       Signed Electronically by: Jus Oswald MD    Copy of this evaluation report is provided to requesting physician.    [place] Preop Guidelines    Revised Cardiac Risk Index

## 2018-05-21 ENCOUNTER — APPOINTMENT (OUTPATIENT)
Dept: SURGERY | Facility: PHYSICIAN GROUP | Age: 53
End: 2018-05-21
Payer: COMMERCIAL

## 2018-05-21 ENCOUNTER — HOSPITAL ENCOUNTER (INPATIENT)
Facility: CLINIC | Age: 53
LOS: 2 days | Discharge: HOME | End: 2018-05-23
Attending: SURGERY | Admitting: SURGERY
Payer: COMMERCIAL

## 2018-05-21 ENCOUNTER — SURGERY (OUTPATIENT)
Age: 53
End: 2018-05-21

## 2018-05-21 ENCOUNTER — ANESTHESIA EVENT (OUTPATIENT)
Dept: SURGERY | Facility: CLINIC | Age: 53
End: 2018-05-21
Payer: COMMERCIAL

## 2018-05-21 ENCOUNTER — ANESTHESIA (OUTPATIENT)
Dept: SURGERY | Facility: CLINIC | Age: 53
End: 2018-05-21
Payer: COMMERCIAL

## 2018-05-21 DIAGNOSIS — E66.01 MORBID OBESITY (H): ICD-10-CM

## 2018-05-21 DIAGNOSIS — G89.18 POST-OP PAIN: Primary | ICD-10-CM

## 2018-05-21 LAB
CREAT SERPL-MCNC: 1.03 MG/DL (ref 0.66–1.25)
GFR SERPL CREATININE-BSD FRML MDRD: 76 ML/MIN/1.7M2
PLATELET # BLD AUTO: 157 10E9/L (ref 150–450)
POTASSIUM SERPL-SCNC: 4 MMOL/L (ref 3.4–5.3)

## 2018-05-21 PROCEDURE — 25000125 ZZHC RX 250: Performed by: NURSE ANESTHETIST, CERTIFIED REGISTERED

## 2018-05-21 PROCEDURE — 25000128 H RX IP 250 OP 636: Performed by: ANESTHESIOLOGY

## 2018-05-21 PROCEDURE — 12000007 ZZH R&B INTERMEDIATE

## 2018-05-21 PROCEDURE — 25000125 ZZHC RX 250: Performed by: PHYSICIAN ASSISTANT

## 2018-05-21 PROCEDURE — 40000169 ZZH STATISTIC PRE-PROCEDURE ASSESSMENT I: Performed by: SURGERY

## 2018-05-21 PROCEDURE — 25000128 H RX IP 250 OP 636: Performed by: SURGERY

## 2018-05-21 PROCEDURE — 85049 AUTOMATED PLATELET COUNT: CPT | Performed by: PHYSICIAN ASSISTANT

## 2018-05-21 PROCEDURE — 37000008 ZZH ANESTHESIA TECHNICAL FEE, 1ST 30 MIN: Performed by: SURGERY

## 2018-05-21 PROCEDURE — 25000566 ZZH SEVOFLURANE, EA 15 MIN: Performed by: SURGERY

## 2018-05-21 PROCEDURE — 0D164ZA BYPASS STOMACH TO JEJUNUM, PERCUTANEOUS ENDOSCOPIC APPROACH: ICD-10-PCS | Performed by: SURGERY

## 2018-05-21 PROCEDURE — 36415 COLL VENOUS BLD VENIPUNCTURE: CPT | Performed by: PHYSICIAN ASSISTANT

## 2018-05-21 PROCEDURE — 71000012 ZZH RECOVERY PHASE 1 LEVEL 1 FIRST HR: Performed by: SURGERY

## 2018-05-21 PROCEDURE — 37000009 ZZH ANESTHESIA TECHNICAL FEE, EACH ADDTL 15 MIN: Performed by: SURGERY

## 2018-05-21 PROCEDURE — 36000063 ZZH SURGERY LEVEL 4 EA 15 ADDTL MIN: Performed by: SURGERY

## 2018-05-21 PROCEDURE — 84132 ASSAY OF SERUM POTASSIUM: CPT | Performed by: ANESTHESIOLOGY

## 2018-05-21 PROCEDURE — 25000128 H RX IP 250 OP 636: Performed by: PHYSICIAN ASSISTANT

## 2018-05-21 PROCEDURE — 82565 ASSAY OF CREATININE: CPT | Performed by: PHYSICIAN ASSISTANT

## 2018-05-21 PROCEDURE — 0BQT4ZZ REPAIR DIAPHRAGM, PERCUTANEOUS ENDOSCOPIC APPROACH: ICD-10-PCS | Performed by: SURGERY

## 2018-05-21 PROCEDURE — 36000093 ZZH SURGERY LEVEL 4 1ST 30 MIN: Performed by: SURGERY

## 2018-05-21 PROCEDURE — 25000125 ZZHC RX 250: Performed by: ANESTHESIOLOGY

## 2018-05-21 PROCEDURE — 43644 LAP GASTRIC BYPASS/ROUX-EN-Y: CPT | Performed by: SURGERY

## 2018-05-21 PROCEDURE — 43644 LAP GASTRIC BYPASS/ROUX-EN-Y: CPT | Mod: AS | Performed by: PHYSICIAN ASSISTANT

## 2018-05-21 PROCEDURE — 25800025 ZZH RX 258: Performed by: SURGERY

## 2018-05-21 PROCEDURE — 25000125 ZZHC RX 250: Performed by: SURGERY

## 2018-05-21 PROCEDURE — 25000128 H RX IP 250 OP 636: Performed by: NURSE ANESTHETIST, CERTIFIED REGISTERED

## 2018-05-21 PROCEDURE — 36415 COLL VENOUS BLD VENIPUNCTURE: CPT | Performed by: ANESTHESIOLOGY

## 2018-05-21 PROCEDURE — 27210794 ZZH OR GENERAL SUPPLY STERILE: Performed by: SURGERY

## 2018-05-21 RX ORDER — LIDOCAINE 40 MG/G
CREAM TOPICAL
Status: DISCONTINUED | OUTPATIENT
Start: 2018-05-21 | End: 2018-05-23 | Stop reason: HOSPADM

## 2018-05-21 RX ORDER — CEFAZOLIN SODIUM 2 G/100ML
2 INJECTION, SOLUTION INTRAVENOUS
Status: COMPLETED | OUTPATIENT
Start: 2018-05-21 | End: 2018-05-21

## 2018-05-21 RX ORDER — VALSARTAN 160 MG/1
320 TABLET ORAL DAILY
Status: DISCONTINUED | OUTPATIENT
Start: 2018-05-22 | End: 2018-05-23 | Stop reason: HOSPADM

## 2018-05-21 RX ORDER — DIPHENHYDRAMINE HCL 25 MG
25 CAPSULE ORAL EVERY 6 HOURS PRN
Status: DISCONTINUED | OUTPATIENT
Start: 2018-05-21 | End: 2018-05-23 | Stop reason: HOSPADM

## 2018-05-21 RX ORDER — ONDANSETRON 2 MG/ML
4 INJECTION INTRAMUSCULAR; INTRAVENOUS EVERY 6 HOURS PRN
Status: DISCONTINUED | OUTPATIENT
Start: 2018-05-21 | End: 2018-05-23 | Stop reason: HOSPADM

## 2018-05-21 RX ORDER — LABETALOL HYDROCHLORIDE 5 MG/ML
10 INJECTION, SOLUTION INTRAVENOUS
Status: DISCONTINUED | OUTPATIENT
Start: 2018-05-21 | End: 2018-05-21 | Stop reason: HOSPADM

## 2018-05-21 RX ORDER — NALOXONE HYDROCHLORIDE 0.4 MG/ML
.1-.4 INJECTION, SOLUTION INTRAMUSCULAR; INTRAVENOUS; SUBCUTANEOUS
Status: DISCONTINUED | OUTPATIENT
Start: 2018-05-21 | End: 2018-05-21

## 2018-05-21 RX ORDER — SCOLOPAMINE TRANSDERMAL SYSTEM 1 MG/1
1 PATCH, EXTENDED RELEASE TRANSDERMAL
Status: DISCONTINUED | OUTPATIENT
Start: 2018-05-21 | End: 2018-05-21 | Stop reason: HOSPADM

## 2018-05-21 RX ORDER — NALOXONE HYDROCHLORIDE 0.4 MG/ML
.1-.4 INJECTION, SOLUTION INTRAMUSCULAR; INTRAVENOUS; SUBCUTANEOUS
Status: DISCONTINUED | OUTPATIENT
Start: 2018-05-21 | End: 2018-05-23 | Stop reason: HOSPADM

## 2018-05-21 RX ORDER — MONTELUKAST SODIUM 10 MG/1
10 TABLET ORAL AT BEDTIME
Status: DISCONTINUED | OUTPATIENT
Start: 2018-05-22 | End: 2018-05-23 | Stop reason: HOSPADM

## 2018-05-21 RX ORDER — SCOLOPAMINE TRANSDERMAL SYSTEM 1 MG/1
1 PATCH, EXTENDED RELEASE TRANSDERMAL
Status: DISCONTINUED | OUTPATIENT
Start: 2018-05-21 | End: 2018-05-23 | Stop reason: HOSPADM

## 2018-05-21 RX ORDER — SODIUM CHLORIDE, SODIUM LACTATE, POTASSIUM CHLORIDE, CALCIUM CHLORIDE 600; 310; 30; 20 MG/100ML; MG/100ML; MG/100ML; MG/100ML
INJECTION, SOLUTION INTRAVENOUS CONTINUOUS
Status: DISCONTINUED | OUTPATIENT
Start: 2018-05-21 | End: 2018-05-21 | Stop reason: HOSPADM

## 2018-05-21 RX ORDER — EPHEDRINE SULFATE 50 MG/ML
INJECTION, SOLUTION INTRAMUSCULAR; INTRAVENOUS; SUBCUTANEOUS PRN
Status: DISCONTINUED | OUTPATIENT
Start: 2018-05-21 | End: 2018-05-21

## 2018-05-21 RX ORDER — SODIUM CHLORIDE, SODIUM LACTATE, POTASSIUM CHLORIDE, CALCIUM CHLORIDE 600; 310; 30; 20 MG/100ML; MG/100ML; MG/100ML; MG/100ML
INJECTION, SOLUTION INTRAVENOUS CONTINUOUS
Status: DISCONTINUED | OUTPATIENT
Start: 2018-05-21 | End: 2018-05-23 | Stop reason: HOSPADM

## 2018-05-21 RX ORDER — ONDANSETRON 4 MG/1
4 TABLET, ORALLY DISINTEGRATING ORAL EVERY 6 HOURS PRN
Status: DISCONTINUED | OUTPATIENT
Start: 2018-05-21 | End: 2018-05-23 | Stop reason: HOSPADM

## 2018-05-21 RX ORDER — KETOROLAC TROMETHAMINE 30 MG/ML
30 INJECTION, SOLUTION INTRAMUSCULAR; INTRAVENOUS EVERY 6 HOURS
Status: DISPENSED | OUTPATIENT
Start: 2018-05-21 | End: 2018-05-22

## 2018-05-21 RX ORDER — HEPARIN SODIUM 5000 [USP'U]/.5ML
5000 INJECTION, SOLUTION INTRAVENOUS; SUBCUTANEOUS
Status: COMPLETED | OUTPATIENT
Start: 2018-05-21 | End: 2018-05-21

## 2018-05-21 RX ORDER — OXYCODONE HYDROCHLORIDE 5 MG/1
5-10 TABLET ORAL
Status: DISCONTINUED | OUTPATIENT
Start: 2018-05-22 | End: 2018-05-23 | Stop reason: HOSPADM

## 2018-05-21 RX ORDER — DIPHENHYDRAMINE HYDROCHLORIDE 50 MG/ML
25 INJECTION INTRAMUSCULAR; INTRAVENOUS EVERY 6 HOURS PRN
Status: DISCONTINUED | OUTPATIENT
Start: 2018-05-21 | End: 2018-05-23 | Stop reason: HOSPADM

## 2018-05-21 RX ORDER — ONDANSETRON 2 MG/ML
INJECTION INTRAMUSCULAR; INTRAVENOUS PRN
Status: DISCONTINUED | OUTPATIENT
Start: 2018-05-21 | End: 2018-05-21

## 2018-05-21 RX ORDER — ONDANSETRON 4 MG/1
4 TABLET, ORALLY DISINTEGRATING ORAL EVERY 30 MIN PRN
Status: DISCONTINUED | OUTPATIENT
Start: 2018-05-21 | End: 2018-05-21 | Stop reason: HOSPADM

## 2018-05-21 RX ORDER — NEOSTIGMINE METHYLSULFATE 1 MG/ML
VIAL (ML) INJECTION PRN
Status: DISCONTINUED | OUTPATIENT
Start: 2018-05-21 | End: 2018-05-21

## 2018-05-21 RX ORDER — LIDOCAINE HYDROCHLORIDE 20 MG/ML
INJECTION, SOLUTION INFILTRATION; PERINEURAL PRN
Status: DISCONTINUED | OUTPATIENT
Start: 2018-05-21 | End: 2018-05-21

## 2018-05-21 RX ORDER — CEFAZOLIN SODIUM 2 G/100ML
2 INJECTION, SOLUTION INTRAVENOUS EVERY 8 HOURS
Status: COMPLETED | OUTPATIENT
Start: 2018-05-21 | End: 2018-05-22

## 2018-05-21 RX ORDER — HYDROMORPHONE HYDROCHLORIDE 1 MG/ML
.3-.5 INJECTION, SOLUTION INTRAMUSCULAR; INTRAVENOUS; SUBCUTANEOUS EVERY 5 MIN PRN
Status: DISCONTINUED | OUTPATIENT
Start: 2018-05-21 | End: 2018-05-21 | Stop reason: HOSPADM

## 2018-05-21 RX ORDER — FENTANYL CITRATE 50 UG/ML
INJECTION, SOLUTION INTRAMUSCULAR; INTRAVENOUS PRN
Status: DISCONTINUED | OUTPATIENT
Start: 2018-05-21 | End: 2018-05-21

## 2018-05-21 RX ORDER — BUPIVACAINE HYDROCHLORIDE AND EPINEPHRINE 2.5; 5 MG/ML; UG/ML
INJECTION, SOLUTION INFILTRATION; PERINEURAL PRN
Status: DISCONTINUED | OUTPATIENT
Start: 2018-05-21 | End: 2018-05-21 | Stop reason: HOSPADM

## 2018-05-21 RX ORDER — DULOXETIN HYDROCHLORIDE 30 MG/1
30 CAPSULE, DELAYED RELEASE ORAL EVERY MORNING
Status: DISCONTINUED | OUTPATIENT
Start: 2018-05-22 | End: 2018-05-23 | Stop reason: HOSPADM

## 2018-05-21 RX ORDER — FENTANYL CITRATE 50 UG/ML
25-50 INJECTION, SOLUTION INTRAMUSCULAR; INTRAVENOUS EVERY 5 MIN PRN
Status: DISCONTINUED | OUTPATIENT
Start: 2018-05-21 | End: 2018-05-21 | Stop reason: HOSPADM

## 2018-05-21 RX ORDER — GLYCOPYRROLATE 0.2 MG/ML
INJECTION, SOLUTION INTRAMUSCULAR; INTRAVENOUS PRN
Status: DISCONTINUED | OUTPATIENT
Start: 2018-05-21 | End: 2018-05-21

## 2018-05-21 RX ORDER — PROPOFOL 10 MG/ML
INJECTION, EMULSION INTRAVENOUS CONTINUOUS PRN
Status: DISCONTINUED | OUTPATIENT
Start: 2018-05-21 | End: 2018-05-21

## 2018-05-21 RX ORDER — PROCHLORPERAZINE MALEATE 5 MG
10 TABLET ORAL EVERY 6 HOURS PRN
Status: DISCONTINUED | OUTPATIENT
Start: 2018-05-21 | End: 2018-05-23 | Stop reason: HOSPADM

## 2018-05-21 RX ORDER — ONDANSETRON 2 MG/ML
4 INJECTION INTRAMUSCULAR; INTRAVENOUS EVERY 30 MIN PRN
Status: DISCONTINUED | OUTPATIENT
Start: 2018-05-21 | End: 2018-05-21 | Stop reason: HOSPADM

## 2018-05-21 RX ADMIN — Medication: at 22:12

## 2018-05-21 RX ADMIN — HYDROMORPHONE HYDROCHLORIDE 0.5 MG: 1 INJECTION, SOLUTION INTRAMUSCULAR; INTRAVENOUS; SUBCUTANEOUS at 12:05

## 2018-05-21 RX ADMIN — FAMOTIDINE 20 MG: 10 INJECTION, SOLUTION INTRAVENOUS at 14:37

## 2018-05-21 RX ADMIN — ROCURONIUM BROMIDE 20 MG: 10 INJECTION INTRAVENOUS at 08:20

## 2018-05-21 RX ADMIN — SODIUM CHLORIDE, POTASSIUM CHLORIDE, SODIUM LACTATE AND CALCIUM CHLORIDE: 600; 310; 30; 20 INJECTION, SOLUTION INTRAVENOUS at 12:04

## 2018-05-21 RX ADMIN — FENTANYL CITRATE 50 MCG: 50 INJECTION, SOLUTION INTRAMUSCULAR; INTRAVENOUS at 08:12

## 2018-05-21 RX ADMIN — ROCURONIUM BROMIDE 10 MG: 10 INJECTION INTRAVENOUS at 10:31

## 2018-05-21 RX ADMIN — ROCURONIUM BROMIDE 10 MG: 10 INJECTION INTRAVENOUS at 10:16

## 2018-05-21 RX ADMIN — ROCURONIUM BROMIDE 20 MG: 10 INJECTION INTRAVENOUS at 08:35

## 2018-05-21 RX ADMIN — MIDAZOLAM 2 MG: 1 INJECTION INTRAMUSCULAR; INTRAVENOUS at 08:12

## 2018-05-21 RX ADMIN — FENTANYL CITRATE 50 MCG: 50 INJECTION, SOLUTION INTRAMUSCULAR; INTRAVENOUS at 09:25

## 2018-05-21 RX ADMIN — PHENYLEPHRINE HYDROCHLORIDE 100 MCG: 10 INJECTION, SOLUTION INTRAMUSCULAR; INTRAVENOUS; SUBCUTANEOUS at 08:44

## 2018-05-21 RX ADMIN — GLYCOPYRROLATE 1 MG: 0.2 INJECTION, SOLUTION INTRAMUSCULAR; INTRAVENOUS at 11:24

## 2018-05-21 RX ADMIN — SCOPALAMINE 1 PATCH: 1 PATCH, EXTENDED RELEASE TRANSDERMAL at 06:29

## 2018-05-21 RX ADMIN — KETOROLAC TROMETHAMINE 30 MG: 30 INJECTION, SOLUTION INTRAMUSCULAR at 14:37

## 2018-05-21 RX ADMIN — HYDROMORPHONE HYDROCHLORIDE 0.5 MG: 1 INJECTION, SOLUTION INTRAMUSCULAR; INTRAVENOUS; SUBCUTANEOUS at 11:25

## 2018-05-21 RX ADMIN — CEFAZOLIN SODIUM 2 G: 2 INJECTION, SOLUTION INTRAVENOUS at 08:20

## 2018-05-21 RX ADMIN — Medication 5 MG: at 08:44

## 2018-05-21 RX ADMIN — SODIUM CHLORIDE 1000 ML: 900 IRRIGANT IRRIGATION at 08:56

## 2018-05-21 RX ADMIN — PROPOFOL 100 MCG/KG/MIN: 10 INJECTION, EMULSION INTRAVENOUS at 08:18

## 2018-05-21 RX ADMIN — SODIUM CHLORIDE, POTASSIUM CHLORIDE, SODIUM LACTATE AND CALCIUM CHLORIDE: 600; 310; 30; 20 INJECTION, SOLUTION INTRAVENOUS at 06:29

## 2018-05-21 RX ADMIN — HYDROMORPHONE HYDROCHLORIDE: 10 INJECTION, SOLUTION INTRAMUSCULAR; INTRAVENOUS; SUBCUTANEOUS at 12:06

## 2018-05-21 RX ADMIN — HETASTARCH IN SODIUM CHLORIDE 500 ML: 6; .9 INJECTION, SOLUTION INTRAVENOUS at 22:04

## 2018-05-21 RX ADMIN — LIDOCAINE HYDROCHLORIDE 1 ML: 10 INJECTION, SOLUTION EPIDURAL; INFILTRATION; INTRACAUDAL; PERINEURAL at 06:29

## 2018-05-21 RX ADMIN — ROCURONIUM BROMIDE 10 MG: 10 INJECTION INTRAVENOUS at 09:19

## 2018-05-21 RX ADMIN — FENTANYL CITRATE 50 MCG: 50 INJECTION, SOLUTION INTRAMUSCULAR; INTRAVENOUS at 10:09

## 2018-05-21 RX ADMIN — SUCCINYLCHOLINE CHLORIDE 100 MG: 20 INJECTION, SOLUTION INTRAMUSCULAR; INTRAVENOUS; PARENTERAL at 08:12

## 2018-05-21 RX ADMIN — LIDOCAINE HYDROCHLORIDE 100 MG: 20 INJECTION, SOLUTION INFILTRATION; PERINEURAL at 08:12

## 2018-05-21 RX ADMIN — FENTANYL CITRATE 50 MCG: 50 INJECTION, SOLUTION INTRAMUSCULAR; INTRAVENOUS at 08:50

## 2018-05-21 RX ADMIN — HEPARIN SODIUM 5000 UNITS: 5000 INJECTION, SOLUTION INTRAVENOUS; SUBCUTANEOUS at 08:23

## 2018-05-21 RX ADMIN — CEFAZOLIN SODIUM 2 G: 2 INJECTION, SOLUTION INTRAVENOUS at 15:43

## 2018-05-21 RX ADMIN — ONDANSETRON 4 MG: 2 INJECTION INTRAMUSCULAR; INTRAVENOUS at 11:18

## 2018-05-21 RX ADMIN — BUPIVACAINE HYDROCHLORIDE AND EPINEPHRINE BITARTRATE 30 ML: 2.5; .005 INJECTION, SOLUTION EPIDURAL; INFILTRATION; INTRACAUDAL; PERINEURAL at 11:26

## 2018-05-21 RX ADMIN — ROCURONIUM BROMIDE 10 MG: 10 INJECTION INTRAVENOUS at 09:56

## 2018-05-21 RX ADMIN — SODIUM CHLORIDE, POTASSIUM CHLORIDE, SODIUM LACTATE AND CALCIUM CHLORIDE: 600; 310; 30; 20 INJECTION, SOLUTION INTRAVENOUS at 08:57

## 2018-05-21 RX ADMIN — ROCURONIUM BROMIDE 10 MG: 10 INJECTION INTRAVENOUS at 08:59

## 2018-05-21 RX ADMIN — NEOSTIGMINE METHYLSULFATE 5 MG: 1 INJECTION, SOLUTION INTRAVENOUS at 11:23

## 2018-05-21 RX ADMIN — CEFAZOLIN SODIUM 1 G: 2 INJECTION, SOLUTION INTRAVENOUS at 10:24

## 2018-05-21 NOTE — IP AVS SNAPSHOT
[facility] [facility]    [address] [place] [address]    Phone:  [phone]                                       After Visit Summary   5/21/2018    David Marino    MRN: [id number]           After Visit Summary Signature Page     I have received my discharge instructions, and my questions have been answered. I have discussed any challenges I see with this plan with the nurse or doctor.    ..........................................................................................................................................  Patient/Patient Representative Signature      ..........................................................................................................................................  Patient Representative Print Name and Relationship to Patient    ..................................................               ................................................  Date                                            Time    ..........................................................................................................................................  Reviewed by Signature/Title    ...................................................              ..............................................  Date                                                            Time

## 2018-05-21 NOTE — ANESTHESIA POSTPROCEDURE EVALUATION
Patient: David Marino    Procedure(s):  LAPAROSCOPIC GASTRIC BYPASS    EBL: 7mL - Wound Class: II-Clean Contaminated    Diagnosis:OBESITY   Diagnosis Additional Information: No value filed.    Anesthesia Type:  General, ETT, RSI    Note:  Anesthesia Post Evaluation    Patient location during evaluation: PACU  Patient participation: Able to fully participate in evaluation  Level of consciousness: awake  Pain management: adequate  Airway patency: patent  Cardiovascular status: acceptable  Respiratory status: acceptable  Hydration status: acceptable  PONV: none     Anesthetic complications: None          Last vitals:  Vitals:    05/21/18 1250 05/21/18 1305 05/21/18 1330   BP: 131/87 135/84 125/81   Pulse:      Resp: 15 12 12   Temp:  36.8  C (98.2  F)    SpO2: 95% 92% 94%         Electronically Signed By: JULEE JAQUEZ MD  May 21, 2018  2:03 PM

## 2018-05-21 NOTE — PROGRESS NOTES
Admission medication history interview status for the 5/21/2018  admission is complete. See EPIC admission navigator for prior to admission medications     Medication history source reliability:Good    Medication history interview source(s):Patient    Medication history resources (including written lists, pill bottles, clinic record):Patient brought in a med list on day of procedure.    Primary pharmacy.[facility] ([facility]    Additional medication history information not noted on PTA med list :None    Time spent in this activity: 30 minutes    Prior to Admission medications    Medication Sig Last Dose Taking? Auth Provider   CALCIUM-VITAMIN D PO Take 1 tablet by mouth 2 times daily 5/21/2018 at 0415 Yes Reported, Patient   CLONAZEPAM PO Take 0.5 mg by mouth nightly as needed for anxiety 5/20/2018 at 2200 Yes Reported, Patient   DULoxetine (CYMBALTA) 30 MG EC capsule Take 1 capsule (30 mg) by mouth 2 times daily  Patient taking differently: Take 30 mg by mouth every morning  5/21/2018 at 0415 Yes Jus Oswald MD   Glycopyrrolate POWD Compounded. Apply pea size amount every morning. 0.5% cream. For chromhidrosis, 30 gm jar 5/21/2018 at 0415 Yes Jus Oswald MD   metroNIDAZOLE (METROGEL) 0.75 % topical gel Apply 1 g topically 2 times daily 5/21/2018 at 0415 Yes Jus Oswald MD   montelukast (SINGULAIR) 10 MG tablet TAKE 1 TAB BY MOUTH AT BEDTIME 5/20/2018 at 2200 Yes Reported, Patient   Multiple Vitamins-Minerals (MULTIVITAMIN ADULT PO) Take 1 tablet by mouth every morning  5/21/2018 at 0415 Yes Reported, Patient   Omega-3 Fatty Acids (FISH OIL OMEGA-3 PO) Take 1 capsule by mouth At Bedtime  5/17/2018 at hs Yes Reported, Patient   omeprazole (PRILOSEC) 40 MG capsule Take 1 capsule (40 mg) by mouth daily Take 30-60 minutes before a meal. 5/21/2018 at 0415 Yes Jus Oswald MD   rosuvastatin (CRESTOR) 10 MG tablet TAKE 1 TABLET(10 MG) BY MOUTH  DAILY  Patient taking differently: Take 10 mg by mouth At Bedtime TAKE 1 TABLET(10 MG) BY MOUTH DAILY 5/20/2018 at 2200 Yes Jus Oswald MD   TRANSDERM-SCOP, 1.5 MG, (1.5mg base/3day) patch Place 1 patch onto the skin every 72 hours (Vertigo) Over 1 month ago at prn Yes Reported, Patient   traZODone (DESYREL) 50 MG tablet Take 1 tablet (50 mg) by mouth At Bedtime 5/20/2018 at 2200 Yes Jus Oswald MD   valsartan (DIOVAN) 320 MG tablet Take 1 tablet (320 mg) by mouth daily 5/21/2018 at 0415 Yes Jus Oswald MD   VITAMIN D, CHOLECALCIFEROL, PO Take 5,000 Units by mouth every morning  5/21/2018 at 0415 Yes Reported, Patient   doxycycline monohydrate 50 MG capsule Take 1 capsule (50 mg) by mouth 2 times daily  Patient taking differently: Take 50 mg by mouth 2 times daily (Only during winter months.) Over 1 month ago at am  Jus Oswald MD

## 2018-05-21 NOTE — ANESTHESIA CARE TRANSFER NOTE
Patient: David Marino    Procedure(s):  LAPAROSCOPIC GASTRIC BYPASS    EBL: 7mL - Wound Class: II-Clean Contaminated    Diagnosis: OBESITY   Diagnosis Additional Information: No value filed.    Anesthesia Type:   General, ETT, RSI     Note:  Airway :Face Mask  Patient transferred to:PACU  Comments: Neuromuscular blockade reversed after TOF 4/4, spontaneous respirations, adequate tidal volumes, followed commands to voice, oropharynx suctioned with soft flexible catheter, extubated atraumatically, extubated with suction, airway patent after extubation.  Oxygen via facemask at 6 liters per minute to PACU. Oxygen tubing connected to wall O2 in PACU, SpO2, NiBP, and EKG monitors and alarms on and functioning, Salma Hugger warmer connected to patient gown, report on patient's clinical status given to PACU RN, RN questions answered. Handoff Report: Identifed the Patient, Identified the Reponsible Provider, Reviewed the pertinent medical history, Discussed the surgical course, Reviewed Intra-OP anesthesia mangement and issues during anesthesia, Set expectations for post-procedure period and Allowed opportunity for questions and acknowledgement of understanding      Vitals: (Last set prior to Anesthesia Care Transfer)    CRNA VITALS  5/21/2018 1113 - 5/21/2018 1148      5/21/2018             Pulse: 98    SpO2: 96 %    Resp Rate (observed): (!)  3    Resp Rate (set): (!)  6                Electronically Signed By: SANDRA Scott CRNA  May 21, 2018  11:48 AM

## 2018-05-21 NOTE — PLAN OF CARE
Problem: Patient Care Overview  Goal: Plan of Care/Patient Progress Review  Outcome: No Change  Pt to floor at approx 1300, oriented to room and call lights, complains of abd pain, instructed on use of PCA, pt now resting, lap sites clean dry and intact, vss, cruz in place, O2 sats mid 90's on 2L NC

## 2018-05-21 NOTE — ANESTHESIA PREPROCEDURE EVALUATION
Anesthesia Evaluation     .             ROS/MED HX    ENT/Pulmonary:     (+)sleep apnea, Intermittent asthma Treatment: Inhaler prn,  uses CPAP , . .    Neurologic:       Cardiovascular:     (+) Dyslipidemia, hypertension----. : . . . :. .       METS/Exercise Tolerance:     Hematologic:         Musculoskeletal:         GI/Hepatic:     (+) GERD hiatal hernia,       Renal/Genitourinary:         Endo:     (+) Obesity, Other Endocrine Disorder Testosterone deficiency;.      Psychiatric:     (+) psychiatric history depression      Infectious Disease:         Malignancy:         Other:                     Physical Exam  Normal systems: cardiovascular, pulmonary and dental    Airway   Mallampati: I  TM distance: >3 FB  Neck ROM: full    Dental     Cardiovascular   Rhythm and rate: regular      Pulmonary    breath sounds clear to auscultation                    Anesthesia Plan      History & Physical Review  History and physical reviewed and following examination; no interval change.    ASA Status:  3 .    NPO Status:  > 8 hours    Plan for General, ETT and RSI with Intravenous induction. Maintenance will be Balanced.    PONV prophylaxis:  Ondansetron (or other 5HT-3), Dexamethasone or Solumedrol and Other (See comment)  Additional equipment: Videolaryngoscope Propofol gtt;      Postoperative Care  Postoperative pain management:  IV analgesics.      Consents  Anesthetic plan, risks, benefits and alternatives discussed with:  Patient..                          .

## 2018-05-21 NOTE — BRIEF OP NOTE
General Surgery Brief Operative Note    Pre-operative diagnosis: MORBID OBESITY    Post-operative diagnosis Same   Procedure: Procedure(s):  LAPAROSCOPIC GASTRIC BYPASS  Wound Class: II-Clean Contaminated    Surgeon(s), Assistant(s): Surgeon(s) and Role:     * Noe Saunders MD - Primary     * Amparo Hugo PA-C - Assisting   Estimated blood loss: 7 mL   Drains: None   Specimens: * No specimens in log *   Findings: See postop diagnosis   Condition: Stable   Comments:      Amparo Hugo PA-C See dictated operative report for full details

## 2018-05-21 NOTE — PHARMACY-CONSULT NOTE
Bariatric Consult    Medications evaluated as requested per Bariatric Consult.  Omeprazole may be changed to pantoprazole liquid.  Cymbalta delayed release capsules can be opened and the enteric-coated pellets can carefully be sprinkled onto applesauce or apple juice only. Stable for up to 2 hours  The pharmacist will continue to follow as new medications are ordered.  Brenda Hernandez, RobertD

## 2018-05-21 NOTE — IP AVS SNAPSHOT
MRN:[id number]                      After Visit Summary   5/21/2018    David Marino    MRN: [id number]           Thank you!     Thank you for choosing [facility] for your care. Our goal is always to provide you with excellent care. Hearing back from our patients is one way we can continue to improve our services. Please take a few minutes to complete the written survey that you may receive in the mail after you visit with us. Thank you!        Patient Information     Date Of Birth          1965        Designated Caregiver       Most Recent Value    Caregiver    Will someone help with your care after discharge? yes    Name of designated caregiver Gavin spouse    Phone number of caregiver [phone]    Caregiver address [address] [place], [place]      About your hospital stay     You were admitted on:  May 21, 2018 You last received care in the:  [place] [address] [facility]    You were discharged on:  May 23, 2018        Reason for your hospital stay       Gastric bypass                  Who to Call     For medical emergencies, please call 911.  For non-urgent questions about your medical care, please call your primary care provider or clinic, [phone]  For questions related to your surgery, please call your surgery clinic        Attending Provider     Provider Specialty    Noe Saunders MD Surgery       Primary Care Provider Office Phone # Fax #    Jus Woodrow Oswald [phone]      After Care Instructions     Activity       Your activity upon discharge: activity as tolerated. No heavy lifting > 20 lbs or strenuous exercise x 3-4 weeks. No driving or alcohol while on pain meds.            Diet       Follow this diet upon discharge: bariatric full liquid diet            Wound care and dressings       Instructions to care for your wound at home: keep wound(s) clean and dry. You may shower, but do not soak incisions x 2 weeks. Leave steri strips  in place, they will fall off on their own. Apply dry dressing as needed.                  Follow-up Appointments     Follow-up and recommended labs and tests        [facility] as scheduled.  Please call the clinic if you have any questions or concerns.  Follow up with your PCP in 3-4 weeks.  Continue to ambulate and use your incentive spirometer at home.  Do not take NSAIDs (such as ibuprofen, naproxen, or aspirin).  You may hold your vitamins/supplements if you are having nausea, try to resume your chewable vitamin if you are able to.  You may take a chewable calcium in place of the tablet for 1-2 months.  It is ok to crush your anti-cholesterol medication.      Blood pressure management plan: To start, we will continue you Diovan.  If you feel lightheaded or dizzy especially with position changes, you may have low blood pressure.  Push fluids and hold your BP med. Check your blood pressure if you are able and call your PCP to adjust your meds.    We have switched you to Zantac. If you continue to experience reflux[facility]                  Your next 10 appointments already scheduled     May 29, 2018  9:30 AM CDT   Pre-Op physical with Jus Oswald MD   [facility] ([facility])    [address] [address]   [phone]            May 29, 2018 11:00 AM CDT   Post Op with Saranya Schroeder Rn, RN   [facility])    [address] [place] [address]   [phone]            May 29, 2018 11:20 AM CDT   Post Op with Cosme Foote PA-C   [facility])    [address] [place] [address]   [phone]            Jun 04, 2018 10:00 AM CDT   Post Op with Saranya Schroeder Rn, RN   [facility] ([facility])    [address]  [place] [address]   [phone]            Jun 22, 2018  9:30 AM CDT   Post Op with Saranya Schroeder Diet 1, RD   [facility] [place] ([facility])    [address]  [place] [address]   [phone]              Further instructions from your care team            For informational purposes only. Not to replace the advice of your health care provider. Copyright   2006 [facility]. All rights reserved. "[employer] 714544 - REV 09/14  After Bariatric Surgery  [facility]  Note: Before you go home, ask your nurse to order your pain medicine from the pharmacy. Be sure you have your medicine with you when you leave.  How much fluid should I drink?    Drink at least 1 ounce of fluid every 15 minutes (1/2 cup per hour) during the day.     Carry a water bottle with you. Drink from it often.    Keep track of how much fluid you drink in a day.    Adjustable stomach band: Do not overeat or drink too much. This can cause vomiting (throwing up), stretch your stomach, or make your stomach slip up over your band.    Remember:  - Do not use straws, chew gum or suck on hard candies. They may cause painful gas.   - No cold drinks.  - No coffee, soda pop or drinks with caffeine. These may cause stomach pain.  - No alcohol. It is bad for your liver and will cause stomach pain. It also adds a lot of calories.  What can I do for pain control?      You had major abdominal surgery that involves all layers of your abdominal muscles.   Pain is expected, even as far out as 6-8 weeks postop.  Moving, sneezing, coughing, and  breathing will cause pain because these activities use your abdominal muscles.      You can take the liquid pain medicine as prescribed. You can also try to wean off from it  as soon as you feel comfortable.  Do not drive while you are taking pain medicine.    This is dangerous.     You may take liquid Tylenol (acetaminophen) for pain in place of the prescribed pain  medicine. Do not take more than 3000 mg in a 24 hour period.     You may also apply ice or heat to the affected area(s).  Just remember to wrap the ice  in something and limit icing sessions to 20 minutes. Excessive icing can irritate the skin  or cause tissue damage.   You can apply heat with a hot, wet towel or heating pad. Just like cold therapy, limit  heat application to 20 minutes. Never sleep with a heating pad on. It could cause severe  burns to your skin.    Do not take NSAID s (ibuprofen, Motrin, Advil, Aleve, Naproxen). They will increase you risk for bleeding or getting an ulcer.    If you have any of the following pain issues, we would like to talk to you:  - pain that does not improve with rest  - pain that gets worse and worse  - pain that is not controlled by your pain medicine  - a sudden severe increase in pain  -   If your doctor prescribes Zantac, take it as ordered. Take it for 3 months to prevent       Ulcers.  -   If you took an antacid before you had surgery, keep taking it for 3 months after           surgery. This will help prevent ulcers.   - Take any other medicines that your doctor tells you to take.   - Wear your binder to support your belly muscles.  Please call the clinic at [phone] for any concerns      How much rest do I need?  Get plenty of rest the first few days after surgery. Balance rest and activity.  Do not nap more than one hour during the day. Set a timer to wake yourself up, if needed. Too much sleep will keep you from drinking enough fluid during the day.  What should I know about my incisions (cuts)?  - Change your bandages as needed or if they get wet.   -Call your doctor if you have any of these signs of infection:   - Redness around the site.   - Drainage that smells bad.   - Fever of 101  F (38.3  C) or higher when taken under the tongue.- Chills.  If  you     have a drain:  - Do not pull on the drain. Do not pull the drain out. We will take out your drain at your first clinic visit.  - The color and amount of fluid varies. Right after surgery the fluid is bright red. Over time, it changes to light pink and may become clear or the color of straw.   Will my urine or bowel movements change?   You might not have a bowel movement for several days after surgery. Your first bowel movements will likely be liquid.   Gastric bypass or sleeve gastrectomy: You may also notice old blood or a darker color in your stools (bowel movements).   Your urine should be clear to light yellow. This shows that you are drinking enough fluid. You should urinate (pass water) at least 2 to 3 times during the day. If not, call us.  What kind of activity is safe?  For 4 weeks after surgery:     Walk for a short time every day.    Do not jog or run.    No weight lifting or belly exercises.  No swimming, baths or hot tubs until your cuts are healed (scabs are gone). You may shower.  No outside activity in hot, humid weather until you can drink 48 to 64 ounces of fluid in 24 hours. If you sweat a lot, your body may lose too much water.   The first month after surgery, do not take any trips where you must sit for a long time. You could get a blood clot in your legs.  Call the clinic at [phone] if:    Your pain medicine is not working.    You do not urinate (pass water) 2 to 3 times per day.    You have any signs of infection.    You have belly pain that gets worse and worse.    You have swollen legs with pain behind the knee or calf.    You have chest pain or feel very short of breath.    You have any questions or concerns.    You have a sudden severe increase in heart rate.    You have vomiting that gets worse and worse.  When should I go back to the clinic?  Time Gastric bypass or sleeve gastrectomy Adjustable gastric band   Week 1 See the physician or physician assistant (PA). See the  "nurse and physical therapist.   Week 2 See the nurse and dietitian. See the nurse and dietitian.   Week 4 Have a nutrition consult with the dietitian. See the PA and dietitian.   Week 6  Go for the first adjustment (fill) of your stomach band.   For the first year (or until your BMI is less than 30) Go to the clinic each month to see if you need a band adjustment (fill).         Pending Results     No orders found from 5/19/2018 to 5/22/2018.            Statement of Approval     Ordered          05/23/18 0941  I have reviewed and agree with all the recommendations and orders detailed in this document.  EFFECTIVE NOW     Approved and electronically signed by:  Amparo Hugo PA-C           05/23/18 0927  I have reviewed and agree with all the recommendations and orders detailed in this document.  EFFECTIVE NOW     Approved and electronically signed by:  Amparo Hugo PA-C             Admission Information     Date & Time Provider Department Dept. Phone    5/21/2018 Noe Saunders MD [place] [address] [facility] [phone]      Your Vitals Were     Blood Pressure Pulse Temperature Respirations Height Weight    132/89 84 98.8  F (37.1  C) (Oral) 16 1.778 m (5' 10\") 109.3 kg (241 lb)    Pulse Oximetry BMI (Body Mass Index)                94% 34.58 kg/m2          MyChart Information     [employer] gives you secure access to your electronic health record. If you see a primary care provider, you can also send messages to your care team and make appointments. If you have questions, please call your primary care clinic.  If you do not have a primary care provider, please call [phone] and they will assist you.        Care EveryWhere ID     This is your Care EveryWhere ID. This could be used by other organizations to access your [facility] medical records  [id number]        Equal Access to Services     SATINDER XIE : favio Perez, lara ruffin " lyndon rodrigesgabriel domínguez'aan ah. So [facility] [phone].    ATENCIÓN: Si doug almanzar, tiene a obrien disposición servicios gratuitos de asistencia lingüística. Karen al [phone].    We comply with applicable federal civil rights laws and [place] laws. We do not discriminate on the basis of race, color, national origin, age, disability, sex, sexual orientation, or gender identity.               Review of your medicines      START taking        Dose / Directions    oxyCODONE IR 5 MG tablet   Commonly known as:  ROXICODONE   Used for:  Post-op pain        Dose:  5-10 mg   Take 1-2 tablets (5-10 mg) by mouth every 3 hours as needed for other (pain control or improvement in physical function. Hold dose for analgesic side effects.)   Quantity:  25 tablet   Refills:  0       ranitidine 75 MG tablet   Commonly known as:  ZANTAC        Dose:  75 mg   Take 1 tablet (75 mg) by mouth 2 times daily   Quantity:  60 tablet   Refills:  2         CONTINUE these medicines which may have CHANGED, or have new prescriptions. If we are uncertain of the size of tablets/capsules you have at home, strength may be listed as something that might have changed.        Dose / Directions    DULoxetine 30 MG EC capsule   Commonly known as:  CYMBALTA   This may have changed:  when to take this   Used for:  Adjustment disorder with depressed mood        Dose:  30 mg   Take 1 capsule (30 mg) by mouth 2 times daily   Quantity:  180 capsule   Refills:  3       rosuvastatin 10 MG tablet   Commonly known as:  CRESTOR   This may have changed:    - how much to take  - how to take this  - when to take this  - additional instructions   Used for:  Mixed hyperlipidemia        TAKE 1 TABLET(10 MG) BY MOUTH DAILY   Quantity:  90 tablet   Refills:  3         CONTINUE these medicines which have NOT CHANGED        Dose / Directions    CALCIUM-VITAMIN D PO        Dose:  1 tablet   Take 1 tablet by mouth 2 times daily   Refills:  0       CLONAZEPAM PO         Dose:  0.5 mg   Take 0.5 mg by mouth nightly as needed for anxiety   Refills:  0       FISH OIL OMEGA-3 PO        Dose:  1 capsule   Take 1 capsule by mouth At Bedtime   Refills:  0       Glycopyrrolate Powd   Used for:  Chromhidrosis        Compounded. Apply pea size amount every morning. 0.5% cream. For chromhidrosis, 30 gm jar   Quantity:  30 g   Refills:  11       metroNIDAZOLE 0.75 % topical gel   Commonly known as:  METROGEL   Used for:  Rosacea        Dose:  1 g   Apply 1 g topically 2 times daily   Quantity:  60 g   Refills:  11       montelukast 10 MG tablet   Commonly known as:  SINGULAIR        TAKE 1 TAB BY MOUTH AT BEDTIME   Refills:  1       MULTIVITAMIN ADULT PO        Dose:  1 tablet   Take 1 tablet by mouth every morning   Refills:  0       TRANSDERM-SCOP (1.5 MG) 72 hr patch   Indication:  Nausea and Vomiting   Generic drug:  scopolamine   Notes to Patient:  May remove patch on Thursday 5/24        Dose:  1 patch   Place 1 patch onto the skin every 72 hours (Vertigo)   Refills:  3       traZODone 50 MG tablet   Commonly known as:  DESYREL   Used for:  Other insomnia        Dose:  50 mg   Take 1 tablet (50 mg) by mouth At Bedtime   Quantity:  90 tablet   Refills:  3       valsartan 320 MG tablet   Commonly known as:  DIOVAN   Used for:  Benign essential hypertension        Dose:  320 mg   Take 1 tablet (320 mg) by mouth daily   Quantity:  90 tablet   Refills:  3       VITAMIN D (CHOLECALCIFEROL) PO        Dose:  5000 Units   Take 5,000 Units by mouth every morning   Refills:  0         STOP taking     doxycycline monohydrate 50 MG capsule           omeprazole 40 MG capsule   Commonly known as:  priLOSEC                Where to get your medicines      These medications were sent to [facility] - [place], [place] - [address]  [address] [address]     Phone:  [phone]     ranitidine 75 MG tablet         Some of these will need a paper prescription and  others can be bought over the counter. Ask your nurse if you have questions.     Bring a paper prescription for each of these medications     oxyCODONE IR 5 MG tablet                Protect others around you: Learn how to safely use, store and throw away your medicines at www.disposemymeds.org.        Information about OPIOIDS     PRESCRIPTION OPIOIDS: WHAT YOU NEED TO KNOW   You have a prescription for an opioid (narcotic) pain medicine. Opioids can cause addiction. If you have a history of chemical dependency of any type, you are at a higher risk of becoming addicted to opioids. Only take this medicine after all other options have been tried. Take it for as short a time and as few doses as possible.     Do not:    Drive. If you drive while taking these medicines, you could be arrested for driving under the influence (DUI).    Operate heavy machinery    Do any other dangerous activities while taking these medicines.     Drink any alcohol while taking these medicines.      Take with any other medicines that contain acetaminophen. Read all labels carefully. Look for the word  acetaminophen  or  Tylenol.  Ask your pharmacist if you have questions or are unsure.    Store your pills in a secure place, locked if possible. We will not replace any lost or stolen medicine. If you don t finish your medicine, please throw away (dispose) as directed by your pharmacist. The [place] Pollution Control Agency has more information about safe disposal: https://www.pca.[facility].[place].us/living-green/managing-unwanted-medications    All opioids tend to cause constipation. Drink plenty of water and eat foods that have a lot of fiber, such as fruits, vegetables, prune juice, apple juice and high-fiber cereal. Take a laxative (Miralax, milk of magnesia, Colace, Senna) if you don t move your bowels at least every other day.              Medication List: This is a list of all your medications and when to take them. Check marks below indicate  your daily home schedule. Keep this list as a reference.      Medications           Morning Afternoon Evening Bedtime As Needed    CALCIUM-VITAMIN D PO   Take 1 tablet by mouth 2 times daily   Next Dose Due:  Resume home schedule                                      CLONAZEPAM PO   Take 0.5 mg by mouth nightly as needed for anxiety                                   DULoxetine 30 MG EC capsule   Commonly known as:  CYMBALTA   Take 1 capsule (30 mg) by mouth 2 times daily   Last time this was given:  30 mg on 5/23/2018  8:28 AM   Next Dose Due:  Resume home schedule                                      FISH OIL OMEGA-3 PO   Take 1 capsule by mouth At Bedtime   Next Dose Due:  Resume home schedule                                   Glycopyrrolate Powd   Compounded. Apply pea size amount every morning. 0.5% cream. For chromhidrosis, 30 gm jar   Next Dose Due:  Resume home schedule                                   metroNIDAZOLE 0.75 % topical gel   Commonly known as:  METROGEL   Apply 1 g topically 2 times daily   Next Dose Due:  Resume home schedule                                      montelukast 10 MG tablet   Commonly known as:  SINGULAIR   TAKE 1 TAB BY MOUTH AT BEDTIME   Last time this was given:  10 mg on 5/22/2018 10:11 PM   Next Dose Due:  5/23/18                                   MULTIVITAMIN ADULT PO   Take 1 tablet by mouth every morning   Next Dose Due:  Resume home schedule                                   oxyCODONE IR 5 MG tablet   Commonly known as:  ROXICODONE   Take 1-2 tablets (5-10 mg) by mouth every 3 hours as needed for other (pain control or improvement in physical function. Hold dose for analgesic side effects.)   Last time this was given:  10 mg on 5/23/2018  8:28 AM                                   ranitidine 75 MG tablet   Commonly known as:  ZANTAC   Take 1 tablet (75 mg) by mouth 2 times daily   Next Dose Due:  Resume home schedule                                      rosuvastatin 10 MG  tablet   Commonly known as:  CRESTOR   TAKE 1 TABLET(10 MG) BY MOUTH DAILY   Next Dose Due:  Resume home schedule                                TRANSDERM-SCOP (1.5 MG) 72 hr patch   Place 1 patch onto the skin every 72 hours (Vertigo)   Last time this was given:  1 patch on 5/21/2018  6:29 AM   Generic drug:  scopolamine   Notes to Patient:  May remove patch on Thursday 5/24                                traZODone 50 MG tablet   Commonly known as:  DESYREL   Take 1 tablet (50 mg) by mouth At Bedtime   Next Dose Due:  Resume home schedule                                   valsartan 320 MG tablet   Commonly known as:  DIOVAN   Take 1 tablet (320 mg) by mouth daily   Last time this was given:  320 mg on 5/23/2018  8:28 AM   Next Dose Due:  5/24/18                                   VITAMIN D (CHOLECALCIFEROL) PO   Take 5,000 Units by mouth every morning   Next Dose Due:  Resume home schedule

## 2018-05-22 ENCOUNTER — APPOINTMENT (OUTPATIENT)
Dept: GENERAL RADIOLOGY | Facility: CLINIC | Age: 53
End: 2018-05-22
Attending: PHYSICIAN ASSISTANT
Payer: COMMERCIAL

## 2018-05-22 LAB
ANION GAP SERPL CALCULATED.3IONS-SCNC: 4 MMOL/L (ref 3–14)
CHLORIDE SERPL-SCNC: 106 MMOL/L (ref 94–109)
CO2 SERPL-SCNC: 29 MMOL/L (ref 20–32)
GLUCOSE SERPL-MCNC: 106 MG/DL (ref 70–99)
HGB BLD-MCNC: 12.2 G/DL (ref 13.3–17.7)
POTASSIUM SERPL-SCNC: 4.1 MMOL/L (ref 3.4–5.3)
SODIUM SERPL-SCNC: 139 MMOL/L (ref 133–144)

## 2018-05-22 PROCEDURE — 80051 ELECTROLYTE PANEL: CPT | Performed by: PHYSICIAN ASSISTANT

## 2018-05-22 PROCEDURE — 85018 HEMOGLOBIN: CPT | Performed by: PHYSICIAN ASSISTANT

## 2018-05-22 PROCEDURE — 25000128 H RX IP 250 OP 636: Performed by: PHYSICIAN ASSISTANT

## 2018-05-22 PROCEDURE — 25000132 ZZH RX MED GY IP 250 OP 250 PS 637: Performed by: PHYSICIAN ASSISTANT

## 2018-05-22 PROCEDURE — 40000986 XR UPPER GI WATER SOLUBLE

## 2018-05-22 PROCEDURE — 12000007 ZZH R&B INTERMEDIATE

## 2018-05-22 PROCEDURE — 25000125 ZZHC RX 250: Performed by: PHYSICIAN ASSISTANT

## 2018-05-22 PROCEDURE — 36415 COLL VENOUS BLD VENIPUNCTURE: CPT | Performed by: PHYSICIAN ASSISTANT

## 2018-05-22 PROCEDURE — 82947 ASSAY GLUCOSE BLOOD QUANT: CPT | Performed by: PHYSICIAN ASSISTANT

## 2018-05-22 RX ADMIN — DIATRIZOATE MEGLUMINE AND DIATRIZOATE SODIUM 25 ML: 660; 100 SOLUTION ORAL; RECTAL at 08:07

## 2018-05-22 RX ADMIN — ENOXAPARIN SODIUM 40 MG: 40 INJECTION SUBCUTANEOUS at 20:43

## 2018-05-22 RX ADMIN — ENOXAPARIN SODIUM 40 MG: 40 INJECTION SUBCUTANEOUS at 09:06

## 2018-05-22 RX ADMIN — SODIUM CHLORIDE, POTASSIUM CHLORIDE, SODIUM LACTATE AND CALCIUM CHLORIDE: 600; 310; 30; 20 INJECTION, SOLUTION INTRAVENOUS at 15:44

## 2018-05-22 RX ADMIN — Medication: at 15:16

## 2018-05-22 RX ADMIN — VALSARTAN 320 MG: 160 TABLET, FILM COATED ORAL at 12:29

## 2018-05-22 RX ADMIN — OXYCODONE HYDROCHLORIDE 10 MG: 5 TABLET ORAL at 22:11

## 2018-05-22 RX ADMIN — FAMOTIDINE 20 MG: 10 INJECTION, SOLUTION INTRAVENOUS at 09:09

## 2018-05-22 RX ADMIN — CEFAZOLIN SODIUM 2 G: 2 INJECTION, SOLUTION INTRAVENOUS at 00:26

## 2018-05-22 RX ADMIN — SODIUM CHLORIDE, POTASSIUM CHLORIDE, SODIUM LACTATE AND CALCIUM CHLORIDE: 600; 310; 30; 20 INJECTION, SOLUTION INTRAVENOUS at 02:49

## 2018-05-22 RX ADMIN — OXYCODONE HYDROCHLORIDE 5 MG: 5 TABLET ORAL at 15:52

## 2018-05-22 RX ADMIN — OXYCODONE HYDROCHLORIDE 10 MG: 5 TABLET ORAL at 19:15

## 2018-05-22 RX ADMIN — MONTELUKAST SODIUM 10 MG: 10 TABLET, FILM COATED ORAL at 22:11

## 2018-05-22 RX ADMIN — OXYCODONE HYDROCHLORIDE 5 MG: 5 TABLET ORAL at 15:16

## 2018-05-22 RX ADMIN — DULOXETINE 30 MG: 30 CAPSULE, DELAYED RELEASE ORAL at 12:28

## 2018-05-22 RX ADMIN — SODIUM CHLORIDE, POTASSIUM CHLORIDE, SODIUM LACTATE AND CALCIUM CHLORIDE: 600; 310; 30; 20 INJECTION, SOLUTION INTRAVENOUS at 22:11

## 2018-05-22 RX ADMIN — SODIUM CHLORIDE, POTASSIUM CHLORIDE, SODIUM LACTATE AND CALCIUM CHLORIDE: 600; 310; 30; 20 INJECTION, SOLUTION INTRAVENOUS at 09:05

## 2018-05-22 RX ADMIN — ACETAMINOPHEN 650 MG: 160 SUSPENSION ORAL at 21:01

## 2018-05-22 RX ADMIN — FAMOTIDINE 20 MG: 10 INJECTION, SOLUTION INTRAVENOUS at 20:43

## 2018-05-22 NOTE — PLAN OF CARE
Problem: Patient Care Overview  Goal: Plan of Care/Patient Progress Review  Outcome: No Change  Vital Signs stable. Alert and Oriented. Bowel sounds hypoactive and not passing flatus. 6 lap sites all clean, dry, and intact. Pain controlled with PCA Dilaudid. Patient ambulated in the halls stand by assist. Lung sounds clear and IS to 2500, on 2L O2 during the night. Adequate urinary output via cruz catheter. NPO with ice chips. Pt denies nausea.

## 2018-05-22 NOTE — PROGRESS NOTES
"[facility]  Bariatric Surgery Progress Note    Admission Date: 2018         Assessment and Plan:   David Marino is a 53 year old male S/P Procedure(s):  LAPAROSCOPIC BYPASS GASTRIC, 1 Day Post-Op.    - UGI pending, if nL start clears and PO meds  - Discontinue PCA if tolerates clears, start PO pain meds and ordered home meds  - Labs pending, Lovenox given  - Continue Pepcid IV  - Appreciate Pharmacy and Nutrition assistance  - Ambulate 4x day and encourage IS  - Home probably tomorrow             Interval History:   Sore but pain controlled, UO adequate, cruz out, ambulating some. Meds reviewed.                     Physical Exam:   Blood pressure 118/75, pulse 84, temperature 98  F (36.7  C), temperature source Axillary, resp. rate 16, height 1.778 m (5' 10\"), weight 109.3 kg (241 lb), SpO2 96 %.  Temperature Temp  Av  F (36.7  C)  Min: 97.1  F (36.2  C)  Max: 98.4  F (36.9  C)   I/O last 3 completed shifts:  In: 2248 [P.O.:120; I.V.:2128]  Out: 957 [Urine:950; Blood:7]  Constitutional:  Awake, alert, oriented, and in no apparent distress.   Lungs: No increased work of breathing, good air exchange, clear to auscultation bilaterally, and no crackles or wheezing.   Cardiovascular: Regular rate and rhythm, normal S1 and S2, and no murmur noted.   Abdomen: Soft, non-distended, appropriately tender at incision(s). Binder present.    Wounds: Clean, dry, and intact. No erythema or drainage.    Extremities: No edema or calf tenderness. +SCDs.          Data:   UGI: pending    Recent Labs   Lab Test  18   1625  05/15/18   1614  17   1435   WBC   --   5.7  6.2   HGB   --   14.4  13.8   HCT   --   42.2  41.0   PLT  157  172  172      Recent Labs   Lab Test  18   1625  18   0620  05/15/18   1614  17   1435  16   0721   POTASSIUM   --   4.0  4.2  4.3  3.9   CHLORIDE   --    --   105  105  107   CO2   --    --   24  26  29   BUN   --    --   13  15  12 "   CR  1.03   --   1.10  1.15  1.00       Recent Labs  Lab 05/15/18  1614   GLC 92       Amparo Hugo PA-C  [facility]  [phone]

## 2018-05-22 NOTE — PLAN OF CARE
Problem: Patient Care Overview  Goal: Plan of Care/Patient Progress Review  Outcome: Improving  Passed UGI. Started on clears. Tolerating it so far. Incisions intact. Abd binder on. PCA stopped. PRN PO Oxycodone x 1. Ambulated in the halls x 3. Family in room. Will monitor.

## 2018-05-22 NOTE — PLAN OF CARE
Problem: Bariatric Surgery (Adult,Pediatric)  Goal: Signs and Symptoms of Listed Potential Problems Will be Absent, Minimized or Managed (Bariatric Surgery)  Signs and symptoms of listed potential problems will be absent, minimized or managed by discharge/transition of care (reference Bariatric Surgery (Adult,Pediatric) CPG).   Outcome: No Change  A/Ox4. VSS. LS clear. BS hypo, no flatus. Lopez patent, low urine output. Hespan given. Lap sites cdi. Abd binder in place. Managing pain with PCA. NPO. Upper GI scheduled for tomorrow. Significant other at bedside.

## 2018-05-22 NOTE — CONSULTS
"NUTRITION EDUCATION    REASON FOR ASSESSMENT:  Bariatric Surgery Consult    CURRENT DIET:  Bariatric Clear Liquids    NUTRITION HISTORY:  Patient worked with clinical dietitian in [facility] prior to surgery to assist with lifestyle modification.    ANTHROPOMETRICS:   Ht: 5' 10\"  Wt: 241 lbs 0 oz (109.3 kg)  BMI: Body mass index is 34.58 kg/(m^2).  IBW: 75.5 kg  %IBW: 145%    ASSESSED NUTRITION NEEDS:  Energy Needs: 4106-0226 (11 - 14 kcal/kg ABW)                      Protein Needs:  (1 - 1.5 gm/kg IBW)  Fluid Needs: 8692-6727 (1mL/kcal/ABW)    Know patient will not meet assessed needs due to the restrictive nature of surgery.    NUTRITION DIAGNOSIS:   Food- and nutrition related knowledge deficit related to bariatric surgery as evidence by laparoscopic gastric bypass surgery on 5/21/2018.    INTERVENTIONS:    Nutrition Prescription:    Recommended patient follow bariatric diet advancement for laparoscopic gastric bypass    Implementation:    Nutrition Education (Content):  a) Reviewed laparoscopic gastric bypass diet guidelines  b) Provided handouts:  Nutrition After laparoscopic gastric bypass and Vitamin and Mineral Supplements After Weight Loss Surgery    Nutrition Education (Application):  c) Discussed current eating habits and recommended alternative food choices  d) Patient verbalizes understanding of diet by listing appropriate foods for home    Anticipate good compliance    Diet Education - refer to Education Flowsheet    Goals:    Patient will follow bariatric diet advancement schedule    Patient will follow post-operative vitamin mineral schedule      Follow Up:    Patient to follow up in 2 weeks with RD in [facility]    Provided RD contact information for future questions      Lizbeth Campos RD, LD  Clinical Dietitian     "

## 2018-05-23 VITALS
RESPIRATION RATE: 16 BRPM | TEMPERATURE: 98.8 F | OXYGEN SATURATION: 94 % | SYSTOLIC BLOOD PRESSURE: 132 MMHG | HEIGHT: 70 IN | HEART RATE: 84 BPM | DIASTOLIC BLOOD PRESSURE: 89 MMHG | BODY MASS INDEX: 34.5 KG/M2 | WEIGHT: 241 LBS

## 2018-05-23 LAB — GLUCOSE BLDC GLUCOMTR-MCNC: 110 MG/DL (ref 70–99)

## 2018-05-23 PROCEDURE — 25000132 ZZH RX MED GY IP 250 OP 250 PS 637: Performed by: PHYSICIAN ASSISTANT

## 2018-05-23 PROCEDURE — 25000128 H RX IP 250 OP 636: Performed by: PHYSICIAN ASSISTANT

## 2018-05-23 PROCEDURE — 00000146 ZZHCL STATISTIC GLUCOSE BY METER IP

## 2018-05-23 PROCEDURE — 25000125 ZZHC RX 250: Performed by: PHYSICIAN ASSISTANT

## 2018-05-23 RX ORDER — OXYCODONE HYDROCHLORIDE 5 MG/1
5-10 TABLET ORAL
Qty: 25 TABLET | Refills: 0 | Status: SHIPPED | OUTPATIENT
Start: 2018-05-23 | End: 2018-05-29

## 2018-05-23 RX ADMIN — VALSARTAN 320 MG: 160 TABLET, FILM COATED ORAL at 08:28

## 2018-05-23 RX ADMIN — OXYCODONE HYDROCHLORIDE 10 MG: 5 TABLET ORAL at 02:38

## 2018-05-23 RX ADMIN — DULOXETINE 30 MG: 30 CAPSULE, DELAYED RELEASE ORAL at 08:28

## 2018-05-23 RX ADMIN — OXYCODONE HYDROCHLORIDE 10 MG: 5 TABLET ORAL at 08:28

## 2018-05-23 RX ADMIN — OXYCODONE HYDROCHLORIDE 10 MG: 5 TABLET ORAL at 05:43

## 2018-05-23 RX ADMIN — ONDANSETRON 4 MG: 4 TABLET, ORALLY DISINTEGRATING ORAL at 02:38

## 2018-05-23 RX ADMIN — ENOXAPARIN SODIUM 40 MG: 40 INJECTION SUBCUTANEOUS at 08:28

## 2018-05-23 RX ADMIN — FAMOTIDINE 20 MG: 10 INJECTION, SOLUTION INTRAVENOUS at 08:28

## 2018-05-23 RX ADMIN — SODIUM CHLORIDE, POTASSIUM CHLORIDE, SODIUM LACTATE AND CALCIUM CHLORIDE: 600; 310; 30; 20 INJECTION, SOLUTION INTRAVENOUS at 04:18

## 2018-05-23 NOTE — DISCHARGE SUMMARY
"  Discharge Summary    David Marino MRN# [id number]   YOB: 1965 Age: 53 year old     Date of Admission:  5/21/2018  Date of Discharge:  5/23/2018  Admitting Physician:  Noe Saunders MD  Discharging Service:  Surgery  Primary Provider: Jus Oswald         Admission Diagnosis:   Principle Diagnosis: Morbid Obesity Body mass index is 34.58 kg/(m^2).  Secondary Diagnosis: None         Procedures:   Procedure(s):  LAPAROSCOPIC BYPASS GASTRIC         Brief History of Illness:   This patient was a 53 year old male who presented to the [facility] for potential bariatric surgery.  The patient has failed previous weight loss attempts and has co-morbidities of hypertension, hyperlipidemia, obstructive sleep apnea, low back pain, and asthma due to morbid obesity.  After pre-op screening, discussing the risks, benefits, and possible complications, an informed consent was obtained and the patient underwent the above procedure.  Please see the Operative Report for full details.         Hospital Course:   The patient recovered as anticipated.  The patient was discharged to home in stable condition by the surgical service.  He verbalized understanding of all discharge instructions.  He was asked to call with any further questions or concerns.  Please see chart for details.          Consultations:   Consultation during this admission received from nutrition          Discharge Disposition:   Discharged to home          Condition on Discharge:   Discharge condition: Stable   Discharge vitals: Blood pressure 132/89, pulse 84, temperature 98.8  F (37.1  C), temperature source Oral, resp. rate 16, height 5' 10\" (1.778 m), weight 241 lb (109.3 kg), SpO2 94 %.           Discharge Medications:     Current Discharge Medication List      START taking these medications    Details   oxyCODONE IR (ROXICODONE) 5 MG tablet Take 1-2 tablets (5-10 mg) by mouth every 3 hours as needed for " other (pain control or improvement in physical function. Hold dose for analgesic side effects.)  Qty: 25 tablet, Refills: 0    Associated Diagnoses: Post-op pain      ranitidine (ZANTAC) 75 MG tablet Take 1 tablet (75 mg) by mouth 2 times daily  Qty: 60 tablet, Refills: 2    Associated Diagnoses: Morbid obesity (H)         CONTINUE these medications which have NOT CHANGED    Details   CALCIUM-VITAMIN D PO Take 1 tablet by mouth 2 times daily      CLONAZEPAM PO Take 0.5 mg by mouth nightly as needed for anxiety      DULoxetine (CYMBALTA) 30 MG EC capsule Take 1 capsule (30 mg) by mouth 2 times daily  Qty: 180 capsule, Refills: 3    Associated Diagnoses: Adjustment disorder with depressed mood      Glycopyrrolate POWD Compounded. Apply pea size amount every morning. 0.5% cream. For chromhidrosis, 30 gm jar  Qty: 30 g, Refills: 11    Associated Diagnoses: Chromhidrosis      metroNIDAZOLE (METROGEL) 0.75 % topical gel Apply 1 g topically 2 times daily  Qty: 60 g, Refills: 11    Associated Diagnoses: Rosacea      montelukast (SINGULAIR) 10 MG tablet TAKE 1 TAB BY MOUTH AT BEDTIME  Refills: 1      Multiple Vitamins-Minerals (MULTIVITAMIN ADULT PO) Take 1 tablet by mouth every morning       Omega-3 Fatty Acids (FISH OIL OMEGA-3 PO) Take 1 capsule by mouth At Bedtime       rosuvastatin (CRESTOR) 10 MG tablet TAKE 1 TABLET(10 MG) BY MOUTH DAILY  Qty: 90 tablet, Refills: 3    Associated Diagnoses: Mixed hyperlipidemia      TRANSDERM-SCOP, 1.5 MG, (1.5mg base/3day) patch Place 1 patch onto the skin every 72 hours (Vertigo)  Refills: 3      traZODone (DESYREL) 50 MG tablet Take 1 tablet (50 mg) by mouth At Bedtime  Qty: 90 tablet, Refills: 3    Associated Diagnoses: Other insomnia      valsartan (DIOVAN) 320 MG tablet Take 1 tablet (320 mg) by mouth daily  Qty: 90 tablet, Refills: 3    Associated Diagnoses: Benign essential hypertension      VITAMIN D, CHOLECALCIFEROL, PO Take 5,000 Units by mouth every morning          STOP  taking these medications       doxycycline monohydrate 50 MG capsule Comments:   Reason for Stopping:         omeprazole (PRILOSEC) 40 MG capsule Comments:   Reason for Stopping:                    Discharge Instructions:   Follow up at the [facility] next week as scheduled.  Please call   the clinic if you have any questions or concerns.  Follow up with your PCP   in 3-4 weeks.  Continue to ambulate and use your incentive spirometer at   home.  Do not take NSAIDs (such as ibuprofen, naproxen, or aspirin).  You   may hold your vitamins/supplements if you are having nausea, try to resume   your chewable vitamin if you are able to.  You may take a chewable calcium   in place of the tablet for 1-2 months.  It is ok to crush your   anti-cholesterol medication.      Blood pressure management plan: To start, we will continue you Diovan.  If   you feel lightheaded or dizzy especially with position changes, you may   have low blood pressure.  Push fluids and hold your BP med. Check your   blood pressure if you are able and call your PCP to adjust your meds.    We have switched you to Zantac. If you continue to experience reflux, you   may return to Prilosec.                  After Care Instructions     Activity       Your activity upon discharge: activity as tolerated. No heavy lifting > 20 lbs or strenuous exercise x 3-4 weeks. No driving or alcohol while on pain meds.            Diet       Follow this diet upon discharge: bariatric full liquid diet            Wound care and dressings       Instructions to care for your wound at home: keep wound(s) clean and dry. You may shower, but do not soak incisions x 2 weeks. Leave steri strips in place, they will fall off on their own. Apply dry dressing as needed.                      Amparo Hugo PA-C, dictating on behalf of Noe Saunders MD  Office #: [phone]

## 2018-05-23 NOTE — DISCHARGE INSTRUCTIONS
For informational purposes only. Not to replace the advice of your health care provider. Copyright   2006 [facility]. All rights reserved. [employer] 052222 - REV 09/14  After Bariatric Surgery  [facility]  Note: Before you go home, ask your nurse to order your pain medicine from the pharmacy. Be sure you have your medicine with you when you leave.  How much fluid should I drink?    Drink at least 1 ounce of fluid every 15 minutes (1/2 cup per hour) during the day.     Carry a water bottle with you. Drink from it often.    Keep track of how much fluid you drink in a day.    Adjustable stomach band: Do not overeat or drink too much. This can cause vomiting (throwing up), stretch your stomach, or make your stomach slip up over your band.    Remember:  - Do not use straws, chew gum or suck on hard candies. They may cause painful gas.   - No cold drinks.  - No coffee, soda pop or drinks with caffeine. These may cause stomach pain.  - No alcohol. It is bad for your liver and will cause stomach pain. It also adds a lot of calories.  What can I do for pain control?      You had major abdominal surgery that involves all layers of your abdominal muscles.   Pain is expected, even as far out as 6-8 weeks postop.  Moving, sneezing, coughing, and  breathing will cause pain because these activities use your abdominal muscles.      You can take the liquid pain medicine as prescribed. You can also try to wean off from it  as soon as you feel comfortable.  Do not drive while you are taking pain medicine.   This is dangerous.     You may take liquid Tylenol (acetaminophen) for pain in place of the prescribed pain  medicine. Do not take more than 3000 mg in a 24 hour period.     You may also apply ice or heat to the affected area(s).  Just remember to wrap the ice  in something and limit icing sessions to 20 minutes. Excessive icing can irritate the skin  or cause tissue damage.   You can apply  heat with a hot, wet towel or heating pad. Just like cold therapy, limit  heat application to 20 minutes. Never sleep with a heating pad on. It could cause severe  burns to your skin.    Do not take NSAID s (ibuprofen, Motrin, Advil, Aleve, Naproxen). They will increase you risk for bleeding or getting an ulcer.    If you have any of the following pain issues, we would like to talk to you:  - pain that does not improve with rest  - pain that gets worse and worse  - pain that is not controlled by your pain medicine  - a sudden severe increase in pain  -   If your doctor prescribes Zantac, take it as ordered. Take it for 3 months to prevent       Ulcers.  -   If you took an antacid before you had surgery, keep taking it for 3 months after           surgery. This will help prevent ulcers.   - Take any other medicines that your doctor tells you to take.   - Wear your binder to support your belly muscles.  Please call the clinic at [phone] for any concerns      How much rest do I need?  Get plenty of rest the first few days after surgery. Balance rest and activity.  Do not nap more than one hour during the day. Set a timer to wake yourself up, if needed. Too much sleep will keep you from drinking enough fluid during the day.  What should I know about my incisions (cuts)?  - Change your bandages as needed or if they get wet.   -Call your doctor if you have any of these signs of infection:   - Redness around the site.   - Drainage that smells bad.   - Fever of 101  F (38.3  C) or higher when taken under the tongue.- Chills.  If you     have a drain:  - Do not pull on the drain. Do not pull the drain out. We will take out your drain at your first clinic visit.  - The color and amount of fluid varies. Right after surgery the fluid is bright red. Over time, it changes to light pink and may become clear or the color of straw.   Will my urine or bowel movements change?   You might not have a bowel movement for several days  after surgery. Your first bowel movements will likely be liquid.   Gastric bypass or sleeve gastrectomy: You may also notice old blood or a darker color in your stools (bowel movements).   Your urine should be clear to light yellow. This shows that you are drinking enough fluid. You should urinate (pass water) at least 2 to 3 times during the day. If not, call us.  What kind of activity is safe?  For 4 weeks after surgery:     Walk for a short time every day.    Do not jog or run.    No weight lifting or belly exercises.  No swimming, baths or hot tubs until your cuts are healed (scabs are gone). You may shower.  No outside activity in hot, humid weather until you can drink 48 to 64 ounces of fluid in 24 hours. If you sweat a lot, your body may lose too much water.   The first month after surgery, do not take any trips where you must sit for a long time. You could get a blood clot in your legs.  Call the clinic at [phone] if:    Your pain medicine is not working.    You do not urinate (pass water) 2 to 3 times per day.    You have any signs of infection.    You have belly pain that gets worse and worse.    You have swollen legs with pain behind the knee or calf.    You have chest pain or feel very short of breath.    You have any questions or concerns.    You have a sudden severe increase in heart rate.    You have vomiting that gets worse and worse.  When should I go back to the clinic?  Time Gastric bypass or sleeve gastrectomy Adjustable gastric band   Week 1 See the physician or physician assistant (PA). See the nurse and physical therapist.   Week 2 See the nurse and dietitian. See the nurse and dietitian.   Week 4 Have a nutrition consult with the dietitian. See the PA and dietitian.   Week 6  Go for the first adjustment (fill) of your stomach band.   For the first year (or until your BMI is less than 30) Go to the clinic each month to see if you need a band adjustment (fill).

## 2018-05-23 NOTE — PLAN OF CARE
Problem: Patient Care Overview  Goal: Plan of Care/Patient Progress Review  Outcome: No Change  A & O, VSS RA while awake. Patient refused CPAP tonight, 1 L O2 via NC while sleeping. Pain managed w/ tylenol, oxy, and ice. BS hypo, flatus -. Pt denies nausea. Tolerating bariatric clears. LS clear. Lap sites CDI. Abdominal binder secured in place. Ambulates SBA.

## 2018-05-23 NOTE — PROGRESS NOTES
Pt given written and verbal discharge instructions. Pt knows that follow up care is needed and who to call with any questions or concerns. Pt has order for zantac and script was called to wrong pharmacy, call this pharmacy and they said medications is available over the counter and insurance does not cover medication, pt states he will  at store. All questions and concerns addressed and pt is ready for discharge

## 2018-05-23 NOTE — PLAN OF CARE
Problem: Patient Care Overview  Goal: Plan of Care/Patient Progress Review  Outcome: No Change  POD 2.  Pt A&Ox4.  VSS on 1L oxygen via NC during the night, RA while awake.  Pain managed with oxycodone.  CMS intact.  Ambulated in hallway.  Up with SBA.  LR infusing at 150mL/hr.  Voiding adequately.  Bowel sounds hypoactive, +flatus.  Nausea controlled with Zofran.  Bariatric clears.  Lung sounds clear.  Lap sites CDI.  Abdominal binder in place.  Family at bedside.  Nursing to continue to monitor.

## 2018-05-23 NOTE — PROGRESS NOTES
"[facility]  Bariatric Surgery Progress Note    Admission Date: 2018         Assessment and Plan:   David Marino is a 53 year old male S/P Procedure(s):  LAPAROSCOPIC BYPASS GASTRIC, 2 Days Post-Op.     - Continue bariatric fulls  - Ambulate 4x day and encourage IS  - Shower  - Home  - DC instructions discussed             Interval History:   P up tp 103 last night, in 80s now, hypertensive, also better, states he was in pain at the time, tolerating diet, pain controlled with PO meds, UO adequate, ambulating, + flatus. Meds reviewed.                     Physical Exam:   Blood pressure 132/89, pulse 84, temperature 98.8  F (37.1  C), temperature source Oral, resp. rate 16, height 5' 10\" (1.778 m), weight 241 lb (109.3 kg), SpO2 94 %.  Temperature Temp  Av.5  F (36.9  C)  Min: 98.1  F (36.7  C)  Max: 98.8  F (37.1  C)   I/O last 3 completed shifts:  In: 4109 [P.O.:520; I.V.:3589]  Out: 475 [Urine:475]  Constitutional:  Awake, alert, oriented, and in no apparent distress.   Lungs: No increased work of breathing, good air exchange, clear to auscultation bilaterally, and no crackles or wheezing.   Cardiovascular: Regular rate and rhythm, normal S1 and S2, and no murmur noted.   Abdomen: Soft, non-distended, appropriately tender at incision(s), + BS. Binder present.    Wounds: Clean, dry, and intact.  No erythema or drainage.    Extremities: No edema or calf tenderness. +SCDs.          Data:     Recent Labs  Lab 18  0601 18  1100   GLC  --  106*   *  --        KHANG VictoriaC  [facility]  [phone]  "

## 2018-05-23 NOTE — OP NOTE
General Surgery Operative Note    PREOPERATIVE DIAGNOSIS:  Morbid OBESITY     POSTOPERATIVE DIAGNOSIS:  Same, hypertension, sleep apnea, hyperlipidemia, hiatal hernia    PROCEDURE:   Procedure(s):  LAPAROSCOPIC BYPASS GASTRIC, reduction hiatal hernia    ANESTHESIA:  General.      SURGEON:  Noe Saunders MD    ASSISTANT:  Amparo Hugo PA-C. The physician assistant was medically necessary for their expertise in camera management, suctioning, and retraction    INDICATIONS:  Morbid Obesity, multiple co-morbidities    PROCEDURE:  The patient was taken to the operating suite.  The operative area was prepped and draped in a sterile fashion.  Surgeon initiated timeout was acknowledged.  The abdomen was insufflated via a left supraumbilical incision using a veress needle with low pressures. An 11mm trocar was inserted. There was no injury seen when the camera was placed. 5mm trocars were placed in the right subxiphoid position and the left upper quadrant and 12 mm trocars in the right medial and left far lateral abdomen. The abdomen was inspected. The ligament of Treitz was identified and we went distal to an area of long mesenteric reach. The mesentery was divided with the harmonic scalpel and the bowel divided with a 60 blue stapler load. A 130cm Yamilet limb was measured and a side to side Jejuno-jejunostomy was created with a blue 60 load. The defect was closed with a transversely oriented 60 blue load. The mesenteric defect was closed with running 2-0 silk, the anastomotic crotch was reinforced with 2-0 suture. The omentum was divided in the midline. A liver retractor was placed. The angle of His was dissected clean. A moderate sized hiatal hernia was dissected out. This was then reduced carefully after dissecting the proximal stomach and distal esophagus. The sac was partially reduced.  A window was made behind the stomach.  An anterior gastrotomy was made and a circular stapler anvil size 21 was introduced.  This was placed  out through the anterior portion of the proximal stomach.  Gastrostomy was closed with a blue load of the 60 stapler after the orogastric tube was removed.  Pouch was created with multiple applications of the 60 blue load stapler and one purple 60 load.  Hemostasis was assured on both staple lines with clips and cautery.  The Yamilet limb was brought up from below and checked for twists, there were none.  The end of the Yamilet limb was opened with harmonic scalpel.  A mesenteric vessel was sacrificed.  The circular stapler size 21 was introduced from the left side and placed into the small bowel.  The shaft was placed through the antimesenteric portion of the small bowel.  Pieces of the stapler were connected. It was closed, fired, opened and removed without difficulty.   Excess Yamilet limb was divided with a purple load of the stapler with suture line reinforcement.  Hemostasis on the candycane was assured with cautery.  Gastrojejunostomy was reinforced with interrupted 2-0 Vicryl.  Bubble test was done showing no leakage.  Valenzuela's defect was closed with running 2-0 silk.  Omentum was laid in front. The abdomen was inspected and appeared to be dry.  Left lateral trocar was removed and the fascia closed with 0 Vicryl under direct vision.  Gas was suctioned out of the abdomen and trocars were all removed.   Sponge count was reported as correct.  The skin edges were reapproximated with 4-0 Vicryl and Steri-Strips.  The patient was  awakened and taken to the PACU in stable condition.  At the conclusion of the case, all counts were correct.    Noe Saunders

## 2018-05-24 ENCOUNTER — TELEPHONE (OUTPATIENT)
Dept: FAMILY MEDICINE | Facility: CLINIC | Age: 53
End: 2018-05-24

## 2018-05-24 NOTE — TELEPHONE ENCOUNTER
ED / Discharge Outreach Protocol    Patient Contact    Attempt # 1    Was call answered?  No.  Left message on voicemail with information to call me back.  Brneda Massey RN    LAPAROSCOPIC BYPASS GASTRIC  Follow up at the [facility] next week as scheduled.  Please call   the clinic if you have any questions or concerns.  Follow up with your PCP   in 3-4 weeks

## 2018-05-25 NOTE — TELEPHONE ENCOUNTER
Pt returned call.  He is feeling fine and will keep Tuesday's appt.  He may be reached at [phone].

## 2018-05-25 NOTE — TELEPHONE ENCOUNTER
ED / Discharge Outreach Protocol    Patient Contact    Attempt # 2    Was call answered?  No.  Left message on voicemail with information to call me back.    Monika RODRIGUES RN

## 2018-05-29 ENCOUNTER — OFFICE VISIT (OUTPATIENT)
Dept: SURGERY | Facility: CLINIC | Age: 53
End: 2018-05-29
Payer: COMMERCIAL

## 2018-05-29 VITALS
BODY MASS INDEX: 33.33 KG/M2 | HEART RATE: 70 BPM | WEIGHT: 232.3 LBS | RESPIRATION RATE: 16 BRPM | SYSTOLIC BLOOD PRESSURE: 118 MMHG | TEMPERATURE: 97.8 F | OXYGEN SATURATION: 100 % | DIASTOLIC BLOOD PRESSURE: 85 MMHG

## 2018-05-29 DIAGNOSIS — E66.811 OBESITY (BMI 30.0-34.9): Primary | ICD-10-CM

## 2018-05-29 DIAGNOSIS — K91.2 POSTSURGICAL MALABSORPTION: ICD-10-CM

## 2018-05-29 DIAGNOSIS — G47.33 OSA (OBSTRUCTIVE SLEEP APNEA): ICD-10-CM

## 2018-05-29 DIAGNOSIS — I10 BENIGN ESSENTIAL HYPERTENSION: ICD-10-CM

## 2018-05-29 DIAGNOSIS — Z98.84 BARIATRIC SURGERY STATUS: ICD-10-CM

## 2018-05-29 PROBLEM — E66.01 MORBID OBESITY (H): Status: RESOLVED | Noted: 2018-05-21 | Resolved: 2018-05-29

## 2018-05-29 PROBLEM — E66.9 OBESITY (BMI 30-39.9): Status: RESOLVED | Noted: 2017-07-11 | Resolved: 2018-05-29

## 2018-05-29 PROCEDURE — 99207 ZZC NO CHARGE NURSE ONLY: CPT

## 2018-05-29 PROCEDURE — 99024 POSTOP FOLLOW-UP VISIT: CPT | Performed by: PHYSICIAN ASSISTANT

## 2018-05-29 NOTE — MR AVS SNAPSHOT
After Visit Summary   5/29/2018    David Marino    MRN: [id number]           Patient Information     Date Of Birth          1965        Visit Information        Provider Department      5/29/2018 11:00 AM Rn, Saranya Schroeder, RN [facility]      Today's Diagnoses     Obesity (BMI 30.0-34.9)    -  1    Bariatric surgery status           Follow-ups after your visit        Your next 10 appointments already scheduled     Jun 04, 2018 10:00 AM CDT   Post Op with Saranya Schroeder Rn, RN   [facility] ([facility])    [address]  [facility] [address] [phone]            Jun 22, 2018  9:30 AM CDT   Post Op with Saranya Schroeder Diet 1, RD   [facility] ([facility])    [address]  [facility] [id number] [phone]              Who to contact     If you have questions or need follow up information about today's clinic visit or your schedule please contact [facility] directly at [phone].  Normal or non-critical lab and imaging results will be communicated to you by MyChart, letter or phone within 4 business days after the clinic has received the results. If you do not hear from us within 7 days, please contact the clinic through [employer] or phone. If you have a critical or abnormal lab result, we will notify you by phone as soon as possible.  Submit refill requests through [employer] or call your pharmacy and they will forward the refill request to us. Please allow 3 business days for your refill to be completed.          Additional Information About Your Visit        [employer] Information     [employer] gives you secure access to your electronic health record. If you see a primary care provider, you can also send messages to your care team and make  appointments. If you have questions, please call your primary care clinic.  If you do not have a primary care provider, please call [phone] and they will assist you.        Care EveryWhere ID     This is your Care EveryWhere ID. This could be used by other organizations to access your [facility] medical records  [id number]         Blood Pressure from Last 3 Encounters:   05/29/18 118/85   05/23/18 132/89   05/15/18 126/76    Weight from Last 3 Encounters:   05/29/18 232 lb 4.8 oz (105.4 kg)   05/21/18 241 lb (109.3 kg)   05/15/18 249 lb 8 oz (113.2 kg)              Today, you had the following     No orders found for display         Today's Medication Changes          These changes are accurate as of 5/29/18 11:44 AM.  If you have any questions, ask your nurse or doctor.               These medicines have changed or have updated prescriptions.        Dose/Directions    DULoxetine 30 MG EC capsule   Commonly known as:  CYMBALTA   This may have changed:  when to take this   Used for:  Adjustment disorder with depressed mood        Dose:  30 mg   Take 1 capsule (30 mg) by mouth 2 times daily   Quantity:  180 capsule   Refills:  3       rosuvastatin 10 MG tablet   Commonly known as:  CRESTOR   This may have changed:    - how much to take  - how to take this  - when to take this  - additional instructions   Used for:  Mixed hyperlipidemia        TAKE 1 TABLET(10 MG) BY MOUTH DAILY   Quantity:  90 tablet   Refills:  3                Primary Care Provider Office Phone # Fax #    Jus Woodrow Oswald [phone] [address] [facility] [address]        Equal Access to Services     [facility]: Hadii jaye palma [place], waaxda luqadaha, qaybta kaalmalara champion . So [facility] [phone].    ATENCIÓN: Si habla español, tiene a obrien disposición servicios gratuitos de asistencia lingüística. Llame al [phone].    We comply with applicable  federal civil rights laws and [place] laws. We do not discriminate on the basis of race, color, national origin, age, disability, sex, sexual orientation, or gender identity.            Thank you!     Thank you for choosing [facility]  for your care. Our goal is always to provide you with excellent care. Hearing back from our patients is one way we can continue to improve our services. Please take a few minutes to complete the written survey that you may receive in the mail after your visit with us. Thank you!             Your Updated Medication List - Protect others around you: Learn how to safely use, store and throw away your medicines at www.disposemymeds.org.          This list is accurate as of 5/29/18 11:44 AM.  Always use your most recent med list.                   Brand Name Dispense Instructions for use Diagnosis    B-12 DOTS SL      Place 1 tablet under the tongue daily        CALCIUM-VITAMIN D PO      Take 1 tablet by mouth 3 times daily        CLONAZEPAM PO      Take 0.5 mg by mouth nightly as needed for anxiety        DULoxetine 30 MG EC capsule    CYMBALTA    180 capsule    Take 1 capsule (30 mg) by mouth 2 times daily    Adjustment disorder with depressed mood       FISH OIL OMEGA-3 PO      Take 1 capsule by mouth At Bedtime        Glycopyrrolate Powd     30 g    Compounded. Apply pea size amount every morning. 0.5% cream. For chromhidrosis, 30 gm jar    Chromhidrosis       metroNIDAZOLE 0.75 % topical gel    METROGEL    60 g    Apply 1 g topically 2 times daily    Rosacea       montelukast 10 MG tablet    SINGULAIR     TAKE 1 TAB BY MOUTH AT BEDTIME        MULTIVITAMIN ADULT PO      Take 2 tablets by mouth every morning        ranitidine 75 MG tablet    ZANTAC    60 tablet    Take 1 tablet (75 mg) by mouth 2 times daily    Morbid obesity (H)       rosuvastatin 10 MG tablet    CRESTOR    90 tablet    TAKE 1 TABLET(10 MG) BY MOUTH DAILY    Mixed hyperlipidemia        TRANSDERM-SCOP (1.5 MG) 72 hr patch   Generic drug:  scopolamine      Place 1 patch onto the skin every 72 hours (Vertigo)        traZODone 50 MG tablet    DESYREL    90 tablet    Take 1 tablet (50 mg) by mouth At Bedtime    Other insomnia       valsartan 320 MG tablet    DIOVAN    90 tablet    Take 1 tablet (320 mg) by mouth daily    Benign essential hypertension       VITAMIN D (CHOLECALCIFEROL) PO      Take 5,000 Units by mouth every morning        VITRON-C PO      Take 1 tablet by mouth every morning

## 2018-05-29 NOTE — PROGRESS NOTES
[facility]   1 Week Surgical Follow-Up     PCP:  Jus Oswald    DOS: 05/21/18  Surgeon: PLB  Surgery Type: Bypass  HISTORY OF PRESENT ILLNESS:  David Marino returns today for her follow-up appointment status post bariatric surgery.  He is currently using nothing for pain medication. Did take roxicodone for a couple of days.  Refill Needed: No.  Patient's Pain Scale: 2. The pain is located left side of abdomen.  He is not so much in pain as much as sore.   Patient's current daily fluid intake in oz is: 62 ounces of water, Crystal Light, and Premier Protein drink.  Patient has started postoperative Vitamins: Yes.  Taking all depression medications.  Feels good mentally.    Not on CPAP.   Did not use the CPAP in the hospital.  Has not been using since being home.  Does not like to wear it.   Activity:   Activity: walking up to 1 floor at [facility] and up most of the time daily  REVIEW OF SYSTEMS:  GI:    Nausea: No  Vomiting: No  Diarrhea: No  Constipation: No  Last BM: this am dark and soft  Dysphagia: No  GERD: No    CV/Pulmonary:   Chest Pain: No  Dizzy: No  Light Headed: No  SOB: No using IS couple times per day.  Gets up to 2500.    Endo:  Diabetes: No  :    Contraception: male  Dysuria: No  Vascular:    LE Edema: No     PHYSICAL EXAMINATION:    /85 (BP Location: Left arm, Patient Position: Sitting, Cuff Size: Adult Large)  Pulse 70  Temp 97.8  F (36.6  C)  Resp 16  Wt 232 lb 4.8 oz (105.4 kg)  SpO2 100%  BMI 33.33 kg/m2     GENERAL: No acute distress. Alert and oriented time 3.  HEART: Regular rate and rhythm.  LUNGS:  Clear to auscultation bilaterally.  ABDOMEN: Soft, incisions clean,dry, and intact. Tenderness WNL for post op.  EXTREMITIES: No lower extremity edema bilaterally. No calf swelling or tenderness. Negative carlos a's  SKIN: No rashes.  PSYCHOLOGICAL: Stable. Pleasant      ASSESSMENT:    1. S/P bariatric surgery.  2. Post surgical malabsorption  3. ZARIA  4. Blood  pressure    PLAN:   Discussed if any change in mental health to contact PCP.  Since he did have a malabsorptive procedure he needs to be aware of possible changes.   Discussed importance of CPAP use  Continue with recommended antacid medication for the next 3 months.   Strive to drink 64 oz of fluid daily.  Strive to move hourly while awake to decrease risk of developing a blood clot.  Continue to do deep breathing exercises twice daily or use your spirometer.  Follow up with your primary care provider to discuss obesity related conditions by one month post op. Re: blood pressure  Start recommended postoperative vitamins within in the first 6 weeks.  Advance diet per Dietitian at your 2 week appointment.   Make follow up appointment to meet with psychologist at 1 and 3 months post operatively.   Wear binder to support abdominal muscles and gradually wean off over the next few weeks.   Return to clinic for 2 wk post op appointment and bring post op vitamins along.  Call with questions or concerns at any time.

## 2018-05-29 NOTE — MR AVS SNAPSHOT
After Visit Summary   5/29/2018    David Marino    MRN: [id number]           Patient Information     Date Of Birth          1965        Visit Information        Provider Department      5/29/2018 11:20 AM Cosme Foote PA-C [facility]      Today's Diagnoses     Obesity (BMI 30.0-34.9)    -  1    Postsurgical malabsorption        Benign essential hypertension        ZARIA (obstructive sleep apnea) Severe AHI 51          Care Instructions    Discussed if any change in mental health to contact PCP.  Since he did have a malabsorptive procedure he needs to be aware of possible changes.   Discussed importance of CPAP use  Continue with recommended antacid medication for the next 3 months.   Strive to drink 64 oz of fluid daily.  Strive to move hourly while awake to decrease risk of developing a blood clot.  Continue to do deep breathing exercises twice daily or use your spirometer.  Follow up with your primary care provider to discuss obesity related conditions by one month post op. Re: blood pressure  Start recommended postoperative vitamins within in the first 6 weeks.  Advance diet per Dietitian at your 2 week appointment.   Make follow up appointment to meet with psychologist at 1 and 3 months post operatively.   Wear binder to support abdominal muscles and gradually wean off over the next few weeks.   Return to clinic for 2 wk post op appointment and bring post op vitamins along.  Call with questions or concerns at any time.            Follow-ups after your visit        Your next 10 appointments already scheduled     Jun 04, 2018 10:00 AM CDT   Post Op with Saranya Schroeder Rn, RN   [facility] ([facility])    [address] [place] [address]   [phone]            Jun 22, 2018  9:30 AM CDT   Post Op with Saranya Barton 1, RD   [facility] [place] ([facility])    [address] [place] [address] [address]  [facility] [address] [phone]              Who to contact     If you have questions or need follow up information about today's clinic visit or your schedule please contact [facility] directly at [phone].  Normal or non-critical lab and imaging results will be communicated to you by Ag[employer], letter or phone within 4 business days after the clinic has received the results. If you do not hear from us within 7 days, please contact the clinic through [employer] or phone. If you have a critical or abnormal lab result, we will notify you by phone as soon as possible.  Submit refill requests through [employer] or call your pharmacy and they will forward the refill request to us. Please allow 3 business days for your refill to be completed.          Additional Information About Your Visit        [employer] Information     [employer] gives you secure access to your electronic health record. If you see a primary care provider, you can also send messages to your care team and make appointments. If you have questions, please call your primary care clinic.  If you do not have a primary care provider, please call [phone] and they will assist you.        Care EveryWhere ID     This is your Care EveryWhere ID. This could be used by other organizations to access your [facility] medical records  [id number]        Your Vitals Were     Pulse Temperature Respirations Pulse Oximetry BMI (Body Mass Index)       70 97.8  F (36.6  C) 16 100% 33.33 kg/m2        Blood Pressure from Last 3 Encounters:   05/29/18 118/85   05/23/18 132/89   05/15/18 126/76    Weight from Last 3 Encounters:   05/29/18 232 lb 4.8 oz (105.4 kg)   05/21/18 241 lb (109.3 kg)   05/15/18 249 lb 8 oz (113.2 kg)              Today, you had the following     No orders found for display         Today's Medication Changes           These changes are accurate as of 5/29/18 12:01 PM.  If you have any questions, ask your nurse or doctor.               These medicines have changed or have updated prescriptions.        Dose/Directions    DULoxetine 30 MG EC capsule   Commonly known as:  CYMBALTA   This may have changed:  when to take this   Used for:  Adjustment disorder with depressed mood        Dose:  30 mg   Take 1 capsule (30 mg) by mouth 2 times daily   Quantity:  180 capsule   Refills:  3       rosuvastatin 10 MG tablet   Commonly known as:  CRESTOR   This may have changed:    - how much to take  - how to take this  - when to take this  - additional instructions   Used for:  Mixed hyperlipidemia        TAKE 1 TABLET(10 MG) BY MOUTH DAILY   Quantity:  90 tablet   Refills:  3                Primary Care Provider Office Phone # Fax #    Jus Woodrow Oswald [phone] [address] [facility] [address]        Equal Access to Services     [employer] : Hadii jaye pope hadasho [place], waaxda luqadaha, qaybta kaalmada adeegyada, lara danielson . So [facility] [phone].    ATENCIÓN: Si habla español, tiene a obrien disposición servicios gratuitos de asistencia lingüística. Llame al [phone].    We comply with applicable federal civil rights laws and [place] laws. We do not discriminate on the basis of race, color, national origin, age, disability, sex, sexual orientation, or gender identity.            Thank you!     Thank you for choosing [facility]  for your care. Our goal is always to provide you with excellent care. Hearing back from our patients is one way we can continue to improve our services. Please take a few minutes to complete the written survey that you may receive in the mail after your visit with us. Thank you!             Your Updated Medication List - Protect others around you: Learn how to safely use, store and throw away your  medicines at www.disposemymeds.org.          This list is accurate as of 5/29/18 12:01 PM.  Always use your most recent med list.                   Brand Name Dispense Instructions for use Diagnosis    B-12 DOTS SL      Place 1 tablet under the tongue daily        CALCIUM-VITAMIN D PO      Take 1 tablet by mouth 3 times daily        CLONAZEPAM PO      Take 0.5 mg by mouth nightly as needed for anxiety        DULoxetine 30 MG EC capsule    CYMBALTA    180 capsule    Take 1 capsule (30 mg) by mouth 2 times daily    Adjustment disorder with depressed mood       FISH OIL OMEGA-3 PO      Take 1 capsule by mouth At Bedtime        Glycopyrrolate Powd     30 g    Compounded. Apply pea size amount every morning. 0.5% cream. For chromhidrosis, 30 gm jar    Chromhidrosis       metroNIDAZOLE 0.75 % topical gel    METROGEL    60 g    Apply 1 g topically 2 times daily    Rosacea       montelukast 10 MG tablet    SINGULAIR     TAKE 1 TAB BY MOUTH AT BEDTIME        MULTIVITAMIN ADULT PO      Take 2 tablets by mouth every morning        ranitidine 75 MG tablet    ZANTAC    60 tablet    Take 1 tablet (75 mg) by mouth 2 times daily    Morbid obesity (H)       rosuvastatin 10 MG tablet    CRESTOR    90 tablet    TAKE 1 TABLET(10 MG) BY MOUTH DAILY    Mixed hyperlipidemia       TRANSDERM-SCOP (1.5 MG) 72 hr patch   Generic drug:  scopolamine      Place 1 patch onto the skin every 72 hours (Vertigo)        traZODone 50 MG tablet    DESYREL    90 tablet    Take 1 tablet (50 mg) by mouth At Bedtime    Other insomnia       valsartan 320 MG tablet    DIOVAN    90 tablet    Take 1 tablet (320 mg) by mouth daily    Benign essential hypertension       VITAMIN D (CHOLECALCIFEROL) PO      Take 5,000 Units by mouth every morning        VITRON-C PO      Take 1 tablet by mouth every morning

## 2018-05-29 NOTE — PATIENT INSTRUCTIONS
Discussed if any change in mental health to contact PCP.  Since he did have a malabsorptive procedure he needs to be aware of possible changes.   Discussed importance of CPAP use  Continue with recommended antacid medication for the next 3 months.   Strive to drink 64 oz of fluid daily.  Strive to move hourly while awake to decrease risk of developing a blood clot.  Continue to do deep breathing exercises twice daily or use your spirometer.  Follow up with your primary care provider to discuss obesity related conditions by one month post op. Re: blood pressure  Start recommended postoperative vitamins within in the first 6 weeks.  Advance diet per Dietitian at your 2 week appointment.   Make follow up appointment to meet with psychologist at 1 and 3 months post operatively.   Wear binder to support abdominal muscles and gradually wean off over the next few weeks.   Return to clinic for 2 wk post op appointment and bring post op vitamins along.  Call with questions or concerns at any time.

## 2018-06-04 ENCOUNTER — OFFICE VISIT (OUTPATIENT)
Dept: SURGERY | Facility: CLINIC | Age: 53
End: 2018-06-04
Payer: COMMERCIAL

## 2018-06-04 ENCOUNTER — OFFICE VISIT (OUTPATIENT)
Dept: FAMILY MEDICINE | Facility: CLINIC | Age: 53
End: 2018-06-04
Payer: COMMERCIAL

## 2018-06-04 VITALS
DIASTOLIC BLOOD PRESSURE: 84 MMHG | WEIGHT: 224.9 LBS | RESPIRATION RATE: 14 BRPM | OXYGEN SATURATION: 99 % | SYSTOLIC BLOOD PRESSURE: 114 MMHG | HEART RATE: 73 BPM | TEMPERATURE: 98.2 F | BODY MASS INDEX: 32.27 KG/M2

## 2018-06-04 VITALS
OXYGEN SATURATION: 99 % | HEART RATE: 80 BPM | WEIGHT: 225.9 LBS | SYSTOLIC BLOOD PRESSURE: 112 MMHG | HEIGHT: 70 IN | RESPIRATION RATE: 16 BRPM | DIASTOLIC BLOOD PRESSURE: 78 MMHG | TEMPERATURE: 97.8 F | BODY MASS INDEX: 32.34 KG/M2

## 2018-06-04 VITALS — WEIGHT: 224.9 LBS | BODY MASS INDEX: 32.2 KG/M2 | HEIGHT: 70 IN

## 2018-06-04 DIAGNOSIS — Z98.84 BARIATRIC SURGERY STATUS: ICD-10-CM

## 2018-06-04 DIAGNOSIS — E66.811 OBESITY (BMI 30.0-34.9): Primary | ICD-10-CM

## 2018-06-04 DIAGNOSIS — F41.1 GAD (GENERALIZED ANXIETY DISORDER): ICD-10-CM

## 2018-06-04 DIAGNOSIS — E66.01 MORBID OBESITY (H): Primary | ICD-10-CM

## 2018-06-04 DIAGNOSIS — K91.2 POSTSURGICAL MALABSORPTION: ICD-10-CM

## 2018-06-04 DIAGNOSIS — I10 BENIGN ESSENTIAL HYPERTENSION: ICD-10-CM

## 2018-06-04 DIAGNOSIS — K21.9 GASTROESOPHAGEAL REFLUX DISEASE, ESOPHAGITIS PRESENCE NOT SPECIFIED: ICD-10-CM

## 2018-06-04 DIAGNOSIS — E66.811 OBESITY (BMI 30.0-34.9): ICD-10-CM

## 2018-06-04 PROCEDURE — 99207 ZZC NO CHARGE NURSE ONLY: CPT

## 2018-06-04 PROCEDURE — 97803 MED NUTRITION INDIV SUBSEQ: CPT | Performed by: DIETITIAN, REGISTERED

## 2018-06-04 PROCEDURE — 99214 OFFICE O/P EST MOD 30 MIN: CPT | Mod: 24 | Performed by: FAMILY MEDICINE

## 2018-06-04 RX ORDER — CIMETIDINE 400 MG
400 TABLET ORAL 2 TIMES DAILY
Qty: 60 TABLET | Refills: 11 | Status: SHIPPED | OUTPATIENT
Start: 2018-06-04 | End: 2021-10-05

## 2018-06-04 ASSESSMENT — ANXIETY QUESTIONNAIRES
5. BEING SO RESTLESS THAT IT IS HARD TO SIT STILL: SEVERAL DAYS
6. BECOMING EASILY ANNOYED OR IRRITABLE: MORE THAN HALF THE DAYS
2. NOT BEING ABLE TO STOP OR CONTROL WORRYING: SEVERAL DAYS
1. FEELING NERVOUS, ANXIOUS, OR ON EDGE: SEVERAL DAYS
GAD7 TOTAL SCORE: 9
3. WORRYING TOO MUCH ABOUT DIFFERENT THINGS: SEVERAL DAYS
7. FEELING AFRAID AS IF SOMETHING AWFUL MIGHT HAPPEN: SEVERAL DAYS
IF YOU CHECKED OFF ANY PROBLEMS ON THIS QUESTIONNAIRE, HOW DIFFICULT HAVE THESE PROBLEMS MADE IT FOR YOU TO DO YOUR WORK, TAKE CARE OF THINGS AT HOME, OR GET ALONG WITH OTHER PEOPLE: NOT DIFFICULT AT ALL

## 2018-06-04 ASSESSMENT — PATIENT HEALTH QUESTIONNAIRE - PHQ9: 5. POOR APPETITE OR OVEREATING: MORE THAN HALF THE DAYS

## 2018-06-04 NOTE — PROGRESS NOTES
"BARIATRIC PROGRESS NOTE - 2 Week Post Op  DATE OF VISIT: June 4, 2018    David Marino  1965  male  [id number]  53 year old    ASSESSMENT:    REASON FOR VISIT:  David Marino is a 53 year old year old male presents today for a 2 Week Post Op nutrition follow-up appointment. Patient is accompanied by self      DIAGNOSIS:  Status: post gastric bypass surgery.  Obesity:  Obesity Grade I BMI 30-34.9    ANTHROPOMETRICS:  Height: 177.8 cm (5' 10\")  Initial weight: 112.5 kg (248 lb)  Current Weight: 102 kg (224 lb 14.4 oz)  BMI: 32.34 kg/(m^2).    VITAMINS AND MINERALS:   2 Multivitamin with Minerals  250 mg Calcium With Vitamin D TID-2 hours away from MVI  5000 International units Vitamin D  1000 mcg Vitamin B-12 sublingual      NUTRITION HISTORY:  Tolerating diet: Bariatric full liquid diet  Fluids/water intake: 50 ounces/day  Fluids: enhanced  water, protein drink, decaf coffee  Small bites: Yes  Portion size: 1/2 cup or less  20-30 minute meals: Yes  Chew foods thoroughly: Yes (tried some solids in cream soup-cram of chicken)  Breakfast: unsweetened apple sauce or Greek yogurt  Lunch: 1/2 cup sugar free pudding or yogurt  Dinner: 1/2 cup cream of chicken or clam chowder  Snacks: protein drink (30 g protein)  Nausea: none  Vomiting: none  Additional Information: Patient is pleased that he has not had any nausea or vomiting. Patient brought in his vitamin bottles today and they were all good options, but pt does not like the calcium chew so he will try another product.      PHYSICAL ACTIVITY:  Type: walking  Frequency: 7 days a week.  Duration: 40 minutes.    DIAGNOSIS:       Current Nutrition Diagnosis: Altered gastrointestinal function related to alternation in gastrointestinal structure as evidenced by history of gastric bypass.     INTERVENTION  Nutrition Prescription: Recommended bariatric puree diet.    Goals:  Start puree diet at day 14 post op.  Take 500 mg calcium three times per day and 2 hours away from " multivitamins/ mineral  Continue MVI, calcium with vitamin D, vitamin B12, vitamin D  Aim for 60 to 90 grams protein.  Continue 48 to 64 oz of fluid per day.    Implementation:  Discussed transition to puree diet.  Emphasized importance of adequate protein.  Reviewed required vitamins and mineral supplements.  Verbalizes good understanding of surgery diet guidelines.  Assessed learning needs and learning preferences.      NUTRITION MONITORING AND EVALUATION:   Monitor diet tolerance and weight loss.      Anticipated Compliance: good  Follow Up: Continue to monitor patient closely regarding weight loss and diet.  At 4 weeks Post Op    TIME SPENT WITH PATIENT: 20 minutes.    Bryan Aiken RD, LD  [facility]  [phone]

## 2018-06-04 NOTE — PROGRESS NOTES
SUBJECTIVE:   David Marino is a 53 year old male who presents to clinic today for the following health issues:      Patient would like to discuss meds, blood pressure and anxiety related issues- had bariatric surgery on May 21, 2018.    THU-7 SCORE 10/14/2016 11/13/2017 6/4/2018   Total Score 13 3 9       Last week started feeling lightheaded  On 320 valsartan, on sat went back to 160  And then stopped completely    Hx of using klonopin  Has been taking it at bedtime  In 2 months wonders if will be able to continue (all worries are gone)      ROS: As per HPI.    CV: no palpitations, no chest pain, no lower extremity swelling.  Resp: no shortness of breath, wheezing, or cough.    I have reviewed and updated the patient's past medical history in the EMR. Current problems are:    Patient Active Problem List   Diagnosis     Chromhidrosis     Environmental allergies     Gastroesophageal reflux disease     Hiatal hernia     Mixed hyperlipidemia     Hypertriglyceridemia     Benign essential hypertension     Insomnia     Testosterone deficiency     Gastric polyposis     Adiposity     Rib pain     Rosacea     Adjustment disorder with depressed mood     Low back strain, initial encounter     BMI 32.0-32.9,adult     Sleep disturbance     ZARIA (obstructive sleep apnea) Severe AHI 51     Mild intermittent asthma with acute exacerbation     Obesity (BMI 30.0-34.9)     Postsurgical malabsorption     Past Surgical History:   Procedure Laterality Date     APPENDECTOMY       ARTHROSCOPY KNEE Right 09/28/2016    Procedure: Arthroscopic partial medial meniscectomy, right knee. Surgeon:  Keith Ayala MD  Location: [facility]     AS REMOVAL OF TONSILS,<12 Y/O       C SYMPATHECTOMY,CERVICAL  2003     LAPAROSCOPIC BYPASS GASTRIC N/A 5/21/2018    Procedure: LAPAROSCOPIC BYPASS GASTRIC;  LAPAROSCOPIC GASTRIC BYPASS    EBL: 7mL;  Surgeon: Noe Saunders MD;  Location:  OR       Social History   Substance Use  Topics     Smoking status: Former Smoker     Smokeless tobacco: Never Used      Comment: smoker 30+ years ago     Alcohol use 0.0 oz/week     0 Standard drinks or equivalent per week      Comment: occ 1-2 x month     Family History   Problem Relation Age of Onset     DIABETES Mother      Coronary Artery Disease Mother      Hypertension Mother      Depression Mother      Asthma Mother      Coronary Artery Disease Father      Hypertension Father      Hyperlipidemia Father      Depression Maternal Grandmother      CEREBROVASCULAR DISEASE No family hx of      Breast Cancer No family hx of      Colon Cancer No family hx of      Prostate Cancer No family hx of      Other Cancer No family hx of      Anxiety Disorder No family hx of      MENTAL ILLNESS No family hx of      Substance Abuse No family hx of      Anesthesia Reaction No family hx of      OSTEOPOROSIS No family hx of      Genetic Disorder No family hx of      Thyroid Disease No family hx of      Obesity No family hx of      Unknown/Adopted No family hx of          Allergies, reviewed:     Allergies   Allergen Reactions     Honey      Pollen Extract      Sulfa Drugs          Current Outpatient Prescriptions on File Prior to Visit:  CALCIUM-VITAMIN D PO Take 1 chew tab by mouth 3 times daily 1 = 250 mg   CLONAZEPAM PO Take 0.5 mg by mouth nightly as needed for anxiety   Cyanocobalamin (B-12 DOTS SL) Place 1,000 mcg under the tongue daily    DULoxetine (CYMBALTA) 30 MG EC capsule Take 1 capsule (30 mg) by mouth 2 times daily (Patient taking differently: Take 30 mg by mouth every morning )   Glycopyrrolate POWD Compounded. Apply pea size amount every morning. 0.5% cream. For chromhidrosis, 30 gm jar   metroNIDAZOLE (METROGEL) 0.75 % topical gel Apply 1 g topically 2 times daily   montelukast (SINGULAIR) 10 MG tablet TAKE 1 TAB BY MOUTH AT BEDTIME   Multiple Vitamins-Minerals (MULTIVITAMIN ADULT PO) Take 2 tablets by mouth every morning    ranitidine (ZANTAC) 75 MG  "tablet Take 1 tablet (75 mg) by mouth 2 times daily   rosuvastatin (CRESTOR) 10 MG tablet TAKE 1 TABLET(10 MG) BY MOUTH DAILY (Patient taking differently: Take 10 mg by mouth At Bedtime TAKE 1 TABLET(10 MG) BY MOUTH DAILY)   traZODone (DESYREL) 50 MG tablet Take 1 tablet (50 mg) by mouth At Bedtime   valsartan (DIOVAN) 320 MG tablet Take 1 tablet (320 mg) by mouth daily   VITAMIN D, CHOLECALCIFEROL, PO Take 5,000 Units by mouth every morning    Omega-3 Fatty Acids (FISH OIL OMEGA-3 PO) Take 1 capsule by mouth At Bedtime    TRANSDERM-SCOP, 1.5 MG, (1.5mg base/3day) patch Place 1 patch onto the skin every 72 hours (Vertigo)     No current facility-administered medications on file prior to visit.       Objective:  /78  Pulse 80  Temp 97.8  F (36.6  C) (Oral)  Resp 16  Ht 5' 10\" (1.778 m)  Wt 225 lb 14.4 oz (102.5 kg)  SpO2 99%  BMI 32.41 kg/m2  General Appearance: Pleasant, alert, WN/WD in no acute respiratory distress.  Skin: no rash, warm and dry.  Neurologic Exam: Nonfocal, no tremor. Normal gait.  Psychiatric Exam: Alert - appropriate, normal affect      BP Readings from Last 3 Encounters:   06/04/18 112/78   06/04/18 114/84   05/29/18 118/85    Wt Readings from Last 3 Encounters:   06/04/18 225 lb 14.4 oz (102.5 kg)   06/04/18 224 lb 14.4 oz (102 kg)   06/04/18 224 lb 14.4 oz (102 kg)          Recent Labs   Lab Test  05/22/18   1100  05/21/18   1625  05/21/18   0620  05/15/18   1614  08/07/17   1435  09/13/16   0721   A1C   --    --    --    --   5.6   --    LDL   --    --    --    --    --   134*   HDL   --    --    --    --    --   40   TRIG   --    --    --    --    --   262*   ALT   --    --    --   84*  84*   --    CR   --   1.03   --   1.10  1.15  1.00   GFRESTIMATED   --   76   --   70  67  79   GFRESTBLACK   --   >90   --   85  81  >90   GFR Calc     POTASSIUM  4.1   --   4.0  4.2  4.3  3.9   TSH   --    --    --    --    --   1.42        ASSESSMENT, PLAN:  1. Morbid obesity " (H)  Overall things going well post bariatric surgery but he needs a prescription for Tagamet  - cimetidine (TAGAMET) 400 MG tablet; Take 1 tablet (400 mg) by mouth 2 times daily  Dispense: 60 tablet; Refill: 11    2. Gastroesophageal reflux disease, esophagitis presence not specified  Mainly for reflux has not noted any symptoms but is at high risk because of the bariatric surgery    3. Benign essential hypertension  Normal blood pressure off of his valsartan perhaps from his dietary changes    4. THU (generalized anxiety disorder)  We did discuss the use of clonazepam at night for anxiety and sleep  And the risks and benefits of this treatment particularly possibility of dependence and withdrawal symptoms with discontinuation    Also he is already taking trazodone since clonazepam may well be doing most of the work he could try discontinuing the trazodone    Otherwise follow-up as needed  Or in 3 months

## 2018-06-04 NOTE — MR AVS SNAPSHOT
After Visit Summary   6/4/2018    David Marino    MRN: [id number]           Patient Information     Date Of Birth          1965        Visit Information        Provider Department      6/4/2018 11:45 AM Jus Oswald MD [facility]        Today's Diagnoses     Morbid obesity (H)    -  1    Gastroesophageal reflux disease, esophagitis presence not specified        Benign essential hypertension        THU (generalized anxiety disorder)           Follow-ups after your visit        Your next 10 appointments already scheduled     Jun 22, 2018  9:30 AM CDT   Post Op with  Wl Diet 1, RD   [facility] ([facility]    [address] [place] [address] [phone]              Who to contact     If you have questions or need follow up information about today's clinic visit or your schedule please contact [facility] directly at [phone].  Normal or non-critical lab and imaging results will be communicated to you by [employer], letter or phone within 4 business days after the clinic has received the results. If you do not hear from us within 7 days, please contact the clinic through [employer] or phone. If you have a critical or abnormal lab result, we will notify you by phone as soon as possible.  Submit refill requests through [employer] or call your pharmacy and they will forward the refill request to us. Please allow 3 business days for your refill to be completed.          Additional Information About Your Visit        MyChart Information     [employer] gives you secure access to your electronic health record. If you see a primary care provider, you can also send messages to your care team and make appointments. If you have questions, please call your primary care clinic.  If you do not have a primary care provider, please call [phone] and they will assist you.        Care EveryWhere  "ID     This is your Care EveryWhere ID. This could be used by other organizations to access your [place] medical records  [id number]        Your Vitals Were     Pulse Temperature Respirations Height Pulse Oximetry BMI (Body Mass Index)    80 97.8  F (36.6  C) (Oral) 16 5' 10\" (1.778 m) 99% 32.41 kg/m2       Blood Pressure from Last 3 Encounters:   06/04/18 112/78   06/04/18 114/84   05/29/18 118/85    Weight from Last 3 Encounters:   06/04/18 225 lb 14.4 oz (102.5 kg)   06/04/18 224 lb 14.4 oz (102 kg)   06/04/18 224 lb 14.4 oz (102 kg)              Today, you had the following     No orders found for display         Today's Medication Changes          These changes are accurate as of 6/4/18 12:28 PM.  If you have any questions, ask your nurse or doctor.               Start taking these medicines.        Dose/Directions    cimetidine 400 MG tablet   Commonly known as:  TAGAMET   Used for:  Morbid obesity (H)   Replaces:  ranitidine 75 MG tablet   Started by:  Jus Oswald MD        Dose:  400 mg   Take 1 tablet (400 mg) by mouth 2 times daily   Quantity:  60 tablet   Refills:  11         These medicines have changed or have updated prescriptions.        Dose/Directions    DULoxetine 30 MG EC capsule   Commonly known as:  CYMBALTA   This may have changed:  when to take this   Used for:  Adjustment disorder with depressed mood        Dose:  30 mg   Take 1 capsule (30 mg) by mouth 2 times daily   Quantity:  180 capsule   Refills:  3       rosuvastatin 10 MG tablet   Commonly known as:  CRESTOR   This may have changed:    - how much to take  - how to take this  - when to take this  - additional instructions   Used for:  Mixed hyperlipidemia        TAKE 1 TABLET(10 MG) BY MOUTH DAILY   Quantity:  90 tablet   Refills:  3         Stop taking these medicines if you haven't already. Please contact your care team if you have questions.     ranitidine 75 MG tablet   Commonly known as:  ZANTAC   Replaced by:  " cimetidine 400 MG tablet   Stopped by:  Jus Oswald MD           valsartan 320 MG tablet   Commonly known as:  DIOVAN   Stopped by:  Jus Oswald MD                Where to get your medicines      These medications were sent to [id number] [facility] - [place], [place] - [address]  [address] [facility] [address]     Phone:  [phone]     cimetidine 400 MG tablet                Primary Care Provider Office Phone # Fax #    Jus Oswald [phone] [address] [facility] [address]        Equal Access to Services     [facility]: Hadii aad ku hadasho Soomaali, waaxda luqadaha, qaybta kaalmada adeegyada, waxay idiin hayaan adeeg hany danielson . So [facility] [phone].    ATENCIÓN: Si habla español, tiene a obrien disposición servicios gratuitos de asistencia lingüística. Llame al [phone].    We comply with applicable federal civil rights laws and [place] laws. We do not discriminate on the basis of race, color, national origin, age, disability, sex, sexual orientation, or gender identity.            Thank you!     Thank you for choosing [facility]  for your care. Our goal is always to provide you with excellent care. Hearing back from our patients is one way we can continue to improve our services. Please take a few minutes to complete the written survey that you may receive in the mail after your visit with us. Thank you!             Your Updated Medication List - Protect others around you: Learn how to safely use, store and throw away your medicines at www.disposemymeds.org.          This list is accurate as of 6/4/18 12:28 PM.  Always use your most recent med list.                   Brand Name Dispense Instructions for use Diagnosis    B-12 DOTS SL      Place 1,000 mcg under the tongue daily        CALCIUM-VITAMIN D PO      Take 1 chew tab by mouth 3 times daily 1 = 250 mg        cimetidine 400 MG tablet    TAGAMET    60  tablet    Take 1 tablet (400 mg) by mouth 2 times daily    Morbid obesity (H)       CLONAZEPAM PO      Take 0.5 mg by mouth nightly as needed for anxiety        DULoxetine 30 MG EC capsule    CYMBALTA    180 capsule    Take 1 capsule (30 mg) by mouth 2 times daily    Adjustment disorder with depressed mood       FISH OIL OMEGA-3 PO      Take 1 capsule by mouth At Bedtime        Glycopyrrolate Powd     30 g    Compounded. Apply pea size amount every morning. 0.5% cream. For chromhidrosis, 30 gm jar    Chromhidrosis       metroNIDAZOLE 0.75 % topical gel    METROGEL    60 g    Apply 1 g topically 2 times daily    Rosacea       montelukast 10 MG tablet    SINGULAIR     TAKE 1 TAB BY MOUTH AT BEDTIME        MULTIVITAMIN ADULT PO      Take 2 tablets by mouth every morning        rosuvastatin 10 MG tablet    CRESTOR    90 tablet    TAKE 1 TABLET(10 MG) BY MOUTH DAILY    Mixed hyperlipidemia       TRANSDERM-SCOP (1.5 MG) 72 hr patch   Generic drug:  scopolamine      Place 1 patch onto the skin every 72 hours (Vertigo)        traZODone 50 MG tablet    DESYREL    90 tablet    Take 1 tablet (50 mg) by mouth At Bedtime    Other insomnia       VITAMIN D (CHOLECALCIFEROL) PO      Take 5,000 Units by mouth every morning

## 2018-06-04 NOTE — MR AVS SNAPSHOT
MRN:[id number]                      After Visit Summary   6/4/2018    David Marino    MRN: [id number]           Visit Information        Provider Department      6/4/2018 10:30 AM 1, Saranya Barton, TAVARES [facility]      Your next 10 appointments already scheduled     Jun 04, 2018 11:45 AM CDT   MyChart Long with Jus Oswald MD   [facility] ([facility])    [address] [facility] [address] [phone]            Jun 22, 2018  9:30 AM CDT   Post Op with Saranya Wl Diet 1, RD   [facility] ([facility])    [facility] [address] [phone]              MyChart Information     [employer] gives you secure access to your electronic health record. If you see a primary care provider, you can also send messages to your care team and make appointments. If you have questions, please call your primary care clinic.  If you do not have a primary care provider, please call [phone] and they will assist you.        Care EveryWhere ID     This is your Care EveryWhere ID. This could be used by other organizations to access your [place] medical records  [id number]        Equal Access to Services     SATINDER XIE : Hadii jaye mccraryo Somargaritaali, waaxda luqadaha, qaybta kaalmada adeegyada, lara moody. So [facility] [phone].    ATENCIÓN: Si habla español, tiene a obrien disposición servicios gratuitos de asistencia lingüística. Llame al [phone].    We comply with applicable federal civil rights laws and [place] laws. We do not discriminate on the basis of race, color, national origin, age, disability, sex, sexual orientation, or gender identity.

## 2018-06-04 NOTE — MR AVS SNAPSHOT
After Visit Summary   6/4/2018    David Marino    MRN: [id number]           Patient Information     Date Of Birth          1965        Visit Information        Provider Department      6/4/2018 10:00 AM Rn, Saranya Schroeder, RN [facility]      Today's Diagnoses     Obesity (BMI 30.0-34.9)    -  1    Bariatric surgery status           Follow-ups after your visit        Your next 10 appointments already scheduled     Jun 04, 2018 11:45 AM CDT   Audrey Koroma with Jus Oswald MD   [facility] ([facility])    [address] [facility] [id number] [phone]            Jun 22, 2018  9:30 AM CDT   Post Op with Saranya Schroeder Diet 1, RD   [facility] ([facility])    [address] [place] [address] [phone]              Who to contact     If you have questions or need follow up information about today's clinic visit or your schedule please contact [facility] directly at [phone].  Normal or non-critical lab and imaging results will be communicated to you by MyChart, letter or phone within 4 business days after the clinic has received the results. If you do not hear from us within 7 days, please contact the clinic through [employer] or phone. If you have a critical or abnormal lab result, we will notify you by phone as soon as possible.  Submit refill requests through [employer] or call your pharmacy and they will forward the refill request to us. Please allow 3 business days for your refill to be completed.          Additional Information About Your Visit        MyChart Information     [employer] gives you secure access to your electronic health record. If you see a primary care provider, you can also send messages to your care team and make appointments. If you have questions,  please call your primary care clinic.  If you do not have a primary care provider, please call [phone] and they will assist you.        Care EveryWhere ID     This is your Care EveryWhere ID. This could be used by other organizations to access your [place] medical records  [id number]        Your Vitals Were     Pulse Temperature Respirations Pulse Oximetry BMI (Body Mass Index)       73 98.2  F (36.8  C) 14 99% 32.27 kg/m2        Blood Pressure from Last 3 Encounters:   06/04/18 114/84   05/29/18 118/85   05/23/18 132/89    Weight from Last 3 Encounters:   06/04/18 224 lb 14.4 oz (102 kg)   06/04/18 224 lb 14.4 oz (102 kg)   05/29/18 232 lb 4.8 oz (105.4 kg)              Today, you had the following     No orders found for display         Today's Medication Changes          These changes are accurate as of 6/4/18 11:25 AM.  If you have any questions, ask your nurse or doctor.               These medicines have changed or have updated prescriptions.        Dose/Directions    DULoxetine 30 MG EC capsule   Commonly known as:  CYMBALTA   This may have changed:  when to take this   Used for:  Adjustment disorder with depressed mood        Dose:  30 mg   Take 1 capsule (30 mg) by mouth 2 times daily   Quantity:  180 capsule   Refills:  3       rosuvastatin 10 MG tablet   Commonly known as:  CRESTOR   This may have changed:    - how much to take  - how to take this  - when to take this  - additional instructions   Used for:  Mixed hyperlipidemia        TAKE 1 TABLET(10 MG) BY MOUTH DAILY   Quantity:  90 tablet   Refills:  3                Primary Care Provider Office Phone # Fax #    Jus Woodrow Oswald [phone] [address] [facility] [address]        Equal Access to Services     SATINDER XIE : Jaime Barnes, favio ying, lara ruffin. So [facility] [phone].    ATENCIÓN: Si doug almanzar, irene herrera obrien  disposición servicios gratuitos de asistencia lingüística. Karen garza [phone].    We comply with applicable federal civil rights laws and [place] laws. We do not discriminate on the basis of race, color, national origin, age, disability, sex, sexual orientation, or gender identity.            Thank you!     Thank you for choosing [facility]  for your care. Our goal is always to provide you with excellent care. Hearing back from our patients is one way we can continue to improve our services. Please take a few minutes to complete the written survey that you may receive in the mail after your visit with us. Thank you!             Your Updated Medication List - Protect others around you: Learn how to safely use, store and throw away your medicines at www.disposemymeds.org.          This list is accurate as of 6/4/18 11:25 AM.  Always use your most recent med list.                   Brand Name Dispense Instructions for use Diagnosis    B-12 DOTS SL      Place 1,000 mcg under the tongue daily        CALCIUM-VITAMIN D PO      Take 1 chew tab by mouth 3 times daily 1 = 250 mg        CLONAZEPAM PO      Take 0.5 mg by mouth nightly as needed for anxiety        DULoxetine 30 MG EC capsule    CYMBALTA    180 capsule    Take 1 capsule (30 mg) by mouth 2 times daily    Adjustment disorder with depressed mood       FISH OIL OMEGA-3 PO      Take 1 capsule by mouth At Bedtime        Glycopyrrolate Powd     30 g    Compounded. Apply pea size amount every morning. 0.5% cream. For chromhidrosis, 30 gm jar    Chromhidrosis       metroNIDAZOLE 0.75 % topical gel    METROGEL    60 g    Apply 1 g topically 2 times daily    Rosacea       montelukast 10 MG tablet    SINGULAIR     TAKE 1 TAB BY MOUTH AT BEDTIME        MULTIVITAMIN ADULT PO      Take 2 tablets by mouth every morning        ranitidine 75 MG tablet    ZANTAC    60 tablet    Take 1 tablet (75 mg) by mouth 2 times daily    Morbid obesity (H)        rosuvastatin 10 MG tablet    CRESTOR    90 tablet    TAKE 1 TABLET(10 MG) BY MOUTH DAILY    Mixed hyperlipidemia       TRANSDERM-SCOP (1.5 MG) 72 hr patch   Generic drug:  scopolamine      Place 1 patch onto the skin every 72 hours (Vertigo)        traZODone 50 MG tablet    DESYREL    90 tablet    Take 1 tablet (50 mg) by mouth At Bedtime    Other insomnia       valsartan 320 MG tablet    DIOVAN    90 tablet    Take 1 tablet (320 mg) by mouth daily    Benign essential hypertension       VITAMIN D (CHOLECALCIFEROL) PO      Take 5,000 Units by mouth every morning

## 2018-06-04 NOTE — NURSING NOTE
"Chief Complaint   Patient presents with     Consult     Patient would like to discuss meds, blood pressure and anxiety related issues- had bariatric surgery on May 21, 2018.     /78  Pulse 80  Temp 97.8  F (36.6  C) (Oral)  Resp 16  Ht 5' 10\" (1.778 m)  Wt 225 lb 14.4 oz (102.5 kg)  SpO2 99%  BMI 32.41 kg/m2 Estimated body mass index is 32.41 kg/(m^2) as calculated from the following:    Height as of this encounter: 5' 10\" (1.778 m).    Weight as of this encounter: 225 lb 14.4 oz (102.5 kg).  Medication Reconciliation: complete        Health Maintenance Due Pending Provider Review:  NONE    n/a    Marley Mathews MA  [facility]  "

## 2018-06-04 NOTE — PROGRESS NOTES
[facility]  2 Week Surgical Follow-Up     Current PCP:  Jus Oswald  Surgeon: PLMARSHA  HISTORY OF PRESENT ILLNESS:  David Marino returns today for his follow-up appointment status post Surgery Type: Bypass DOS: 05/21/18. He is accompanied by Self.   His daily fluid intake is 80 ounces and consists of water, enhanced water, decaf. 1 protein drink, and Fair Life milk.  Feels well emotionally Yes. Discussed talking with PCP if mood not feeling well.  Patient reports using: Alcohol - No     Cigarettes - No  Pain:    He is currently using Pain Meds: nothing for over a week. for pain relief. He rates his pain at a Pain Scale: 0 - 8/10 on the pain scale. The pain is located left side of abdomen and is apparent with lying on left side, if overdoes it, and overall stretching and movement on that side.  Refill Needed: No  Activity:  The patient is considered a fall risk: No.   What are you doing for physical activity? Activity: walking up to miles 2 most days  How many days per week?  Most days  Patient plans to walk and do yoga.    Review of Systems:   GI:  Nausea: No   Vomiting: No   GERD: No  Diarrhea: No          Constipation: No    Patient's Last BM: today - every 1-2 days and normal form and darker in color.       Neuro/CV/Pulmonary:   Dizzy: No   Light Headed: Yes occurs infrequently upon rising felt d/t low BP as fluid intake is adequate. Patient went to 160 mg from 320 Diovan on Saturday 6/2/18 and then hasn't taken any BP meds since and BP today is 114/84.  SOB: No   Chest Pain: No   Vascular:    LE Edema: No  Lillie's Negative  CPAP - took back last week. Reports  states he no longer snores or gasps for air during sleeping.   :  Form of birth control is Contraception: male  Symptoms of UTI:  Dysuria: No  Endo: Diabetes: No  BS check (freq): na                  Avg. BS Level: na  PHYSICAL EXAMINATION:    VITALS:  /84 (BP Location: Left arm, Patient Position: Sitting, Cuff  Size: Adult Large)  Pulse 73  Temp 98.2  F (36.8  C)  Resp 14  Wt 224 lb 14.4 oz (102 kg)  SpO2 99%  BMI 32.27 kg/m2                   LUNGS:  clear to auscultation  ABDOMEN: Abdomen soft, non-tender. BS normal. No masses, organomegaly  INCISION: dry and intact    MEDICATIONS/VITAMINS/ALLERGIES REVIEWED: Yes - noted patient discontinues Diovan Josh 6/3/18.  ASA Hx: No    ASSESSMENT:    1.  DOS: 05/21/18 status post gastric bypass surgery.    INTERVENTIONS:      Symptoms given re: signs and symptoms of low BP and when to call PCP. Patient verbalized understanding.  Steri strip removed?  Yes       PLAN:    Make follow up appointment to meet with psychologist within 3 months post operatively.     Follow up with your primary care provider to obesity related conditions by one month post op. Seeing today at 1145 and plans to discuss anti-anxiety meds and BP med.     Advance diet per Dietitian today.     Call with questions or concerns at any time.    Return to clinic in 4 weeks for nutrition visit.    Return to clinic postop month 3.    Guidelines provided re: how to increase activity/exercise?  Yes

## 2018-06-05 ASSESSMENT — ANXIETY QUESTIONNAIRES: GAD7 TOTAL SCORE: 9

## 2018-06-06 ENCOUNTER — DOCUMENTATION ONLY (OUTPATIENT)
Dept: SLEEP MEDICINE | Facility: CLINIC | Age: 53
End: 2018-06-06

## 2018-06-06 NOTE — PROGRESS NOTES
6 month [facility] Recheck Visit     Diagnostic AHI: 51.6     Subjective measures:   Pt states that he has decided not to use his machine and returned it to [facility]    Assessment: Pt not meeting objective benchmarks for compliance     Action plan: pt will follow-up if he decides to pursue treatment    Device type: Auto-CPAP  PAP settings: CPAP min 10 cm  H20     CPAP max 16 cm  H20  Objective measures: No use since 5/19/18

## 2018-06-22 ENCOUNTER — OFFICE VISIT (OUTPATIENT)
Dept: SURGERY | Facility: CLINIC | Age: 53
End: 2018-06-22
Payer: COMMERCIAL

## 2018-06-22 VITALS — HEIGHT: 70 IN | WEIGHT: 213.3 LBS | BODY MASS INDEX: 30.54 KG/M2

## 2018-06-22 DIAGNOSIS — E66.811 OBESITY (BMI 30.0-34.9): ICD-10-CM

## 2018-06-22 PROCEDURE — 97803 MED NUTRITION INDIV SUBSEQ: CPT | Performed by: DIETITIAN, REGISTERED

## 2018-06-22 NOTE — PROGRESS NOTES
"BARIATRIC PROGRESS NOTE - 4 Week Post Op  DATE OF VISIT: 2018    David Marino  1965  male  [id number]  53 year old    ASSESSMENT:    REASON FOR VISIT:  David Marino is a 53 year old year old male presents today for a 4 Week Post Op nutrition follow-up appointment. Patient is accompanied by self      DIAGNOSIS:  Status: post gastric bypass surgery.   Obesity Grade I BMI 30-34.9    ANTHROPOMETRICS:  Height: 177.8 cm (5' 10\")  Initial weight:  112.5 kg (248 lb)  Current Weight: 96.8 kg (213 lb 4.8 oz)  BMI: 30.67 kg/(m^2).    VITAMINS AND MINERALS:   2 Multivitamin with Minerals  500 mg Calcium chew with Vitamin D TID  5000 International units Vitamin D  1000 mcg Vitamin B-12 sublingual    NUTRITION HISTORY:  Tolerating diet: Bariatric pureed diet  Fluids/water intake: 62 ounces/day  Small bites: Yes  Portion size: 1/2 cup  20-30 minute meals: Yes  Fluids and meals  by 30 minutes: Yes  Chew foods thoroughly: Yes  Breakfast: 1 egg or 1/2 cup Cream of Wheat made with milk or Plain yogurt with splenda and cinnamon  Lunch: 1/2 cup blended turkey chili with beans  Dinner: 1/3 cup tuna or chicken salad, few bites unsweetened applesauce  Snacks: 16 oz Fairlife milk  Nausea: none  VomitinX   Constipation: none  Additional Information: Patient vomited 1X on refried beans and feels like he was eating too fast.      PHYSICAL ACTIVITY:  Type: walking, yard work  Frequency: 5 days a week.  Duration: 30-40 minutes.    DIAGNOSIS:   Previous Nutrition Diagnosis: Altered gastrointestinal function related to alternation in gastrointestinal structure as evidenced by history of gastric bypass.   No change    Previous Goals:  Start puree diet at day 14 post op.-met  Take 500 mg calcium three times per day and 2 hours away from multivitamins/ mineral-met  Continue MVI, calcium with vitamin D, vitamin B12, vitamin D-met  Aim for 60 to 90 grams protein.-not met  Continue 48 to 64 oz of fluid per " day.-met    Current Nutrition Diagnosis: Altered gastrointestinal function related to alternation in gastrointestinal structure as evidenced by history of gastric bypass.     INTERVENTION  Nutrition Prescription: Recommended bariatric soft diet.    Goals:  Start soft diet at day 28 post op.  Continue MVI, calcium with vitamin D, vitamin B12, vitamin D  Aim for 60 to 90 grams protein.  Continue 48 to 64 oz of fluid per day.    Implementation:  Discussed transition to soft diet.  Emphasized importance of adequate protein.  Reviewed required vitamins and mineral supplements.  Verbalizes good understanding of surgery diet guidelines.  Assessed learning needs and learning preferences.      NUTRITION MONITORING AND EVALUATION:   Monitor diet tolerance and weight loss.      Anticipated Compliance: good  Follow Up: Continue to monitor patient closely regarding weight loss and diet.  At 3 months Post Op    TIME SPENT WITH PATIENT: 25 minutes.    Bryan Aiken RD, LD  [facility]  [phone]

## 2018-06-22 NOTE — MR AVS SNAPSHOT
MRN:[id number]                      After Visit Summary   6/22/2018    David Marino    MRN: [id number]           Visit Information        Provider Department      6/22/2018 9:30 AM 1, Sh Wl Diet, RD [facility]      Your next 10 appointments already scheduled     Aug 24, 2018 10:00 AM CDT   Return Visit with Cosme Foote PA-C   [facility] [place] ([facility])    [address] [place] [id number] [phone]            Aug 24, 2018 10:30 AM CDT   Return Visit with Saranya Wl Diet 3, RD   [facility] ([facility])    [address] [place] [id number] [phone]              MyChart Information     uBeamt gives you secure access to your electronic health record. If you see a primary care provider, you can also send messages to your care team and make appointments. If you have questions, please call your primary care clinic.  If you do not have a primary care provider, please call [phone] and they will assist you.        Care EveryWhere ID     This is your Care EveryWhere ID. This could be used by other organizations to access your [facility] medical records  [id number]        Equal Access to Services     SATINDER XIE : Hadii jaye pope hadasho Soomaali, waaxda luqadaha, qaybta kaalmada adeegyada, lara moody. So [facility] [phone].    ATENCIÓN: Si habla español, tiene a obrien disposición servicios gratuitos de asistencia lingüística. Llame al [phone].    We comply with applicable federal civil rights laws and [place] laws. We do not discriminate on the basis of race, color, national origin, age, disability, sex, sexual orientation, or gender identity.

## 2018-07-09 DIAGNOSIS — J45.20 MILD INTERMITTENT ASTHMA WITHOUT COMPLICATION: Primary | ICD-10-CM

## 2018-07-10 RX ORDER — MONTELUKAST SODIUM 10 MG/1
TABLET ORAL
Qty: 90 TABLET | Refills: 1 | Status: SHIPPED | OUTPATIENT
Start: 2018-07-10 | End: 2021-12-01

## 2018-07-10 NOTE — TELEPHONE ENCOUNTER
"JF  Routing refill request to provider for review/approval because:  Medication is reported/historical  Last OV 6/4/18  Thanks, Brenda Massey RN  Requested Prescriptions   Pending Prescriptions Disp Refills     montelukast (SINGULAIR) 10 MG tablet [Pharmacy Med Name: MONTELUKAST SOD 10 MG TABLET] 90 tablet 1     Sig: TAKE 1 TAB BY MOUTH AT BEDTIME    Leukotriene Inhibitors Protocol Failed    7/9/2018  1:26 AM       Failed - Asthma control assessment score within normal limits in last 6 months    Please review ACT score.          Passed - Patient is age 12 or older    If patient is under 16, ok to refill using age based dosing.          Passed - Recent (6 mo) or future (30 days) visit within the authorizing provider's specialty    Patient had office visit in the last 6 months or has a visit in the next 30 days with authorizing provider or within the authorizing provider's specialty.  See \"Patient Info\" tab in inbasket, or \"Choose Columns\" in Meds & Orders section of the refill encounter.                    "

## 2018-08-24 ENCOUNTER — OFFICE VISIT (OUTPATIENT)
Dept: SURGERY | Facility: CLINIC | Age: 53
End: 2018-08-24
Payer: COMMERCIAL

## 2018-08-24 VITALS
HEART RATE: 61 BPM | BODY MASS INDEX: 27.52 KG/M2 | SYSTOLIC BLOOD PRESSURE: 118 MMHG | DIASTOLIC BLOOD PRESSURE: 86 MMHG | OXYGEN SATURATION: 97 % | WEIGHT: 192.2 LBS | HEIGHT: 70 IN | RESPIRATION RATE: 12 BRPM

## 2018-08-24 VITALS — HEIGHT: 70 IN | BODY MASS INDEX: 27.52 KG/M2 | WEIGHT: 192.2 LBS

## 2018-08-24 DIAGNOSIS — E78.2 MIXED HYPERLIPIDEMIA: ICD-10-CM

## 2018-08-24 DIAGNOSIS — K21.9 GASTROESOPHAGEAL REFLUX DISEASE WITHOUT ESOPHAGITIS: ICD-10-CM

## 2018-08-24 DIAGNOSIS — G47.33 OSA (OBSTRUCTIVE SLEEP APNEA): ICD-10-CM

## 2018-08-24 DIAGNOSIS — K91.2 POSTSURGICAL MALABSORPTION: ICD-10-CM

## 2018-08-24 DIAGNOSIS — Z98.84 BARIATRIC SURGERY STATUS: ICD-10-CM

## 2018-08-24 PROBLEM — E66.811 OBESITY (BMI 30.0-34.9): Status: RESOLVED | Noted: 2018-05-29 | Resolved: 2018-08-24

## 2018-08-24 PROCEDURE — 97803 MED NUTRITION INDIV SUBSEQ: CPT | Performed by: DIETITIAN, REGISTERED

## 2018-08-24 PROCEDURE — 99213 OFFICE O/P EST LOW 20 MIN: CPT | Performed by: PHYSICIAN ASSISTANT

## 2018-08-24 NOTE — MR AVS SNAPSHOT
MRN:[id number]                      After Visit Summary   8/24/2018    David Marino    MRN: [id number]           Visit Information        Provider Department      8/24/2018 10:30 AM 3, Saranya Schroeder Diet, RD [facility]      Your next 10 appointments already scheduled     Aug 28, 2018 11:00 AM CDT   MyChart Long with Jus Oswald MD   [facility] ([facility]    [address] [facility] [address]   [phone]            Nov 30, 2018  8:30 AM CST   Return Bariatric Nutrition Visit with Saranya Wl Diet 3, RD   [facility] ([facility])    [address] [place] [address] [id number] [phone]            Nov 30, 2018  9:00 AM CST   Return Bariatric Visit with Cosme Foote PA-C   [facility])    [address] [place] [phone]              MyChart Information     Genymobilehart gives you secure access to your electronic health record. If you see a primary care provider, you can also send messages to your care team and make appointments. If you have questions, please call your primary care clinic.  If you do not have a primary care provider, please call [phone] and they will assist you.        Care EveryWhere ID     This is your Care EveryWhere ID. This could be used by other organizations to access your [facility] medical records  [id number]        Equal Access to Services     EMY XIE : Hadii aad ku hadasho Soomaali, waaxda luqadaha, qaybta kaalmada adeegyada, lara moody. So [facility] [phone].    ATENCIÓN: Si habla español, tiene a obrien disposición servicios gratuitos de asistencia lingüística. Llame al [phone].    We comply with applicable federal civil  rights laws and [place] laws. We do not discriminate on the basis of race, color, national origin, age, disability, sex, sexual orientation, or gender identity.

## 2018-08-24 NOTE — LETTER
[facility]          [address]  [address]  [place] [address]                                         Tel:  [phone]  Fax: [phone]    2019    David Marino  [address] [place] [address] [address]  [place] [address]    : 1965      Dear David;     We have identified that you have outstanding lab tests that have . If you would like to have the  labs completed, please contact our clinic at [phone] to have them re-ordered. If you have any questions, please contact our office.  Sincerely,    Aury Grewal CMA

## 2018-08-24 NOTE — MR AVS SNAPSHOT
After Visit Summary   8/24/2018    David Marino    MRN: [id number]           Patient Information     Date Of Birth          1965        Visit Information        Provider Department      8/24/2018 10:00 AM Cosme Foote PA-C [facility]      Today's Diagnoses     Adult BMI 27.0-27.9 kg/sq m    -  1    Bariatric surgery status        Postsurgical malabsorption        ZARIA (obstructive sleep apnea) Severe AHI 51        Mixed hyperlipidemia        Gastroesophageal reflux disease without esophagitis          Care Instructions    3 months status laparoscopic gastric bypass  Obesity - resolved  Body mass index is 27.58 kg/(m^2)..  Post surgical malabsorption:                        Ordered vitamin B12, vitamin D, PTH, ferritin, TIBC, and iron labs.                        Follow foodn per dietitian recommendations.                        Continue taking recommended post-op vitamins.  Heartburn - Start weaning off zantac.  If symptoms return can restart  Hypertension - resolved  High cholesterol - continue seeing primary regarding this  ZARIA - recommended patient contact sleep center regarding a new sleep study.    Return to clinic in 3 months.          Follow-ups after your visit        Your next 10 appointments already scheduled     Aug 28, 2018 11:00 AM CDT   Audrey Koroma with Jus Oswald MD   [facility] ([facility])    [address] [facility] [id number] [phone]            Nov 30, 2018  8:30 AM CST   Return Bariatric Nutrition Visit with Saranya Schroeder Diet 3, RD   [facility] ([facility])    [address] [place] [id number]   [phone]            Nov 30, 2018  9:00 AM CST   Return Bariatric Visit with Cosme Foote PA-C   [facility]  S[facility])    [address]  [place] [phone]              Future tests that were ordered for you today     Open Future Orders        Priority Expected Expires Ordered    Vitamin D Screen Routine 8/24/2018 2/20/2019 8/24/2018    Parathyroid Hormone Intact Routine 8/24/2018 2/20/2019 8/24/2018    Iron Routine 8/24/2018 2/20/2019 8/24/2018    Iron and Iron Binding Capacity Routine 8/24/2018 2/20/2019 8/24/2018    Ferritin Routine 8/24/2018 2/20/2019 8/24/2018    Vitamin B12 Routine 8/24/2018 2/20/2019 8/24/2018            Who to contact     If you have questions or need follow up information about today's clinic visit or your schedule please contact [facility] directly at [phone].  Normal or non-critical lab and imaging results will be communicated to you by MyChart, letter or phone within 4 business days after the clinic has received the results. If you do not hear from us within 7 days, please contact the clinic through Lagoahart or phone. If you have a critical or abnormal lab result, we will notify you by phone as soon as possible.  Submit refill requests through Lookback or call your pharmacy and they will forward the refill request to us. Please allow 3 business days for your refill to be completed.          Additional Information About Your Visit        LagoaharMedioTrabajo Information     Lookback gives you secure access to your electronic health record. If you see a primary care provider, you can also send messages to your care team and make appointments. If you have questions, please call your primary care clinic.  If you do not have a primary care provider, please call [phone] and they will assist you.        Care EveryWhere ID     This is your Care EveryWhere ID. This could be used by other organizations to access your [facility] medical records  [id number]        Your Vitals Were     Pulse Respirations Height Pulse  "Oximetry BMI (Body Mass Index)       61 12 5' 10\" (1.778 m) 97% 27.58 kg/m2        Blood Pressure from Last 3 Encounters:   08/24/18 118/86   06/04/18 112/78   06/04/18 114/84    Weight from Last 3 Encounters:   08/24/18 192 lb 3.2 oz (87.2 kg)   08/24/18 192 lb 3.2 oz (87.2 kg)   06/22/18 213 lb 4.8 oz (96.8 kg)                 Today's Medication Changes          These changes are accurate as of 8/24/18 11:54 AM.  If you have any questions, ask your nurse or doctor.               These medicines have changed or have updated prescriptions.        Dose/Directions    DULoxetine 30 MG EC capsule   Commonly known as:  CYMBALTA   This may have changed:  when to take this   Used for:  Adjustment disorder with depressed mood        Dose:  30 mg   Take 1 capsule (30 mg) by mouth 2 times daily   Quantity:  180 capsule   Refills:  3       rosuvastatin 10 MG tablet   Commonly known as:  CRESTOR   This may have changed:    - how much to take  - how to take this  - when to take this  - additional instructions   Used for:  Mixed hyperlipidemia        TAKE 1 TABLET(10 MG) BY MOUTH DAILY   Quantity:  90 tablet   Refills:  3                Primary Care Provider Office Phone # Fax #    Jus Woodrow Oswald [phone] [address] [facility] [facility] [address]        Equal Access to Services     SATINDER XIE AH: Hadii jaye palma [place], waaxda luqadaha, qaybta kaalmaayaka branch, lara danielson . So [facility] [phone].    ATENCIÓN: Si habla español, tiene a obrien disposición servicios gratuitos de asistencia lingüística. Karen al [phone].    We comply with applicable federal civil rights laws and [place] laws. We do not discriminate on the basis of race, color, national origin, age, disability, sex, sexual orientation, or gender identity.            Thank you!     Thank you for choosing [facility]  for your care. Our goal is always to provide " you with excellent care. Hearing back from our patients is one way we can continue to improve our services. Please take a few minutes to complete the written survey that you may receive in the mail after your visit with us. Thank you!             Your Updated Medication List - Protect others around you: Learn how to safely use, store and throw away your medicines at www.disposemymeds.org.          This list is accurate as of 8/24/18 11:54 AM.  Always use your most recent med list.                   Brand Name Dispense Instructions for use Diagnosis    B-12 DOTS SL      Place 1,000 mcg under the tongue daily        CALCIUM-VITAMIN D PO      Take 1 chew tab by mouth 3 times daily 1 = 250 mg        cimetidine 400 MG tablet    TAGAMET    60 tablet    Take 1 tablet (400 mg) by mouth 2 times daily    Morbid obesity (H)       CLONAZEPAM PO      Take 0.5 mg by mouth nightly as needed for anxiety        DULoxetine 30 MG EC capsule    CYMBALTA    180 capsule    Take 1 capsule (30 mg) by mouth 2 times daily    Adjustment disorder with depressed mood       FISH OIL OMEGA-3 PO      Take 1 capsule by mouth At Bedtime        Glycopyrrolate Powd     30 g    Compounded. Apply pea size amount every morning. 0.5% cream. For chromhidrosis, 30 gm jar    Chromhidrosis       metroNIDAZOLE 0.75 % topical gel    METROGEL    60 g    Apply 1 g topically 2 times daily    Rosacea       montelukast 10 MG tablet    SINGULAIR    90 tablet    TAKE 1 TAB BY MOUTH AT BEDTIME    Mild intermittent asthma without complication       MULTIVITAMIN ADULT PO      Take 2 tablets by mouth every morning        rosuvastatin 10 MG tablet    CRESTOR    90 tablet    TAKE 1 TABLET(10 MG) BY MOUTH DAILY    Mixed hyperlipidemia       TRANSDERM-SCOP (1.5 MG) 72 hr patch   Generic drug:  scopolamine      Place 1 patch onto the skin every 72 hours (Vertigo)        traZODone 50 MG tablet    DESYREL    90 tablet    Take 1 tablet (50 mg) by mouth At Bedtime    Other  insomnia       VITAMIN D (CHOLECALCIFEROL) PO      Take 5,000 Units by mouth every morning

## 2018-08-24 NOTE — PROGRESS NOTES
"BARIATRIC FOLLOW UP VISIT     August 24, 2018       HISTORY OF PRESENT ILLNESS: Pt returns today for his follow-up appointment status post laparoscopic gastric bypass.  Overall doing well. Energy level is back to normal after being down.  Still figuring out foods he can tolerate better than others.  Mentally is feeling well.   Is still taking heartburn medication.    Initial Weight: 248 lb (112.5 kg)   Current Weight: 192 lb 3.2 oz (87.2 kg)  Cumulative weight loss (lbs): 55.8  Last Visits Weight: 213 lb (96.6 kg)     Patient is taking the following bariatric postoperative vitamins:  2 Complete multivitamins with minerals (at different times than calcium)   5000 International Units of Vitamin D daily  1200 mg of Calcium daily in divided doses  1000 mcg of Vitamin B12 sl daily      Pt is exercising by walking 3-4 times per week.  Starting to use treadmill.         OBESITY RELATED CONDITIONS:  High Cholesterol - On medications.  Has appt with primary next week  HTN - Stopped meds right after discharge.  Doing great   GERD - wean off zantac starting today  ZARIA - Has not been using CPAP. Not snoring No witnessed apneic spells.  Energy is good  Plantar fasciitis - Resolved       SOCIAL HISTORY:  Pt denies smoking.  Pt denies alcohol use.  Avoids NSAIDS.  Handful of times has had some coffee      REVIEW OF SYSTEMS:     GI:  Nausea- No  Vomiting- Two incidents when ate to fast  Diarrhea- No  Constipation- No  Gets in more than 64 oz din a day  Dysphagia- No  Abdominal Pain- No  Heartburn- Was on prilosec bid x 15 years leading up to surgery.  He has continued 3 months of zantac and having no heartburn     SKIN:  Intertriginous irritation- No       PSYCH:  Depression- Well managed. Pt feels really good      LABS/IMAGING/MEDICAL RECORDS REVIEW: labs    PHYSICAL EXAMINATION:   /86 (BP Location: Left arm, Patient Position: Sitting, Cuff Size: Adult Large)  Pulse 61  Resp 12  Ht 5' 10\" (1.778 m)  Wt 192 lb 3.2 oz " (87.2 kg)  SpO2 97%  BMI 27.58 kg/m2    GENERAL: Alert and oriented x3. NAD  HEART: No murmurs, rubs or gallops, Regular rate and rhythm  LUNGS: Breathing unlabored, Lung sounds clear to auscultation bilaterally  ABDOMEN: soft; nontender; nondistended, incision well healed. No hernia  EXTREMITIES: No LE edema bilaterally, Gait normal  SKIN: No intertriginous irritation or rash      ASSESSMENT AND PLAN:      3 months status laparoscopic gastric bypass  Obesity - resolved  Body mass index is 27.58 kg/(m^2)..  Post surgical malabsorption:   Ordered vitamin B12, vitamin D, PTH, ferritin, TIBC, and iron labs.   Follow foodn per dietitian recommendations.   Continue taking recommended post-op vitamins.  Heartburn - Start weaning off zantac.  If symptoms return can restart  Hypertension - resolved  High cholesterol - continue seeing primary regarding this  ZARIA - recommended patient contact sleep center regarding a new sleep study.    Return to clinic in 3 months.      I spent a total of 22 minutes face to face with David during today's office visit. Over 50% of this time was spent counseling the patient and/or coordinating care.

## 2018-08-24 NOTE — PROGRESS NOTES
"NUTRITION POST OP APPOINTMENT  DATE OF VISIT: August 24, 2018    David Marino  1965  male  [id number]  53 year old     ASSESSMENT:    REASON FOR VISIT:  David is a 53 year old year old male presents today for 3 month PO nutrition follow-up appointment. Patient is accompanied by self.    DIAGNOSIS:  Status post gastric bypass surgery.  Obesity Overweight BMI 25-29.9     ANTHROPOMETRICS:  Initial Weight: 248 lbs   Height: 177.8 cm (5' 10\")  Current Weight: 87.2 kg (192 lb 3.2 oz)   BMI: 27.64 kg/(m^2).    VITAMINS AND MINERALS:  1 Multivitamin with Minerals BID  300 mg Calcium With Vitamin D QID  5000 International units Vitamin D  1000 mcg Vitamin B-12 sublingual  No iron needed per clinic guidelines  *Encouraged pt to take 600mg Calcium BID    NUTRITION HISTORY:  Breakfast: Protein Shake OR Greek yogurt with fruit w/cinnamon and splenda  Lunch: Salad with baked chicken and imitation crab and vegetables, sometimes soup   Supper: pork, turkey, fish + corn/potatoes + vegetable  Snacks: black licorice, vegetables  Fluids consumed: Water, enhanced water, protein drink, lemon/lime in water, decaf coffee, decaf tea  Consuming liquid calories: Yes  Protein intake: 60-80 grams/day  Tolerate regular texture food: Yes  Any foods not tolerated details: Yes  If any food not tolerated: very dense pieces of food like cheese or hard boiled egg   Portion size: 1/2-1 cup  Take 20-30 minutes to consume each meal: Yes   Eat protein foods first: Yes  Fluids and meals separate by at least 30 minutes: Yes  Chew foods thoroughly: Yes  Tolerating diet: Yes  Drinking high protein supplements: Yes  Consuming snacks per day: 1  Additional Information: Pt feeling well and happy with weight loss. Discussed the importance of continued portion control and avoiding snacking.         PHYSICAL ACTIVITY:  Type: walking  Frequency (days per week): 3-4  Duration (min): 30-60    DIAGNOSIS:  Previous Nutrition Diagnosis: Altered gastrointestinal " function related to alteration in gastrointestinal structure as evidenced by history of gastric bypass surgery.- no change    Previous goals:  Start soft diet at day 28 post op. - met  Continue MVI, calcium with vitamin D, vitamin B12, vitamin D - met  Aim for 60 to 90 grams protein. - met  Continue 48 to 64 oz of fluid per day. - met    Current Nutrition Diagnosis: Altered gastrointestinal function related to alteration in gastrointestinal structure as evidenced by history of gastric bypass surgery.    INTERVENTION:   Nutrition Prescription: Eat 3 meals a day at regular intervals. Consume 60-90 grams of protein daily. Follow post-surgical vitamin and mineral protocol.  Assessed learning needs and learning preferences.    GOALS:  Have 3 good groups per meal  Avoid exceeding 1 cup per meal  Replace solid snacks with milk or protein drinks  Continue to aim for 600-800 kcals/day    Follow-Up:   Recommend standard post-op follow up visits to assist with lifestyle changes or per insurance.  Implementation: Discussed progress toward previous goals; reinforced importance of following bariatric lifestyle changes.    NUTRITION MONITORING AND EVALUATION:  Anticipated compliance: good  Verbalized good understanding.    Follow up: Patient to follow up in 3 months, at 6 months post-op    TIME SPENT WITH PATIENT:  20 minutes    Lizbeth Campos RD, LD  Clinical Dietitian

## 2018-08-24 NOTE — PATIENT INSTRUCTIONS
3 months status laparoscopic gastric bypass  Obesity - resolved  Body mass index is 27.58 kg/(m^2)..  Post surgical malabsorption:                        Ordered vitamin B12, vitamin D, PTH, ferritin, TIBC, and iron labs.                        Follow foodn per dietitian recommendations.                        Continue taking recommended post-op vitamins.  Heartburn - Start weaning off zantac.  If symptoms return can restart  Hypertension - resolved  High cholesterol - continue seeing primary regarding this  ZARIA - recommended patient contact sleep center regarding a new sleep study.    Return to clinic in 3 months.

## 2018-08-28 ENCOUNTER — OFFICE VISIT (OUTPATIENT)
Dept: FAMILY MEDICINE | Facility: CLINIC | Age: 53
End: 2018-08-28
Payer: COMMERCIAL

## 2018-08-28 VITALS
WEIGHT: 195 LBS | TEMPERATURE: 97.8 F | HEART RATE: 61 BPM | SYSTOLIC BLOOD PRESSURE: 130 MMHG | DIASTOLIC BLOOD PRESSURE: 88 MMHG | HEIGHT: 70 IN | RESPIRATION RATE: 16 BRPM | OXYGEN SATURATION: 100 % | BODY MASS INDEX: 27.92 KG/M2

## 2018-08-28 DIAGNOSIS — E78.2 MIXED HYPERLIPIDEMIA: ICD-10-CM

## 2018-08-28 DIAGNOSIS — G47.09 OTHER INSOMNIA: ICD-10-CM

## 2018-08-28 DIAGNOSIS — F41.9 ANXIETY: Primary | ICD-10-CM

## 2018-08-28 PROCEDURE — 99214 OFFICE O/P EST MOD 30 MIN: CPT | Performed by: FAMILY MEDICINE

## 2018-08-28 RX ORDER — CLONAZEPAM 0.5 MG/1
0.5 TABLET ORAL
Qty: 30 TABLET | Refills: 5 | Status: SHIPPED | OUTPATIENT
Start: 2018-08-28 | End: 2021-10-05

## 2018-08-28 NOTE — MR AVS SNAPSHOT
After Visit Summary   8/28/2018    David Marino    MRN: [id number]           Patient Information     Date Of Birth          1965        Visit Information        Provider Department      8/28/2018 11:00 AM Jus Oswald MD [facility]        Today's Diagnoses     Anxiety    -  1    Mixed hyperlipidemia        Other insomnia           Follow-ups after your visit        Your next 10 appointments already scheduled     Nov 30, 2018  8:30 AM CST   Return Bariatric Nutrition Visit with Saranya Schroeder Diet 3, RD   [facility] ([facility])    [address] [place] [address] [id number] [phone]            Nov 30, 2018  9:00 AM CST   Return Bariatric Visit with Cosme Foote PA-C   [facility] ([facility])    [address] [place] [address] [id number] [phone]              Who to contact     If you have questions or need follow up information about today's clinic visit or your schedule please contact [facility] directly at [phone].  Normal or non-critical lab and imaging results will be communicated to you by MyChart, letter or phone within 4 business days after the clinic has received the results. If you do not hear from us within 7 days, please contact the clinic through [employer] [facility] or phone. If you have a critical or abnormal lab result, we will notify you by phone as soon as possible.  Submit refill requests through [employer] or call your pharmacy and they will forward the refill request to us. Please allow 3 business days for your refill to be completed.          Additional Information About Your Visit        MyChart Information     [employer] gives you secure access to your electronic health record. If you see a primary care provider, you can also send messages to your care team and make appointments. If you have  "questions, please call your primary care clinic.  If you do not have a primary care provider, please call [phone] and they will assist you.        Care EveryWhere ID     This is your Care EveryWhere ID. This could be used by other organizations to access your [facility] medical records  [id number]        Your Vitals Were     Pulse Temperature Respirations Height Pulse Oximetry BMI (Body Mass Index)    61 97.8  F (36.6  C) (Oral) 16 5' 10\" (1.778 m) 100% 27.98 kg/m2       Blood Pressure from Last 3 Encounters:   08/28/18 130/88   08/24/18 118/86   06/04/18 112/78    Weight from Last 3 Encounters:   08/28/18 195 lb (88.5 kg)   08/24/18 192 lb 3.2 oz (87.2 kg)   08/24/18 192 lb 3.2 oz (87.2 kg)                 Today's Medication Changes          These changes are accurate as of 8/28/18 11:59 PM.  If you have any questions, ask your nurse or doctor.               These medicines have changed or have updated prescriptions.        Dose/Directions    * CLONAZEPAM PO   This may have changed:  Another medication with the same name was added. Make sure you understand how and when to take each.   Changed by:  Jus Oswald MD        Dose:  0.5 mg   Take 0.5 mg by mouth nightly as needed for anxiety   Refills:  0       * clonazePAM 0.5 MG tablet   Commonly known as:  klonoPIN   This may have changed:  You were already taking a medication with the same name, and this prescription was added. Make sure you understand how and when to take each.   Used for:  Other insomnia, Anxiety   Changed by:  Jus Oswald MD        Dose:  0.5 mg   Take 1 tablet (0.5 mg) by mouth nightly as needed for anxiety   Quantity:  30 tablet   Refills:  5       DULoxetine 30 MG EC capsule   Commonly known as:  CYMBALTA   This may have changed:  when to take this   Used for:  Adjustment disorder with depressed mood        Dose:  30 mg   Take 1 capsule (30 mg) by mouth 2 times daily   Quantity:  180 capsule   Refills:  3       " rosuvastatin 10 MG tablet   Commonly known as:  CRESTOR   This may have changed:    - how much to take  - how to take this  - when to take this  - additional instructions   Used for:  Mixed hyperlipidemia        TAKE 1 TABLET(10 MG) BY MOUTH DAILY   Quantity:  90 tablet   Refills:  3       * Notice:  This list has 2 medication(s) that are the same as other medications prescribed for you. Read the directions carefully, and ask your doctor or other care provider to review them with you.         Where to get your medicines      Some of these will need a paper prescription and others can be bought over the counter.  Ask your nurse if you have questions.     Bring a paper prescription for each of these medications     clonazePAM 0.5 MG tablet                Primary Care Provider Office Phone # Fax #    Jus Woodrow Oswald [phone] [address] [facility] [address]        Equal Access to Services     SATINDER XIE : Hadii jaye palma Sojreamie, waaxda luqchantal, qaybta kaalmada sarina, lara danielson . So [facility] [phone].    ATENCIÓN: Si habla español, tiene a obrien disposición servicios gratuitos de asistencia lingüística. Karen al [phone].    We comply with applicable federal civil rights laws and [place] laws. We do not discriminate on the basis of race, color, national origin, age, disability, sex, sexual orientation, or gender identity.            Thank you!     Thank you for choosing [facility]  for your care. Our goal is always to provide you with excellent care. Hearing back from our patients is one way we can continue to improve our services. Please take a few minutes to complete the written survey that you may receive in the mail after your visit with us. Thank you!             Your Updated Medication List - Protect others around you: Learn how to safely use, store and throw away your medicines at www.disposemymeds.org.           This list is accurate as of 8/28/18 11:59 PM.  Always use your most recent med list.                   Brand Name Dispense Instructions for use Diagnosis    B-12 DOTS SL      Place 1,000 mcg under the tongue daily        CALCIUM-VITAMIN D PO      Take 1 chew tab by mouth 3 times daily 1 = 250 mg        cimetidine 400 MG tablet    TAGAMET    60 tablet    Take 1 tablet (400 mg) by mouth 2 times daily    Morbid obesity (H)       * CLONAZEPAM PO      Take 0.5 mg by mouth nightly as needed for anxiety        * clonazePAM 0.5 MG tablet    klonoPIN    30 tablet    Take 1 tablet (0.5 mg) by mouth nightly as needed for anxiety    Other insomnia, Anxiety       DULoxetine 30 MG EC capsule    CYMBALTA    180 capsule    Take 1 capsule (30 mg) by mouth 2 times daily    Adjustment disorder with depressed mood       FISH OIL OMEGA-3 PO      Take 1 capsule by mouth At Bedtime        Glycopyrrolate Powd     30 g    Compounded. Apply pea size amount every morning. 0.5% cream. For chromhidrosis, 30 gm jar    Chromhidrosis       metroNIDAZOLE 0.75 % topical gel    METROGEL    60 g    Apply 1 g topically 2 times daily    Rosacea       montelukast 10 MG tablet    SINGULAIR    90 tablet    TAKE 1 TAB BY MOUTH AT BEDTIME    Mild intermittent asthma without complication       MULTIVITAMIN ADULT PO      Take 2 tablets by mouth every morning        rosuvastatin 10 MG tablet    CRESTOR    90 tablet    TAKE 1 TABLET(10 MG) BY MOUTH DAILY    Mixed hyperlipidemia       TRANSDERM-SCOP (1.5 MG) 72 hr patch   Generic drug:  scopolamine      Place 1 patch onto the skin every 72 hours (Vertigo)        traZODone 50 MG tablet    DESYREL    90 tablet    Take 1 tablet (50 mg) by mouth At Bedtime    Other insomnia       VITAMIN D (CHOLECALCIFEROL) PO      Take 5,000 Units by mouth every morning        * Notice:  This list has 2 medication(s) that are the same as other medications prescribed for you. Read the directions carefully, and ask your doctor or  other care provider to review them with you.

## 2018-08-28 NOTE — PROGRESS NOTES
SUBJECTIVE:   David Marino is a 53 year old male who presents to clinic today for the following health issues:    Anxiety Follow-Up    Status since last visit: Improved     Other associated symptoms:None    Complicating factors:   Significant life event: No   Current substance abuse: None  Depression symptoms: No  THU-7 SCORE 10/14/2016 11/13/2017 6/4/2018   Total Score 13 3 9       THU-7    Amount of exercise or physical activity: 4-5 days/week for an average of 15-30 minutes    Problems taking medications regularly: No    Medication side effects: none    Diet: regular (no restrictions)    Anxiety worsening recently  Has been taking clonazepam at night, had this in the past    Bariatric surgery, 3 month f/u, going well  BMI 27 at 3 months    Current symptoms are described by the PHQ9/THU below.  PHQ-9 SCORE 8/7/2017 11/13/2017 3/5/2018   Total Score 4 4 4     THU-7 SCORE 10/14/2016 11/13/2017 6/4/2018   Total Score 13 3 9       No known medical causes of depression or Anxiety.    ROS:  Musculoskeletal: Normal; movements are symmetrical, no weakness  Neuro: Normal; no tremor  Psych: No suicidal thoughts or plan. No halluciations.    Social History  Social History     Social History     Marital status:      Spouse name: N/A     Number of children: N/A     Years of education: N/A     Occupational History     Not on file.     Social History Main Topics     Smoking status: Former Smoker     Smokeless tobacco: Never Used      Comment: smoker 30+ years ago     Alcohol use No     Drug use: No     Sexual activity: Yes     Partners: Male     Other Topics Concern     Parent/Sibling W/ Cabg, Mi Or Angioplasty Before 65f 55m? No     Social History Narrative       Medications:    Current Outpatient Prescriptions:      CALCIUM-VITAMIN D PO, Take 1 chew tab by mouth 3 times daily 1 = 250 mg, Disp: , Rfl:      cimetidine (TAGAMET) 400 MG tablet, Take 1 tablet (400 mg) by mouth 2 times daily, Disp: 60 tablet, Rfl: 11      "CLONAZEPAM PO, Take 0.5 mg by mouth nightly as needed for anxiety, Disp: , Rfl:      Cyanocobalamin (B-12 DOTS SL), Place 1,000 mcg under the tongue daily , Disp: , Rfl:      DULoxetine (CYMBALTA) 30 MG EC capsule, Take 1 capsule (30 mg) by mouth 2 times daily (Patient taking differently: Take 30 mg by mouth every morning ), Disp: 180 capsule, Rfl: 3     Glycopyrrolate POWD, Compounded. Apply pea size amount every morning. 0.5% cream. For chromhidrosis, 30 gm jar, Disp: 30 g, Rfl: 11     metroNIDAZOLE (METROGEL) 0.75 % topical gel, Apply 1 g topically 2 times daily, Disp: 60 g, Rfl: 11     montelukast (SINGULAIR) 10 MG tablet, TAKE 1 TAB BY MOUTH AT BEDTIME, Disp: 90 tablet, Rfl: 1     Multiple Vitamins-Minerals (MULTIVITAMIN ADULT PO), Take 2 tablets by mouth every morning , Disp: , Rfl:      Omega-3 Fatty Acids (FISH OIL OMEGA-3 PO), Take 1 capsule by mouth At Bedtime , Disp: , Rfl:      rosuvastatin (CRESTOR) 10 MG tablet, TAKE 1 TABLET(10 MG) BY MOUTH DAILY (Patient taking differently: Take 10 mg by mouth At Bedtime TAKE 1 TABLET(10 MG) BY MOUTH DAILY), Disp: 90 tablet, Rfl: 3     TRANSDERM-SCOP, 1.5 MG, (1.5mg base/3day) patch, Place 1 patch onto the skin every 72 hours (Vertigo), Disp: , Rfl: 3     traZODone (DESYREL) 50 MG tablet, Take 1 tablet (50 mg) by mouth At Bedtime, Disp: 90 tablet, Rfl: 3     VITAMIN D, CHOLECALCIFEROL, PO, Take 5,000 Units by mouth every morning , Disp: , Rfl:        Allergies   Allergen Reactions     Honey      Pollen Extract      Sulfa Drugs            OBJECTIVE:  /88  Pulse 61  Temp 97.8  F (36.6  C) (Oral)  Resp 16  Ht 5' 10\" (1.778 m)  Wt 195 lb (88.5 kg)  SpO2 100%  BMI 27.98 kg/m2  Mental Status exam  GROOMING: well groomed.  ATTIRE: Appropriate.  AGE: Appears stated.  BEHAVIOR TOWARDS EXAMINER: Cooperative.  MOTOR ACTIVITY: Unremarkable.  EYE CONTACT: Appropriate.  Mood:  anxious  Affect:  appropriate and in normal range  SPEECH/LANGUAGE: " Unremarkable.  ATTENTION: Unremarkable.  CONCENTRATION: Unremarkable.  THOUGHT PROCESS: Unremarkable  THOUGHT CONTENT: Unremarkable for hallucinations and delusions.  ORIENTATION: Times 3.  MEMORY: No evidence of impairment.  JUDGMENT: No evidence of impairment.  ESTIMATED INTELLIGENCE: above Average.  DEMONSTRATED INSIGHT: good  FUND OF KNOWLEDGE: good  SUICIDE RISK: None apparent and denied.    ASSESSMENT/PLAN:    Anxiety,  Other insomnia  Worsening, Discussed potential for impairment and dependence with benzo medications, a limited Rx prescribed    - clonazePAM (KLONOPIN) 0.5 MG tablet; Take 1 tablet (0.5 mg) by mouth nightly as needed for anxiety  Dispense: 30 tablet; Refill: 5         Mixed hyperlipidemia  Recheck, lost a lot of weight after bariatric surgery    - Lipid panel reflex to direct LDL Fasting; Future        Reviewed risks and benefits of medications and other treatment options.  Pt is advised to call if side effects to medications occur, especially if exaggerated/dramatic.   Follow up appointment made today for within 30 days, return sooner if symptoms worsen.    Jus Oswald MD MPH

## 2018-08-28 NOTE — NURSING NOTE
"Chief Complaint   Patient presents with     Anxiety     /88  Pulse 61  Temp 97.8  F (36.6  C) (Oral)  Resp 16  Ht 5' 10\" (1.778 m)  Wt 195 lb (88.5 kg)  SpO2 100%  BMI 27.98 kg/m2 Estimated body mass index is 27.98 kg/(m^2) as calculated from the following:    Height as of this encounter: 5' 10\" (1.778 m).    Weight as of this encounter: 195 lb (88.5 kg).  bp completed using cuff size: large       Health Maintenance addressed:  PHQ9 and ACT    Possibly completing today per provider review.    Mariella Lucas MA     "

## 2018-08-29 ASSESSMENT — ASTHMA QUESTIONNAIRES: ACT_TOTALSCORE: 25

## 2018-08-29 ASSESSMENT — PATIENT HEALTH QUESTIONNAIRE - PHQ9: SUM OF ALL RESPONSES TO PHQ QUESTIONS 1-9: 7

## 2018-09-11 ENCOUNTER — OFFICE VISIT (OUTPATIENT)
Dept: FAMILY MEDICINE | Facility: CLINIC | Age: 53
End: 2018-09-11
Payer: COMMERCIAL

## 2018-09-11 VITALS
BODY MASS INDEX: 27.2 KG/M2 | OXYGEN SATURATION: 99 % | HEIGHT: 70 IN | RESPIRATION RATE: 14 BRPM | DIASTOLIC BLOOD PRESSURE: 90 MMHG | HEART RATE: 54 BPM | TEMPERATURE: 97.4 F | SYSTOLIC BLOOD PRESSURE: 138 MMHG | WEIGHT: 190 LBS

## 2018-09-11 DIAGNOSIS — G47.9 SLEEP DISTURBANCE: ICD-10-CM

## 2018-09-11 DIAGNOSIS — E34.9 TESTOSTERONE DEFICIENCY: ICD-10-CM

## 2018-09-11 DIAGNOSIS — F43.21 ADJUSTMENT DISORDER WITH DEPRESSED MOOD: Primary | ICD-10-CM

## 2018-09-11 PROCEDURE — 36415 COLL VENOUS BLD VENIPUNCTURE: CPT | Performed by: FAMILY MEDICINE

## 2018-09-11 PROCEDURE — 99214 OFFICE O/P EST MOD 30 MIN: CPT | Performed by: FAMILY MEDICINE

## 2018-09-11 PROCEDURE — 84403 ASSAY OF TOTAL TESTOSTERONE: CPT | Performed by: FAMILY MEDICINE

## 2018-09-11 PROCEDURE — 84270 ASSAY OF SEX HORMONE GLOBUL: CPT | Performed by: FAMILY MEDICINE

## 2018-09-11 RX ORDER — DESVENLAFAXINE 100 MG/1
100 TABLET, EXTENDED RELEASE ORAL DAILY
Qty: 90 TABLET | Refills: 3 | Status: SHIPPED | OUTPATIENT
Start: 2018-10-11 | End: 2021-10-05

## 2018-09-11 RX ORDER — DESVENLAFAXINE 50 MG/1
50 TABLET, FILM COATED, EXTENDED RELEASE ORAL DAILY
Qty: 30 TABLET | Refills: 0 | Status: SHIPPED | OUTPATIENT
Start: 2018-09-11 | End: 2021-10-05

## 2018-09-11 NOTE — NURSING NOTE
"Chief Complaint   Patient presents with     RECHECK     anti-depressant medications       Initial /90  Pulse 54  Temp 97.4  F (36.3  C) (Oral)  Resp 14  Ht 5' 10\" (1.778 m)  Wt 190 lb (86.2 kg)  SpO2 99%  BMI 27.26 kg/m2 Estimated body mass index is 27.26 kg/(m^2) as calculated from the following:    Height as of this encounter: 5' 10\" (1.778 m).    Weight as of this encounter: 190 lb (86.2 kg).  BP completed using cuff size: large    Health Maintenance that is potentially due pending provider review:  PHQ9    PHQ/ACT/THU--Gave pt questionnare  "

## 2018-09-11 NOTE — MR AVS SNAPSHOT
After Visit Summary   9/11/2018    David Marino    MRN: [id number]           Patient Information     Date Of Birth          1965        Visit Information        Provider Department      9/11/2018 1:30 PM Jus Oswald MD [facility]        Today's Diagnoses     Adjustment disorder with depressed mood    -  1    Sleep disturbance        Testosterone deficiency           Follow-ups after your visit        Additional Services     MENTAL HEALTH REFERRAL  - Adult; Psychiatry and Medication Management; Psychiatry; [facility]: Psychiatry Clinic [phone]; We will contact you to schedule the appointment or please call with any questions       All scheduling is subject to the client's specific insurance plan & benefits, provider/location availability, and provider clinical specialities.  Please arrive 15 minutes early for your first appointment and bring your completed paperwork.    Please be aware that coverage of these services is subject to the terms and limitations of your health insurance plan.  Call member services at your health plan with any benefit or coverage questions.                            Your next 10 appointments already scheduled     Nov 30, 2018  8:30 AM CST   Return Bariatric Nutrition Visit with Saranya Barton 3, RD   [facility] ([facility])    [address] [place] [id number] [phone]            Nov 30, 2018  9:00 AM CST   Return Bariatric Visit with Cosme Foote PA-C   [facility] ([facility])    [address] [place] [id number] [phone]              Who to contact     If you have questions or need follow up information about today's clinic visit or your schedule please contact [facility] directly at [phone].  Normal or non-critical lab and imaging results will  "be communicated to you by [employer] e[employer], letter or phone within 4 business days after the clinic has received the results. If you do not hear from us within 7 days, please contact the clinic through Sohalo or phone. If you have a critical or abnormal lab result, we will notify you by phone as soon as possible.  Submit refill requests through Sohalo or call your pharmacy and they will forward the refill request to us. Please allow 3 business days for your refill to be completed.          Additional Information About Your Visit        Sohalo Information     [employer]alo gives you secure access to your electronic health record. If you see a primary care provider, you can also send messages to your care team and make appointments. If you have questions, please call your primary care clinic.  If you do not have a primary care provider, please call [phone] and they will assist you.        Care EveryWhere ID     This is your Care EveryWhere ID. This could be used by other organizations to access your [facility] medical records  [id number]        Your Vitals Were     Pulse Temperature Respirations Height Pulse Oximetry BMI (Body Mass Index)    54 97.4  F (36.3  C) (Oral) 14 5' 10\" (1.778 m) 99% 27.26 kg/m2       Blood Pressure from Last 3 Encounters:   09/11/18 138/90   08/28/18 130/88   08/24/18 118/86    Weight from Last 3 Encounters:   09/11/18 190 lb (86.2 kg)   08/28/18 195 lb (88.5 kg)   08/24/18 192 lb 3.2 oz (87.2 kg)              We Performed the Following     MENTAL HEALTH REFERRAL  - Adult; Psychiatry and Medication Management; Psychiatry; [facility]: Psychiatry Clinic [phone]; We will contact you to schedule the appointment or please call with any questions     Testosterone Free and Total          Today's Medication Changes          These changes are accurate as of 9/11/18 11:59 PM.  If you have any questions, ask your nurse or doctor.               Start taking these medicines.        Dose/Directions    * " desvenlafaxine succinate 50 MG 24 hr tablet   Commonly known as:  PRISTIQ   Used for:  Adjustment disorder with depressed mood   Started by:  Jus Oswald MD        Dose:  50 mg   Take 1 tablet (50 mg) by mouth daily   Quantity:  30 tablet   Refills:  0       * desvenlafaxine succinate 100 MG 24 hr tablet   Commonly known as:  PRISTIQ   Used for:  Adjustment disorder with depressed mood   Started by:  Jus Oswald MD        Dose:  100 mg   Start taking on:  10/11/2018   Take 1 tablet (100 mg) by mouth daily (after taking 50mg for 1 month)   Quantity:  90 tablet   Refills:  3       * Notice:  This list has 2 medication(s) that are the same as other medications prescribed for you. Read the directions carefully, and ask your doctor or other care provider to review them with you.      These medicines have changed or have updated prescriptions.        Dose/Directions    rosuvastatin 10 MG tablet   Commonly known as:  CRESTOR   This may have changed:    - how much to take  - how to take this  - when to take this  - additional instructions   Used for:  Mixed hyperlipidemia        TAKE 1 TABLET(10 MG) BY MOUTH DAILY   Quantity:  90 tablet   Refills:  3         Stop taking these medicines if you haven't already. Please contact your care team if you have questions.     DULoxetine 30 MG EC capsule   Commonly known as:  CYMBALTA   Stopped by:  Jus Oswald MD                Where to get your medicines      These medications were sent to [place] [address] [place] [address] [address]     Phone:  [phone]     desvenlafaxine succinate 100 MG 24 hr tablet    desvenlafaxine succinate 50 MG 24 hr tablet                Primary Care Provider Office Phone # Fax #    Jus Oswald [phone] [address] [facility] [address]        Equal Access to Services     SATINDER XIE AH: Jaime Barnes  waaaronda patriciachantal, qaybta kalenora branch, lara begumaacipriano ah. [facility] [phone].    ATENCIÓN: Si doug almanzar, tiene a obrien disposición servicios gratuitos de asistencia lingüística. Karen al [phone].    We comply with applicable federal civil rights laws and [place] laws. We do not discriminate on the basis of race, color, national origin, age, disability, sex, sexual orientation, or gender identity.            Thank you!     Thank you for choosing [facility]  for your care. Our goal is always to provide you with excellent care. Hearing back from our patients is one way we can continue to improve our services. Please take a few minutes to complete the written survey that you may receive in the mail after your visit with us. Thank you!             Your Updated Medication List - Protect others around you: Learn how to safely use, store and throw away your medicines at www.disposemymeds.org.          This list is accurate as of 9/11/18 11:59 PM.  Always use your most recent med list.                   Brand Name Dispense Instructions for use Diagnosis    B-12 DOTS SL      Place 1,000 mcg under the tongue daily        CALCIUM-VITAMIN D PO      Take 1 chew tab by mouth 3 times daily 1 = 250 mg        cimetidine 400 MG tablet    TAGAMET    60 tablet    Take 1 tablet (400 mg) by mouth 2 times daily    Morbid obesity (H)       clonazePAM 0.5 MG tablet    klonoPIN    30 tablet    Take 1 tablet (0.5 mg) by mouth nightly as needed for anxiety    Other insomnia, Anxiety       * desvenlafaxine succinate 50 MG 24 hr tablet    PRISTIQ    30 tablet    Take 1 tablet (50 mg) by mouth daily    Adjustment disorder with depressed mood       * desvenlafaxine succinate 100 MG 24 hr tablet   Start taking on:  10/11/2018    PRISTIQ    90 tablet    Take 1 tablet (100 mg) by mouth daily (after taking 50mg for 1 month)    Adjustment disorder with depressed mood       FISH OIL OMEGA-3 PO      Take  1 capsule by mouth At Bedtime        Glycopyrrolate Powd     30 g    Compounded. Apply pea size amount every morning. 0.5% cream. For chromhidrosis, 30 gm jar    Chromhidrosis       metroNIDAZOLE 0.75 % topical gel    METROGEL    60 g    Apply 1 g topically 2 times daily    Rosacea       montelukast 10 MG tablet    SINGULAIR    90 tablet    TAKE 1 TAB BY MOUTH AT BEDTIME    Mild intermittent asthma without complication       MULTIVITAMIN ADULT PO      Take 2 tablets by mouth every morning        rosuvastatin 10 MG tablet    CRESTOR    90 tablet    TAKE 1 TABLET(10 MG) BY MOUTH DAILY    Mixed hyperlipidemia       TRANSDERM-SCOP (1.5 MG) 72 hr patch   Generic drug:  scopolamine      Place 1 patch onto the skin every 72 hours (Vertigo)        traZODone 50 MG tablet    DESYREL    90 tablet    Take 1 tablet (50 mg) by mouth At Bedtime    Other insomnia       VITAMIN D (CHOLECALCIFEROL) PO      Take 5,000 Units by mouth every morning        * Notice:  This list has 2 medication(s) that are the same as other medications prescribed for you. Read the directions carefully, and ask your doctor or other care provider to review them with you.

## 2018-09-11 NOTE — PROGRESS NOTES
SUBJECTIVE:   David Marino is a 53 year old male who presents to clinic today for the following health issues:     recheck depression meds-discuss other options    Depression Followup    Status since last visit: Worsened     See PHQ-9 for current symptoms.  Other associated symptoms: sleep difficulties    Complicating factors:   Significant life event:  No   Current substance abuse:  None  Anxiety or Panic symptoms:  No    PHQ-9 11/13/2017 3/5/2018 8/28/2018   Total Score 4 4 7   Q9: Suicide Ideation Not at all Not at all Not at all       PHQ-9  English  PHQ-9   Any Language  Suicide Assessment Five-step Evaluation and Treatment (SAFE-T)    Feeling more overwhelmed, backing out of social situations, worsening    Started happening when he started klonopin    To review patient started taking a supply of Klonopin that he had from a previous medical provider and have been taking it for about 3 months when he asked for recent refill    Current symptoms are described by the PHQ9/THU below.  PHQ-9 SCORE 3/5/2018 8/28/2018 9/11/2018   Total Score 4 7 12     THU-7 SCORE 10/14/2016 11/13/2017 6/4/2018   Total Score 13 3 9       He also be interested to see if he has some testosterone deficiency previously he was on testosterone and had stopped it after he had his bariatric surgery    No known medical causes of depression or Anxiety.    ROS:  Musculoskeletal: Normal; movements are symmetrical, no weakness  Neuro: Normal; no tremor  Psych: No suicidal thoughts or plan. No halluciations.    Social History  Social History     Social History     Marital status:      Spouse name: N/A     Number of children: N/A     Years of education: N/A     Occupational History     Not on file.     Social History Main Topics     Smoking status: Former Smoker     Smokeless tobacco: Never Used      Comment: smoker 30+ years ago     Alcohol use No     Drug use: No     Sexual activity: Yes     Partners: Male     Other Topics Concern      Parent/Sibling W/ Cabg, Mi Or Angioplasty Before 65f 55m? No     Social History Narrative       Medications:    Current Outpatient Prescriptions:      CALCIUM-VITAMIN D PO, Take 1 chew tab by mouth 3 times daily 1 = 250 mg, Disp: , Rfl:      cimetidine (TAGAMET) 400 MG tablet, Take 1 tablet (400 mg) by mouth 2 times daily, Disp: 60 tablet, Rfl: 11     clonazePAM (KLONOPIN) 0.5 MG tablet, Take 1 tablet (0.5 mg) by mouth nightly as needed for anxiety, Disp: 30 tablet, Rfl: 5     Cyanocobalamin (B-12 DOTS SL), Place 1,000 mcg under the tongue daily , Disp: , Rfl:      [START ON 10/11/2018] desvenlafaxine succinate (PRISTIQ) 100 MG 24 hr tablet, Take 1 tablet (100 mg) by mouth daily (after taking 50mg for 1 month), Disp: 90 tablet, Rfl: 3     desvenlafaxine succinate (PRISTIQ) 50 MG 24 hr tablet, Take 1 tablet (50 mg) by mouth daily, Disp: 30 tablet, Rfl: 0     Glycopyrrolate POWD, Compounded. Apply pea size amount every morning. 0.5% cream. For chromhidrosis, 30 gm jar, Disp: 30 g, Rfl: 11     metroNIDAZOLE (METROGEL) 0.75 % topical gel, Apply 1 g topically 2 times daily, Disp: 60 g, Rfl: 11     montelukast (SINGULAIR) 10 MG tablet, TAKE 1 TAB BY MOUTH AT BEDTIME, Disp: 90 tablet, Rfl: 1     Multiple Vitamins-Minerals (MULTIVITAMIN ADULT PO), Take 2 tablets by mouth every morning , Disp: , Rfl:      Omega-3 Fatty Acids (FISH OIL OMEGA-3 PO), Take 1 capsule by mouth At Bedtime , Disp: , Rfl:      rosuvastatin (CRESTOR) 10 MG tablet, TAKE 1 TABLET(10 MG) BY MOUTH DAILY (Patient taking differently: Take 10 mg by mouth At Bedtime TAKE 1 TABLET(10 MG) BY MOUTH DAILY), Disp: 90 tablet, Rfl: 3     TRANSDERM-SCOP, 1.5 MG, (1.5mg base/3day) patch, Place 1 patch onto the skin every 72 hours (Vertigo), Disp: , Rfl: 3     traZODone (DESYREL) 50 MG tablet, Take 1 tablet (50 mg) by mouth At Bedtime, Disp: 90 tablet, Rfl: 3     VITAMIN D, CHOLECALCIFEROL, PO, Take 5,000 Units by mouth every morning , Disp: , Rfl:        Allergies  "  Allergen Reactions     Honey      Pollen Extract      Sulfa Drugs            OBJECTIVE:  /90  Pulse 54  Temp 97.4  F (36.3  C) (Oral)  Resp 14  Ht 5' 10\" (1.778 m)  Wt 190 lb (86.2 kg)  SpO2 99%  BMI 27.26 kg/m2  Mental Status exam  GROOMING: well groomed.  ATTIRE: Appropriate.  AGE: Appears stated.  BEHAVIOR TOWARDS EXAMINER: Cooperative.  MOTOR ACTIVITY: Unremarkable.  EYE CONTACT: Appropriate.  Mood:  anxious  Affect:  mood congruent  SPEECH/LANGUAGE: Unremarkable.  ATTENTION: Unremarkable.  CONCENTRATION: Unremarkable.  THOUGHT PROCESS: Unremarkable  THOUGHT CONTENT: Unremarkable for hallucinations and delusions.  ORIENTATION: Times 3.  MEMORY: No evidence of impairment.  JUDGMENT: No evidence of impairment.  ESTIMATED INTELLIGENCE: above Average.  DEMONSTRATED INSIGHT: good  FUND OF KNOWLEDGE: good  SUICIDE RISK: None apparent and denied.  Neuro  Face moves symmetrically. Tongue midline.  Neck without bruits. CV: S1, S2. Motor strength 5/5.  Gait:  deana from a chair without difficulty and normal gait.      ASSESSMENT/PLAN:  1. Adjustment disorder with depressed mood  We discussed that I think a long-term use of benzodiazepines is not going to be a great choice for him and it is likely to lead to some depressant side effects which he may be experiencing    Patient reports that he had better luck with Pristiq in the past and his current medication and would be interested in trying that again  - MENTAL HEALTH REFERRAL  - Adult; Psychiatry and Medication Management; Psychiatry; [facility]: Psychiatry Clinic [phone]; We will contact you to schedule the appointment or please call with any questions  - desvenlafaxine succinate (PRISTIQ) 50 MG 24 hr tablet; Take 1 tablet (50 mg) by mouth daily  Dispense: 30 tablet; Refill: 0  - desvenlafaxine succinate (PRISTIQ) 100 MG 24 hr tablet; Take 1 tablet (100 mg) by mouth daily (after taking 50mg for 1 month)  Dispense: 90 tablet; Refill: 3    2. Sleep " disturbance  Also I think a referral for psychiatry and therapy would be helpful  - MENTAL HEALTH REFERRAL  - Adult; Psychiatry and Medication Management; Psychiatry; [facility]: Psychiatry Clinic [phone]; We will contact you to schedule the appointment or please call with any questions    3. Testosterone deficiency  Recheck this value now that he has had bariatric surgery his obesity may have been affecting his testosterone levels  - Testosterone Free and Total        Reviewed risks and benefits of medications and other treatment options.  Pt is advised to call if side effects to medications occur, especially if exaggerated/dramatic.   Follow up appointment made today for within 30 days, return sooner if symptoms worsen.    Jus Oswald MD MPH

## 2018-09-12 ASSESSMENT — PATIENT HEALTH QUESTIONNAIRE - PHQ9: SUM OF ALL RESPONSES TO PHQ QUESTIONS 1-9: 12

## 2018-09-13 LAB
SHBG SERPL-SCNC: 27 NMOL/L (ref 11–80)
TESTOST FREE SERPL-MCNC: 7.38 NG/DL (ref 4.7–24.4)
TESTOST SERPL-MCNC: 332 NG/DL (ref 240–950)

## 2018-09-17 ENCOUNTER — MYC MEDICAL ADVICE (OUTPATIENT)
Dept: FAMILY MEDICINE | Facility: CLINIC | Age: 53
End: 2018-09-17

## 2018-09-17 DIAGNOSIS — E34.9 TESTOSTERONE DEFICIENCY: Primary | ICD-10-CM

## 2018-09-19 ENCOUNTER — MYC MEDICAL ADVICE (OUTPATIENT)
Dept: FAMILY MEDICINE | Facility: CLINIC | Age: 53
End: 2018-09-19

## 2018-09-27 ENCOUNTER — TRANSFERRED RECORDS (OUTPATIENT)
Dept: HEALTH INFORMATION MANAGEMENT | Facility: CLINIC | Age: 53
End: 2018-09-27

## 2018-10-04 DIAGNOSIS — F43.21 ADJUSTMENT DISORDER WITH DEPRESSED MOOD: ICD-10-CM

## 2018-10-05 RX ORDER — DESVENLAFAXINE 50 MG/1
TABLET, FILM COATED, EXTENDED RELEASE ORAL
Refills: 0
Start: 2018-10-05

## 2018-10-05 NOTE — TELEPHONE ENCOUNTER
"Duplicate- message sent to pharm-  Has an order to start 100 mg 10/11/18 Eprescribed 9/11/18 when 50 mg for 1 month was sent  Jennifer Arias RN        Requested Prescriptions   Pending Prescriptions Disp Refills     desvenlafaxine succinate (PRISTIQ) 50 MG 24 hr tablet [Pharmacy Med Name: DESVENLAFAXINE SUC ER 50 MG TB] 30 tablet 0     Sig: TAKE 1 TABLET BY MOUTH EVERY DAY    Serotonin-Norepinephrine Reuptake Inhibitors  Failed    10/4/2018 12:58 PM       Failed - Blood pressure under 140/90 in past 12 months    BP Readings from Last 3 Encounters:   09/11/18 138/90   08/28/18 130/88   08/24/18 118/86                Passed - Recent (12 mo) or future (30 days) visit within the authorizing provider's specialty    Patient had office visit in the last 12 months or has a visit in the next 30 days with authorizing provider or within the authorizing provider's specialty.  See \"Patient Info\" tab in inbasket, or \"Choose Columns\" in Meds & Orders section of the refill encounter.           Passed - Patient is age 18 or older       Passed - Normal serum creatinine on file in past 12 months    Recent Labs   Lab Test  05/21/18   1625   CR  1.03                 "

## 2018-10-24 ENCOUNTER — OFFICE VISIT (OUTPATIENT)
Dept: FAMILY MEDICINE | Facility: CLINIC | Age: 53
End: 2018-10-24
Payer: COMMERCIAL

## 2018-10-24 VITALS — SYSTOLIC BLOOD PRESSURE: 134 MMHG | DIASTOLIC BLOOD PRESSURE: 76 MMHG | HEART RATE: 72 BPM | OXYGEN SATURATION: 99 %

## 2018-10-24 DIAGNOSIS — Z80.8 FAMILY HISTORY OF SKIN CANCER: ICD-10-CM

## 2018-10-24 DIAGNOSIS — L71.9 ROSACEA: ICD-10-CM

## 2018-10-24 DIAGNOSIS — R23.8 INFLAMMATORY PAPULE: Primary | ICD-10-CM

## 2018-10-24 PROCEDURE — 99213 OFFICE O/P EST LOW 20 MIN: CPT | Performed by: FAMILY MEDICINE

## 2018-10-24 RX ORDER — MUPIROCIN 20 MG/G
OINTMENT TOPICAL
Qty: 22 G | Refills: 0 | Status: SHIPPED | OUTPATIENT
Start: 2018-10-24 | End: 2021-10-05

## 2018-10-24 RX ORDER — METRONIDAZOLE 7.5 MG/G
GEL TOPICAL 2 TIMES DAILY
Qty: 90 G | Refills: 11 | Status: SHIPPED | OUTPATIENT
Start: 2018-10-24 | End: 2022-08-12

## 2018-10-24 NOTE — MR AVS SNAPSHOT
After Visit Summary   10/24/2018    David Marino    MRN: [id number]           Patient Information     Date Of Birth          1965        Visit Information        Provider Department      10/24/2018 10:20 AM Meron Quijano MD [facility] [place]        Today's Diagnoses     Inflammatory papule    -  1    Rosacea        Family history of skin cancer          Care Instructions    FUTURE APPOINTMENTS  Follow up in 2 weeks as needed if the lesion on the nose is not resolving.  Otherwise, follow up annually for re-evaluation of rosacea and medication refill.    TOPICAL ANTIBIOTIC  Apply mupirocin (Bactroban) 2% ointment three time(s) a day for 7 days to the affected areas on the nose.    TOPICAL MEDICATION INSTRUCTIONS  Metronidazole (Metrogel) 0.75% gel.    Apply a layer to the affected area(s) of rosacea two times per day, everyday regardless if symptoms are present or not.    Keep in mind to also regularly use moisturizer, as this preventative measure can help maintain your skin's natural moisture barrier.    Apply moisturizer after application of medication.    Avoid excessive alcohol, caffeine, chocolate, spicy food consumption or sun exposure.            Follow-ups after your visit        Your next 10 appointments already scheduled     Nov 30, 2018  8:30 AM CST   Return Bariatric Nutrition Visit with Saranya Schroeder Diet 3, RD   [facility])    [address] [place] [id number]   [phone]            Nov 30, 2018  9:00 AM CST   Return Bariatric Visit with Cosme Foote PA-C   [facility] [address] [address]   [phone]              Who to contact     If you have questions or need follow up information about today's clinic visit or your schedule please contact  [facility] [place] directly at [phone].  Normal or non-critical lab and imaging results will be communicated to you by MyChart, letter or phone within 4 business days after the clinic has received the results. If you do not hear from us within 7 days, please contact the clinic through [facility] or phone. If you have a critical or abnormal lab result, we will notify you by phone as soon as possible.  Submit refill requests through [facility] or call your pharmacy and they will forward the refill request to us. Please allow 3 business days for your refill to be completed.          Additional Information About Your Visit        [facility] Information     [facility] gives you secure access to your electronic health record. If you see a primary care provider, you can also send messages to your care team and make appointments. If you have questions, please call your primary care clinic.  If you do not have a primary care provider, please call [phone] and they will assist you.        Care EveryWhere ID     This is your Care EveryWhere ID. This could be used by other organizations to access your [facility] medical records  [id number]        Your Vitals Were     Pulse Pulse Oximetry                72 99%           Blood Pressure from Last 3 Encounters:   10/24/18 134/76   09/11/18 138/90   08/28/18 130/88    Weight from Last 3 Encounters:   09/11/18 190 lb (86.2 kg)   08/28/18 195 lb (88.5 kg)   08/24/18 192 lb 3.2 oz (87.2 kg)              Today, you had the following     No orders found for display         Today's Medication Changes          These changes are accurate as of 10/24/18 10:35 AM.  If you have any questions, ask your nurse or doctor.               Start taking these medicines.        Dose/Directions    mupirocin 2 % ointment   Commonly known as:  BACTROBAN   Used for:  Inflammatory papule   Started by:  Meron Quijano MD        Apply to AA on nose tid for 7 days   Quantity:  22 g    Refills:  0         These medicines have changed or have updated prescriptions.        Dose/Directions    * metroNIDAZOLE 0.75 % topical gel   Commonly known as:  METROGEL   This may have changed:  Another medication with the same name was added. Make sure you understand how and when to take each.   Used for:  Rosacea   Changed by:  Meron Quijano MD        Dose:  1 g   Apply 1 g topically 2 times daily   Quantity:  60 g   Refills:  11       * metroNIDAZOLE 0.75 % topical gel   Commonly known as:  METROGEL   This may have changed:  You were already taking a medication with the same name, and this prescription was added. Make sure you understand how and when to take each.   Used for:  Rosacea   Changed by:  Meron Quijano MD        Apply topically 2 times daily Apply to AA on face and scalp bid   Quantity:  90 g   Refills:  11       rosuvastatin 10 MG tablet   Commonly known as:  CRESTOR   This may have changed:    - how much to take  - how to take this  - when to take this  - additional instructions   Used for:  Mixed hyperlipidemia        TAKE 1 TABLET(10 MG) BY MOUTH DAILY   Quantity:  90 tablet   Refills:  3       * Notice:  This list has 2 medication(s) that are the same as other medications prescribed for you. Read the directions carefully, and ask your doctor or other care provider to review them with you.         Where to get your medicines      These medications were sent to [place] [address] [place] [address] [address]     Phone:  [phone]     metroNIDAZOLE 0.75 % topical gel    mupirocin 2 % ointment                Primary Care Provider Office Phone # Fax #    Jus Woodrow Oswald [phone]       [facility] [address]        Equal Access to Services     SATINDER XIE AH: Hadii jaye Barnes, favio ying, lara ruffin. So  [facility] [phone].    ATENCIÓN: Si doug almanzar, tiene a obrien disposición servicios gratuitos de asistencia lingüística. Karen garza [phone].    We comply with applicable federal civil rights laws and [place] laws. We do not discriminate on the basis of race, color, national origin, age, disability, sex, sexual orientation, or gender identity.            Thank you!     Thank you for choosing [facility] [place]  for your care. Our goal is always to provide you with excellent care. Hearing back from our patients is one way we can continue to improve our services. Please take a few minutes to complete the written survey that you may receive in the mail after your visit with us. Thank you!             Your Updated Medication List - Protect others around you: Learn how to safely use, store and throw away your medicines at www.disposemymeds.org.          This list is accurate as of 10/24/18 10:35 AM.  Always use your most recent med list.                   Brand Name Dispense Instructions for use Diagnosis    B-12 DOTS SL      Place 1,000 mcg under the tongue daily        CALCIUM-VITAMIN D PO      Take 1 chew tab by mouth 3 times daily 1 = 250 mg        cimetidine 400 MG tablet    TAGAMET    60 tablet    Take 1 tablet (400 mg) by mouth 2 times daily    Morbid obesity (H)       clonazePAM 0.5 MG tablet    klonoPIN    30 tablet    Take 1 tablet (0.5 mg) by mouth nightly as needed for anxiety    Other insomnia, Anxiety       * desvenlafaxine succinate 50 MG 24 hr tablet    PRISTIQ    30 tablet    Take 1 tablet (50 mg) by mouth daily    Adjustment disorder with depressed mood       * desvenlafaxine succinate 100 MG 24 hr tablet    PRISTIQ    90 tablet    Take 1 tablet (100 mg) by mouth daily (after taking 50mg for 1 month)    Adjustment disorder with depressed mood       FISH OIL OMEGA-3 PO      Take 1 capsule by mouth At Bedtime        Glycopyrrolate Powd     30 g    Compounded. Apply pea size amount every  morning. 0.5% cream. For chromhidrosis, 30 gm jar    Chromhidrosis       * metroNIDAZOLE 0.75 % topical gel    METROGEL    60 g    Apply 1 g topically 2 times daily    Rosacea       * metroNIDAZOLE 0.75 % topical gel    METROGEL    90 g    Apply topically 2 times daily Apply to AA on face and scalp bid    Rosacea       montelukast 10 MG tablet    SINGULAIR    90 tablet    TAKE 1 TAB BY MOUTH AT BEDTIME    Mild intermittent asthma without complication       MULTIVITAMIN ADULT PO      Take 2 tablets by mouth every morning        mupirocin 2 % ointment    BACTROBAN    22 g    Apply to AA on nose tid for 7 days    Inflammatory papule       rosuvastatin 10 MG tablet    CRESTOR    90 tablet    TAKE 1 TABLET(10 MG) BY MOUTH DAILY    Mixed hyperlipidemia       TRANSDERM-SCOP (1.5 MG) 72 hr patch   Generic drug:  scopolamine      Place 1 patch onto the skin every 72 hours (Vertigo)        traZODone 50 MG tablet    DESYREL    90 tablet    Take 1 tablet (50 mg) by mouth At Bedtime    Other insomnia       VITAMIN D (CHOLECALCIFEROL) PO      Take 5,000 Units by mouth every morning        * Notice:  This list has 4 medication(s) that are the same as other medications prescribed for you. Read the directions carefully, and ask your doctor or other care provider to review them with you.

## 2018-10-24 NOTE — PROGRESS NOTES
[facility] - PRIMARY CARE SKIN    CC : Lesion(s)  SUBJECTIVE:                                                    David Marino is a 53 year old male who presents to clinic today because of a non-healing lesion on the nose.    Bothersome lesions noticed by the patient or other skin concerns :  Issue One : A lesion on the nose began 5 weeks ago apperaing like a zit. He tried to squeeze it open but it is not resolving.  Onset : 5 weeks ago.  Enlarging : YES.  Bleeding : NO  Itchy or irritating : NO.  Pain or tenderness : NO.  Changing color : NO.    Issue Two : He also requests refill of metronidazole for rosacea. He has had significant relief with use of Metrogel. He has been applying it on the face, scalp and neck.    Personal history of skin cancer : NO.  Family history of skin cancer : YES - unknown type skin cancers in father (who had a history of working with radiation and working on the farm)    Personal Medical History  Eczema Psoriasis Autoimmune   NO NO NO   Other : rosacea, chromhidrosis    Family Medical History  Eczema Psoriasis Autoimmune   NO NO NO   Other : roccaea    Sun Exposure History  Previous history of significant sun exposure: YES tanned extensively before  Blistering sunburns : YES  Tanning beds : YES - when younger.  Sunscreen Use : YES, frequency : when outdoors, SPF : 15-50.    Occupation : works for [employer], office (indoor).    Refer to electronic medical record (EMR) for past medical history and medications.    INTEGUMENTARY/SKIN: POSITIVE for non-healing lesion  ROS : 14 point review of systems was negative except the symptoms listed above in the HPI.    This document serves as a record of the services and decisions personally performed and made by Tomeka Quijano MD. It was created on her behalf by Floyd Lynn, a trained medical scribe.  The creation of this document is based on the scribe's personal observations and the provider's statements to the medical scribe.  Floyd Lynn,  October 24, 2018 10:22 AM      OBJECTIVE:                                                    GENERAL: healthy, alert and no distress  SKIN: Castillo Skin Type - III.  Face were examined. The dermatoscope was used to help evaluate pigmented lesions.  Skin Pertinent Findings:  Right side of nose : 4 x 5 mm in size smooth raised indurated lesion with a 1 mm superficial erosion.    Diagnostic Test Results:  none           ASSESSMENT:                                                      Encounter Diagnoses   Name Primary?     Inflammatory papule Yes     Rosacea      Family history of skin cancer          PLAN:                                                    Patient Instructions   FUTURE APPOINTMENTS  Follow up in 2 weeks as needed if the lesion on the nose is not resolving.  Otherwise, follow up annually for re-evaluation of rosacea and medication refill.    TOPICAL ANTIBIOTIC  Apply mupirocin (Bactroban) 2% ointment three time(s) a day for 7 days to the affected areas on the nose.    TOPICAL MEDICATION INSTRUCTIONS  Metronidazole (Metrogel) 0.75% gel.    Apply a layer to the affected area(s) of rosacea two times per day, everyday regardless if symptoms are present or not.    Keep in mind to also regularly use moisturizer, as this preventative measure can help maintain your skin's natural moisture barrier.    Apply moisturizer after application of medication.    Avoid excessive alcohol, caffeine, chocolate, spicy food consumption or sun exposure.        PROCEDURES:                                                    None.    TT : 20 minutes.  CT : 15 minutes.      The information in this document, created by the medical scribe for me, accurately reflects the services I personally performed and the decisions made by me. I have reviewed and approved this document for accuracy prior to leaving the patient care area.  Tomeka Quijano MD October 24, 2018 10:22 AM  [facility]

## 2018-10-24 NOTE — PATIENT INSTRUCTIONS
FUTURE APPOINTMENTS  Follow up in 2 weeks as needed if the lesion on the nose is not resolving.  Otherwise, follow up annually for re-evaluation of rosacea and medication refill.    TOPICAL ANTIBIOTIC  Apply mupirocin (Bactroban) 2% ointment three time(s) a day for 7 days to the affected areas on the nose.    TOPICAL MEDICATION INSTRUCTIONS  Metronidazole (Metrogel) 0.75% gel.    Apply a layer to the affected area(s) of rosacea two times per day, everyday regardless if symptoms are present or not.    Keep in mind to also regularly use moisturizer, as this preventative measure can help maintain your skin's natural moisture barrier.    Apply moisturizer after application of medication.    Avoid excessive alcohol, caffeine, chocolate, spicy food consumption or sun exposure.

## 2018-10-24 NOTE — LETTER
10/24/2018         RE: David Marino  [address]  [place] [address]        Dear Colleague,    Thank you for referring your patient, David Marino, to the [facility] [place]. Please see a copy of my visit note below.    [facility] - PRIMARY CARE SKIN    CC : Lesion(s)  SUBJECTIVE:                                                    David Marino is a 53 year old male who presents to clinic today because of a non-healing lesion on the nose.    Bothersome lesions noticed by the patient or other skin concerns :  Issue One : A lesion on the nose began 5 weeks ago apperaing like a zit. He tried to squeeze it open but it is not resolving.  Onset : 5 weeks ago.  Enlarging : YES.  Bleeding : NO  Itchy or irritating : NO.  Pain or tenderness : NO.  Changing color : NO.    Issue Two : He also requests refill of metronidazole for rosacea. He has had significant relief with use of Metrogel. He has been applying it on the face, scalp and neck.    Personal history of skin cancer : NO.  Family history of skin cancer : YES - unknown type skin cancers in father (who had a history of working with radiation and working on the farm)    Personal Medical History  Eczema Psoriasis Autoimmune   NO NO NO   Other : rosacea, chromhidrosis    Family Medical History  Eczema Psoriasis Autoimmune   NO NO NO   Other : roccaea    Sun Exposure History  Previous history of significant sun exposure: YES tanned extensively before  Blistering sunburns : YES  Tanning beds : YES - when younger.  Sunscreen Use : YES, frequency : when outdoors, SPF : 15-50.    Occupation : works for [employer], office (indoor).    Refer to electronic medical record (EMR) for past medical history and medications.    INTEGUMENTARY/SKIN: POSITIVE for non-healing lesion  ROS : 14 point review of systems was negative except the symptoms listed above in the HPI.    This document serves as a record of the services and decisions personally  performed and made by Tomeka Quijano MD. It was created on her behalf by Floyd Lynn, a trained medical scribe.  The creation of this document is based on the scribe's personal observations and the provider's statements to the medical scribe.  Floyd Lynn, October 24, 2018 10:22 AM      OBJECTIVE:                                                    GENERAL: healthy, alert and no distress  SKIN: Castillo Skin Type - III.  Face were examined. The dermatoscope was used to help evaluate pigmented lesions.  Skin Pertinent Findings:  Right side of nose : 4 x 5 mm in size smooth raised indurated lesion with a 1 mm superficial erosion.    Diagnostic Test Results:  none           ASSESSMENT:                                                      Encounter Diagnoses   Name Primary?     Inflammatory papule Yes     Rosacea      Family history of skin cancer          PLAN:                                                    Patient Instructions   FUTURE APPOINTMENTS  Follow up in 2 weeks as needed if the lesion on the nose is not resolving.  Otherwise, follow up annually for re-evaluation of rosacea and medication refill.    TOPICAL ANTIBIOTIC  Apply mupirocin (Bactroban) 2% ointment three time(s) a day for 7 days to the affected areas on the nose.    TOPICAL MEDICATION INSTRUCTIONS  Metronidazole (Metrogel) 0.75% gel.    Apply a layer to the affected area(s) of rosacea two times per day, everyday regardless if symptoms are present or not.    Keep in mind to also regularly use moisturizer, as this preventative measure can help maintain your skin's natural moisture barrier.    Apply moisturizer after application of medication.    Avoid excessive alcohol, caffeine, chocolate, spicy food consumption or sun exposure.        PROCEDURES:                                                    None.    TT : 20 minutes.  CT : 15 minutes.      The information in this document, created by the medical scribe for me, accurately reflects the services  I personally performed and the decisions made by me. I have reviewed and approved this document for accuracy prior to leaving the patient care area.  Tomeka Quijano MD October 24, 2018 10:22 AM  [facility] [place]    Again, thank you for allowing me to participate in the care of your patient.        Sincerely,        Meron Quijano MD

## 2019-02-09 DIAGNOSIS — E78.2 MIXED HYPERLIPIDEMIA: ICD-10-CM

## 2019-02-12 ENCOUNTER — OFFICE VISIT - HEALTHEAST (OUTPATIENT)
Dept: FAMILY MEDICINE | Facility: CLINIC | Age: 54
End: 2019-02-12

## 2019-02-12 DIAGNOSIS — Z86.39 HISTORY OF HYPOGONADISM: ICD-10-CM

## 2019-02-12 DIAGNOSIS — F51.01 PRIMARY INSOMNIA: ICD-10-CM

## 2019-02-12 DIAGNOSIS — J45.20 INTERMITTENT ASTHMA, WELL CONTROLLED: ICD-10-CM

## 2019-02-12 DIAGNOSIS — Z13.220 LIPID SCREENING: ICD-10-CM

## 2019-02-12 DIAGNOSIS — L75.1 CHROMHIDROSIS: ICD-10-CM

## 2019-02-12 DIAGNOSIS — E78.5 HYPERLIPIDEMIA LDL GOAL <130: ICD-10-CM

## 2019-02-12 DIAGNOSIS — Z91.018 FOOD ALLERGY: ICD-10-CM

## 2019-02-12 DIAGNOSIS — Z98.84 HISTORY OF GASTRIC BYPASS: ICD-10-CM

## 2019-02-12 DIAGNOSIS — F32.5 MAJOR DEPRESSION IN COMPLETE REMISSION (H): ICD-10-CM

## 2019-02-12 DIAGNOSIS — H04.123 DRY EYES: ICD-10-CM

## 2019-02-12 RX ORDER — ROSUVASTATIN CALCIUM 10 MG/1
TABLET, COATED ORAL
Qty: 30 TABLET | Refills: 0 | Status: SHIPPED | OUTPATIENT
Start: 2019-02-12 | End: 2021-10-04

## 2019-02-12 NOTE — TELEPHONE ENCOUNTER
"Requested Prescriptions   Pending Prescriptions Disp Refills     rosuvastatin (CRESTOR) 10 MG tablet [Pharmacy Med Name: ROSUVASTATIN CALCIUM 10 MG TAB] 90 tablet 3     Sig: TAKE 1 TABLET(10 MG) BY MOUTH DAILY    Statins Protocol Failed - 2/9/2019 12:24 AM       Failed - LDL on file in past 12 months    Recent Labs   Lab Test 09/13/16  0721   *            Passed - No abnormal creatine kinase in past 12 months    No lab results found.            Passed - Recent (12 mo) or future (30 days) visit within the authorizing provider's specialty    Patient had office visit in the last 12 months or has a visit in the next 30 days with authorizing provider or within the authorizing provider's specialty.  See \"Patient Info\" tab in inbasket, or \"Choose Columns\" in Meds & Orders section of the refill encounter.             Passed - Medication is active on med list       Passed - Patient is age 18 or older        Medication is being filled for 1 time refill only due to:  Patient needs to be seen because due for cholesterol recheck..  "

## 2019-04-01 ENCOUNTER — COMMUNICATION - HEALTHEAST (OUTPATIENT)
Dept: FAMILY MEDICINE | Facility: CLINIC | Age: 54
End: 2019-04-01

## 2019-04-01 DIAGNOSIS — Z98.84 HISTORY OF GASTRIC BYPASS: ICD-10-CM

## 2019-04-01 DIAGNOSIS — Z12.11 SPECIAL SCREENING FOR MALIGNANT NEOPLASMS, COLON: ICD-10-CM

## 2019-04-01 DIAGNOSIS — Z13.220 SCREENING, LIPID: ICD-10-CM

## 2019-04-08 ENCOUNTER — COMMUNICATION - HEALTHEAST (OUTPATIENT)
Dept: FAMILY MEDICINE | Facility: CLINIC | Age: 54
End: 2019-04-08

## 2019-04-08 DIAGNOSIS — L75.1 CHROMHIDROSIS: ICD-10-CM

## 2019-04-08 DIAGNOSIS — R61 GENERALIZED HYPERHIDROSIS: ICD-10-CM

## 2019-05-15 ENCOUNTER — OFFICE VISIT - HEALTHEAST (OUTPATIENT)
Dept: FAMILY MEDICINE | Facility: CLINIC | Age: 54
End: 2019-05-15

## 2019-05-15 DIAGNOSIS — J45.31 MILD PERSISTENT ASTHMA WITH EXACERBATION: ICD-10-CM

## 2019-05-15 DIAGNOSIS — E78.5 HYPERLIPIDEMIA LDL GOAL <130: ICD-10-CM

## 2019-05-15 DIAGNOSIS — H04.123 DRY EYES: ICD-10-CM

## 2019-05-15 DIAGNOSIS — Z98.84 HISTORY OF GASTRIC BYPASS: ICD-10-CM

## 2019-05-15 DIAGNOSIS — Z86.39 HISTORY OF HYPOGONADISM: ICD-10-CM

## 2019-05-15 LAB
BASOPHILS # BLD AUTO: 0 THOU/UL (ref 0–0.2)
BASOPHILS NFR BLD AUTO: 1 % (ref 0–2)
CHOLEST SERPL-MCNC: 135 MG/DL
EOSINOPHIL # BLD AUTO: 0.1 THOU/UL (ref 0–0.4)
EOSINOPHIL NFR BLD AUTO: 3 % (ref 0–6)
ERYTHROCYTE [DISTWIDTH] IN BLOOD BY AUTOMATED COUNT: 12.3 % (ref 11–14.5)
FASTING STATUS PATIENT QL REPORTED: YES
FERRITIN SERPL-MCNC: 47 NG/ML (ref 27–300)
HCT VFR BLD AUTO: 43.2 % (ref 40–54)
HDLC SERPL-MCNC: 59 MG/DL
HGB BLD-MCNC: 14.9 G/DL (ref 14–18)
IRON SATN MFR SERPL: 13 % (ref 20–50)
IRON SERPL-MCNC: 46 UG/DL (ref 42–175)
LDLC SERPL CALC-MCNC: 50 MG/DL
LYMPHOCYTES # BLD AUTO: 1.3 THOU/UL (ref 0.8–4.4)
LYMPHOCYTES NFR BLD AUTO: 30 % (ref 20–40)
MCH RBC QN AUTO: 30.5 PG (ref 27–34)
MCHC RBC AUTO-ENTMCNC: 34.5 G/DL (ref 32–36)
MCV RBC AUTO: 88 FL (ref 80–100)
MONOCYTES # BLD AUTO: 0.4 THOU/UL (ref 0–0.9)
MONOCYTES NFR BLD AUTO: 8 % (ref 2–10)
NEUTROPHILS # BLD AUTO: 2.4 THOU/UL (ref 2–7.7)
NEUTROPHILS NFR BLD AUTO: 58 % (ref 50–70)
PLATELET # BLD AUTO: 154 THOU/UL (ref 140–440)
PMV BLD AUTO: 8.2 FL (ref 7–10)
PSA SERPL-MCNC: 1.2 NG/ML (ref 0–3.5)
PTH-INTACT SERPL-MCNC: 59 PG/ML (ref 10–86)
RBC # BLD AUTO: 4.89 MILL/UL (ref 4.4–6.2)
TIBC SERPL-MCNC: 360 UG/DL (ref 313–563)
TRANSFERRIN SERPL-MCNC: 288 MG/DL (ref 212–360)
TRIGL SERPL-MCNC: 131 MG/DL
VIT B12 SERPL-MCNC: 1001 PG/ML (ref 213–816)
WBC: 4.1 THOU/UL (ref 4–11)

## 2019-05-15 ASSESSMENT — MIFFLIN-ST. JEOR: SCORE: 1691.74

## 2019-05-16 LAB
25(OH)D3 SERPL-MCNC: 57.3 NG/ML (ref 30–80)
25(OH)D3 SERPL-MCNC: 57.3 NG/ML (ref 30–80)
ANA SER QL: 1.2 U

## 2019-05-18 LAB
SHBG SERPL-SCNC: 28 NMOL/L (ref 11–80)
TESTOST FREE SERPL-MCNC: 6.05 NG/DL (ref 4.7–24.4)
TESTOST SERPL-MCNC: 282 NG/DL (ref 240–950)

## 2019-05-20 ENCOUNTER — COMMUNICATION - HEALTHEAST (OUTPATIENT)
Dept: FAMILY MEDICINE | Facility: CLINIC | Age: 54
End: 2019-05-20

## 2019-05-22 ENCOUNTER — COMMUNICATION - HEALTHEAST (OUTPATIENT)
Dept: FAMILY MEDICINE | Facility: CLINIC | Age: 54
End: 2019-05-22

## 2019-06-11 ENCOUNTER — RECORDS - HEALTHEAST (OUTPATIENT)
Dept: ADMINISTRATIVE | Facility: OTHER | Age: 54
End: 2019-06-11

## 2019-06-11 ENCOUNTER — COMMUNICATION - HEALTHEAST (OUTPATIENT)
Dept: FAMILY MEDICINE | Facility: CLINIC | Age: 54
End: 2019-06-11

## 2019-06-11 DIAGNOSIS — J45.31 MILD PERSISTENT ASTHMA WITH EXACERBATION: ICD-10-CM

## 2019-06-17 ENCOUNTER — OFFICE VISIT - HEALTHEAST (OUTPATIENT)
Dept: FAMILY MEDICINE | Facility: CLINIC | Age: 54
End: 2019-06-17

## 2019-06-17 DIAGNOSIS — E29.1 HYPOGONADISM MALE: ICD-10-CM

## 2019-06-17 DIAGNOSIS — Z76.89 ENCOUNTER TO ESTABLISH CARE: ICD-10-CM

## 2019-06-17 DIAGNOSIS — J45.20 INTERMITTENT ASTHMA, WELL CONTROLLED: ICD-10-CM

## 2019-06-17 DIAGNOSIS — F34.1 DYSTHYMIC DISORDER: ICD-10-CM

## 2019-06-17 DIAGNOSIS — E78.5 HYPERLIPIDEMIA LDL GOAL <130: ICD-10-CM

## 2019-06-17 DIAGNOSIS — J45.20 MILD INTERMITTENT ASTHMA WITHOUT COMPLICATION: ICD-10-CM

## 2019-06-18 ENCOUNTER — COMMUNICATION - HEALTHEAST (OUTPATIENT)
Dept: FAMILY MEDICINE | Facility: CLINIC | Age: 54
End: 2019-06-18

## 2019-07-16 ENCOUNTER — RECORDS - HEALTHEAST (OUTPATIENT)
Dept: ADMINISTRATIVE | Facility: OTHER | Age: 54
End: 2019-07-16

## 2019-08-12 ENCOUNTER — OFFICE VISIT - HEALTHEAST (OUTPATIENT)
Dept: FAMILY MEDICINE | Facility: CLINIC | Age: 54
End: 2019-08-12

## 2019-08-12 DIAGNOSIS — Z01.818 PREOP GENERAL PHYSICAL EXAM: ICD-10-CM

## 2019-08-12 DIAGNOSIS — A63.0 CONDYLOMA: ICD-10-CM

## 2019-08-12 LAB
ANION GAP SERPL CALCULATED.3IONS-SCNC: 7 MMOL/L (ref 5–18)
APTT PPP: 29 SECONDS (ref 24–37)
BUN SERPL-MCNC: 8 MG/DL (ref 8–22)
CALCIUM SERPL-MCNC: 9.9 MG/DL (ref 8.5–10.5)
CHLORIDE BLD-SCNC: 105 MMOL/L (ref 98–107)
CO2 SERPL-SCNC: 30 MMOL/L (ref 22–31)
CREAT SERPL-MCNC: 0.91 MG/DL (ref 0.7–1.3)
GFR SERPL CREATININE-BSD FRML MDRD: >60 ML/MIN/1.73M2
GLUCOSE BLD-MCNC: 93 MG/DL (ref 70–125)
HGB BLD-MCNC: 14.7 G/DL (ref 14–18)
POTASSIUM BLD-SCNC: 4.2 MMOL/L (ref 3.5–5)
SODIUM SERPL-SCNC: 142 MMOL/L (ref 136–145)

## 2019-08-12 ASSESSMENT — MIFFLIN-ST. JEOR: SCORE: 1744.2

## 2019-08-27 ASSESSMENT — MIFFLIN-ST. JEOR: SCORE: 1703.08

## 2019-08-29 ENCOUNTER — ANESTHESIA - HEALTHEAST (OUTPATIENT)
Dept: SURGERY | Facility: AMBULATORY SURGERY CENTER | Age: 54
End: 2019-08-29

## 2019-08-30 ENCOUNTER — SURGERY - HEALTHEAST (OUTPATIENT)
Dept: SURGERY | Facility: AMBULATORY SURGERY CENTER | Age: 54
End: 2019-08-30

## 2019-08-30 ASSESSMENT — MIFFLIN-ST. JEOR: SCORE: 1744.2

## 2019-10-11 ENCOUNTER — COMMUNICATION - HEALTHEAST (OUTPATIENT)
Dept: FAMILY MEDICINE | Facility: CLINIC | Age: 54
End: 2019-10-11

## 2019-10-14 ENCOUNTER — COMMUNICATION - HEALTHEAST (OUTPATIENT)
Dept: FAMILY MEDICINE | Facility: CLINIC | Age: 54
End: 2019-10-14

## 2019-10-14 ENCOUNTER — MYC MEDICAL ADVICE (OUTPATIENT)
Dept: FAMILY MEDICINE | Facility: CLINIC | Age: 54
End: 2019-10-14

## 2019-10-14 DIAGNOSIS — L75.1 CHROMHIDROSIS: ICD-10-CM

## 2019-10-14 RX ORDER — GLYCOPYRROLATE 100 %
POWDER (GRAM) MISCELLANEOUS
Qty: 30 G | Refills: 11 | Status: CANCELLED | OUTPATIENT
Start: 2019-10-14

## 2019-10-14 NOTE — TELEPHONE ENCOUNTER
Glyco/vanic/ungua   Last Written Prescription Date: 03/06/18  Last Fill Quantity: 30g,  # refills: 11   Last office visit: 10/24/2018 with prescribing provider:  yes   Future Office Visit:      Requested Prescriptions   Pending Prescriptions Disp Refills     Glycopyrrolate POWD 30 g 11     Sig: Compounded. Apply pea size amount every morning. 0.5% cream. For chromhidrosis, 30 gm jar       There is no refill protocol information for this order

## 2019-10-16 ENCOUNTER — COMMUNICATION - HEALTHEAST (OUTPATIENT)
Dept: FAMILY MEDICINE | Facility: CLINIC | Age: 54
End: 2019-10-16

## 2019-10-16 ENCOUNTER — AMBULATORY - HEALTHEAST (OUTPATIENT)
Dept: FAMILY MEDICINE | Facility: CLINIC | Age: 54
End: 2019-10-16

## 2019-10-16 DIAGNOSIS — L75.1 CHROMHIDROSIS: ICD-10-CM

## 2019-10-16 RX ORDER — GLYCOPYRROLATE 100 %
POWDER (GRAM) MISCELLANEOUS
Qty: 30 G | Refills: 11 | Status: CANCELLED | OUTPATIENT
Start: 2019-10-16

## 2019-10-16 NOTE — TELEPHONE ENCOUNTER
Requested Prescriptions   Pending Prescriptions Disp Refills     Glycopyrrolate POWD 30 g 11     Sig: Compounded. Apply pea size amount every morning. 0.5% cream. For chromhidrosis, 30 gm jar       There is no refill protocol information for this order      Requested Prescriptions   Pending Prescriptions Disp Refills     Glycopyrrolate POWD 30 g 11     Sig: Compounded. Apply pea size amount every morning. 0.5% cream. For chromhidrosis, 30 gm jar       There is no refill protocol information for this order        Last Written Prescription Date:  3/6/2018  Last Fill Quantity: 30g,  # refills: 11   Last office visit: 10/24/2018 with prescribing provider:  Dr. Oswald   Future Office Visit:  No upcoming appts scheduled    Marley Tai MA  Medical Assistant  [facility]

## 2019-11-11 ENCOUNTER — COMMUNICATION - HEALTHEAST (OUTPATIENT)
Dept: FAMILY MEDICINE | Facility: CLINIC | Age: 54
End: 2019-11-11

## 2019-11-11 DIAGNOSIS — L71.9 ROSACEA: ICD-10-CM

## 2019-12-10 ENCOUNTER — OFFICE VISIT - HEALTHEAST (OUTPATIENT)
Dept: FAMILY MEDICINE | Facility: CLINIC | Age: 54
End: 2019-12-10

## 2019-12-10 DIAGNOSIS — J01.10 ACUTE NON-RECURRENT FRONTAL SINUSITIS: ICD-10-CM

## 2020-01-03 ENCOUNTER — RECORDS - HEALTHEAST (OUTPATIENT)
Dept: ADMINISTRATIVE | Facility: OTHER | Age: 55
End: 2020-01-03

## 2020-01-05 ENCOUNTER — COMMUNICATION - HEALTHEAST (OUTPATIENT)
Dept: FAMILY MEDICINE | Facility: CLINIC | Age: 55
End: 2020-01-05

## 2020-01-05 DIAGNOSIS — E29.1 HYPOGONADISM MALE: ICD-10-CM

## 2020-01-08 ENCOUNTER — COMMUNICATION - HEALTHEAST (OUTPATIENT)
Dept: FAMILY MEDICINE | Facility: CLINIC | Age: 55
End: 2020-01-08

## 2020-01-14 ENCOUNTER — OFFICE VISIT - HEALTHEAST (OUTPATIENT)
Dept: FAMILY MEDICINE | Facility: CLINIC | Age: 55
End: 2020-01-14

## 2020-01-14 DIAGNOSIS — F34.1 DYSTHYMIC DISORDER: ICD-10-CM

## 2020-01-14 DIAGNOSIS — J45.20 MILD INTERMITTENT ASTHMA WITHOUT COMPLICATION: ICD-10-CM

## 2020-01-14 ASSESSMENT — ANXIETY QUESTIONNAIRES
4. TROUBLE RELAXING: SEVERAL DAYS
GAD7 TOTAL SCORE: 7
2. NOT BEING ABLE TO STOP OR CONTROL WORRYING: SEVERAL DAYS
6. BECOMING EASILY ANNOYED OR IRRITABLE: MORE THAN HALF THE DAYS
5. BEING SO RESTLESS THAT IT IS HARD TO SIT STILL: SEVERAL DAYS
IF YOU CHECKED OFF ANY PROBLEMS ON THIS QUESTIONNAIRE, HOW DIFFICULT HAVE THESE PROBLEMS MADE IT FOR YOU TO DO YOUR WORK, TAKE CARE OF THINGS AT HOME, OR GET ALONG WITH OTHER PEOPLE: SOMEWHAT DIFFICULT
1. FEELING NERVOUS, ANXIOUS, OR ON EDGE: NOT AT ALL
7. FEELING AFRAID AS IF SOMETHING AWFUL MIGHT HAPPEN: SEVERAL DAYS
3. WORRYING TOO MUCH ABOUT DIFFERENT THINGS: SEVERAL DAYS

## 2020-01-14 ASSESSMENT — PATIENT HEALTH QUESTIONNAIRE - PHQ9: SUM OF ALL RESPONSES TO PHQ QUESTIONS 1-9: 6

## 2020-01-31 ENCOUNTER — OFFICE VISIT (OUTPATIENT)
Dept: OPTOMETRY | Facility: CLINIC | Age: 55
End: 2020-01-31
Payer: COMMERCIAL

## 2020-01-31 DIAGNOSIS — H02.889 MEIBOMIAN GLAND DYSFUNCTION: Primary | ICD-10-CM

## 2020-01-31 PROCEDURE — 92002 INTRM OPH EXAM NEW PATIENT: CPT | Performed by: OPTOMETRIST

## 2020-01-31 RX ORDER — DOXYCYCLINE 50 MG/1
CAPSULE ORAL
Qty: 60 CAPSULE | Refills: 3 | Status: CANCELLED | OUTPATIENT
Start: 2020-01-31

## 2020-01-31 RX ORDER — DOXYCYCLINE 50 MG/1
CAPSULE ORAL
Qty: 60 CAPSULE | Refills: 2 | Status: SHIPPED | OUTPATIENT
Start: 2020-01-31 | End: 2021-12-01

## 2020-01-31 ASSESSMENT — VISUAL ACUITY
OD_SC: 20/25
OS_SC+: +2
OS_SC: 20/25
OD_SC+: -2
METHOD: SNELLEN - LINEAR

## 2020-01-31 NOTE — PATIENT INSTRUCTIONS
Meibomian gland dysfunction or Posterior Blepharitis, is characterized by inflammation along both the uppper and lower eyelid margins. A single row of these glands is present in each lid with openings along the lid margins.  It is often found in association with skin conditions such as rosacea and seborrheic dermatitis.    Symptoms include:  ?Red eyes  ?Gritty or burning sensation  ?Excessive tearing  ?Itchy eyelids  ?Red, swollen eyelids  ?Crusting or matting of eyelashes in the morning  ?Light sensitivity  ?Blurred vision    It is important to keep cosmetics from blocking these oil glands. If blocked, they do not   excrete oil into the tear film, which causes the tears to evaporate quickly.   This may result in watery eyes.  There is also an increase of bacterial growth when the tear film is unstable, leading to further ocular surface inflammation.    Warm compresses are very beneficial to the normal functioning of the eye.  Warm compresses for 5-10 minutes twice daily and keeping the lid margins clean  and help maintain a good tear film.   Moisten a washcloth with hot water, or microwave for 10 seconds, being careful to not get the cloth too hot.   Then put the washcloth onto your eyelids for 5 minutes. It will cool quickly so a rice pack or eyemask that can be heated and laid on top of the washcloth will help retain the heat.    Omega 3 fatty acid supplements taken once to twice daily and artificial tears such as Soothe xp, Refresh optive , Retaine and systane balance are also an additional treatment to control inflammation and help soothe your eyes.  systane complete is very similar to the systaneultra , so continue with current     Overabundance of bacterial microorganisms along the eyelashes and lid margins induce stress on the tear film and promote inflammation.  Regular lid hygiene helps diminish the bacterial population to prevent inflammation and infection.  Use a warm compress to loosen crusts   Cleanse  lids once daily with a lid cleansing product as directed such as Ocusoft or Sterilid which can be purchased at most pharmacies. Diluted baby shampoo will also work, but not as well as it dries the skin and can irritate eyelids.  Hypo chlor spray may also be used on closed lids.    I recommend using artificial tears for your dry eye. There are over the counter drops that work well and may be used up to 4 x daily. ( systane , refresh, thera tears) If you need more than 4 drops daily, use a preservative free product which come in individual vials and may be used for 24 hours until finished and discarded.    Do not use red eye relief products such as visine or clear eyes for dry eye

## 2020-01-31 NOTE — TELEPHONE ENCOUNTER
Dr. Pardo did not put in prescription for doxycycline and is not able to be contacted.  I sent a prescription for 3 months to the pharmacy.    Braydon Ramírez OD

## 2020-01-31 NOTE — PROGRESS NOTES
"Pt does not want to be dilated today.     Current Eye Medications:  Pt uses Systane 2x a day.     Subjective:  Pt has Rosacea on his face and wonders if he also has ocular rosacea.  Eyes feel like sand paper, red. Eyes are \"incredible uncomfortable\" on the \"verge of pain\".  Pt feels like he is really sensitive to light. His computer screen seems really bright.  Pt has had problems with eyes for years, but in January symptoms have become very challenging to cope.      Pt had a yearly exam in January.  Allergy issues are usually late in spring and summer. Pt does take allergy medication.        Previously on doxy did not like as he was photosensitive, but would like to try again  Currently on fish oil  Using systane complete bid     Objective:  See Ophthalmology Exam.       Assessment:   MGD / dry eye     Plan:   Warm compresses/ more frequent artificial tears  L  Will try doxy for a couple of months and discontinue before spring  See Patient Instructions.      Nicole Pardo OD        "

## 2020-01-31 NOTE — LETTER
"    1/31/2020         RE: David Marino  [address]  [place] [address]        Dear Colleague,    Thank you for referring your patient, David Marino, to [facility]. Please see a copy of my visit note below.    Pt does not want to be dilated today.     Current Eye Medications:  Pt uses Systane 2x a day.     Subjective:  Pt has Rosacea on his face and wonders if he also has ocular rosacea.  Eyes feel like sand paper, red. Eyes are \"incredible uncomfortable\" on the \"verge of pain\".  Pt feels like he is really sensitive to light. His computer screen seems really bright.  Pt has had problems with eyes for years, but in January symptoms have become very challenging to cope.      Pt had a yearly exam in January.  Allergy issues are usually late in spring and summer. Pt does take allergy medication.        Previously on doxy did not like as he was photosensitive, but would like to try again  Currently on fish oil  Using systane complete bid     Objective:  See Ophthalmology Exam.       Assessment:   MGD / dry eye     Plan:   Warm compresses/ more frequent artificial tears  L  Will try doxy for a couple of months and discontinue before spring  See Patient Instructions.      Nicole Pardo OD          Again, thank you for allowing me to participate in the care of your patient.        Sincerely,        Nicole Pardo, OD    "

## 2020-02-03 ENCOUNTER — RECORDS - HEALTHEAST (OUTPATIENT)
Dept: ADMINISTRATIVE | Facility: OTHER | Age: 55
End: 2020-02-03

## 2020-02-24 ENCOUNTER — OFFICE VISIT - HEALTHEAST (OUTPATIENT)
Dept: FAMILY MEDICINE | Facility: CLINIC | Age: 55
End: 2020-02-24

## 2020-02-24 ENCOUNTER — RECORDS - HEALTHEAST (OUTPATIENT)
Dept: GENERAL RADIOLOGY | Facility: CLINIC | Age: 55
End: 2020-02-24

## 2020-02-24 DIAGNOSIS — M54.50 ACUTE MIDLINE LOW BACK PAIN WITHOUT SCIATICA: ICD-10-CM

## 2020-02-24 DIAGNOSIS — M54.50 LOW BACK PAIN: ICD-10-CM

## 2020-02-26 ENCOUNTER — COMMUNICATION - HEALTHEAST (OUTPATIENT)
Dept: FAMILY MEDICINE | Facility: CLINIC | Age: 55
End: 2020-02-26

## 2020-02-28 ENCOUNTER — COMMUNICATION - HEALTHEAST (OUTPATIENT)
Dept: FAMILY MEDICINE | Facility: CLINIC | Age: 55
End: 2020-02-28

## 2020-03-04 ENCOUNTER — OFFICE VISIT (OUTPATIENT)
Dept: ORTHOPEDICS | Facility: CLINIC | Age: 55
End: 2020-03-04
Payer: COMMERCIAL

## 2020-03-04 VITALS
HEIGHT: 70 IN | SYSTOLIC BLOOD PRESSURE: 147 MMHG | WEIGHT: 215 LBS | DIASTOLIC BLOOD PRESSURE: 89 MMHG | BODY MASS INDEX: 30.78 KG/M2

## 2020-03-04 DIAGNOSIS — M51.369 DDD (DEGENERATIVE DISC DISEASE), LUMBAR: Primary | ICD-10-CM

## 2020-03-04 DIAGNOSIS — M54.50 ACUTE MIDLINE LOW BACK PAIN WITHOUT SCIATICA: ICD-10-CM

## 2020-03-04 PROCEDURE — 99204 OFFICE O/P NEW MOD 45 MIN: CPT | Performed by: FAMILY MEDICINE

## 2020-03-04 RX ORDER — METHYLPREDNISOLONE 4 MG
TABLET, DOSE PACK ORAL
Qty: 21 TABLET | Refills: 0 | Status: SHIPPED | OUTPATIENT
Start: 2020-03-04 | End: 2021-09-13

## 2020-03-04 ASSESSMENT — ENCOUNTER SYMPTOMS: BACK PAIN: 1

## 2020-03-04 ASSESSMENT — MIFFLIN-ST. JEOR: SCORE: 1821.48

## 2020-03-04 NOTE — PATIENT INSTRUCTIONS
If you have any further questions for your physician or physician s care team you can call [phone] and use option 2 then 3 to leave a voice message.

## 2020-03-04 NOTE — LETTER
3/4/2020         RE: David Marino  [address]  [place] [address]        Dear Colleague,    Thank you for referring your patient, David Marino, to the  [facility]. Please see a copy of my visit note below.    [facility]   Clinic Visit s Mar 4, 2020    PCP: Outside, Provider      David is a 55 year old male who is seen in consultation at the request of Dr. Bucio for   Chief Complaint   Patient presents with     Lower Back - Pain       Injury: Patient describes injury as bent over to brush his toilet and felt an immediate pain in his back. He went to a doctor one week after.     Location of Pain: midline low back, radiating to left hip    Duration of Pain: acute, 2 week(s),   Rating of Pain at worst: 8/10  Rating of Pain Currently: 2/10  Pain is better with: activity avoidance   Pain is worse with: nothing specific  Treatment so far consists of: stretching, heat and ice ibuprofen , chiro  Associated symptoms: no distal numbness or tingling; denies swelling or warmth  Recent imaging completed: No recent imaging completed.  Prior History of related problems: similar episode 4 years ago, resolved with chiro and massage.     Social History: is employed as a/an hr admin      Past Medical History:   Diagnosis Date     Depression      Hypertension      ZARIA (obstructive sleep apnea) Severe AHI 51 12/8/2017    HST 12/6/2017 Severe     Uncomplicated asthma        Patient Active Problem List    Diagnosis Date Noted     Family history of skin cancer 10/24/2018     Priority: Medium     Adult BMI 27.0-27.9 kg/sq m 08/24/2018     Priority: Medium     Postsurgical malabsorption 05/29/2018     Priority: Medium     Mild intermittent asthma with acute exacerbation 03/14/2018     Priority: Medium     ZARIA (obstructive sleep apnea) Severe AHI 51 12/08/2017     Priority: Medium     HST 12/6/2017 Severe       BMI 32.0-32.9,adult 07/11/2017     Priority: Medium     Sleep  disturbance 07/11/2017     Priority: Medium     Low back strain, initial encounter 02/13/2017     Priority: Medium     Adjustment disorder with depressed mood 05/06/2016     Priority: Medium     Rosacea 04/15/2015     Priority: Medium     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  Failed robinul, capsaicin.       Environmental allergies 02/04/2015     Priority: Medium     Mixed hyperlipidemia 02/04/2015     Priority: Medium     Hypertriglyceridemia 02/04/2015     Priority: Medium     Insomnia 02/04/2015     Priority: Medium     Testosterone deficiency 02/04/2015     Priority: Medium     Adiposity 02/04/2015     Priority: Medium     Rib pain 02/04/2015     Priority: Medium     Gastroesophageal reflux disease 03/20/2013     Priority: Medium     Hiatal hernia 03/20/2013     Priority: Medium     Overview:   small       Gastric polyposis 03/20/2013     Priority: Medium       Family History   Problem Relation Age of Onset     Diabetes Mother      Coronary Artery Disease Mother      Hypertension Mother      Depression Mother      Asthma Mother      Glaucoma Mother      Coronary Artery Disease Father      Hypertension Father      Hyperlipidemia Father      Depression Maternal Grandmother      Glaucoma Maternal Grandmother      Cerebrovascular Disease No family hx of      Breast Cancer No family hx of      Colon Cancer No family hx of      Prostate Cancer No family hx of      Other Cancer No family hx of      Anxiety Disorder No family hx of      Mental Illness No family hx of      Substance Abuse No family hx of      Anesthesia Reaction No family hx of      Osteoporosis No family hx of      Genetic Disorder No family hx of      Thyroid Disease No family hx of      Obesity No family hx of      Unknown/Adopted No family hx of      Macular Degeneration No family hx of        Social History     Socioeconomic History     Marital status:      Spouse name: Not on  "file     Number of children: Not on file     Years of education: Not on file     Highest education level: Not on file   Occupational History     Not on file   Social Needs     Financial resource strain: Not on file     Food insecurity:     Worry: Not on file     Inability: Not on file     Transportation needs:     Medical: Not on file     Non-medical: Not on file   Tobacco Use     Smoking status: Former Smoker     Smokeless tobacco: Never Used     Tobacco comment: smoker 30+ years ago   Substance and Sexual Activity     Alcohol use: No     Alcohol/week: 0.0 standard drinks     Drug use: No       Past Surgical History:   Procedure Laterality Date     APPENDECTOMY       ARTHROSCOPY KNEE Right 09/28/2016    Procedure: Arthroscopic partial medial meniscectomy, right knee. Surgeon:  Keith Ayala MD  Location: [facility]     AS REMOVAL OF TONSILS,<12 Y/O       C SYMPATHECTOMY,CERVICAL  2003     LAPAROSCOPIC BYPASS GASTRIC N/A 5/21/2018    Procedure: LAPAROSCOPIC BYPASS GASTRIC;  LAPAROSCOPIC GASTRIC BYPASS    EBL: 7mL;  Surgeon: Noe Saunders MD;  Location:  OR             Review of Systems   Musculoskeletal: Positive for back pain.   All other systems reviewed and are negative.        Physical Exam    BP (!) 147/89   Ht 1.778 m (5' 10\")   Wt 97.5 kg (215 lb)   BMI 30.85 kg/m     Constitutional:well-developed, well-nourished, and in no distress.   Cardiovascular: Intact distal pulses.    Neurological: alert. Gait Normal:   Gait, station, stance, and balance appear normal for age  Skin: Skin is warm and dry.   Psychiatric: Mood and affect normal.   Respiratory: unlabored, speaks in full sentences  Lymph: no LAD, no lymphangitis          Back Exam     Tenderness   The patient is experiencing no tenderness.     Range of Motion   Extension: 10   Flexion: normal   Lateral bend right:  20 abnormal   Lateral bend left:  20 abnormal   Rotation right: normal   Rotation left: normal     Muscle " Strength   The patient has normal back strength.    Tests   Straight leg raise right: negative  Straight leg raise left: negative    Other   Toe walk: normal  Heel walk: normal  Sensation: normal  Gait: normal   Erythema: no back redness          X-ray images Previously done and independently reviewed by me in the office today with the patient. X-ray shows:    EXAM: XR LUMBAR SPINE 4 OR MORE VWS  LOCATION: [facility]  DATE/TIME: 2/24/2020 10:46 AM    INDICATION: Low back pain  COMPARISON: None.    IMPRESSION:   5 lumbar type vertebrae. There is subtle levocurvature of the lumbar spine. 1 mm retrolisthesis of L2 on L3 and L3 on L4. Vertebral body heights are maintained. Mild intervertebral disc height loss at L5-S1. Mild endplate spurring throughout   the lumbar spine. No pars defects. Mild facet arthropathy at L5-S1. The visualized bony pelvis is grossly within normal limits. The neuroforamina are not well imaged on this exam although there is suggestion of mild bilateral neuroforaminal narrowing at   L5-S1.    Surgical clips in the left upper quadrant.      ASSESSMENT/PLAN    ICD-10-CM    1. DDD (degenerative disc disease), lumbar M51.36 methylPREDNISolone (MEDROL DOSEPAK) 4 MG tablet therapy pack   2. Acute midline low back pain without sciatica M54.5 methylPREDNISolone (MEDROL DOSEPAK) 4 MG tablet therapy pack     Suspect discogenic source. Reassurance, nonradicular. Discussed long term protection with back strengthening, avoiding high impact sports, caution with lifting.     Acute care discussed; ibu helps but causing stomach issues despite Prilosec. Will start oral steroids briefly. Declines PHYSICAL THERAPY, opts to return to [facility] for now.     Again, thank you for allowing me to participate in the care of your patient.        Sincerely,        Uziel Riggs MD

## 2020-03-04 NOTE — PROGRESS NOTES
[facility]   Clinic Visit s Mar 4, 2020    PCP: Outside, Provider      David is a 55 year old male who is seen in consultation at the request of Dr. Bucio for   Chief Complaint   Patient presents with     Lower Back - Pain       Injury: Patient describes injury as bent over to brush his toilet and felt an immediate pain in his back. He went to a doctor one week after.     Location of Pain: midline low back, radiating to left hip    Duration of Pain: acute, 2 week(s),   Rating of Pain at worst: 8/10  Rating of Pain Currently: 2/10  Pain is better with: activity avoidance   Pain is worse with: nothing specific  Treatment so far consists of: stretching, heat and ice ibuprofen , chiro  Associated symptoms: no distal numbness or tingling; denies swelling or warmth  Recent imaging completed: No recent imaging completed.  Prior History of related problems: similar episode 4 years ago, resolved with chiro and massage.     Social History: is employed as a/an hr admin      Past Medical History:   Diagnosis Date     Depression      Hypertension      ZARIA (obstructive sleep apnea) Severe AHI 51 12/8/2017    HST 12/6/2017 Severe     Uncomplicated asthma        Patient Active Problem List    Diagnosis Date Noted     Family history of skin cancer 10/24/2018     Priority: Medium     Adult BMI 27.0-27.9 kg/sq m 08/24/2018     Priority: Medium     Postsurgical malabsorption 05/29/2018     Priority: Medium     Mild intermittent asthma with acute exacerbation 03/14/2018     Priority: Medium     ZARIA (obstructive sleep apnea) Severe AHI 51 12/08/2017     Priority: Medium     HST 12/6/2017 Severe       BMI 32.0-32.9,adult 07/11/2017     Priority: Medium     Sleep disturbance 07/11/2017     Priority: Medium     Low back strain, initial encounter 02/13/2017     Priority: Medium     Adjustment disorder with depressed mood 05/06/2016     Priority: Medium     Rosacea 04/15/2015     Priority: Medium      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  Failed robinul, capsaicin.       Environmental allergies 02/04/2015     Priority: Medium     Mixed hyperlipidemia 02/04/2015     Priority: Medium     Hypertriglyceridemia 02/04/2015     Priority: Medium     Insomnia 02/04/2015     Priority: Medium     Testosterone deficiency 02/04/2015     Priority: Medium     Adiposity 02/04/2015     Priority: Medium     Rib pain 02/04/2015     Priority: Medium     Gastroesophageal reflux disease 03/20/2013     Priority: Medium     Hiatal hernia 03/20/2013     Priority: Medium     Overview:   small       Gastric polyposis 03/20/2013     Priority: Medium       Family History   Problem Relation Age of Onset     Diabetes Mother      Coronary Artery Disease Mother      Hypertension Mother      Depression Mother      Asthma Mother      Glaucoma Mother      Coronary Artery Disease Father      Hypertension Father      Hyperlipidemia Father      Depression Maternal Grandmother      Glaucoma Maternal Grandmother      Cerebrovascular Disease No family hx of      Breast Cancer No family hx of      Colon Cancer No family hx of      Prostate Cancer No family hx of      Other Cancer No family hx of      Anxiety Disorder No family hx of      Mental Illness No family hx of      Substance Abuse No family hx of      Anesthesia Reaction No family hx of      Osteoporosis No family hx of      Genetic Disorder No family hx of      Thyroid Disease No family hx of      Obesity No family hx of      Unknown/Adopted No family hx of      Macular Degeneration No family hx of        Social History     Socioeconomic History     Marital status:      Spouse name: Not on file     Number of children: Not on file     Years of education: Not on file     Highest education level: Not on file   Occupational History     Not on file   Social Needs     Financial resource strain: Not on file     Food insecurity:     " Worry: Not on file     Inability: Not on file     Transportation needs:     Medical: Not on file     Non-medical: Not on file   Tobacco Use     Smoking status: Former Smoker     Smokeless tobacco: Never Used     Tobacco comment: smoker 30+ years ago   Substance and Sexual Activity     Alcohol use: No     Alcohol/week: 0.0 standard drinks     Drug use: No       Past Surgical History:   Procedure Laterality Date     APPENDECTOMY       ARTHROSCOPY KNEE Right 09/28/2016    Procedure: Arthroscopic partial medial meniscectomy, right knee. Surgeon:  Keith Ayala MD  Location: [facility]     AS REMOVAL OF TONSILS,<14 Y/O       C SYMPATHECTOMY,CERVICAL  2003     LAPAROSCOPIC BYPASS GASTRIC N/A 5/21/2018    Procedure: LAPAROSCOPIC BYPASS GASTRIC;  LAPAROSCOPIC GASTRIC BYPASS    EBL: 7mL;  Surgeon: Noe Saunders MD;  Location:  OR             Review of Systems   Musculoskeletal: Positive for back pain.   All other systems reviewed and are negative.        Physical Exam    BP (!) 147/89   Ht 1.778 m (5' 10\")   Wt 97.5 kg (215 lb)   BMI 30.85 kg/m    Constitutional:well-developed, well-nourished, and in no distress.   Cardiovascular: Intact distal pulses.    Neurological: alert. Gait Normal:   Gait, station, stance, and balance appear normal for age  Skin: Skin is warm and dry.   Psychiatric: Mood and affect normal.   Respiratory: unlabored, speaks in full sentences  Lymph: no LAD, no lymphangitis          Back Exam     Tenderness   The patient is experiencing no tenderness.     Range of Motion   Extension: 10   Flexion: normal   Lateral bend right:  20 abnormal   Lateral bend left:  20 abnormal   Rotation right: normal   Rotation left: normal     Muscle Strength   The patient has normal back strength.    Tests   Straight leg raise right: negative  Straight leg raise left: negative    Other   Toe walk: normal  Heel walk: normal  Sensation: normal  Gait: normal   Erythema: no back " redness          X-ray images Previously done and independently reviewed by me in the office today with the patient. X-ray shows:    EXAM: XR LUMBAR SPINE 4 OR MORE VWS  LOCATION: [facility]  DATE/TIME: 2/24/2020 10:46 AM    INDICATION: Low back pain  COMPARISON: None.    IMPRESSION:   5 lumbar type vertebrae. There is subtle levocurvature of the lumbar spine. 1 mm retrolisthesis of L2 on L3 and L3 on L4. Vertebral body heights are maintained. Mild intervertebral disc height loss at L5-S1. Mild endplate spurring throughout   the lumbar spine. No pars defects. Mild facet arthropathy at L5-S1. The visualized bony pelvis is grossly within normal limits. The neuroforamina are not well imaged on this exam although there is suggestion of mild bilateral neuroforaminal narrowing at   L5-S1.    Surgical clips in the left upper quadrant.      ASSESSMENT/PLAN    ICD-10-CM    1. DDD (degenerative disc disease), lumbar M51.36 methylPREDNISolone (MEDROL DOSEPAK) 4 MG tablet therapy pack   2. Acute midline low back pain without sciatica M54.5 methylPREDNISolone (MEDROL DOSEPAK) 4 MG tablet therapy pack     Suspect discogenic source. Reassurance, nonradicular. Discussed long term protection with back strengthening, avoiding high impact sports, caution with lifting.     Acute care discussed; ibu helps but causing stomach issues despite Prilosec. Will start oral steroids briefly. Declines PHYSICAL THERAPY, opts to return to chiro for now.

## 2020-03-10 ENCOUNTER — HEALTH MAINTENANCE LETTER (OUTPATIENT)
Age: 55
End: 2020-03-10

## 2020-03-30 ENCOUNTER — COMMUNICATION - HEALTHEAST (OUTPATIENT)
Dept: FAMILY MEDICINE | Facility: CLINIC | Age: 55
End: 2020-03-30

## 2020-03-30 DIAGNOSIS — M25.561 ARTHRALGIA OF BOTH LOWER LEGS: ICD-10-CM

## 2020-03-30 DIAGNOSIS — M25.562 ARTHRALGIA OF BOTH LOWER LEGS: ICD-10-CM

## 2020-03-30 DIAGNOSIS — L71.9 ROSACEA: ICD-10-CM

## 2020-05-04 ENCOUNTER — RECORDS - HEALTHEAST (OUTPATIENT)
Dept: ADMINISTRATIVE | Facility: OTHER | Age: 55
End: 2020-05-04

## 2020-05-27 ENCOUNTER — AMBULATORY - HEALTHEAST (OUTPATIENT)
Dept: FAMILY MEDICINE | Facility: CLINIC | Age: 55
End: 2020-05-27

## 2020-05-27 ENCOUNTER — COMMUNICATION - HEALTHEAST (OUTPATIENT)
Dept: FAMILY MEDICINE | Facility: CLINIC | Age: 55
End: 2020-05-27

## 2020-05-27 DIAGNOSIS — L71.9 ROSACEA: ICD-10-CM

## 2020-08-20 ENCOUNTER — COMMUNICATION - HEALTHEAST (OUTPATIENT)
Dept: FAMILY MEDICINE | Facility: CLINIC | Age: 55
End: 2020-08-20

## 2020-08-20 DIAGNOSIS — E29.1 HYPOGONADISM MALE: ICD-10-CM

## 2020-08-20 DIAGNOSIS — E78.5 HYPERLIPIDEMIA LDL GOAL <130: ICD-10-CM

## 2020-08-24 ENCOUNTER — OFFICE VISIT - HEALTHEAST (OUTPATIENT)
Dept: FAMILY MEDICINE | Facility: CLINIC | Age: 55
End: 2020-08-24

## 2020-08-24 DIAGNOSIS — M54.50 CHRONIC MIDLINE LOW BACK PAIN WITHOUT SCIATICA: ICD-10-CM

## 2020-08-24 DIAGNOSIS — L71.9 ROSACEA: ICD-10-CM

## 2020-08-24 DIAGNOSIS — G89.29 CHRONIC MIDLINE LOW BACK PAIN WITHOUT SCIATICA: ICD-10-CM

## 2020-08-24 DIAGNOSIS — M25.521 RIGHT ELBOW PAIN: ICD-10-CM

## 2020-08-25 ASSESSMENT — ANXIETY QUESTIONNAIRES
5. BEING SO RESTLESS THAT IT IS HARD TO SIT STILL: MORE THAN HALF THE DAYS
7. FEELING AFRAID AS IF SOMETHING AWFUL MIGHT HAPPEN: SEVERAL DAYS
GAD7 TOTAL SCORE: 10
IF YOU CHECKED OFF ANY PROBLEMS ON THIS QUESTIONNAIRE, HOW DIFFICULT HAVE THESE PROBLEMS MADE IT FOR YOU TO DO YOUR WORK, TAKE CARE OF THINGS AT HOME, OR GET ALONG WITH OTHER PEOPLE: SOMEWHAT DIFFICULT
4. TROUBLE RELAXING: MORE THAN HALF THE DAYS
1. FEELING NERVOUS, ANXIOUS, OR ON EDGE: SEVERAL DAYS
3. WORRYING TOO MUCH ABOUT DIFFERENT THINGS: SEVERAL DAYS
6. BECOMING EASILY ANNOYED OR IRRITABLE: MORE THAN HALF THE DAYS
2. NOT BEING ABLE TO STOP OR CONTROL WORRYING: SEVERAL DAYS

## 2020-08-25 ASSESSMENT — PATIENT HEALTH QUESTIONNAIRE - PHQ9: SUM OF ALL RESPONSES TO PHQ QUESTIONS 1-9: 8

## 2020-11-19 ENCOUNTER — RECORDS - HEALTHEAST (OUTPATIENT)
Dept: ADMINISTRATIVE | Facility: OTHER | Age: 55
End: 2020-11-19

## 2020-12-27 ENCOUNTER — HEALTH MAINTENANCE LETTER (OUTPATIENT)
Age: 55
End: 2020-12-27

## 2021-01-22 ENCOUNTER — COMMUNICATION - HEALTHEAST (OUTPATIENT)
Dept: FAMILY MEDICINE | Facility: CLINIC | Age: 56
End: 2021-01-22

## 2021-01-22 DIAGNOSIS — L71.9 ROSACEA: ICD-10-CM

## 2021-01-23 ENCOUNTER — COMMUNICATION - HEALTHEAST (OUTPATIENT)
Dept: TELEHEALTH | Facility: CLINIC | Age: 56
End: 2021-01-23

## 2021-01-26 ENCOUNTER — OFFICE VISIT - HEALTHEAST (OUTPATIENT)
Dept: FAMILY MEDICINE | Facility: CLINIC | Age: 56
End: 2021-01-26

## 2021-01-26 DIAGNOSIS — A63.0 ANAL CONDYLOMA: ICD-10-CM

## 2021-01-26 DIAGNOSIS — L71.9 ROSACEA: ICD-10-CM

## 2021-01-26 DIAGNOSIS — J45.20 MILD INTERMITTENT ASTHMA WITHOUT COMPLICATION: ICD-10-CM

## 2021-01-26 DIAGNOSIS — I10 BENIGN ESSENTIAL HYPERTENSION: ICD-10-CM

## 2021-01-26 DIAGNOSIS — E29.1 HYPOGONADISM MALE: ICD-10-CM

## 2021-01-26 DIAGNOSIS — L84 CORN OR CALLUS: ICD-10-CM

## 2021-01-26 ASSESSMENT — PATIENT HEALTH QUESTIONNAIRE - PHQ9: SUM OF ALL RESPONSES TO PHQ QUESTIONS 1-9: 4

## 2021-02-09 ENCOUNTER — OFFICE VISIT - HEALTHEAST (OUTPATIENT)
Dept: FAMILY MEDICINE | Facility: CLINIC | Age: 56
End: 2021-02-09

## 2021-02-09 DIAGNOSIS — I10 BENIGN ESSENTIAL HYPERTENSION: ICD-10-CM

## 2021-02-09 DIAGNOSIS — L84 CORN OR CALLUS: ICD-10-CM

## 2021-02-16 ENCOUNTER — COMMUNICATION - HEALTHEAST (OUTPATIENT)
Dept: FAMILY MEDICINE | Facility: CLINIC | Age: 56
End: 2021-02-16

## 2021-02-16 DIAGNOSIS — I10 BENIGN ESSENTIAL HYPERTENSION: ICD-10-CM

## 2021-02-18 ENCOUNTER — RECORDS - HEALTHEAST (OUTPATIENT)
Dept: ADMINISTRATIVE | Facility: OTHER | Age: 56
End: 2021-02-18

## 2021-02-25 ENCOUNTER — RECORDS - HEALTHEAST (OUTPATIENT)
Dept: ADMINISTRATIVE | Facility: OTHER | Age: 56
End: 2021-02-25

## 2021-03-03 ENCOUNTER — OFFICE VISIT - HEALTHEAST (OUTPATIENT)
Dept: FAMILY MEDICINE | Facility: CLINIC | Age: 56
End: 2021-03-03

## 2021-03-03 DIAGNOSIS — I10 BENIGN ESSENTIAL HYPERTENSION: ICD-10-CM

## 2021-03-03 DIAGNOSIS — B07.8 COMMON WART: ICD-10-CM

## 2021-03-11 ENCOUNTER — RECORDS - HEALTHEAST (OUTPATIENT)
Dept: ADMINISTRATIVE | Facility: OTHER | Age: 56
End: 2021-03-11

## 2021-03-19 ENCOUNTER — RECORDS - HEALTHEAST (OUTPATIENT)
Dept: ADMINISTRATIVE | Facility: OTHER | Age: 56
End: 2021-03-19

## 2021-04-24 ENCOUNTER — HEALTH MAINTENANCE LETTER (OUTPATIENT)
Age: 56
End: 2021-04-24

## 2021-05-27 ASSESSMENT — PATIENT HEALTH QUESTIONNAIRE - PHQ9
SUM OF ALL RESPONSES TO PHQ QUESTIONS 1-9: 4
SUM OF ALL RESPONSES TO PHQ QUESTIONS 1-9: 8
SUM OF ALL RESPONSES TO PHQ QUESTIONS 1-9: 6

## 2021-05-28 ASSESSMENT — ASTHMA QUESTIONNAIRES
ACT_TOTALSCORE: 23
ACT_TOTALSCORE: 24
ACT_TOTALSCORE: 25

## 2021-05-28 ASSESSMENT — ANXIETY QUESTIONNAIRES
GAD7 TOTAL SCORE: 10
GAD7 TOTAL SCORE: 7

## 2021-05-28 NOTE — PROGRESS NOTES
Assessment & Plan   1. Mild persistent asthma with exacerbation  Lungs clear on exam, suspicion for pneumonia is low.  I suspect his asthma is playing a large role in this persistent cough and he is experiencing some shortness of breath as well.  Recommended prednisone burst as well as regular use of albuterol.  Will prescribe him some codeine guaifenesin syrup for nighttime use.  If symptoms do not improve with the prednisone return for recheck in 2 to 3 days.  - predniSONE (DELTASONE) 20 MG tablet; Take 20 mg by mouth 2 (two) times a day for 5 days.  Dispense: 10 tablet; Refill: 0  - codeine-guaiFENesin (GUAIFENESIN AC)  mg/5 mL liquid; Take 5-10 mL by mouth every 6 (six) hours as needed for cough. Maximum dose in 24 hours:  60mL  Dispense: 240 mL; Refill: 0    2. History of gastric bypass  Labs as ordered by Dr. Sky  - Vitamin B12  - Vitamin D, Total (25-Hydroxy)  - HM1(CBC and Differential)  - Ferritin  - Iron and Transferrin Iron Binding Capacity  - Parathyroid Hormone Intact  - PSA, Annual Screen (Prostatic-Specific Antigen)  - HM1 (CBC with Diff)    3. Dry eyes  - Antinuclear Antibody (MERVIN) Cascade    4. Hyperlipidemia LDL goal <130  - Lipid Tiller FASTING    5. History of hypogonadism  - Testosterone, Free and Total    Emily Daugherty CNP    Subjective   Chief Complaint:  chest congestion (x2 weeks pt has been feeling alittle better); Sore Throat; and Cough (pt is coughing at night )    HPI:   David Marino is a 54 y.o. male who presents for   Cough, congestion.   He is a patient of Dr. Sky.  PMH notable for asthma, hyperlipidemia, rosacea, allergies, reflux.     He states sore throat and chest congestion began two weeks ago.  Sore throat and nasal congestion have improved the cough is persistent.  He feels heaviness in his chest and feels slightly short of breath.  Cough is typically dry though occasionally productive for thick yellow mucus.  He has not been febrile or experience chills.  He  "does states over the last 2 days he has begun to feel more fatigued.  He is most bothered by cough at night and is kept up by this.  Previously asthma well-controlled with Advair and as needed albuterol.  He states he has not had an exacerbation in many years.    Allergies:  is allergic to allerg xt,d.farinae-d.pteronys; bee pollen; honey; and sulfa (sulfonamide antibiotics).    SH/FH:  Social History and Family History reviewed and updated.   Tobacco Status:  He  reports that he quit smoking about 22 years ago. His smoking use included cigarettes. He started smoking about 35 years ago. He has never used smokeless tobacco.    Review of Systems:  A complete head to toe ROS is negative unless otherwise noted in HPI    Objective     Vitals:    05/15/19 0939   BP: 138/90   Patient Site: Right Arm   Patient Position: Sitting   Cuff Size: Adult Regular   Pulse: 89   Temp: 97.3  F (36.3  C)   SpO2: 97%   Weight: 187 lb 8 oz (85 kg)   Height: 5' 10\" (1.778 m)       Physical Exam:  GENERAL: Alert, well-appearing male .   EYES: Conjunctiva pink, sclera white, no exudates. FREIDA.  EOMs intact.   NOSE: Nares patent, no discharge.    MOUTH: Pharynx moist, pink without exudate. No tonsillar enlargement  NECK: No lymphadenopathy.   CV: Regular rate and rhythm without murmurs, rubs or gallops.  RESP: Lung sounds clear, no wheezing, rhonchi, rales.    "

## 2021-05-29 NOTE — TELEPHONE ENCOUNTER
Refill denied.  Please let patient know that if he feels he needs a refill on the prednisone I would really recommend that he see someone in person to assess his breathing.

## 2021-05-29 NOTE — TELEPHONE ENCOUNTER
Left message to call back for: Patient  Information to relay to patient:  Please see message below from Emily and relay to pt when he calls back. Thanks!

## 2021-05-29 NOTE — TELEPHONE ENCOUNTER
Central PA team  [phone]  Pool: HE PA MED ([id number])          PA has been initiated.       PA form completed and faxed insurance via Cover My Meds     Key:  [id number] - PA Case ID: [id number]     Medication:  Testosterone 25 MG/2.5GM(1%) gel    Insurance:  [employer]        Response will be received via fax and may take up to 5-10 business days depending on plan

## 2021-05-29 NOTE — PROGRESS NOTES
Office Visit - New Patient   David Marino   54 y.o.  male    Date of visit: 6/17/2019  Physician: Tracey Bucio CNP     Assessment and Plan   1. Encounter to establish care  2. Mild intermittent asthma without complication  Well managed   - ADVAIR -21 mcg/actuation inhaler; INHALE 2 PUFFS BY MOUTH TWICE DAILY. RINSE MOUTH AFTER USE.  Dispense: 1 Inhaler; Refill: 1  - albuterol (VENTOLIN HFA) 90 mcg/actuation inhaler; Inhale 2 puffs every 4 (four) hours as needed for wheezing (and cough).  Dispense: 1 each; Refill: 3  - montelukast (SINGULAIR) 10 mg tablet; Take 1 tablet (10 mg total) by mouth daily.  Dispense: 90 tablet; Refill: 1      3. Hyperlipidemia LDL goal <130  Continue with current mediation and dose  - rosuvastatin (CRESTOR) 10 MG tablet; Take 1 tablet (10 mg total) by mouth daily.  Dispense: 90 tablet; Refill: 3    4. Depression With Anxiety  She was seen at [facility] today will be started on new antidepressants.  Patient also started therapy.    5. Hypogonadism male  Recommend starting testosterone gel and follow-up in a week or 2.  - testosterone (ANDROGEL) 1 % (25 mg/2.5gram) GlPk; Place 0.05 g on the skin daily.  Dispense: 3 Package; Refill: 3    The following high BMI interventions were performed this visit: encouragement to exercise and dietary management education, guidance, and counseling    No follow-ups on file.     Chief Complaint   Chief Complaint   Patient presents with     Establish Care     BP, testosterone, Dry Eye     Test Results        Patient Profile   Social History     Social History Narrative     Not on file        Past Medical History   Patient Active Problem List   Diagnosis     Insomnia     Rosacea     Hyperlipidemia     Allergies     Asthma     Chronic Reflux Esophagitis     Hypogonadism male     Depression With Anxiety     Joint Pain, Localized In The Knee     Chromhidrosis     Hypercholesteremia       Past Surgical History  He has a past surgical  history that includes pr remove tonsils/adenoids,<14 y/o; pr thoracoscopy surg thor sympathect; pr appendectomy; pr knee scope,diagnostic; pr knee scope,diagnostic; Bariatric Surgery (05/2018); and Appendectomy.     History of Present Illness   This 54 y.o. old male patient with history of insomnia, rosacea, hyperlipidemia, asthma, chronic reflux esophagitis, hypogonadism, depression with anxiety, Crohn hydrocyst and hypercholesterolemia presented to the clinic today to establish care.  Patient reported that he had a weight surgery done few years ago and has maintained his weight.  He stated that after his surgery he was off blood pressure medication. He reports that for the past 2 years that he has not been himself he has been more depressed than usual.  He stated that he does not have interest in doing things that he love doing.  He states that he gets easily irritated.  He states that he has low libido he has not had any sexual intercourse with his .  He states that he did go to [facility] today to establish care for therapy and also to start new medication.  He reports that he used to be on Pristiq but he stopped 2 days ago.  Patient also states that he has low testosterone, in the past he has used testosterone shot and chills which has helped with his mood.  He denied any suicidal or homicidal ideation.    Review of Systems: A comprehensive review of systems was negative except as noted.     Medications and Allergies   Current Outpatient Medications   Medication Sig Dispense Refill     ADVAIR -21 mcg/actuation inhaler INHALE 2 PUFFS BY MOUTH TWICE DAILY. RINSE MOUTH AFTER USE. 1 Inhaler 1     albuterol (VENTOLIN HFA) 90 mcg/actuation inhaler Inhale 2 puffs every 4 (four) hours as needed for wheezing (and cough). 1 each 3     DOCOSAHEXANOIC ACID/EPA (FISH OIL ORAL) Take by mouth.       ERGOCALCIFEROL, VITAMIN D2, (VITAMIN D2 ORAL) Take by mouth.       GLYCOPYRROLATE, BULK, MISC every  morning. 0.5% cream. For chromhidrosis, 30 gm jar       metroNIDAZOLE (METROGEL) 0.75 % gel Apply topically.       montelukast (SINGULAIR) 10 mg tablet Take 1 tablet (10 mg total) by mouth daily. 90 tablet 1     MULTIVITAMIN ORAL Take by mouth.       rosuvastatin (CRESTOR) 10 MG tablet Take 1 tablet (10 mg total) by mouth daily. 90 tablet 3     VITAMIN B COMPLEX ORAL Take by mouth.       testosterone (ANDROGEL) 1 % (25 mg/2.5gram) GlPk Place 0.05 g on the skin daily. 3 Package 3     No current facility-administered medications for this visit.      Allergies   Allergen Reactions     Allerg Xt,D.Farinae-D.Pteronys Other (See Comments)     Bee Pollen Other (See Comments)     Honey Other (See Comments)     Sulfa (Sulfonamide Antibiotics) Rash        Family and Social History   Family History   Problem Relation Age of Onset     Diabetes Mother      Heart disease Mother      Hypertension Mother      CABG Mother      Glaucoma Mother      Depression Mother      Heart disease Father      Hypertension Father      Osteoarthritis Father      CABG Father      Hypertension Sister      Hyperlipidemia Sister      Depression Sister      Breast cancer Sister      Hypertension Sister      Hyperlipidemia Sister      Dementia Maternal Grandmother      Depression Maternal Grandmother      Early death Maternal Grandmother      Hypertension Maternal Grandmother      Stroke Maternal Grandmother      Arthritis Maternal Grandfather      Hearing loss Maternal Grandfather      Heart disease Maternal Grandfather      Hyperlipidemia Maternal Grandfather      Hypertension Maternal Grandfather      Arthritis Paternal Grandmother      Depression Paternal Grandmother      Heart disease Paternal Grandmother      Hyperlipidemia Paternal Grandmother      Heart disease Paternal Grandfather      Hyperlipidemia Paternal Grandfather      Asthma Other      Hypertension Other         Social History     Tobacco Use     Smoking status: Former Smoker     Types:  Cigarettes     Start date: 1984     Last attempt to quit: 1997     Years since quittin.4     Smokeless tobacco: Never Used   Substance Use Topics     Alcohol use: Yes     Frequency: Monthly or less     Comment: OC     Drug use: No        Physical Exam   General Appearance: Well groomed    BP (!) 156/96   Pulse 64   Wt 188 lb 3 oz (85.4 kg)   SpO2 100%   BMI 27.00 kg/m      EYES: Eyelids, conjunctiva, and sclera were normal. Pupils were normal. Cornea, iris, and lens were normal bilaterally.  HEAD, EARS, NOSE, MOUTH, AND THROAT: Head and face were normal. Hearing was normal to voice and the ears were normal to external exam. Nose appearance was normal and there was no discharge. Oropharynx was normal.  NECK: Neck appearance was normal. There were no neck masses and the thyroid was not enlarged.  RESPIRATORY: Breathing pattern was normal and the chest moved symmetrically.  .  CARDIOVASCULAR: Heart rate and rhythm were normal.    MUSCULOSKELETAL: Skeletal configuration was normal and muscle mass was normal for age. Joint appearance was overall normal.  NEUROLOGIC: The patient was alert and oriented to person, place, time, and circumstance. Speech was normal. Cranial nerves were normal. Motor strength was normal for age. The patient was normally coordinated.  PSYCHIATRIC:  Mood and affect were normal and the patient had normal recent and remote memory. The patient's judgment and insight were normal.       Additional Information     Reviewed recent lab and discuss result with patient including low testosterone     Time: total time spent with the patient was 45 minutes of which >50% was spent in counseling and coordination of care     Tracey Bucio CNP  Family Nurse Practitioner  [facility]  [phone]  [email or web address]

## 2021-05-29 NOTE — TELEPHONE ENCOUNTER
Dr. Sky,    [facility] only.    Shelly RIDER LPN .......... 1:11 PM  05/22/19

## 2021-05-29 NOTE — TELEPHONE ENCOUNTER
Fax received from [facility] pharmacy requesting Prior Authorization    Medication Name:   testosterone (ANDROGEL) 1 % (25 mg/2.5gram) GlPk 3 Package 3 6/17/2019     Sig - Route: Place 0.05 g on the skin daily. - Transdermal    Sent to pharmacy as: testosterone (ANDROGEL) 1 % (25 mg/2.5gram) GlPk    E-Prescribing Status: Receipt confirmed by pharmacy (6/17/2019  4:16 PM CDT)          Insurance Plan:    [place] [facility]   Patient ID Number: [id number]    Please start PA process

## 2021-05-29 NOTE — TELEPHONE ENCOUNTER
Requested Prescriptions     Pending Prescriptions Disp Refills     predniSONE (DELTASONE) 10 mg tablet [Pharmacy Med Name: PREDNISONE 10 MG TABLET] 20 tablet 0     Sig: TAKE 2 TABLETS (20MG) BY MOUTH 2 TIMES A DAY FOR 5 DAYS.     codeine-guaiFENesin (GUAIFENESIN AC)  mg/5 mL liquid [Pharmacy Med Name: CODEINE-GUAIFEN  MG/5 ML] 240 mL 0     Sig: TAKE 5-10 ML BY MOUTH EVERY 6 (SIX) HOURS AS NEEDED FOR COUGH. MAXIMUM DOSE IN 24 HOURS: 60ML

## 2021-05-29 NOTE — TELEPHONE ENCOUNTER
Controlled Substance Refill Request  Medication:   Requested Prescriptions     Pending Prescriptions Disp Refills     predniSONE (DELTASONE) 10 mg tablet [Pharmacy Med Name: PREDNISONE 10 MG TABLET] 20 tablet 0     Sig: TAKE 2 TABLETS (20MG) BY MOUTH 2 TIMES A DAY FOR 5 DAYS.     codeine-guaiFENesin (GUAIFENESIN AC)  mg/5 mL liquid [Pharmacy Med Name: CODEINE-GUAIFEN  MG/5 ML] 240 mL 0     Sig: TAKE 5-10 ML BY MOUTH EVERY 6 (SIX) HOURS AS NEEDED FOR COUGH. MAXIMUM DOSE IN 24 HOURS: 60ML     Date Last Fill: 5/15/19  Pharmacy: [facility]   Submit electronically to pharmacy    Controlled Substance Agreement on File:   Encounter-Level CSA Scan Date:    There are no encounter-level csa scan date.       Last office visit with primary: 5/15/2019

## 2021-05-31 NOTE — ANESTHESIA POSTPROCEDURE EVALUATION
Patient: David Marino  ANOSCOPY HIGH RESOLUTION OR WITH ANESTHESIA fulgeration of anal condyloma  Anesthesia type: MAC    Patient location: Phase II Recovery  Last vitals:   Vitals Value Taken Time   /76 8/30/2019  3:45 PM   Temp  8/30/2019  4:13 PM   Pulse 72 8/30/2019  3:56 PM   Resp 16 8/30/2019  3:30 PM   SpO2 98 % 8/30/2019  3:56 PM   Vitals shown include unvalidated device data.  Post vital signs: stable  Level of consciousness: awake and responds to simple questions  Post-anesthesia pain: pain controlled  Post-anesthesia nausea and vomiting: no  Pulmonary: unassisted, return to baseline  Cardiovascular: stable and blood pressure at baseline  Hydration: adequate  Anesthetic events: no    QCDR Measures:  ASA# 11 - Alaina-op Cardiac Arrest: ASA11B - Patient did NOT experience unanticipated cardiac arrest  ASA# 12 - [place]aina-op Mortality Rate: ASA12B - Patient did NOT die  ASA# 13 - PACU Re-Intubation Rate: NA - No ETT / LMA used for case  ASA# 10 - Composite Anes Safety: ASA10A - No serious adverse event    Additional Notes:

## 2021-05-31 NOTE — ANESTHESIA PREPROCEDURE EVALUATION
Anesthesia Evaluation      Patient summary reviewed   History of anesthetic complications (ponv)     Airway   Mallampati: II  Neck ROM: full   Pulmonary - normal exam   (+) asthma  mild,  well controlled, a smoker (remote)                         Cardiovascular - normal exam  (+) hypertension (no meds) well controlled, , hypercholesterolemia,      Neuro/Psych    (+) depression, anxiety/panic attacks,     Endo/Other - negative ROS      GI/Hepatic/Renal - negative ROS           Dental - normal exam   (+) caps                       Anesthesia Plan  Planned anesthetic: MAC  Versed/fentanyl/propofol  Ketamine PRN  Decadron/zofran    ASA 2   Induction: intravenous   Anesthetic plan and risks discussed with: patient and spouse  Anesthesia plan special considerations: antiemetics,   Post-op plan: routine recovery      Results for orders placed or performed in visit on 08/12/19   Hemoglobin   Result Value Ref Range    Hemoglobin 14.7 14.0 - 18.0 g/dL   Basic Metabolic Panel   Result Value Ref Range    Sodium 142 136 - 145 mmol/L    Potassium 4.2 3.5 - 5.0 mmol/L    Chloride 105 98 - 107 mmol/L    CO2 30 22 - 31 mmol/L    Anion Gap, Calculation 7 5 - 18 mmol/L    Glucose 93 70 - 125 mg/dL    Calcium 9.9 8.5 - 10.5 mg/dL    BUN 8 8 - 22 mg/dL    Creatinine 0.91 0.70 - 1.30 mg/dL    GFR MDRD Af Amer >60 >60 mL/min/1.73m2    GFR MDRD Non Af Amer >60 >60 mL/min/1.73m2   APTT(PTT)   Result Value Ref Range    PTT 29 24 - 37 seconds

## 2021-05-31 NOTE — PROGRESS NOTES
Preoperative Exam    Scheduled Procedure: Polyp Removal   Surgery Date:  8.30.19  Surgery Location: [facility], fax [phone]    Surgeon:  Dr. Zuniga    Assessment/Plan:     1. Preop general physical exam  2. Condyloma  - Hemoglobin  - Basic Metabolic Panel  - APTT(PTT)      Surgical Procedure Risk: Low (reported cardiac risk generally < 1%)  Have you had prior anesthesia?: Yes  Have you or any family members had a previous anesthesia reaction:  Yes: Nausea  Do you or any family members have a history of a clotting or bleeding disorder?: No  Cardiac Risk Assessment: no increased risk for major cardiac complications    Pending review of diagnostic evaluation, APPROVAL GIVEN to proceed with proposed procedure, without further diagnostic evaluation.      Functional Status: Independent  Patient plans to recover at home with family.     Subjective:      David Marino is a 54 y.o. male who presents for a preoperative consultation.  [facility] of anal condylomas which was observed during a surveillance colonoscopy.  During reassessment condylomas was not identified was recommended that patient undergo a high-resolution anoscopy in the operating room for better examination of the entire anorectal junction.  Patient has no concerns today.  He denies chest pain, shortness of breath, fever and chills.  Patient recently started a new medication for his depression.  He states that his symptoms are better.    All other systems reviewed and are negative, other than those listed in the HPI.    Pertinent History  Do you have difficulty breathing or chest pain after walking up a flight of stairs: No  History of obstructive sleep apnea: Yes: No longer  Steroid use in the last 6 months: Yes: Selegiline  Frequent Aspirin/NSAID use: No  Prior Blood Transfusion: No  Prior Blood Transfusion Reaction: No  If for some reason prior to, during or after  the procedure, if it is medically indicated, would you be willing to have a blood transfusion?:  There is no transfusion refusal.    Current Outpatient Medications   Medication Sig Dispense Refill     ADVAIR -21 mcg/actuation inhaler INHALE 2 PUFFS BY MOUTH TWICE DAILY. RINSE MOUTH AFTER USE. 1 Inhaler 1     albuterol (VENTOLIN HFA) 90 mcg/actuation inhaler Inhale 2 puffs every 4 (four) hours as needed for wheezing (and cough). 1 each 3     DOCOSAHEXANOIC ACID/EPA (FISH OIL ORAL) Take by mouth.       ERGOCALCIFEROL, VITAMIN D2, (VITAMIN D2 ORAL) Take by mouth.       GLYCOPYRROLATE, BULK, MISC every morning. 0.5% cream. For chromhidrosis, 30 gm jar       metroNIDAZOLE (METROGEL) 0.75 % gel Apply topically.       montelukast (SINGULAIR) 10 mg tablet Take 1 tablet (10 mg total) by mouth daily. 90 tablet 1     MULTIVITAMIN ORAL Take by mouth.       rosuvastatin (CRESTOR) 10 MG tablet Take 1 tablet (10 mg total) by mouth daily. 90 tablet 3     testosterone (ANDROGEL) 1 % (25 mg/2.5gram) GlPk Place 0.05 g on the skin daily. 3 Package 3     VITAMIN B COMPLEX ORAL Take by mouth.       No current facility-administered medications for this visit.         Allergies   Allergen Reactions     Allerg Xt,D.Farinae-D.Pteronys Other (See Comments)     Bee Pollen Other (See Comments)     Honey Other (See Comments)     Sulfa (Sulfonamide Antibiotics) Rash       Patient Active Problem List   Diagnosis     Insomnia     Rosacea     Hyperlipidemia     Allergies     Asthma     Chronic Reflux Esophagitis     Hypogonadism male     Depression With Anxiety     Joint Pain, Localized In The Knee     Chromhidrosis     Hypercholesteremia       Past Medical History:   Diagnosis Date     Asthma      Depression      Hypercholesteremia      Hypertension        Past Surgical History:   Procedure Laterality Date     APPENDECTOMY       BARIATRIC SURGERY  05/2018     [place] APPENDECTOMY      Description: Appendectomy;  Proc Date: 01/01/1984;     [place] KNEE  SCOPE,DIAGNOSTIC      Description: Arthroscopy Knee Left;  Proc Date: 1993;     RI KNEE SCOPE,DIAGNOSTIC      Description: Arthroscopy Knee Right;  Proc Date: 2013;  Comments: for R knee meniscal tear     RI REMOVE TONSILS/ADENOIDS,<14 Y/O      Description: Tonsillectomy With Adenoidectomy;  Proc Date: 2009;  Comments: Had sinus issues     RI THORACOSCOPY SURG THOR SYMPATHECT      Description: Thoracoscopy (Therapeutic) W/ Thoracic Sympathectomy;  Proc Date: 2004;  Comments: 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; ; endoscopic thoracic       Social History     Socioeconomic History     Marital status:      Spouse name: Not on file     Number of children: Not on file     Years of education: Not on file     Highest education level: Not on file   Occupational History     Not on file   Social Needs     Financial resource strain: Not on file     Food insecurity:     Worry: Not on file     Inability: Not on file     Transportation needs:     Medical: Not on file     Non-medical: Not on file   Tobacco Use     Smoking status: Former Smoker     Types: Cigarettes     Start date: 1984     Last attempt to quit: 1997     Years since quittin.6     Smokeless tobacco: Never Used   Substance and Sexual Activity     Alcohol use: Yes     Frequency: Monthly or less     Comment: OC     Drug use: No     Sexual activity: Not Currently     Partners: Male   Lifestyle     Physical activity:     Days per week: Not on file     Minutes per session: Not on file     Stress: Not on file   Relationships     Social connections:     Talks on phone: Not on file     Gets together: Not on file     Attends Christian service: Not on file     Active member of club or organization: Not on file     Attends meetings of clubs or organizations: Not on file     Relationship status: Not on file     Intimate partner violence:     Fear of current or ex partner: Not on file     Emotionally abused: Not on  "file     Physically abused: Not on file     Forced sexual activity: Not on file   Other Topics Concern     Not on file   Social History Narrative     Not on file       Patient Care Team:  Tracey Bucio, GERRY as PCP - General (Nurse Practitioner)          Objective:     Vitals:    08/12/19 0737 08/12/19 0754   BP: (!) 140/94 136/87   Pulse: 71    Temp: 98.4  F (36.9  C)    SpO2: 99%    Weight: 195 lb 9 oz (88.7 kg)    Height: 5' 11\" (1.803 m)          Physical Exam:  /90   Pulse 71   Temp 98.4  F (36.9  C)   Ht 5' 11\" (1.803 m)   Wt 195 lb 9 oz (88.7 kg)   SpO2 99%   BMI 27.28 kg/m    General appearance: alert, appears stated age and cooperative  Head: Normocephalic, without obvious abnormality, atraumatic  Eyes: conjunctivae/corneas clear. PERRL, EOM's intact. Fundi benign.  Ears: normal TM's and external ear canals both ears  Throat: lips, mucosa, and tongue normal; teeth and gums normal  Neck: no adenopathy, no carotid bruit, no JVD, supple, symmetrical, trachea midline and thyroid not enlarged, symmetric, no tenderness/mass/nodules  Lungs: clear to auscultation bilaterally  Heart: regular rate and rhythm, S1, S2 normal, no murmur, click, rub or gallop  Abdomen: soft, non-tender; bowel sounds normal; no masses,  no organomegaly  Extremities: extremities normal, atraumatic, no cyanosis or edema  Pulses: 2+ and symmetric  Skin: Skin color, texture, turgor normal. No rashes or lesions  Lymph nodes: Cervical, supraclavicular, and axillary nodes normal.  Neurologic: Grossly normal      There are no Patient Instructions on file for this visit.    EKG: Not indicated    Labs:  Recent Results (from the past 240 hour(s))   Hemoglobin    Collection Time: 08/12/19  8:04 AM   Result Value Ref Range    Hemoglobin 14.7 14.0 - 18.0 g/dL    and Labs pending at this time.  Results will be reviewed when available.    Immunization History   Administered Date(s) Administered     Hep B, historic 01[birth year]01/1900     " Influenza, inj, historic,unspecified 01/01/1900, 10/29/2010     Influenza, seasonal,quad inj 6-35 mos 10/05/2012     Influenza,live, Nasal Laiv4 10/01/2013     Influenza,seasonal quad, PF, 36+MOS 12/23/2014, 09/08/2015, 10/14/2016, 11/13/2017, 08/28/2018     Influenza,seasonal, Inj IIV3 11/28/2003, 12/06/2006, 10/30/2008, 11/01/2011, 10/05/2012     Tdap 10/20/2008, 12/06/2013           Electronically signed by GERRY Hernandez 08/12/19 7:30 AM

## 2021-05-31 NOTE — ANESTHESIA CARE TRANSFER NOTE
Last vitals:   Vitals:    08/30/19 1453   BP: 109/62   Pulse: 86   Resp: 16   Temp: 36.6  C (97.8  F)   SpO2: 95%     Patient's level of consciousness is drowsy  Spontaneous respirations: yes  Maintains airway independently: yes  Dentition unchanged: yes  Oropharynx: oropharynx clear of all foreign objects    QCDR Measures:  ASA# 20 - Surgical Safety Checklist: WHO surgical safety checklist completed prior to induction    PQRS# 430 - Adult PONV Prevention: 4558F - Pt received => 2 anti-emetic agents (different classes) preop & intraop  ASA# 8 - Peds PONV Prevention: NA - Not pediatric patient, not GA or 2 or more risk factors NOT present  PQRS# 424 - Alaina-op Temp Management: NA - MAC anesthesia or case < 60 minutes  PQRS# 426 - PACU Transfer Protocol: - Transfer of care checklist used  ASA# 14 - Acute Post-op Pain: ASA14B - Patient did NOT experience pain >= 7 out of 10

## 2021-06-02 VITALS — WEIGHT: 178.25 LBS | BODY MASS INDEX: 25.58 KG/M2

## 2021-06-02 VITALS — BODY MASS INDEX: 26.84 KG/M2 | HEIGHT: 70 IN | WEIGHT: 187.5 LBS

## 2021-06-02 NOTE — TELEPHONE ENCOUNTER
RN cannot approve Refill Request    RN can NOT refill this medication med is not covered by policy/route to provider. Last office visit: 6/17/2019 Tracey Bucio FNP Last Physical: 8/12/2019 Last MTM visit: Visit date not found Last visit same specialty: 6/17/2019 Tracey Bucio FNP.  Next visit within 3 mo: Visit date not found  Next physical within 3 mo: Visit date not found      Sana Sal, [facility] Triage/Med Refill 10/11/2019    Requested Prescriptions   Pending Prescriptions Disp Refills     GLYCOPYRROLATE, BULK, MISC  0     Sig: every morning. 0.5% cream. For chromhidrosis, 30 gm jar       There is no refill protocol information for this order

## 2021-06-02 NOTE — TELEPHONE ENCOUNTER
Contacted [facility] at [phone]. They are filling the rx for pickup today.  LM for patient to call back with that information. Don Angeles CMA

## 2021-06-03 VITALS — WEIGHT: 195.56 LBS | BODY MASS INDEX: 27.38 KG/M2 | HEIGHT: 71 IN

## 2021-06-03 VITALS
WEIGHT: 208.8 LBS | TEMPERATURE: 98.3 F | BODY MASS INDEX: 29.12 KG/M2 | SYSTOLIC BLOOD PRESSURE: 139 MMHG | DIASTOLIC BLOOD PRESSURE: 87 MMHG

## 2021-06-03 VITALS — BODY MASS INDEX: 27.38 KG/M2 | WEIGHT: 195.56 LBS | HEIGHT: 71 IN

## 2021-06-03 VITALS — BODY MASS INDEX: 27 KG/M2 | WEIGHT: 188.19 LBS

## 2021-06-03 NOTE — TELEPHONE ENCOUNTER
Refill Approved    Rx renewed per Medication Renewal Policy. Medication was last renewed on 5/16/16.    Katelin Muñiz, [facility] Triage/Med Refill 11/12/2019     Requested Prescriptions   Pending Prescriptions Disp Refills     metroNIDAZOLE (METROGEL) 0.75 % gel [Pharmacy Med Name: METRONIDAZOLE TOPICAL 0.75% GL] 90 g 11     Sig: APPLY TOPICALLY TO AFFECTED AREA(S) ON FACE & SCALP TWICE DAILY       Topical Dermatology Medications Refill Protocol Passed - 11/11/2019  3:39 PM        Passed - Patient has had office visit/physical in last 1 year     Last office visit with prescriber/PCP: 6/17/2019 Tracey Bucio FNP OR same dept: 6/17/2019 Tracey Bucio FNP OR same specialty: 6/17/2019 Tracey Bucio FNP  Last physical: 8/12/2019 Last MTM visit: Visit date not found   Next visit within 3 mo: Visit date not found  Next physical within 3 mo: Visit date not found  Prescriber OR PCP: GERRY Hernandez  Last diagnosis associated with med order: There are no diagnoses linked to this encounter.  If protocol passes may refill for 12 months if within 3 months of last provider visit (or a total of 15 months).

## 2021-06-04 VITALS
DIASTOLIC BLOOD PRESSURE: 99 MMHG | HEART RATE: 74 BPM | SYSTOLIC BLOOD PRESSURE: 143 MMHG | OXYGEN SATURATION: 97 % | BODY MASS INDEX: 30.91 KG/M2 | WEIGHT: 215.44 LBS

## 2021-06-04 VITALS
BODY MASS INDEX: 28.78 KG/M2 | OXYGEN SATURATION: 98 % | HEART RATE: 90 BPM | WEIGHT: 206.38 LBS | SYSTOLIC BLOOD PRESSURE: 132 MMHG | DIASTOLIC BLOOD PRESSURE: 93 MMHG

## 2021-06-04 VITALS
DIASTOLIC BLOOD PRESSURE: 104 MMHG | OXYGEN SATURATION: 99 % | BODY MASS INDEX: 30.32 KG/M2 | SYSTOLIC BLOOD PRESSURE: 150 MMHG | WEIGHT: 217.38 LBS | HEART RATE: 80 BPM

## 2021-06-04 NOTE — PROGRESS NOTES
Assessment:   1. Acute non-recurrent frontal sinusitis  Discussed diagnosis and possible treatment with patient and recommend treatment with oral antibiotic, nasal steroid and nasal saline spray.  Patient will follow-up if symptoms persist or worsens.  Patient was also advised to use ibuprofen 600 mg every 6 hours as needed for pain and headache.  - azithromycin (ZITHROMAX) 250 MG tablet; Take 500 mg (2 x 250 mg tablets) on day 1 followed by 250 mg (1 tablet) on days 2-5.  Dispense: 6 tablet; Refill: 0     Plan:   Nasal saline sprays.  Nasal steroids per medication orders.  Zithromax per medication orders.  Follow up in 7 days or as needed.     Subjective:    David Marino is a 54 y.o. male who presents for evaluation of sinus pain. Symptoms include: congestion, cough, facial pain, headaches, mouth breathing and sinus pressure. Onset of symptoms was 2 weeks ago. Symptoms have been unchanged since that time. Past history is significant for no history of pneumonia or bronchitis. Patient is a non-smoker.    The following portions of the patient's history were reviewed and updated as appropriate: allergies, current medications, past family history, past medical history, past social history, past surgical history and problem list.    Review of Systems  A 12 point comprehensive review of systems was negative except as noted.     Objective:   /87   Temp 98.3  F (36.8  C)   Wt 208 lb 12.8 oz (94.7 kg)   BMI 29.12 kg/m    General appearance: alert, appears stated age and cooperative  Head: Normocephalic, without obvious abnormality, atraumatic, sinuses tender to percussion  Eyes: conjunctivae/corneas clear. PERRL, EOM's intact. Fundi benign.  Ears: normal TM's and external ear canals both ears  Nose: blood tinged discharge, moderate congestion, turbinates inflamed  Throat: lips, mucosa, and tongue normal; teeth and gums normal  Neck: no adenopathy, no carotid bruit, no JVD, supple, symmetrical, trachea midline  and thyroid not enlarged, symmetric, no tenderness/mass/nodules  Lungs: clear to auscultation bilaterally  Heart: regular rate and rhythm, S1, S2 normal, no murmur, click, rub or gallop  Pulses: 2+ and symmetric  Skin: Skin color, texture, turgor normal. No rashes or lesions  Lymph nodes: Cervical, supraclavicular, and axillary nodes normal.  Neurologic: Grossly normal

## 2021-06-05 VITALS
SYSTOLIC BLOOD PRESSURE: 118 MMHG | WEIGHT: 218.4 LBS | BODY MASS INDEX: 31.34 KG/M2 | HEART RATE: 71 BPM | DIASTOLIC BLOOD PRESSURE: 80 MMHG

## 2021-06-05 VITALS
DIASTOLIC BLOOD PRESSURE: 103 MMHG | HEART RATE: 71 BPM | WEIGHT: 215.8 LBS | BODY MASS INDEX: 30.96 KG/M2 | SYSTOLIC BLOOD PRESSURE: 161 MMHG

## 2021-06-05 VITALS
DIASTOLIC BLOOD PRESSURE: 92 MMHG | HEART RATE: 80 BPM | WEIGHT: 215.2 LBS | SYSTOLIC BLOOD PRESSURE: 140 MMHG | BODY MASS INDEX: 30.88 KG/M2

## 2021-06-05 NOTE — PROGRESS NOTES
Office Visit - Follow Up   David Marino   54 y.o. male    Date of Visit: 1/14/2020    Chief Complaint   Patient presents with     Medication Refill     Facial Pain        Assessment and Plan   1. Mild intermittent asthma without complication  Stable, controlled with current medications. Answered all questions.   - ADVAIR -21 mcg/actuation inhaler; INHALE 2 PUFFS BY MOUTH TWICE DAILY. RINSE MOUTH AFTER USE.  Dispense: 1 Inhaler; Refill: 5  - albuterol (VENTOLIN HFA) 90 mcg/actuation inhaler; Inhale 2 puffs every 4 (four) hours as needed for wheezing (and cough).  Dispense: 1 each; Refill: 3  - montelukast (SINGULAIR) 10 mg tablet; Take 1 tablet (10 mg total) by mouth daily.  Dispense: 90 tablet; Refill: 3    2. Depression With Anxiety  Well controlled with his current medication and sees a psychiatrist for his medications.     The following high BMI interventions were performed this visit: encouragement to exercise and dietary management education, guidance, and counseling    No follow-ups on file.     History of Present Illness   This 54 y.o. old male patient with history of insomnia, rosacea, hyperlipidemia, mild intermittent asthma, GERD, hypogonadism, depression with anxiety presented to the clinic today for follow-up.  Patient reported that he is doing very well including his asthma and also his anxiety and depression are also well controlled.  He wanted to know how often he should come in for visit.  He denied any chest pain, shortness of breath, syncope and fever.    Review of Systems: A comprehensive review of systems was negative except as noted.     Medications, Allergies and Problem List   Reviewed and updated     Physical Exam   General Appearance:   Well groomed    BP (!) 132/93   Pulse 90   Wt 206 lb 6 oz (93.6 kg)   SpO2 98%   BMI 28.78 kg/m         Additional Information   Current Outpatient Medications   Medication Sig Dispense Refill     ADVAIR -21 mcg/actuation inhaler  INHALE 2 PUFFS BY MOUTH TWICE DAILY. RINSE MOUTH AFTER USE. 1 Inhaler 5     albuterol (VENTOLIN HFA) 90 mcg/actuation inhaler Inhale 2 puffs every 4 (four) hours as needed for wheezing (and cough). 1 each 3     DOCOSAHEXANOIC ACID/EPA (FISH OIL ORAL) Take by mouth.       EMSAM 9 mg/24 hr APPLY 1 PATCH TOPICALLY DAILY AS DIRECTED  0     ERGOCALCIFEROL, VITAMIN D2, (VITAMIN D2 ORAL) Take by mouth.       GLYCOPYRROLATE, BULK, MISC every morning. 0.5% cream. For chromhidrosis, 30 gm jar       ibuprofen (ADVIL,MOTRIN) 600 MG tablet Take 1 tablet (600 mg total) by mouth every 6 (six) hours. 60 tablet 0     metroNIDAZOLE (METROGEL) 0.75 % gel APPLY TOPICALLY TO AFFECTED AREA(S) ON FACE & SCALP TWICE DAILY 90 g 3     montelukast (SINGULAIR) 10 mg tablet Take 1 tablet (10 mg total) by mouth daily. 90 tablet 3     MULTIVITAMIN ORAL Take by mouth.       rosuvastatin (CRESTOR) 10 MG tablet Take 1 tablet (10 mg total) by mouth daily. 90 tablet 3     testosterone (ANDROGEL) 1 % (25 mg/2.5gram) GlPk Apply 0.05g to the skin daily. Please dispense a full package 6 Package 3     VITAMIN B COMPLEX ORAL Take by mouth.       azithromycin (ZITHROMAX) 250 MG tablet Take 500 mg (2 x 250 mg tablets) on day 1 followed by 250 mg (1 tablet) on days 2-5. 6 tablet 0     No current facility-administered medications for this visit.      Allergies   Allergen Reactions     Allerg Xt,D.Farinae-D.Pteronys Other (See Comments)     Bee Pollen Other (See Comments)     Honey Other (See Comments)     Sulfa (Sulfonamide Antibiotics) Rash     Social History     Tobacco Use     Smoking status: Former Smoker     Types: Cigarettes     Start date: 1984     Last attempt to quit: 1997     Years since quittin.0     Smokeless tobacco: Never Used   Substance Use Topics     Alcohol use: Yes     Frequency: Monthly or less     Comment: OC     Drug use: No     Reviewed medication including medication for anxiety and depression, asthma and  hypogonadism.    Time: total time spent with the patient was 15 minutes of which >50% was spent in counseling and coordination of care     Tracey Bucio, CNP

## 2021-06-05 NOTE — TELEPHONE ENCOUNTER
Medication Question or Clarification  Who is calling: Pharmacy  What medication are you calling about (include dose and sig)?:   testosterone (ANDROGEL) 1 % (25 mg/2.5gram) GlPk 3 Package 3 1/6/2020  No   Sig - Route: Place 0.05 g on the skin daily. - Transdermal     Who prescribed the medication?: Tracey Bucio, GERRY   What is your question/concern?: Script clarification - box comes as 75 grams, pharmacy does not break boxes. Ok to dispense 75 gms?  [facility]ed [facility]  Okay to leave a detailed message?: Yes

## 2021-06-06 NOTE — TELEPHONE ENCOUNTER
Responded to patient. Per speciality scheduling a message was left for patient on 2/26/2020 from [facility].  Another call will be made to the patient today to assist in scheduling. Don Angeles CMA

## 2021-06-06 NOTE — PROGRESS NOTES
"Assessment:   1. Acute midline low back pain without sciatica  Discussed diagnosis and possible treatment options and answered all his questions. Patient will follow up if symptoms persist.   - ketorolac injection 60 mg (TORADOL)  - ibuprofen (ADVIL,MOTRIN) 600 MG tablet; Take 1 tablet (600 mg total) by mouth every 6 (six) hours as needed for pain.  Dispense: 60 tablet; Refill: 2  - XR Lumbar Spine 4 or More VWS; Future     Plan:   Natural history and expected course discussed. Questions answered.  Stretching exercises discussed.  Short (2-4 day) period of relative rest recommended until acute symptoms improve.  Heat to affected area as needed for local pain relief.  NSAIDs per medication orders.     Subjective:   David Marino is a 55 y.o. male who presents for evaluation of low back pain. The patient has had no prior back problems but reported having similar back pain for years ago but that pain was worse than this. Symptoms have been present for 7 days and are gradually worsening.  Onset was related to / precipitated by no known injury. The pain is located in the mid lower back and does not radiate. The pain is described as aching and sharp and occurs all day and worse with movement even walking. He rates his pain as a 7 on a scale of 0-10. Symptoms are exacerbated by exercise, extension, sitting, standing for more than 1 minutes and walking for more than 1 minutes. Symptoms are improved by heat and rest. He has also tried nothing which provided no symptom relief. He has no other symptoms associated with the back pain. The patient has no \"red flag\" history indicative of complicated back pain.    The following portions of the patient's history were reviewed and updated as appropriate: allergies, current medications, past family history, past medical history, past social history, past surgical history and problem list.    Review of Systems  A 12 point comprehensive review of systems was negative except as noted. "      Objective:    Full range of motion without pain, no tenderness, no spasm, no curvature.  Inspection and palpation: inspection of back is normal.  Muscle tone and ROM exam: muscle tone normal without spasm.  Neurological: normal DTRs, muscle strength and reflexes.

## 2021-06-06 NOTE — PROGRESS NOTES
"HPI:      David Marino is a 55 y.o. male who presents for evaluation of low back pain. The patient has had recurrent self limited episodes of low back pain in the past. Symptoms have been present for 7 days and are gradually worsening.  Onset was related to / precipitated by no known injury. The pain is located in the mid lower back and does not radiate. The pain is described as sharp and occurs with movement. He rates his pain as a 6 on a scale of 0-10. Symptoms are exacerbated by standing and walking. Symptoms are improved by heat and rest. He has also tried nothing due to concerns of drug interaction with current medication. He has no other symptoms associated with the back pain. The patient has no \"red flag\" history indicative of complicated back pain.    Allergies   Allergen Reactions     Allerg Xt,D.Farinae-D.Pteronys Other (See Comments)     Bee Pollen Other (See Comments)     Honey Other (See Comments)     Sulfa (Sulfonamide Antibiotics) Rash     Current Outpatient Medications on File Prior to Visit   Medication Sig Dispense Refill     ADVAIR -21 mcg/actuation inhaler INHALE 2 PUFFS BY MOUTH TWICE DAILY. RINSE MOUTH AFTER USE. 1 Inhaler 5     albuterol (VENTOLIN HFA) 90 mcg/actuation inhaler Inhale 2 puffs every 4 (four) hours as needed for wheezing (and cough). 1 each 3     azithromycin (ZITHROMAX) 250 MG tablet Take 500 mg (2 x 250 mg tablets) on day 1 followed by 250 mg (1 tablet) on days 2-5. 6 tablet 0     DOCOSAHEXANOIC ACID/EPA (FISH OIL ORAL) Take by mouth.       EMSAM 12 mg/24 hr Place 9 mg on the skin.       EMSAM 9 mg/24 hr APPLY 1 PATCH TOPICALLY DAILY AS DIRECTED  0     ERGOCALCIFEROL, VITAMIN D2, (VITAMIN D2 ORAL) Take by mouth.       GLYCOPYRROLATE, BULK, MISC every morning. 0.5% cream. For chromhidrosis, 30 gm jar       ibuprofen (ADVIL,MOTRIN) 600 MG tablet Take 1 tablet (600 mg total) by mouth every 6 (six) hours. 60 tablet 0     metroNIDAZOLE (METROGEL) 0.75 % gel APPLY " TOPICALLY TO AFFECTED AREA(S) ON FACE & SCALP TWICE DAILY 90 g 3     montelukast (SINGULAIR) 10 mg tablet Take 1 tablet (10 mg total) by mouth daily. 90 tablet 3     MULTIVITAMIN ORAL Take by mouth.       rosuvastatin (CRESTOR) 10 MG tablet Take 1 tablet (10 mg total) by mouth daily. 90 tablet 3     testosterone (ANDROGEL) 1 % (25 mg/2.5gram) GlPk Apply 0.05g to the skin daily. Please dispense a full package 6 Package 3     VITAMIN B COMPLEX ORAL Take by mouth.       No current facility-administered medications on file prior to visit.        Review of Systems  A 12 point comprehensive review of systems was negative except as noted.      Objective:    Full range of motion without pain, no tenderness, no spasm, no curvature.  Normal reflexes, gait, strength and negative straight-leg raise.  Inspection and palpation: inspection of back is normal.  Muscle tone and ROM exam: muscle tone normal without spasm.  Neurological: normal DTRs, muscle strength and reflexes.       Assessment:   1. Acute low back pain      Plan:   Discussed with patient treatment plan.  -Recommended no lifting and rest period until symptoms improve  -Apply heat to back for pain relief  -Ibuprofen 600 mg three times a day  -Ketoralac 60 mg IM once  -X-ray of back    GERRY Love Student 2/24/2020

## 2021-06-06 NOTE — TELEPHONE ENCOUNTER
I called and left a voice message for patient regarding his back x-ray result.  I did not leave specific messages regarding the results but I did ask patient to give us a call back when he get this message and I will also try and call him back later today.

## 2021-06-06 NOTE — TELEPHONE ENCOUNTER
Patient Returning Call  Reason for call:  Message from Tracey Bucio FNP   Information relayed to patient:  No message to relay  Patient has additional questions:  Yes  If YES, what are your questions/concerns:  Patient will be available the rest of the day and hopes to her from provider today.  Okay to leave a detailed message?: No call back needed

## 2021-06-06 NOTE — TELEPHONE ENCOUNTER
Patient Returning Call   Reason for call: Image Results   Information relayed to patient:  No msg to relay - Please Follow up   Patient has additional questions:  Yes  If YES, what are your questions/concerns:    Results from Imaging - Please leave msg to relay or call patient back . 3 rd request   Okay to leave a detailed message?: Yes

## 2021-06-07 NOTE — TELEPHONE ENCOUNTER
Refill Approved    Rx renewed per Medication Renewal Policy. Medication was last renewed on 11/12/19.    Mee Smith, [facility] Triage/Med Refill 4/1/2020     Requested Prescriptions   Pending Prescriptions Disp Refills     metroNIDAZOLE (METROGEL) 0.75 % gel [Pharmacy Med Name: METRONIDAZOLE TOPICAL 0.75% GL] 90 g 3     Sig: APPLY TOPICALLY TO AFFECTED AREA(S) ON FACE & SCALP TWICE DAILY       Topical Dermatology Medications Refill Protocol Passed - 3/30/2020 10:13 PM        Passed - Patient has had office visit/physical in last 1 year     Last office visit with prescriber/PCP: 2/24/2020 Tracey Bucio FNP OR same dept: 2/24/2020 Tracey Bucio FNP OR same specialty: 2/24/2020 Tracey Bucio FNP  Last physical: 8/12/2019 Last MTM visit: Visit date not found   Next visit within 3 mo: Visit date not found  Next physical within 3 mo: Visit date not found  Prescriber OR PCP: GERRY Hernandez  Last diagnosis associated with med order: 1. Rosacea  - metroNIDAZOLE (METROGEL) 0.75 % gel [Pharmacy Med Name: METRONIDAZOLE TOPICAL 0.75% GL]; APPLY TOPICALLY TO AFFECTED AREA(S) ON FACE & SCALP TWICE DAILY  Dispense: 90 g; Refill: 3    If protocol passes may refill for 12 months if within 3 months of last provider visit (or a total of 15 months).

## 2021-06-10 NOTE — TELEPHONE ENCOUNTER
Controlled Substance Refill Request  Medication Name:   Requested Prescriptions     Pending Prescriptions Disp Refills     testosterone (ANDROGEL) 1 % (25 mg/2.5gram) GlPk 6 Package 3     Sig: Apply 0.05g to the skin daily. Please dispense a full package     Date Last Fill: 5/5/2020  Is patient out of medication?: N/A - electronic request  Patient notified refills processed within 3 business days: N/A - electronic request  [facility]  Submit electronically to pharmacy  Controlled Substance Agreement on file:   Encounter-Level CSA Scan Date:    There are no encounter-level csa scan date.        Last office visit:  2/24/2020

## 2021-06-10 NOTE — TELEPHONE ENCOUNTER
Refill Approved    Rx renewed per Medication Renewal Policy. Medication was last renewed on 6/17/19, last OV 2/24/20.    Adrianna Thomas, [facility] Triage/Med Refill 8/23/2020     Requested Prescriptions   Pending Prescriptions Disp Refills     rosuvastatin (CRESTOR) 10 MG tablet 90 tablet 3     Sig: Take 1 tablet (10 mg total) by mouth daily.       Statins Refill Protocol (Hmg CoA Reductase Inhibitors) Passed - 8/20/2020  3:48 PM        Passed - PCP or prescribing provider visit in past 12 months      Last office visit with prescriber/PCP: 2/24/2020 Tracey Bucio FNP OR same dept: 2/24/2020 Tracey Bucio FNP OR same specialty: 2/24/2020 Tracey Bucio FNP  Last physical: 8/12/2019 Last MTM visit: Visit date not found   Next visit within 3 mo: Visit date not found  Next physical within 3 mo: Visit date not found  Prescriber OR PCP: GERRY Hernandez  Last diagnosis associated with med order: 1. Hyperlipidemia LDL goal <130  - rosuvastatin (CRESTOR) 10 MG tablet; Take 1 tablet (10 mg total) by mouth daily.  Dispense: 90 tablet; Refill: 3    If protocol passes may refill for 12 months if within 3 months of last provider visit (or a total of 15 months).

## 2021-06-10 NOTE — PROGRESS NOTES
Office Visit - Follow Up   David Marino   55 y.o. male    Date of Visit: 8/24/2020    Chief Complaint   Patient presents with     Follow-up     Medication Refill     eye drop        Assessment and Plan   1. Chronic midline low back pain without sciatica  Likely inflamed from recent walking and running. Pain did improve with ketorolac injection in the past. Patient has refused surgery. Continue with ibuprofen as prescribed.   - ketorolac injection 60 mg (TORADOL)  Improved pain with intramuscular injection of Toradol.    2. Right elbow pain  Likely elbow tendinitis, recommend using warm compresses and elbow pad. Patient verbalized understanding and will follow up if symptom persist.     3. Rosacea  Patient is currently on oral antibiotic and has continued to use topical metronidazole. Instruct patient to continue to use OTC eye drop for dry eyes and follow up with optometrist if symptom persist. Patient verbalized understanding.     The following high BMI interventions were performed this visit: encouragement to exercise and dietary management education, guidance, and counseling    No follow-ups on file.     History of Present Illness   This 55 y.o. old male patient with history of rosacea, hyperlipidemia, chronic reflux esophagitis, hypogonadism, and depression with anxiety  who presented to clinic today for follow-up.  Patient reported that his lower back pain is flaring up again.  He stated that he did do well with IM injection of ketorolac the last time.  He stated that her surgery was recommended but he was not ready for surgery.  She said he has been walking a lot and also has been trying to run which could be why his back is hurting more.  Patient denied any numbness or tingling of his lower extremities at this time.  Patient also reported that last winter he fell and he landed on his right elbow and he did feel better until recently he feels some kind of chip in his elbow that causes some pain when he  rest his elbow on a hard platform. He denied any numbness or weakness of the right arm.  Patient also reported that his rosacea is getting worse and he has not been getting his topical metronidazole refilled as he supposed to.  He stated that he did meet with optometrist who asked him to use teardrops which has helped with his eyes.  He is also taking oral doxycycline.    Review of Systems: A comprehensive review of systems was negative except as noted.     Medications, Allergies and Problem List   Reviewed and updated     Physical Exam   General Appearance:   Well groomed  Right elbow: mild tenderness with palpation of elbow.     BP (!) 143/99   Pulse 74   Wt 215 lb 7 oz (97.7 kg)   SpO2 97%   BMI 30.05 kg/m           Additional Information   Current Outpatient Medications   Medication Sig Dispense Refill     ADVAIR -21 mcg/actuation inhaler INHALE 2 PUFFS BY MOUTH TWICE DAILY. RINSE MOUTH AFTER USE. 1 Inhaler 5     albuterol (VENTOLIN HFA) 90 mcg/actuation inhaler Inhale 2 puffs every 4 (four) hours as needed for wheezing (and cough). 1 each 3     DOCOSAHEXANOIC ACID/EPA (FISH OIL ORAL) Take by mouth.       EMSAM 12 mg/24 hr Place 9 mg on the skin.       EMSAM 9 mg/24 hr APPLY 1 PATCH TOPICALLY DAILY AS DIRECTED  0     ERGOCALCIFEROL, VITAMIN D2, (VITAMIN D2 ORAL) Take by mouth.       GLYCOPYRROLATE, BULK, MISC every morning. 0.5% cream. For chromhidrosis, 30 gm jar       ibuprofen (ADVIL,MOTRIN) 600 MG tablet Take 1 tablet (600 mg total) by mouth every 6 (six) hours. 60 tablet 0     lidocaine (LIDODERM) 5 % Remove & Discard patch within 12 hours or as directed by MD 5 patch 2     metroNIDAZOLE (METROGEL) 0.75 % gel APPLY TOPICALLY TO AFFECTED AREA(S) ON FACE & SCALP TWICE DAILY 90 g 3     montelukast (SINGULAIR) 10 mg tablet Take 1 tablet (10 mg total) by mouth daily. 90 tablet 3     MULTIVITAMIN ORAL Take by mouth.       rosuvastatin (CRESTOR) 10 MG tablet Take 1 tablet (10 mg total) by mouth daily.  90 tablet 1     testosterone (ANDROGEL) 1 % (25 mg/2.5gram) GlPk Apply 0.05g to the skin daily. Please dispense a full package 6 Package 3     VITAMIN B COMPLEX ORAL Take by mouth.       Current Facility-Administered Medications   Medication Dose Route Frequency Provider Last Rate Last Dose     ketorolac injection 60 mg (TORADOL)  60 mg Intramuscular Q6H PRN Tracey Bucio, FNP         Allergies   Allergen Reactions     Allerg Xt,D.Farinae-D.Pteronys Other (See Comments)     Bee Pollen Other (See Comments)     Honey Other (See Comments)     Sulfa (Sulfonamide Antibiotics) Rash     Social History     Tobacco Use     Smoking status: Former Smoker     Types: Cigarettes     Start date: 1984     Last attempt to quit: 1997     Years since quittin.6     Smokeless tobacco: Never Used   Substance Use Topics     Alcohol use: Yes     Frequency: Monthly or less     Comment: OC     Drug use: No          Tracey Bucio CNP

## 2021-06-14 NOTE — TELEPHONE ENCOUNTER
Medication is not on current medication list and is historical. Unsure how much to dispense, it is pending.

## 2021-06-14 NOTE — PROGRESS NOTES
Assessment & Plan     Corn or callus  Discussed diagnosis and treatment. Applied liquid nitrogen and recommend that patient return in two weeks    Benign essential hypertension  Discussed diagnosis and treatment. Recommend starting antihypertensive and increase exercise, reduce salt intake and check blood pressure twice daily.   - chlorthalidone (HYGROTEN) 25 MG tablet  Dispense: 90 tablet; Refill: 3    Mild intermittent asthma without complication  - montelukast (SINGULAIR) 10 mg tablet  Dispense: 90 tablet; Refill: 3  - ADVAIR -21 mcg/actuation inhaler  Dispense: 1 Inhaler; Refill: 5    Rosacea  - doxycycline (MONODOX) 50 MG capsule  Dispense: 60 capsule; Refill: 11    Hypogonadism male  - testosterone (ANDROGEL) 1 % (25 mg/2.5gram) GlPk  Dispense: 6 Package; Refill: 3    Anal condyloma  - ibuprofen (ADVIL,MOTRIN) 600 MG tablet  Dispense: 60 tablet; Refill: 0  - glycopyrrolate, bulk, 100 % Powd  Dispense: 30 g; Refill: 2    25 minutes spent on the date of the encounter doing chart review, patient visit and documentation            No follow-ups on file.    GERRY Hernandez  [facility]    Subjective     David Marino is 56 y.o. and presents to clinic today for the following health issues   HPI       Skin Lesion  Onset/Duration: few weeks  Description  Location: right index finger  Color: brown  Border description: non-ulcerated  Character: round  Itching: no  Bleeding:  no  Intensity:  moderate  Progression of Symptoms:  worsening  Accompanying signs and symptoms:   Bleeding: no  Scaling: no  Excessive sun exposure/tanning: no  Sunscreen used: no  History:           Any previous history of skin cancer: no  Any family history of melanoma: no  Previous episodes of similar lesion: no  Precipitating or alleviating factors: none  Therapies tried and outcome: none    Hypertension Follow-up      Do you check your blood pressure regularly outside of the clinic? No     Are  you following a low salt diet? No    Are your blood pressures ever more than 140 on the top number (systolic) OR more       than 90 on the bottom number (diastolic), for example 140/90? Yes      How many servings of fruits and vegetables do you eat daily?  2-3    On average, how many sweetened beverages do you drink each day (Examples: soda, juice, sweet tea, etc.  Do NOT count diet or artificially sweetened beverages)?   1    How many days per week do you exercise enough to make your heart beat faster? 3 or less    How many minutes a day do you exercise enough to make your heart beat faster? 10 - 19    How many days per week do you miss taking your medication? 0      Review of Systems  Review of Systems - History obtained from the patient  General ROS: negative  Psychological ROS: negative  Cardiovascular ROS: positive for - elevated blood pressure  Dermatological ROS: positive for corn        Objective    BP (!) 161/103   Pulse 71   Wt 215 lb 12.8 oz (97.9 kg)   BMI 30.10 kg/m    Body mass index is 30.1 kg/m .  Physical Exam  BP (!) 161/103   Pulse 71   Wt 215 lb 12.8 oz (97.9 kg)   BMI 30.10 kg/m    General appearance: alert, appears stated age and cooperative  Head: Normocephalic, without obvious abnormality, atraumatic  Skin: corm on right index finger      I spent a total of 20 minutes face-to-face with David Marino during today's office visit.  Over 50% of this time was spent counseling the patient and/or coordinating care regarding hypertension and skin wart.  See note for details.

## 2021-06-15 NOTE — PROGRESS NOTES
Assessment/Plan:   1. Benign essential hypertension  Blood pressure is not well controlled and is not meeting goal of <140/90 mm Hg per JNC-8 hypertension guidelines. Appropriately on a high intensity statin given age. Recommend increasing chlorthalidone to 25 mg daily and follow up in two weeks.     2. Corn or callus   1. Liquid nitrogen was applied to single wart(s) for 3 second freeze/thaw cycles.  2. The patient will return at 2-4 week intervals for retreatment's as needed.     18 minutes spent on the date of the encounter doing chart review, patient visit and documentation     Subjective:   Patient here for follow-up of elevated blood pressure.  He is exercising and is adherent to a low-salt diet.  Blood pressure is not well controlled at home. Cardiac symptoms: none. Patient denies: chest pain, claudication, exertional chest pressure/discomfort, irregular heart beat, near-syncope, orthopnea, paroxysmal nocturnal dyspnea and syncope. Cardiovascular risk factors: advanced age (older than 55 for men, 65 for women), dyslipidemia and male gender. Use of agents associated with hypertension: none. History of target organ damage: none.    The following portions of the patient's history were reviewed and updated as appropriate: allergies, current medications, past family history, past medical history, past social history, past surgical history and problem list.    Review of Systems  A 12 point comprehensive review of systems was negative except as noted.        Objective:   BP (!) 140/92   Pulse 80   Wt 215 lb 3.2 oz (97.6 kg)   BMI 30.01 kg/m    General appearance: alert, appears stated age and cooperative  Pulses: 2+ and symmetric  Skin: Skin color, texture, turgor normal. No rashes or lesions      Skin: single wart(s) noted on right index finger. Size range is 0.3 cm.

## 2021-06-15 NOTE — PROGRESS NOTES
Assessment/Plan:   1. Benign essential hypertension  Blood pressure is well controlled and meeting goal of <140/90 mm Hg per JNC-8 hypertension guidelines. Appropriately on a high intensity statin.    Dietary sodium restriction.  Regular aerobic exercise.  Check blood pressures weekly and record.  Follow up: 3 months and as needed.     2. Common wart  1. Liquid nitrogen was applied to a single wart(s) for 3 second freeze/thaw cycles.  2. The patient will return at 2-4 week intervals for retreatment's as needed.     Subjective:   Patient here for follow-up of elevated blood pressure.  He is exercising and is adherent to a low-salt diet.  Blood pressure is well controlled at home. Cardiac symptoms: none. Patient denies: chest pain. Cardiovascular risk factors: advanced age (older than 55 for men, 65 for women), dyslipidemia, hypertension and male gender. Use of agents associated with hypertension: none. History of target organ damage: none. Patient will also like a treatment of his wart located on his right index finger.     The following portions of the patient's history were reviewed and updated as appropriate: allergies, current medications, past family history, past medical history, past social history, past surgical history and problem list.    Review of Systems  A 12 point comprehensive review of systems was negative except as noted.        Objective:   /80 (Patient Site: Right Arm, Patient Position: Sitting, Cuff Size: Adult Large)   Pulse 71   Wt 218 lb 6.4 oz (99.1 kg)   BMI 30.46 kg/m    General appearance: alert, appears stated age and cooperative  Head: Normocephalic, without obvious abnormality, atraumatic  Eyes: conjunctivae/corneas clear. PERRL, EOM's intact. Fundi benign.  Extremities: extremities normal, atraumatic, no cyanosis or edema  Pulses: 2+ and symmetric  Skin: Skin color, texture, turgor normal. No rashes or lesions  Lymph nodes: Cervical, supraclavicular, and axillary nodes  normal.  Neurologic: Grossly normal       Skin: Single wart(s) noted on right index finger. Size range is 0.3 cm.

## 2021-06-16 NOTE — TELEPHONE ENCOUNTER
Telephone Encounter by Debra Franco at 6/19/2019  7:50 AM     Author: Debra Franco Service: -- Author Type: --    Filed: 6/19/2019  7:53 AM Encounter Date: 6/18/2019 Status: Signed    : Debra Franco APPROVED:    Approval start date:06/18/2019  Approval end date:06/18/2022     Pharmacy has been notified of approval and will contact patient when medication is ready for pickup.

## 2021-06-24 NOTE — PATIENT INSTRUCTIONS - HE
Ask your previous PCP office about pathology results for colonscopy - this dictates follow up interval    [facility]   PHONE: [phone]  [facility],  [facility], [facility]

## 2021-06-24 NOTE — PROGRESS NOTES
Assessment and Plan    1. Primary insomnia  Well controlled on:   - traZODone (DESYREL) 50 MG tablet; Take 1 tablet (50 mg total) by mouth at bedtime.  Dispense: 90 tablet; Refill: 3    2. History of gastric bypass  Takes vitamin D, B12 and calcium - need to have adequacy of therapy checked  - Vitamin B12; Standing  - Vitamin D, Total (25-Hydroxy); Standing    3. Food allergy   Last time he ate honey was a kid - eyes and tongue would swell.  He would like to find out if he is still allergic  - Ambulatory referral to Allergy; Standing    4. Dry eyes  Rule ou Sjogren's syndrome as a contributor  - Antinuclear Antibody (MERVIN) Cascade; Standing    5. History of hypogonadism  Currently not on replacement and felt no difference after stopping - recheck  - Testosterone, Free and Total; Standing    6. Intermittent asthma, well controlled  refill  - montelukast (SINGULAIR) 10 mg tablet; Take 1 tablet (10 mg total) by mouth daily.  Dispense: 90 tablet; Refill: 1  Also takes Advair seasonally. ACT Total Score: 23 (2/12/2019 11:51 AM)       7. Hyperlipidemia LDL goal <130  Historically well controlled on rosuvastatin  - Lipid Cascade FASTING; Standing  - rosuvastatin (CRESTOR) 10 MG tablet; Take 1 tablet (10 mg total) by mouth daily.  Dispense: 90 tablet; Refill: 3    9. Major depression in complete remission (H)  Well controlled on:   - desvenlafaxine succinate (PRISTIQ) 100 MG 24 hr tablet; Take 100 mg by mouth daily.  Dispense: 90 tablet; Refill: 1      Patient Instructions   Ask your previous PCP office about pathology results for colonscopy - this dictates follow up interval    [facility]   PHONE: [phone]  [facility],  [facility], [facility]        I spent 45 minutes spent with this patient, over half in counseling: thoroughly reviewing history , current medical issues and follow up , medications and refills, preventive care    Return in about 6 months (around 8/12/2019) for follow-up  multiple issues.    Susi Sky MD     -------------------------------------------    Chief Complaint   Patient presents with     Establish Care     new pt, discuss testesterone levels and chemistry level blood draw      Medication Refill     pt requesting asthma med, crestor, desvenlafaxine      David is new here at [facility] and comes to establish care  He had been seen previously at [facility].  This clinic is closer to where he lives.  He reviews his ongoing issues with me    Depression - this has been something he has managed since early 20s with medications and therapy and exercise and good self care and sleep.  Used to go in and get antidepressant changes because he was gaining weight, but this has not longer been an issue since he had bariatric surgery    Little interest or pleasure in doing things: Not at all  Feeling down, depressed, or hopeless: Not at all  Trouble falling or staying asleep, or sleeping too much: Not at all  Feeling tired or having little energy: Not at all  Poor appetite or overeating: Not at all  Feeling bad about yourself - or that you are a failure or have let yourself or your family down: Several days  Trouble concentrating on things, such as reading the newspaper or watching television: Several days  Moving or speaking so slowly that other people could have noticed. Or the opposite - being so fidgety or restless that you have been moving around a lot more than usual: Not at all  Thoughts that you would be better off dead, or of hurting yourself in some way: Not at all  PHQ-9 Total Score: 2  If you checked off any problems, how difficult have these problems made it for you to do your work, take care of things at home, or get along with other people?: Not difficult at all         He had bariatric surgery in May  2018 and has lost 60 pounds. He does not have regular check in with surgeon - skipped the 6 month follow up.  He is following their  recommendations for vitamin supplementation: Takes calcium, B12, D multivitamin (also has diary);he note that they don't have men take iron.  He has not had testing to check for adequacy of vitamin levels.     Insomnia - well controlled on trazodone    Hypertension - just bought a cuff.  After he lost weight blood pressure went down and he is not currently taking medications - he says today's reading is higher than usual for him.  no chest pains, palpitations or dyspnea.      High cholesterol - had been on Lipitor since 20s - now is on rosuvastatin - not fasting today    Asthma - takes singulair.  Has inhalers - later in the spring uses Advair seasonally.  Rare albuterol. Cold air and allergies are triggers.    ACT Total Score: 23 (2/12/2019 11:51 AM)    Rosacea - uses metronidazole gets from dermatologist    Honey allergy:  Last time he ate honey was a kid - eyes and tongue would swell.  He would like to find out if he is still allergic    Dry eyes - using artificial tears. No rashes or joint pain    Low testosterone since younger - used to give self testosterone injections.  Stopped and went on Tgel - would like testocerone recheck. Hasn't used tgel for 45 days.  It was hard to distinguish between depression and low T.  When he started taking testosterone was mor social and had more anergy and felt all around better.  But since he stopped taking it no change.  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  No kids    Habits and lifestyle  Exercise -  lots of walking - gaol is to get back to running slowly and weights.  Used to do a lot of Yoga and may get back to this    Sleep  - sleeps well    Stress - has stress but it is reasonable              Health Maintenance Due   Topic Date Due     COLONOSCOPY  2016     Health Maintenance reviewed - Results in CareEverywhere but we need to find out pathology for planning of follow up .    Social History     Tobacco Use   Smoking Status Former Smoker     Types: Cigarettes     Start date: 1984     Last attempt to quit: 1997     Years since quittin.1   Smokeless Tobacco Never Used       Social History     Substance and Sexual Activity   Alcohol Use Yes     Frequency: Monthly or less         Vitals:    19 1023   BP: 130/90   Pulse: 66   Resp: 16   SpO2: 98%     Body mass index is 25.58 kg/m .     EXAM:    General appearance - alert, well appearing, and in no distress  Mental status - normal mood, behavior, speech, dress, motor activity, and thought processes  Neck - carotids upstroke normal bilaterally, no bruits  Chest - clear to auscultation, no wheezes, rales or rhonchi, symmetric air entry  Heart - normal rate, regular rhythm, normal S1, S2, no murmurs, rubs, clicks or gallops

## 2021-07-03 NOTE — ADDENDUM NOTE
Addendum Note by Tracey Arroyo FNP at 2/24/2020  9:50 AM     Author: Tracey Arroyo FNP Service: -- Author Type: Nurse Practitioner    Filed: 2/26/2020  4:39 PM Encounter Date: 2/24/2020 Status: Signed    : Tracey Arroyo FNP (Nurse Practitioner)    Addended by: TRACEY ARROYO on: 2/26/2020 04:39 PM        Modules accepted: Orders

## 2021-07-03 NOTE — ADDENDUM NOTE
Addendum Note by Tracey Arroyo FNP at 1/8/2020  5:33 PM     Author: Tracey Arroyo FNP Service: -- Author Type: Nurse Practitioner    Filed: 1/8/2020  5:33 PM Encounter Date: 1/5/2020 Status: Signed    : Tracey Arroyo FNP (Nurse Practitioner)    Addended by: TRACEY ARROYO on: 1/8/2020 05:33 PM        Modules accepted: Orders

## 2021-08-30 DIAGNOSIS — F33.1 MODERATE EPISODE OF RECURRENT MAJOR DEPRESSIVE DISORDER (H): Primary | ICD-10-CM

## 2021-08-30 NOTE — TELEPHONE ENCOUNTER
..Reason for Call:  Medication or medication refill:    Do you use a [facility]?  Name of the pharmacy and phone number for the current request: [id number] - [place], [place] - [address]  [phone]    Name of the medication requested: 9mg EMSAM selegiline transdermal system. Patient only needs (5) to get through till appointment.   Script is on file with [facility].     Other request: Patient has made a request with [facility]. However, provider that as over seeing his medication is no longer and their refusing to refill his medication at this time. Patient is scheduled with Promise Amajiri 9/7/21     Can we leave a detailed message on this number? YES    Phone number patient can be reached at: Home number on file [phone] (home)    Best Time: ANY    Call taken on 8/30/2021 at 4:56 PM by GUANAKITO Booker

## 2021-09-13 ENCOUNTER — OFFICE VISIT (OUTPATIENT)
Dept: FAMILY MEDICINE | Facility: CLINIC | Age: 56
End: 2021-09-13
Payer: COMMERCIAL

## 2021-09-13 VITALS
DIASTOLIC BLOOD PRESSURE: 80 MMHG | SYSTOLIC BLOOD PRESSURE: 120 MMHG | WEIGHT: 210.31 LBS | HEART RATE: 67 BPM | OXYGEN SATURATION: 100 % | BODY MASS INDEX: 30.18 KG/M2

## 2021-09-13 DIAGNOSIS — Z00.00 WELLNESS EXAMINATION: Primary | ICD-10-CM

## 2021-09-13 DIAGNOSIS — L71.9 ROSACEA: ICD-10-CM

## 2021-09-13 DIAGNOSIS — F33.1 MODERATE EPISODE OF RECURRENT MAJOR DEPRESSIVE DISORDER (H): ICD-10-CM

## 2021-09-13 DIAGNOSIS — L98.9 SKIN LESION: ICD-10-CM

## 2021-09-13 PROCEDURE — 99214 OFFICE O/P EST MOD 30 MIN: CPT | Performed by: NURSE PRACTITIONER

## 2021-09-13 RX ORDER — TESTOSTERONE 25 MG/2.5G
GEL TRANSDERMAL
Qty: 2.5 G | Refills: 3 | Status: SHIPPED | OUTPATIENT
Start: 2021-09-13 | End: 2021-10-04

## 2021-09-13 RX ORDER — METRONIDAZOLE 7.5 MG/G
1 GEL TOPICAL 2 TIMES DAILY
Qty: 60 G | Refills: 11 | Status: SHIPPED | OUTPATIENT
Start: 2021-09-13 | End: 2021-12-01

## 2021-09-13 RX ORDER — TESTOSTERONE 25 MG/2.5G
GEL TRANSDERMAL
COMMUNITY
Start: 2021-01-26 | End: 2021-09-13

## 2021-09-13 NOTE — PROGRESS NOTES
Office Visit - Follow Up   David Marino   56 year old male    Date of Visit: 9/13/2021    Chief Complaint   Patient presents with     Recheck Medication     Derm Problem     back of right thigh & right arm        Assessment and Plan   1. Moderate episode of recurrent major depressive disorder (H)  Stable with current medication    - selegiline (EMSAM) 9 MG/24HR 24 hr patch; Place 1 patch onto the skin daily  Dispense: 30 patch; Refill: 11    2. Rosacea  Stable with current medication.   - metroNIDAZOLE (METROGEL) 0.75 % external gel; Apply 1 g topically 2 times daily  Dispense: 60 g; Refill: 11  - testosterone (ANDROGEL) 25 MG/2.5GM (1%) gel; Apply 0.05g to the skin daily. Please dispense a full package  Dispense: 2.5 g; Refill: 3    3. Skin lesion  Benign skin lesion, continue to monitor and follow-up with any changes    4. Wellness examination  Obtain laboratory studies and follow-up with annual physical  - Basic metabolic panel; Future  - Lipid Profile; Future  - TSH; Future  - Hemoglobin; Future      The following high BMI interventions were performed this visit: encouragement to exercise and dietary management education, guidance, and counseling    No follow-ups on file.     History of Present Illness   This 56 year old old male patient with history of anxiety and depression and hypertension who presents for follow-up and for medication refills.  Patient report that he is doing well.  Patient did report a skin lesion on his right upper arm he report that he has noticed this lesion for several days now and would like to have it examined.  Patient denied any itching or pain or discharge from the skin lesion.    Review of Systems: A comprehensive review of systems was negative except as noted.     Medications, Allergies and Problem List   Reviewed and updated     Physical Exam   General Appearance:   Well groomed    /80 (BP Location: Right arm, Patient Position: Sitting, Cuff Size: Adult Large)    Pulse 67   Wt 95.4 kg (210 lb 5 oz)   SpO2 100%   BMI 30.18 kg/m    Skin: nevus on the right upper arm        Additional Information   Current Outpatient Medications   Medication Sig Dispense Refill     CALCIUM-VITAMIN D PO Take 1 chew tab by mouth 3 times daily 1 = 250 mg       cimetidine (TAGAMET) 400 MG tablet Take 1 tablet (400 mg) by mouth 2 times daily 60 tablet 11     clonazePAM (KLONOPIN) 0.5 MG tablet Take 1 tablet (0.5 mg) by mouth nightly as needed for anxiety 30 tablet 5     Cyanocobalamin (B-12 DOTS SL) Place 1,000 mcg under the tongue daily        desvenlafaxine succinate (PRISTIQ) 100 MG 24 hr tablet Take 1 tablet (100 mg) by mouth daily (after taking 50mg for 1 month) 90 tablet 3     desvenlafaxine succinate (PRISTIQ) 50 MG 24 hr tablet Take 1 tablet (50 mg) by mouth daily 30 tablet 0     doxycycline monohydrate (MONODOX) 50 MG capsule 2 x daily for 1 month then once daily for 2 months. 60 capsule 2     Glycopyrrolate POWD Compounded. Apply pea size amount every morning. 0.5% cream. For chromhidrosis, 30 gm jar 30 g 11     metroNIDAZOLE (METROGEL) 0.75 % external gel Apply 1 g topically 2 times daily 60 g 11     metroNIDAZOLE (METROGEL) 0.75 % topical gel Apply topically 2 times daily Apply to AA on face and scalp bid 90 g 11     montelukast (SINGULAIR) 10 MG tablet TAKE 1 TAB BY MOUTH AT BEDTIME 90 tablet 1     Multiple Vitamins-Minerals (MULTIVITAMIN ADULT PO) Take 2 tablets by mouth every morning        mupirocin (BACTROBAN) 2 % ointment Apply to AA on nose tid for 7 days 22 g 0     Omega-3 Fatty Acids (FISH OIL OMEGA-3 PO) Take 1 capsule by mouth At Bedtime        rosuvastatin (CRESTOR) 10 MG tablet TAKE 1 TABLET(10 MG) BY MOUTH DAILY 30 tablet 0     selegiline (EMSAM) 9 MG/24HR 24 hr patch Place 1 patch onto the skin daily 30 patch 11     testosterone (ANDROGEL) 25 MG/2.5GM (1%) gel Apply 0.05g to the skin daily. Please dispense a full package 2.5 g 3     TRANSDERM-SCOP, 1.5 MG, (1.5mg  base/3day) patch Place 1 patch onto the skin every 72 hours (Vertigo)  3     traZODone (DESYREL) 50 MG tablet Take 1 tablet (50 mg) by mouth At Bedtime 90 tablet 3     VITAMIN D, CHOLECALCIFEROL, PO Take 5,000 Units by mouth every morning        Allergies   Allergen Reactions     Bee Pollen Other (See Comments)     Honey      Pollen Extract      Sulfa Drugs      Social History     Tobacco Use     Smoking status: Former Smoker     Types: Cigarettes, Cigarettes     Start date: 1984     Quit date: 1997     Years since quittin.7     Smokeless tobacco: Never Used     Tobacco comment: smoker 30+ years ago   Substance Use Topics     Alcohol use: Yes     Alcohol/week: 0.0 standard drinks     Comment: Alcoholic Drinks/day: OC     Drug use: No          Promise SANDRA Bucio CNP, CNP

## 2021-09-15 ENCOUNTER — LAB (OUTPATIENT)
Dept: LAB | Facility: CLINIC | Age: 56
End: 2021-09-15
Payer: COMMERCIAL

## 2021-09-15 DIAGNOSIS — Z00.00 WELLNESS EXAMINATION: ICD-10-CM

## 2021-09-15 LAB
ANION GAP SERPL CALCULATED.3IONS-SCNC: 6 MMOL/L (ref 5–18)
BUN SERPL-MCNC: 17 MG/DL (ref 8–22)
CALCIUM SERPL-MCNC: 9.6 MG/DL (ref 8.5–10.5)
CHLORIDE BLD-SCNC: 105 MMOL/L (ref 98–107)
CHOLEST SERPL-MCNC: 226 MG/DL
CO2 SERPL-SCNC: 30 MMOL/L (ref 22–31)
CREAT SERPL-MCNC: 1.17 MG/DL (ref 0.7–1.3)
FASTING STATUS PATIENT QL REPORTED: YES
GFR SERPL CREATININE-BSD FRML MDRD: 70 ML/MIN/1.73M2
GLUCOSE BLD-MCNC: 112 MG/DL (ref 70–125)
HDLC SERPL-MCNC: 48 MG/DL
HGB BLD-MCNC: 15.7 G/DL (ref 13.3–17.7)
LDLC SERPL CALC-MCNC: 124 MG/DL
POTASSIUM BLD-SCNC: 4.8 MMOL/L (ref 3.5–5)
SODIUM SERPL-SCNC: 141 MMOL/L (ref 136–145)
TRIGL SERPL-MCNC: 270 MG/DL
TSH SERPL DL<=0.005 MIU/L-ACNC: 2.02 UIU/ML (ref 0.3–5)

## 2021-09-15 PROCEDURE — 84443 ASSAY THYROID STIM HORMONE: CPT

## 2021-09-15 PROCEDURE — 80048 BASIC METABOLIC PNL TOTAL CA: CPT

## 2021-09-15 PROCEDURE — 85018 HEMOGLOBIN: CPT

## 2021-09-15 PROCEDURE — 80061 LIPID PANEL: CPT

## 2021-09-15 PROCEDURE — 36415 COLL VENOUS BLD VENIPUNCTURE: CPT

## 2021-09-30 ENCOUNTER — MYC MEDICAL ADVICE (OUTPATIENT)
Dept: FAMILY MEDICINE | Facility: CLINIC | Age: 56
End: 2021-09-30

## 2021-09-30 DIAGNOSIS — L71.9 ROSACEA: ICD-10-CM

## 2021-09-30 DIAGNOSIS — J06.9 VIRAL UPPER RESPIRATORY TRACT INFECTION: Primary | ICD-10-CM

## 2021-09-30 DIAGNOSIS — G89.29 CHRONIC MIDLINE LOW BACK PAIN WITHOUT SCIATICA: ICD-10-CM

## 2021-09-30 DIAGNOSIS — M54.50 CHRONIC MIDLINE LOW BACK PAIN WITHOUT SCIATICA: ICD-10-CM

## 2021-09-30 DIAGNOSIS — E78.2 MIXED HYPERLIPIDEMIA: ICD-10-CM

## 2021-10-04 RX ORDER — TESTOSTERONE 25 MG/2.5G
GEL TRANSDERMAL
Qty: 2.5 G | Refills: 3 | Status: SHIPPED | OUTPATIENT
Start: 2021-10-04 | End: 2021-10-05

## 2021-10-04 RX ORDER — ROSUVASTATIN CALCIUM 10 MG/1
10 TABLET, COATED ORAL DAILY
Qty: 90 TABLET | Refills: 3 | Status: SHIPPED | OUTPATIENT
Start: 2021-10-04 | End: 2022-09-26

## 2021-10-04 RX ORDER — IBUPROFEN 600 MG/1
600 TABLET, FILM COATED ORAL EVERY 6 HOURS PRN
Qty: 90 TABLET | Refills: 1 | Status: SHIPPED | OUTPATIENT
Start: 2021-10-04 | End: 2021-11-24

## 2021-10-05 ENCOUNTER — OFFICE VISIT (OUTPATIENT)
Dept: FAMILY MEDICINE | Facility: CLINIC | Age: 56
End: 2021-10-05
Payer: COMMERCIAL

## 2021-10-05 ENCOUNTER — MYC MEDICAL ADVICE (OUTPATIENT)
Dept: FAMILY MEDICINE | Facility: CLINIC | Age: 56
End: 2021-10-05

## 2021-10-05 VITALS
SYSTOLIC BLOOD PRESSURE: 143 MMHG | BODY MASS INDEX: 30.13 KG/M2 | OXYGEN SATURATION: 98 % | DIASTOLIC BLOOD PRESSURE: 90 MMHG | HEIGHT: 70 IN | WEIGHT: 210.44 LBS | HEART RATE: 68 BPM

## 2021-10-05 DIAGNOSIS — L71.9 ROSACEA: ICD-10-CM

## 2021-10-05 DIAGNOSIS — Z00.00 ROUTINE GENERAL MEDICAL EXAMINATION AT A HEALTH CARE FACILITY: Primary | ICD-10-CM

## 2021-10-05 PROCEDURE — 99396 PREV VISIT EST AGE 40-64: CPT | Performed by: NURSE PRACTITIONER

## 2021-10-05 RX ORDER — TESTOSTERONE 25 MG/2.5G
GEL TRANSDERMAL
Qty: 2.5 G | Refills: 3 | Status: SHIPPED | OUTPATIENT
Start: 2021-10-05 | End: 2021-10-07

## 2021-10-05 RX ORDER — TESTOSTERONE 25 MG/2.5G
GEL TRANSDERMAL
Qty: 2.5 G | Refills: 3 | Status: SHIPPED | OUTPATIENT
Start: 2021-10-05 | End: 2021-10-05

## 2021-10-05 ASSESSMENT — MIFFLIN-ST. JEOR: SCORE: 1792.04

## 2021-10-05 NOTE — PROGRESS NOTES
SUBJECTIVE:   CC: David Marino is an 56 year old male who presents for preventative health visit.       Patient has been advised of split billing requirements and indicates understanding: Yes  Healthy Habits:     Getting at least 3 servings of Calcium per day:  Yes    Bi-annual eye exam:  Yes    Dental care twice a year:  Yes    Sleep apnea or symptoms of sleep apnea:  None    Diet:  Regular (no restrictions)    Frequency of exercise:  2-3 days/week    Duration of exercise:  15-30 minutes    Taking medications regularly:  No    Medication side effects:  None    PHQ-2 Total Score: 0    Additional concerns today:  No          Chronic Pain Follow-Up    Where in your body do you have pain? Low back  How has your pain affected your ability to work? 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 Chiropractor, Heat, Stretching and Other: ibuprofen  How well are you sleeping? Good  How has your mood been since your last visit? Better  Have you had a significant life event? Other: mother put in Hospice  Other aggravating factors: prolonged sitting  Taking medication as directed? Yes    PHQ-9 SCORE 1/14/2020 8/25/2020 1/26/2021   PHQ-9 Total Score 6 8 4     THU-7 SCORE 6/4/2018 1/14/2020 8/25/2020   Total Score 9 7 10     No flowsheet data found.  Encounter-Level CSA:    There are no encounter-level csa.     Patient-Level CSA:    There are no patient-level csa.         Today's PHQ-2 Score:   PHQ-2 ( 1999 [employer]) 9/30/2021   Q1: Little interest or pleasure in doing things 0   Q2: Feeling down, depressed or hopeless 0   PHQ-2 Score 0   Q1: Little interest or pleasure in doing things Not at all   Q2: Feeling down, depressed or hopeless Not at all   PHQ-2 Score 0       Abuse: Current or Past(Physical, Sexual or Emotional)- No  Do you feel safe in your environment? Yes    Have you ever  done Advance Care Planning? (For example, a Health Directive, POLST, or a discussion with a medical provider or your loved ones about your wishes): Yes, patient states has an Advance Care Planning document and will bring a copy to the clinic.    Social History     Tobacco Use     Smoking status: Former Smoker     Types: Cigarettes, Cigarettes     Start date: 1984     Quit date: 1997     Years since quittin.7     Smokeless tobacco: Never Used     Tobacco comment: smoker 30+ years ago   Substance Use Topics     Alcohol use: Yes     Alcohol/week: 0.0 standard drinks     Comment: Alcoholic Drinks/day: OC     If you drink alcohol do you typically have >3 drinks per day or >7 drinks per week? No    Alcohol Use 2021   Prescreen: >3 drinks/day or >7 drinks/week? No       Last PSA:   Prostate Specific Antigen Screen   Date Value Ref Range Status   05/15/2019 1.2 0.0 - 3.5 ng/mL Final       Reviewed orders with patient. Reviewed health maintenance and updated orders accordingly - Yes  Labs reviewed in EPIC    Reviewed and updated as needed this visit by clinical staff  Tobacco  Allergies  Meds              Reviewed and updated as needed this visit by Provider                Past Medical History:   Diagnosis Date     Asthma     Controlled     Depression      Depression      Hypercholesteremia      Hypertension      Hypertension      ZARIA (obstructive sleep apnea) Severe AHI 51 2017    HST 2017 Severe     PONV (postoperative nausea and vomiting)     Severe      Uncomplicated asthma       Past Surgical History:   Procedure Laterality Date     APPENDECTOMY       APPENDECTOMY       ARTHROSCOPY KNEE Right 2016    Procedure: Arthroscopic partial medial meniscectomy, right knee. Surgeon:  Keith Ayala MD  Location: [facility]     AS REMOVAL OF TONSILS,<11 Y/O       BARIATRIC SURGERY  2018     C APPENDECTOMY      Description: Appendectomy;  Proc Date: 1984;     C  "SYMPATHECTOMY,CERVICAL  2003     C THORACOSCOPY, SURGICAL, W/ THORACIC SYMPATHECTOMY      Description: Thoracoscopy (Therapeutic) W/ Thoracic Sympathectomy;  Proc Date: 01/01/2004;  Comments: 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; ; endoscopic thoracic     COLONOSCOPY N/A 8/30/2019    Procedure: ANOSCOPY HIGH RESOLUTION OR WITH ANESTHESIA fulgeration of anal condyloma;  Surgeon: Nimisha Zuniga MD;  Location: [facility];  Service: General     HC KNEE SCOPE, DIAGNOSTIC      Description: Arthroscopy Knee Left;  Proc Date: 01/01/1993;     HC KNEE SCOPE, DIAGNOSTIC      Description: Arthroscopy Knee Right;  Proc Date: 01/01/2013;  Comments: for R knee meniscal tear     HC REMOVE TONSILS/ADENOIDS,<13 Y/O      Description: Tonsillectomy With Adenoidectomy;  Proc Date: 01/01/2009;  Comments: Had sinus issues     LAPAROSCOPIC BYPASS GASTRIC N/A 5/21/2018    Procedure: LAPAROSCOPIC BYPASS GASTRIC;  LAPAROSCOPIC GASTRIC BYPASS    EBL: 7mL;  Surgeon: Noe Saunders MD;  Location:  OR       Review of Systems  CONSTITUTIONAL: NEGATIVE for fever, chills, change in weight  INTEGUMENTARY/SKIN: NEGATIVE for worrisome rashes, moles or lesions  EYES: NEGATIVE for vision changes or irritation  ENT: NEGATIVE for ear, mouth and throat problems  RESP: NEGATIVE for significant cough or SOB  CV: NEGATIVE for chest pain, palpitations or peripheral edema  GI: NEGATIVE for nausea, abdominal pain, heartburn, or change in bowel habits   male: negative for dysuria, hematuria, decreased urinary stream, erectile dysfunction, urethral discharge  MUSCULOSKELETAL: NEGATIVE for significant arthralgias or myalgia  NEURO: NEGATIVE for weakness, dizziness or paresthesias  PSYCHIATRIC: NEGATIVE for changes in mood or affect    OBJECTIVE:   BP (!) 143/90   Pulse 68   Ht 1.772 m (5' 9.76\")   Wt 95.5 kg (210 lb 7 oz)   SpO2 98%   BMI 30.40 kg/m      Physical Exam  GENERAL: healthy, alert and no distress  EYES: " "Eyes grossly normal to inspection, PERRL and conjunctivae and sclerae normal  HENT: ear canals and TM's normal, nose and mouth without ulcers or lesions  NECK: no adenopathy, no asymmetry, masses, or scars and thyroid normal to palpation  RESP: lungs clear to auscultation - no rales, rhonchi or wheezes  CV: regular rate and rhythm, normal S1 S2, no S3 or S4, no murmur, click or rub, no peripheral edema and peripheral pulses strong  ABDOMEN: soft, nontender, no hepatosplenomegaly, no masses and bowel sounds normal  MS: no gross musculoskeletal defects noted, no edema  SKIN: no suspicious lesions or rashes  NEURO: Normal strength and tone, mentation intact and speech normal  PSYCH: mentation appears normal, affect normal/bright    Diagnostic Test Results:  Labs reviewed in Epic    ASSESSMENT/PLAN:   David was seen today for annual visit and recheck medication.    Diagnoses and all orders for this visit:    Routine general medical examination at a health care facility    Rosacea  -     Discontinue: testosterone (ANDROGEL) 25 MG/2.5GM (1%) gel; Apply to the skin daily. Please dispense a full package        Patient has been advised of split billing requirements and indicates understanding: Yes  COUNSELING:   Reviewed preventive health counseling, as reflected in patient instructions    Estimated body mass index is 30.4 kg/m  as calculated from the following:    Height as of this encounter: 1.772 m (5' 9.76\").    Weight as of this encounter: 95.5 kg (210 lb 7 oz).     Weight management plan: Discussed healthy diet and exercise guidelines    He reports that he quit smoking about 24 years ago. His smoking use included cigarettes and cigarettes. He started smoking about 37 years ago. He has never used smokeless tobacco.      Counseling Resources:  ATP IV Guidelines  Pooled Cohorts Equation Calculator  FRAX Risk Assessment  ICSI Preventive Guidelines  Dietary Guidelines for Americans, 2010  USDA's MyPlate  ASA " Prophylaxis  Lung CA Screening    SANDRA Hough CNP  [facility]

## 2021-10-05 NOTE — TELEPHONE ENCOUNTER
Reason for Call:  Prescription Issue    Name of the medication: Testosterone 1%    Details:   [facility] pharmacy calling.  They have attempted to explain this issue multiple times.  The testosterone gel is dispensed as 2.5 g.    The sig needs to be changed.  Please write how many packets a day he should be using and the quantity dispensed.  One box is 30 packets, and every box is 75 grams.  So if you want him using 2.5 grams, it would be 'Apply 2 packs to skin daily'.        NINA Benjamin    Call taken on 10/5/2021 at 1:25 PM by Olga Leroy CMA

## 2021-10-07 RX ORDER — TESTOSTERONE 25 MG/2.5G
50 GEL TRANSDERMAL DAILY
Qty: 180 PACKET | Refills: 3 | Status: SHIPPED | OUTPATIENT
Start: 2021-10-07 | End: 2022-07-20

## 2021-10-09 ENCOUNTER — HEALTH MAINTENANCE LETTER (OUTPATIENT)
Age: 56
End: 2021-10-09

## 2021-10-19 ENCOUNTER — TRANSFERRED RECORDS (OUTPATIENT)
Dept: HEALTH INFORMATION MANAGEMENT | Facility: CLINIC | Age: 56
End: 2021-10-19
Payer: COMMERCIAL

## 2021-11-24 ENCOUNTER — MYC MEDICAL ADVICE (OUTPATIENT)
Dept: INTERNAL MEDICINE | Facility: CLINIC | Age: 56
End: 2021-11-24
Payer: COMMERCIAL

## 2021-11-24 DIAGNOSIS — G89.29 CHRONIC MIDLINE LOW BACK PAIN WITHOUT SCIATICA: ICD-10-CM

## 2021-11-24 DIAGNOSIS — M54.50 CHRONIC MIDLINE LOW BACK PAIN WITHOUT SCIATICA: ICD-10-CM

## 2021-11-24 DIAGNOSIS — M54.50 ACUTE MIDLINE LOW BACK PAIN WITHOUT SCIATICA: Primary | ICD-10-CM

## 2021-11-24 RX ORDER — IBUPROFEN 600 MG/1
600 TABLET, FILM COATED ORAL EVERY 6 HOURS PRN
Qty: 90 TABLET | Refills: 0 | Status: SHIPPED | OUTPATIENT
Start: 2021-11-24 | End: 2021-12-17

## 2021-11-24 RX ORDER — KETOROLAC TROMETHAMINE 30 MG/ML
60 INJECTION, SOLUTION INTRAMUSCULAR; INTRAVENOUS ONCE
Status: COMPLETED | OUTPATIENT
Start: 2021-11-24 | End: 2021-11-26

## 2021-11-24 NOTE — TELEPHONE ENCOUNTER
Please call him back and try to get him in for Toradol 60 mg injection today November 24 if we possibly can

## 2021-11-24 NOTE — TELEPHONE ENCOUNTER
Refill Request  Medication name: Pending Prescriptions:                       Disp   Refills    ibuprofen (ADVIL/MOTRIN) 600 MG tablet    90 tab*1            Sig: Take 1 tablet (600 mg) by mouth every 6 hours as           needed for moderate pain    Who prescribed the medication: Fortunato  Last refill on medication: 10/04/2021  Requested Pharmacy: [facility]  Last appointment with PCP: 10/05/2021  Next appointment: Appointment scheduled for 12/01/2021 with Silvia

## 2021-11-24 NOTE — TELEPHONE ENCOUNTER
I was not able to reach patient. If he calls back, I can give him the shot if he gets to the clinic by 5 PM. Otherwise, [facility] is an option.    Laverne Cotton CMA ............... 4:39 PM, 11/24/21

## 2021-11-26 ENCOUNTER — ALLIED HEALTH/NURSE VISIT (OUTPATIENT)
Dept: FAMILY MEDICINE | Facility: CLINIC | Age: 56
End: 2021-11-26
Payer: COMMERCIAL

## 2021-11-26 DIAGNOSIS — S39.012A LOW BACK STRAIN, INITIAL ENCOUNTER: Primary | ICD-10-CM

## 2021-11-26 PROCEDURE — 99207 PR DROP WITH A PROCEDURE: CPT

## 2021-11-26 PROCEDURE — 96372 THER/PROPH/DIAG INJ SC/IM: CPT | Performed by: INTERNAL MEDICINE

## 2021-11-26 RX ADMIN — KETOROLAC TROMETHAMINE 60 MG: 30 INJECTION, SOLUTION INTRAMUSCULAR; INTRAVENOUS at 12:40

## 2021-12-01 ENCOUNTER — OFFICE VISIT (OUTPATIENT)
Dept: INTERNAL MEDICINE | Facility: CLINIC | Age: 56
End: 2021-12-01
Payer: COMMERCIAL

## 2021-12-01 VITALS
OXYGEN SATURATION: 98 % | BODY MASS INDEX: 30.64 KG/M2 | WEIGHT: 214 LBS | DIASTOLIC BLOOD PRESSURE: 82 MMHG | HEART RATE: 74 BPM | SYSTOLIC BLOOD PRESSURE: 128 MMHG | HEIGHT: 70 IN

## 2021-12-01 DIAGNOSIS — F10.20 UNCOMPLICATED ALCOHOL DEPENDENCE (H): ICD-10-CM

## 2021-12-01 DIAGNOSIS — F43.21 GRIEF: ICD-10-CM

## 2021-12-01 DIAGNOSIS — J45.20 MILD INTERMITTENT ASTHMA WITHOUT COMPLICATION: ICD-10-CM

## 2021-12-01 DIAGNOSIS — M54.50 CHRONIC BILATERAL LOW BACK PAIN WITHOUT SCIATICA: ICD-10-CM

## 2021-12-01 DIAGNOSIS — Z12.5 SCREENING FOR PROSTATE CANCER: ICD-10-CM

## 2021-12-01 DIAGNOSIS — G89.29 CHRONIC BILATERAL LOW BACK PAIN WITHOUT SCIATICA: ICD-10-CM

## 2021-12-01 DIAGNOSIS — Z13.29 SCREENING FOR ENDOCRINE, NUTRITIONAL, METABOLIC AND IMMUNITY DISORDER: ICD-10-CM

## 2021-12-01 DIAGNOSIS — I10 BENIGN ESSENTIAL HYPERTENSION: ICD-10-CM

## 2021-12-01 DIAGNOSIS — R79.89 LOW TESTOSTERONE: ICD-10-CM

## 2021-12-01 DIAGNOSIS — H02.889 MEIBOMIAN GLAND DYSFUNCTION: ICD-10-CM

## 2021-12-01 DIAGNOSIS — Z76.89 ENCOUNTER TO ESTABLISH CARE: ICD-10-CM

## 2021-12-01 DIAGNOSIS — Z13.0 SCREENING FOR ENDOCRINE, NUTRITIONAL, METABOLIC AND IMMUNITY DISORDER: ICD-10-CM

## 2021-12-01 DIAGNOSIS — F33.1 MODERATE EPISODE OF RECURRENT MAJOR DEPRESSIVE DISORDER (H): ICD-10-CM

## 2021-12-01 DIAGNOSIS — Z13.228 SCREENING FOR ENDOCRINE, NUTRITIONAL, METABOLIC AND IMMUNITY DISORDER: ICD-10-CM

## 2021-12-01 DIAGNOSIS — Z13.21 SCREENING FOR ENDOCRINE, NUTRITIONAL, METABOLIC AND IMMUNITY DISORDER: ICD-10-CM

## 2021-12-01 LAB
ALBUMIN SERPL-MCNC: 4.2 G/DL (ref 3.5–5)
ALP SERPL-CCNC: 74 U/L (ref 45–120)
ALT SERPL W P-5'-P-CCNC: 57 U/L (ref 0–45)
ANION GAP SERPL CALCULATED.3IONS-SCNC: 12 MMOL/L (ref 5–18)
AST SERPL W P-5'-P-CCNC: 31 U/L (ref 0–40)
BASOPHILS # BLD AUTO: 0.1 10E3/UL (ref 0–0.2)
BASOPHILS NFR BLD AUTO: 1 %
BILIRUB SERPL-MCNC: 0.6 MG/DL (ref 0–1)
BUN SERPL-MCNC: 12 MG/DL (ref 8–22)
CALCIUM SERPL-MCNC: 9.7 MG/DL (ref 8.5–10.5)
CHLORIDE BLD-SCNC: 105 MMOL/L (ref 98–107)
CO2 SERPL-SCNC: 24 MMOL/L (ref 22–31)
CREAT SERPL-MCNC: 1.04 MG/DL (ref 0.7–1.3)
EOSINOPHIL # BLD AUTO: 0.1 10E3/UL (ref 0–0.7)
EOSINOPHIL NFR BLD AUTO: 2 %
ERYTHROCYTE [DISTWIDTH] IN BLOOD BY AUTOMATED COUNT: 12.7 % (ref 10–15)
GFR SERPL CREATININE-BSD FRML MDRD: 80 ML/MIN/1.73M2
GLUCOSE BLD-MCNC: 115 MG/DL (ref 70–125)
HCT VFR BLD AUTO: 46.2 % (ref 40–53)
HGB BLD-MCNC: 15.6 G/DL (ref 13.3–17.7)
IMM GRANULOCYTES # BLD: 0 10E3/UL
IMM GRANULOCYTES NFR BLD: 0 %
LYMPHOCYTES # BLD AUTO: 1.5 10E3/UL (ref 0.8–5.3)
LYMPHOCYTES NFR BLD AUTO: 24 %
MCH RBC QN AUTO: 29.1 PG (ref 26.5–33)
MCHC RBC AUTO-ENTMCNC: 33.8 G/DL (ref 31.5–36.5)
MCV RBC AUTO: 86 FL (ref 78–100)
MONOCYTES # BLD AUTO: 0.4 10E3/UL (ref 0–1.3)
MONOCYTES NFR BLD AUTO: 7 %
NEUTROPHILS # BLD AUTO: 3.9 10E3/UL (ref 1.6–8.3)
NEUTROPHILS NFR BLD AUTO: 66 %
PLATELET # BLD AUTO: 181 10E3/UL (ref 150–450)
POTASSIUM BLD-SCNC: 4.4 MMOL/L (ref 3.5–5)
PROT SERPL-MCNC: 7.1 G/DL (ref 6–8)
PSA SERPL-MCNC: 1.98 UG/L (ref 0–3.5)
RBC # BLD AUTO: 5.36 10E6/UL (ref 4.4–5.9)
SODIUM SERPL-SCNC: 141 MMOL/L (ref 136–145)
WBC # BLD AUTO: 6 10E3/UL (ref 4–11)

## 2021-12-01 PROCEDURE — G0103 PSA SCREENING: HCPCS | Performed by: NURSE PRACTITIONER

## 2021-12-01 PROCEDURE — 85025 COMPLETE CBC W/AUTO DIFF WBC: CPT | Performed by: NURSE PRACTITIONER

## 2021-12-01 PROCEDURE — 84403 ASSAY OF TOTAL TESTOSTERONE: CPT | Performed by: NURSE PRACTITIONER

## 2021-12-01 PROCEDURE — 99214 OFFICE O/P EST MOD 30 MIN: CPT | Performed by: NURSE PRACTITIONER

## 2021-12-01 PROCEDURE — 80053 COMPREHEN METABOLIC PANEL: CPT | Performed by: NURSE PRACTITIONER

## 2021-12-01 PROCEDURE — 36415 COLL VENOUS BLD VENIPUNCTURE: CPT | Performed by: NURSE PRACTITIONER

## 2021-12-01 RX ORDER — LISINOPRIL 20 MG/1
20 TABLET ORAL DAILY
Qty: 90 TABLET | Refills: 3 | Status: SHIPPED | OUTPATIENT
Start: 2021-12-01 | End: 2022-12-07

## 2021-12-01 RX ORDER — MONTELUKAST SODIUM 10 MG/1
TABLET ORAL
Qty: 90 TABLET | Refills: 3 | Status: SHIPPED | OUTPATIENT
Start: 2021-12-01 | End: 2022-12-07

## 2021-12-01 RX ORDER — ALBUTEROL SULFATE 90 UG/1
2 AEROSOL, METERED RESPIRATORY (INHALATION)
COMMUNITY
Start: 2020-01-14

## 2021-12-01 RX ORDER — DOXYCYCLINE 50 MG/1
CAPSULE ORAL
Qty: 60 CAPSULE | Refills: 2 | Status: SHIPPED | OUTPATIENT
Start: 2021-12-01 | End: 2022-05-20

## 2021-12-01 RX ORDER — LISINOPRIL 20 MG/1
20 TABLET ORAL
COMMUNITY
Start: 2021-02-16 | End: 2021-12-01

## 2021-12-01 ASSESSMENT — ASTHMA QUESTIONNAIRES
ACT_TOTALSCORE: 25
QUESTION_3 LAST FOUR WEEKS HOW OFTEN DID YOUR ASTHMA SYMPTOMS (WHEEZING, COUGHING, SHORTNESS OF BREATH, CHEST TIGHTNESS OR PAIN) WAKE YOU UP AT NIGHT OR EARLIER THAN USUAL IN THE MORNING: NOT AT ALL
QUESTION_2 LAST FOUR WEEKS HOW OFTEN HAVE YOU HAD SHORTNESS OF BREATH: NOT AT ALL
QUESTION_1 LAST FOUR WEEKS HOW MUCH OF THE TIME DID YOUR ASTHMA KEEP YOU FROM GETTING AS MUCH DONE AT WORK, SCHOOL OR AT HOME: NONE OF THE TIME
QUESTION_4 LAST FOUR WEEKS HOW OFTEN HAVE YOU USED YOUR RESCUE INHALER OR NEBULIZER MEDICATION (SUCH AS ALBUTEROL): NOT AT ALL
QUESTION_5 LAST FOUR WEEKS HOW WOULD YOU RATE YOUR ASTHMA CONTROL: COMPLETELY CONTROLLED

## 2021-12-01 ASSESSMENT — MIFFLIN-ST. JEOR: SCORE: 1807.98

## 2021-12-01 NOTE — PATIENT INSTRUCTIONS
Please let me know if you need help with [facility].    Your labs are processing, I will release results on my chart once they are back.    See you back in September for your physical, before then if anything comes up.     Please let me know if you would like to see the Spine Care Team.

## 2021-12-01 NOTE — PROGRESS NOTES
Clinic Note    Assessment:     Assessment and Plan:  1. Encounter to establish care: Care established today.     2. Moderate episode of recurrent major depressive disorder (H)/Grief/Uncomplicated alcohol dependence (H): Long standing history of depression. He continues to use the EMSAM patch, will refill this today. He is going to start seeing a therapist through [facility]. He will also see [facility] for grief and alcohol. Stable.   - selegiline (EMSAM) 9 MG/24HR 24 hr patch; Place 1 patch onto the skin daily  Dispense: 30 patch; Refill: 11    3. Meibomian gland dysfunction: Refilled Doxycycline.  - doxycycline monohydrate (MONODOX) 50 MG capsule; 2 x daily for 1 month then once daily for 2 months.  Dispense: 60 capsule; Refill: 2    4. Mild intermittent asthma without complication: No recent exacerbations. Refilled Singulair.   - montelukast (SINGULAIR) 10 MG tablet; TAKE 1 TAB BY MOUTH AT BEDTIME  Dispense: 90 tablet; Refill: 3    5. Benign essential hypertension: Blood pressure today in office was 128/82. He continues on Lisinopril. Refilled medication today.   - lisinopril (ZESTRIL) 20 MG tablet; Take 1 tablet (20 mg) by mouth daily  Dispense: 90 tablet; Refill: 3    6. Screening for endocrine, nutritional, metabolic and immunity disorder: Will recheck labs.   - CBC with platelets and differential; Future  - Comprehensive metabolic panel (BMP + Alb, Alk Phos, ALT, AST, Total. Bili, TP); Future    7. Screening for prostate cancer: will recheck levels today.   - PSA, screen; Future    8. Low testosterone: will recheck levels today.   - Testosterone, total; Future    9. Chronic bilateral low back pain: Continues. Exacerbations last about two weeks at a time. Discussed no NSAIDS use orally. If this returns, will refer to the spine care team.      Patient Instructions   Please let me know if you need help with [facility].    Your labs are processing, I will release results on my chart once they  are back.    See you back in September for your physical, before then if anything comes up.     Please let me know if you would like to see the Spine Care Team.     Return in about 9 months (around 9/1/2022) for Follow up.         Subjective:      David Marino is a 56 year old male presents today to establish care.    He previously was a patient of Tracey Bucio CNP.    He reports that he would like a PSA and testosterone levels drawn today, he is due for these.    He reports that his Father currently has prostate cancer. His Mother passed away in October, he helped take care of her, she was on hospice. She had a history of diabetes, glaucoma, heart disease. Father has heart disease. Both have had cardiac bypasses.    No other cancers in the family.    Reviewed his past surgical history with him.    He lives with his spouse, who is also male.    He reports that he has been grieving the loss of his Mother, and has noticed he has been drinking more. Some days its 1-2 glasses of wine, other days it is 1-2 bottles of wine. He reports contacting [facility] to help with his depression, grief, and his drinking.    He stopped drinking as of Sunday last week.    He reports already feeling much better.    He has a history of a previous soumya en y gastric bypass. He was not absorbing his depression medication well, so he was placed on the Selegiline patch. This has been working well for him.    He reports that he did not have any withdrawal symptoms with the hard stop on drinking on Sunday.    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    He has not been exercising.    He reports more exacerbations of low back pain, they are lasting two weeks at a time. He will come in to get a Toradol shot, which helps some.    Reviewed his x-ray from 02/24/20, discussed his facet arthropathy and neuroforaminal narrowing. Discussed if this continues, he could be seen by the spine  "care team.     Discussed with the Yamilet En Y gastric bypass, he should not be taking Ibuprofen orally, or other NSAIDS.    The following portions of the patient's history were reviewed and updated as appropriate.    Review of Systems:    Review is otherwise negative except for what is mentioned above.     Social Hx:    History   Smoking Status     Former Smoker     Types: Cigarettes, Cigarettes     Start date: 1/1/1984     Quit date: 1/1/1997   Smokeless Tobacco     Never Used     Comment: smoker 30+ years ago         Objective:     Vitals:    12/01/21 1145   BP: 128/82   BP Location: Right arm   Patient Position: Sitting   Cuff Size: Adult Regular   Pulse: 74   SpO2: 98%   Weight: 97.1 kg (214 lb)   Height: 1.772 m (5' 9.75\")       Exam:  General: No apparent distress. Calm. Alert and Oriented X3. Pt behavior is appropriate.  Head:Atraumatic. Normocephalic.  Chest/Lungs: Clear to auscultation, normal respiratory effort and rate.   Heart/Pulses: Regular rate and rhythm, no murmurs, gallops, or rubs. Capillary refill <2 seconds. No edema.   Musculoskeletal: Bilateral low back pain, both sides, no radiation down her legs.   Neurologic: Interactive, alert, no focal findings.  Skin: Warm, dry. Normal skin turgor.       Patient Active Problem List   Diagnosis     Chromhidrosis     Environmental allergies     Gastroesophageal reflux disease     Hiatal hernia     Mixed hyperlipidemia     Hypertriglyceridemia     Insomnia     Testosterone deficiency     Gastric polyposis     Adiposity     Rib pain     Rosacea     Adjustment disorder with depressed mood     Low back strain, initial encounter     BMI 32.0-32.9,adult     Sleep disturbance     ZARIA (obstructive sleep apnea) Severe AHI 51     Mild intermittent asthma with acute exacerbation     Postsurgical malabsorption     Adult BMI 27.0-27.9 kg/sq m     Family history of skin cancer     Current Outpatient Medications   Medication Sig Dispense Refill     albuterol (PROAIR " HFA/PROVENTIL HFA/VENTOLIN HFA) 108 (90 Base) MCG/ACT inhaler Inhale 2 puffs into the lungs       CALCIUM-VITAMIN D PO Take 1 chew tab by mouth 3 times daily 1 = 250 mg       doxycycline monohydrate (MONODOX) 50 MG capsule 2 x daily for 1 month then once daily for 2 months. 60 capsule 2     fluticasone-salmeterol (ADVAIR) 100-50 MCG/DOSE inhaler Inhale 1 puff into the lungs every 12 hours 1 each 3     Glycopyrrolate POWD Compounded. Apply pea size amount every morning. 0.5% cream. For chromhidrosis, 30 gm jar 30 g 11     ibuprofen (ADVIL/MOTRIN) 600 MG tablet Take 1 tablet (600 mg) by mouth every 6 hours as needed for moderate pain 90 tablet 0     lisinopril (ZESTRIL) 20 MG tablet Take 1 tablet (20 mg) by mouth daily 90 tablet 3     metroNIDAZOLE (METROGEL) 0.75 % topical gel Apply topically 2 times daily Apply to AA on face and scalp bid 90 g 11     montelukast (SINGULAIR) 10 MG tablet TAKE 1 TAB BY MOUTH AT BEDTIME 90 tablet 3     Multiple Vitamins-Minerals (MULTIVITAMIN ADULT PO) Take 2 tablets by mouth every morning        Omega-3 Fatty Acids (FISH OIL OMEGA-3 PO) Take 1 capsule by mouth At Bedtime        rosuvastatin (CRESTOR) 10 MG tablet Take 1 tablet (10 mg) by mouth daily 90 tablet 3     selegiline (EMSAM) 9 MG/24HR 24 hr patch Place 1 patch onto the skin daily 30 patch 11     testosterone (ANDROGEL) 25 MG/2.5GM (1%) gel Place 2 packets (50 mg) onto the skin daily Apply to the skin daily. Please dispense a full package 180 packet 3     Silvia Walker, Adult-Geriatric Nurse Practitioner  [facility] - Internal Medicine Team     12/1/2021         Answers for HPI/ROS submitted by the patient on 11/24/2021  How many servings of fruits and vegetables do you eat daily?: 2-3  On average, how many sweetened beverages do you drink each day (Examples: soda, juice, sweet tea, etc.  Do NOT count diet or artificially sweetened beverages)?: 1  How many minutes a day do you exercise enough to make your heart beat  faster?: 10 to 19  How many days a week do you exercise enough to make your heart beat faster?: 3 or less  How many days per week do you miss taking your medication?: 0

## 2021-12-02 ASSESSMENT — ASTHMA QUESTIONNAIRES: ACT_TOTALSCORE: 25

## 2021-12-02 ASSESSMENT — PATIENT HEALTH QUESTIONNAIRE - PHQ9: SUM OF ALL RESPONSES TO PHQ QUESTIONS 1-9: 0

## 2021-12-03 LAB — TESTOST SERPL-MCNC: 310 NG/DL (ref 221–716)

## 2021-12-15 DIAGNOSIS — G89.29 CHRONIC MIDLINE LOW BACK PAIN WITHOUT SCIATICA: ICD-10-CM

## 2021-12-15 DIAGNOSIS — M54.50 CHRONIC MIDLINE LOW BACK PAIN WITHOUT SCIATICA: ICD-10-CM

## 2021-12-17 RX ORDER — IBUPROFEN 600 MG/1
TABLET, FILM COATED ORAL
Qty: 90 TABLET | Refills: 0 | Status: SHIPPED | OUTPATIENT
Start: 2021-12-17 | End: 2022-01-04

## 2021-12-17 NOTE — TELEPHONE ENCOUNTER
"Routing refill request to provider for review/approval because:  Labs out of range:  ALT    Last office visit provider:  Silvia Walker       Requested Prescriptions   Pending Prescriptions Disp Refills     ibuprofen (ADVIL/MOTRIN) 600 MG tablet [Pharmacy Med Name: IBUPROFEN 600 MG TABLET] 90 tablet 0     Sig: TAKE 1 TABLET BY MOUTH EVERY 6 HOURS AS NEEDED FOR MODERATE PAIN.       NSAID Medications Failed - 12/15/2021 12:54 AM        Failed - Normal ALT on file in past 12 months     Recent Labs   Lab Test 12/01/21  1226   ALT 57*             Passed - Blood pressure under 140/90 in past 12 months     BP Readings from Last 3 Encounters:   12/01/21 128/82   10/05/21 (!) 143/90   09/13/21 120/80                 Passed - Normal AST on file in past 12 months     Recent Labs   Lab Test 12/01/21  1226   AST 31             Passed - Recent (12 mo) or future (30 days) visit within the authorizing provider's specialty     Patient has had an office visit with the authorizing provider or a provider within the authorizing providers department within the previous 12 mos or has a future within next 30 days. See \"Patient Info\" tab in inbasket, or \"Choose Columns\" in Meds & Orders section of the refill encounter.              Passed - Patient is age 6-64 years        Passed - Normal CBC on file in past 12 months     Recent Labs   Lab Test 12/01/21  1226   WBC 6.0   RBC 5.36   HGB 15.6   HCT 46.2                    Passed - Medication is active on med list        Passed - Normal serum creatinine on file in past 12 months     Recent Labs   Lab Test 12/01/21  1226   CR 1.04       Ok to refill medication if creatinine is low               Michelle Tan [facility] 12/16/21 6:44 PM    "

## 2021-12-20 ENCOUNTER — MYC MEDICAL ADVICE (OUTPATIENT)
Dept: INTERNAL MEDICINE | Facility: CLINIC | Age: 56
End: 2021-12-20
Payer: COMMERCIAL

## 2021-12-20 DIAGNOSIS — M54.50 CHRONIC BILATERAL LOW BACK PAIN WITHOUT SCIATICA: ICD-10-CM

## 2021-12-20 DIAGNOSIS — G89.29 CHRONIC BILATERAL LOW BACK PAIN WITHOUT SCIATICA: ICD-10-CM

## 2021-12-20 DIAGNOSIS — R74.8 ELEVATED LIVER ENZYMES: Primary | ICD-10-CM

## 2021-12-22 ENCOUNTER — OFFICE VISIT (OUTPATIENT)
Dept: PHYSICAL MEDICINE AND REHAB | Facility: CLINIC | Age: 56
End: 2021-12-22
Attending: NURSE PRACTITIONER
Payer: COMMERCIAL

## 2021-12-22 VITALS
SYSTOLIC BLOOD PRESSURE: 133 MMHG | HEIGHT: 70 IN | HEART RATE: 71 BPM | DIASTOLIC BLOOD PRESSURE: 83 MMHG | BODY MASS INDEX: 31.24 KG/M2 | WEIGHT: 218.2 LBS

## 2021-12-22 DIAGNOSIS — M47.816 LUMBAR FACET ARTHROPATHY: Primary | ICD-10-CM

## 2021-12-22 DIAGNOSIS — G89.29 CHRONIC BILATERAL LOW BACK PAIN WITHOUT SCIATICA: ICD-10-CM

## 2021-12-22 DIAGNOSIS — M54.50 CHRONIC BILATERAL LOW BACK PAIN WITHOUT SCIATICA: ICD-10-CM

## 2021-12-22 PROCEDURE — 99204 OFFICE O/P NEW MOD 45 MIN: CPT | Performed by: PHYSICIAN ASSISTANT

## 2021-12-22 RX ORDER — DIAZEPAM 5 MG
TABLET ORAL
Qty: 2 TABLET | Refills: 0 | Status: SHIPPED | OUTPATIENT
Start: 2021-12-22 | End: 2022-02-14

## 2021-12-22 ASSESSMENT — PAIN SCALES - GENERAL: PAINLEVEL: NO PAIN (0)

## 2021-12-22 ASSESSMENT — MIFFLIN-ST. JEOR: SCORE: 1827.03

## 2021-12-22 NOTE — PROGRESS NOTES
ASSESSMENT: David Marino is a 56 year old male with past medical history significant for obstructive sleep apnea, asthma, GERD, hyperlipidemia, insomnia, history of gastric bypass who presents today for new patient evaluation of chronic rate rhythm left low back pain.  My review of an x-ray lumbar spine from 2020 showed mild disc degeneration and facet arthropathy at L5-S1.  There is suggestion of mild lateral foraminal stenosis at this level. Pain sounds most consistent with lumbar facet arthropathy. He does have intermittent pain extending into the right buttock and hip, I would like to evaluate for neural compromise. He was neurologically intact today.    PLAN:  A shared decision making model was used.  The patient's values and choices were respected.  The following represents what was discussed and decided upon by the physician assistant and the patient.      1.  DIAGNOSTIC TESTS: I reviewed the x-ray lumbar spine.  I ordered an MRI lumbar spine for further evaluation. I also ordered flexion-extension x-rays.    2.  PHYSICAL THERAPY: This patient will likely benefit from physical therapy. I held off on ordering this until he is seen the results of his imaging studies. As long as there is no dynamic instability on flexion-extension x-rays, I think he would be a very good candidate for medics.    3.  MEDICATIONS: No changes are made to the patient's medications.  -She can continue using topical pain relieving medications as needed.  -He takes ibuprofen sparingly due to his history of gastric bypass surgery.  -Patient would like to avoid additional pain relief medications because he is concerned they could worsen his depression.    4.  INTERVENTIONS: No interventions were ordered today.  Patient may benefit from interventional pain management if he fails to improve with conservative treatment, depending on the results of an MRI. Based on his exam today, I think I would likely begin with right-sided medial  branch blocks as a work-up for radiofrequency ablation versus facet joint injections if authorized by insurance.    5.  PATIENT EDUCATION: Patient is in agreement the above plan.  All questions were answered.      6.  FOLLOW-UP:   I will send the patient a [employer] message with the results of his MRI lumbar spine.  At that time I will recommend that he return to the clinic to review the MRI in person and discuss treatment options. If he has questions or concerns in the meantime, he should not hesitate to call.      SUBJECTIVE:  David Marino  Is a 56 year old male who presents today in consultation at the request of Silvia Walker CNP for new patient evaluation of low back pain. Patient reports that he first began to experience back pain 8 to 10 years ago when he was very active with running and yoga. He would experience intermittent flares of pain that were treated with chiropractic. 3 years ago the pain became more severe. He had a severe flare of pain where he could not stand up straight and was very painful. Since then he has continued to get frequent flares of pain with minimal activity such as bending or coughing. He is experiencing a flare about once per month. The flare lasts 2 to 3 weeks. He is not currently in a flare. When he is in a flare he has difficulty sleeping and he feels very stiff. Toradol injections used to help but they are no longer effective.    Patient complains of right greater than left low back pain. Pain is located in the lower lumbar region. Occasionally he feels some pain radiating into the right buttock and hip but he states the back pain is so much worse he focuses on that. Pain is aggravated bending, coughing, wrong movements. He is stiff in the mornings. When he is in a flare he feels like he cannot take his normal stride when he is walking. His walking is limited when he is in a flare, but not when he is in between flares. Pain is alleviated with applying ice and stretching.  He describes the pain as sharp with movements and otherwise achy. He denies any numbness, tingling, weakness down the legs. Denies any loss of bowel or bladder control. Denies any recent fevers, chills, sweats.    Patient has had chiropractic treatment in the past. Most recent was January or February 2021. He has had massage a couple of years ago which was helpful for his pain at that time. He has not had physical therapy. He has not had any spine surgeries or spine injections. He takes ibuprofen occasionally for pain which is helpful but he does not like to take it because he has a history of gastric bypass surgery. He is concerned about taking additional pain relief medications due to potentially worsening his depression.    Current Outpatient Medications   Medication     albuterol (PROAIR HFA/PROVENTIL HFA/VENTOLIN HFA) 108 (90 Base) MCG/ACT inhaler     CALCIUM-VITAMIN D PO     doxycycline monohydrate (MONODOX) 50 MG capsule     fluticasone-salmeterol (ADVAIR) 100-50 MCG/DOSE inhaler     Glycopyrrolate POWD     ibuprofen (ADVIL/MOTRIN) 600 MG tablet     lisinopril (ZESTRIL) 20 MG tablet     metroNIDAZOLE (METROGEL) 0.75 % topical gel     montelukast (SINGULAIR) 10 MG tablet     Multiple Vitamins-Minerals (MULTIVITAMIN ADULT PO)     Omega-3 Fatty Acids (FISH OIL OMEGA-3 PO)     rosuvastatin (CRESTOR) 10 MG tablet     selegiline (EMSAM) 9 MG/24HR 24 hr patch     testosterone (ANDROGEL) 25 MG/2.5GM (1%) gel         Allergies   Allergen Reactions     Bee Pollen Other (See Comments)     Honey      Pollen Extract      Sulfa Drugs        Past Medical History:   Diagnosis Date     Asthma     Controlled     Depression      Depression      Hypercholesteremia      Hypertension      Hypertension      ZARIA (obstructive sleep apnea) Severe AHI 51 12/8/2017    HST 12/6/2017 Severe     PONV (postoperative nausea and vomiting)     Severe      Uncomplicated asthma         Patient Active Problem List   Diagnosis     Chromhidrosis      Environmental allergies     Gastroesophageal reflux disease     Hiatal hernia     Mixed hyperlipidemia     Hypertriglyceridemia     Insomnia     Testosterone deficiency     Gastric polyposis     Adiposity     Rib pain     Rosacea     Adjustment disorder with depressed mood     Low back strain, initial encounter     BMI 32.0-32.9,adult     Sleep disturbance     ZARIA (obstructive sleep apnea) Severe AHI 51     Mild intermittent asthma with acute exacerbation     Postsurgical malabsorption     Adult BMI 27.0-27.9 kg/sq m     Family history of skin cancer       Past Surgical History:   Procedure Laterality Date     APPENDECTOMY       APPENDECTOMY       ARTHROSCOPY KNEE Right 09/28/2016    Procedure: Arthroscopic partial medial meniscectomy, right knee. Surgeon:  Keith Ayala MD  Location: [facility]     AS REMOVAL OF TONSILS,<11 Y/O       BARIATRIC SURGERY  05/2018     C APPENDECTOMY      Description: Appendectomy;  Proc Date: 01/01/1984;     C SYMPATHECTOMY,CERVICAL  2003     C THORACOSCOPY, SURGICAL, W/ THORACIC SYMPATHECTOMY      Description: Thoracoscopy (Therapeutic) W/ Thoracic Sympathectomy;  Proc Date: 01/01/2004;  Comments: 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; ; endoscopic thoracic     COLONOSCOPY N/A 8/30/2019    Procedure: ANOSCOPY HIGH RESOLUTION OR WITH ANESTHESIA fulgeration of anal condyloma;  Surgeon: Nimisha Zuniga MD;  Location: [facility];  Service: General     HC KNEE SCOPE, DIAGNOSTIC      Description: Arthroscopy Knee Left;  Proc Date: 01/01/1993;     HC KNEE SCOPE, DIAGNOSTIC      Description: Arthroscopy Knee Right;  Proc Date: 01/01/2013;  Comments: for R knee meniscal tear     HC REMOVE TONSILS/ADENOIDS,<12 Y/O      Description: Tonsillectomy With Adenoidectomy;  Proc Date: 01/01/2009;  Comments: Had sinus issues     LAPAROSCOPIC BYPASS GASTRIC N/A 5/21/2018    Procedure: LAPAROSCOPIC BYPASS GASTRIC;  LAPAROSCOPIC GASTRIC  BYPASS    EBL: 7mL;  Surgeon: Noe Saunders MD;  Location: [facility]       Family History   Problem Relation Age of Onset     Diabetes Mother      Coronary Artery Disease Mother      Hypertension Mother      Depression Mother      Asthma Mother      Glaucoma Mother      Coronary Artery Disease Father      Hypertension Father      Hyperlipidemia Father      Depression Maternal Grandmother      Glaucoma Maternal Grandmother      Cerebrovascular Disease No family hx of      Breast Cancer No family hx of      Colon Cancer No family hx of      Prostate Cancer No family hx of      Other Cancer No family hx of      Anxiety Disorder No family hx of      Mental Illness No family hx of      Substance Abuse No family hx of      Anesthesia Reaction No family hx of      Osteoporosis No family hx of      Genetic Disorder No family hx of      Thyroid Disease No family hx of      Obesity No family hx of      Unknown/Adopted No family hx of      Macular Degeneration No family hx of      Heart Disease Mother      CABG Mother      Heart Disease Father      Osteoarthritis Father      CABG Father      Hypertension Sister      Hyperlipidemia Sister      Depression Sister      Breast Cancer Sister      Hypertension Sister      Hyperlipidemia Sister      Dementia Maternal Grandmother      Early Death Maternal Grandmother      Hypertension Maternal Grandmother      Cerebrovascular Disease Maternal Grandmother      Arthritis Maternal Grandfather      Hearing Loss Maternal Grandfather      Heart Disease Maternal Grandfather      Hyperlipidemia Maternal Grandfather      Hypertension Maternal Grandfather      Arthritis Paternal Grandmother      Depression Paternal Grandmother      Heart Disease Paternal Grandmother      Hyperlipidemia Paternal Grandmother      Heart Disease Paternal Grandfather      Hyperlipidemia Paternal Grandfather      Asthma Other      Hypertension Other        Social history: Patient works for the [employer] for  [employer]. He denies tobacco, alcohol, illicit drug use.       ROS:Positive for weight gain, joint pain, muscle pain, Depression. Specifically negative for bowel/bladder dysfunction, fevers,chills, appetite changes, unexplained weight loss.   Otherwise 13 systems reviewed are negative.  Please see the patient's intake questionnaire from today for details.      OBJECTIVE:  PHYSICAL EXAMINATION:    CONSTITUTIONAL:  Vital signs as above.  No acute distress.  The patient is well nourished and well groomed.  PSYCHIATRIC:  The patient is awake, alert, oriented to person, place, time and answering questions appropriately with clear speech.    HEENT: Normocephalic, atraumatic.  Sclera clear.  Neck is supple.  SKIN:  Skin over the face, bilateral lower extremities, and posterior torso is clean, dry, intact without rashes.    GAIT:  Gait is non-antalgic.  The patient is able to heel and toe walk without significant difficulty.    STANDING EXAMINATION: Mild tenderness palpation right lower lumbar paraspinous muscles. Lumbar flexion intact. Lumbar extension mildly restricted with increased pain. Lumbar facet loading maneuvers increased low back pain bilaterally.  MUSCLE STRENGTH:  The patient has 5/5 strength for the bilateral hip flexors, knee flexors/extensors, ankle dorsiflexors/plantar flexors, great toe extensors, ankle evertors/invertors.  NEUROLOGICAL: 1+ patellar, and 2+ achilles reflexes bilaterally. Negative Babinski's bilaterally.  No ankle clonus bilaterally. Sensation to light touch is intact in the bilateral L4, L5, and S1 dermatomes.  VASCULAR:  2/4 dorsalis pedis pulses bilaterally.  Bilateral lower extremities are warm.  There is no pitting edema of the bilateral lower extremities.  ABDOMINAL:  Soft, non-distended, non-tender throughout all quadrants.  No pulsatile mass palpated in the left lower quadrant.  LYMPH NODES:  No palpable or tender inguinal lymph nodes.  MUSCULOSKELETAL: Straight leg raise on the right  causes low back pain, but no leg pain. Straight leg raise negative left.    RESULTS: I reviewed the x-ray lumbar spine from [facility] dated February 24, 2020.  This shows subtle levocurvature lumbar spine.  There is mild disc degeneration L5-S1.  Mild facet arthropathy L5 is 1.  There is suggestion of mild bilateral foraminal stenosis at this level.

## 2021-12-22 NOTE — PATIENT INSTRUCTIONS
[facility] Scheduling    Please call [phone] to schedule your image(s) (select option#1).     [facility]  [address] [facility] [id number]      [address] [address]

## 2021-12-22 NOTE — LETTER
12/22/2021         RE: David Marino  [address]  [facility] [address]        Dear Colleague,    Thank you for referring your patient, David Marino, to the [facility]. Please see a copy of my visit note below.    ASSESSMENT: David Marino is a 56 year old male with past medical history significant for obstructive sleep apnea, asthma, GERD, hyperlipidemia, insomnia, history of gastric bypass who presents today for new patient evaluation of chronic rate rhythm left low back pain.  My review of an x-ray lumbar spine from 2020 showed mild disc degeneration and facet arthropathy at L5-S1.  There is suggestion of mild lateral foraminal stenosis at this level. Pain sounds most consistent with lumbar facet arthropathy. He does have intermittent pain extending into the right buttock and hip, I would like to evaluate for neural compromise. He was neurologically intact today.    PLAN:  A shared decision making model was used.  The patient's values and choices were respected.  The following represents what was discussed and decided upon by the physician assistant and the patient.      1.  DIAGNOSTIC TESTS: I reviewed the x-ray lumbar spine.  I ordered an MRI lumbar spine for further evaluation. I also ordered flexion-extension x-rays.    2.  PHYSICAL THERAPY: This patient will likely benefit from physical therapy. I held off on ordering this until he is seen the results of his imaging studies. As long as there is no dynamic instability on flexion-extension x-rays, I think he would be a very good candidate for medics.    3.  MEDICATIONS: No changes are made to the patient's medications.  -She can continue using topical pain relieving medications as needed.  -He takes ibuprofen sparingly due to his history of gastric bypass surgery.  -Patient would like to avoid additional pain relief medications because he is concerned they could worsen his depression.    4.   INTERVENTIONS: No interventions were ordered today.  Patient may benefit from interventional pain management if he fails to improve with conservative treatment, depending on the results of an MRI. Based on his exam today, I think I would likely begin with right-sided medial branch blocks as a work-up for radiofrequency ablation versus facet joint injections if authorized by insurance.    5.  PATIENT EDUCATION: Patient is in agreement the above plan.  All questions were answered.      6.  FOLLOW-UP:   I will send the patient a [email or web address] message with the results of his MRI lumbar spine.  At that time I will recommend that he return to the clinic to review the MRI in person and discuss treatment options. If he has questions or concerns in the meantime, he should not hesitate to call.      SUBJECTIVE:  David Marino  Is a 56 year old male who presents today in consultation at the request of Silvia Walker CNP for new patient evaluation of low back pain. Patient reports that he first began to experience back pain 8 to 10 years ago when he was very active with running and yoga. He would experience intermittent flares of pain that were treated with chiropractic. 3 years ago the pain became more severe. He had a severe flare of pain where he could not stand up straight and was very painful. Since then he has continued to get frequent flares of pain with minimal activity such as bending or coughing. He is experiencing a flare about once per month. The flare lasts 2 to 3 weeks. He is not currently in a flare. When he is in a flare he has difficulty sleeping and he feels very stiff. Toradol injections used to help but they are no longer effective.    Patient complains of right greater than left low back pain. Pain is located in the lower lumbar region. Occasionally he feels some pain radiating into the right buttock and hip but he states the back pain is so much worse he focuses on that. Pain is aggravated bending,  coughing, wrong movements. He is stiff in the mornings. When he is in a flare he feels like he cannot take his normal stride when he is walking. His walking is limited when he is in a flare, but not when he is in between flares. Pain is alleviated with applying ice and stretching. He describes the pain as sharp with movements and otherwise achy. He denies any numbness, tingling, weakness down the legs. Denies any loss of bowel or bladder control. Denies any recent fevers, chills, sweats.    Patient has had chiropractic treatment in the past. Most recent was January or February 2021. He has had massage a couple of years ago which was helpful for his pain at that time. He has not had physical therapy. He has not had any spine surgeries or spine injections. He takes ibuprofen occasionally for pain which is helpful but he does not like to take it because he has a history of gastric bypass surgery. He is concerned about taking additional pain relief medications due to potentially worsening his depression.    Current Outpatient Medications   Medication     albuterol (PROAIR HFA/PROVENTIL HFA/VENTOLIN HFA) 108 (90 Base) MCG/ACT inhaler     CALCIUM-VITAMIN D PO     doxycycline monohydrate (MONODOX) 50 MG capsule     fluticasone-salmeterol (ADVAIR) 100-50 MCG/DOSE inhaler     Glycopyrrolate POWD     ibuprofen (ADVIL/MOTRIN) 600 MG tablet     lisinopril (ZESTRIL) 20 MG tablet     metroNIDAZOLE (METROGEL) 0.75 % topical gel     montelukast (SINGULAIR) 10 MG tablet     Multiple Vitamins-Minerals (MULTIVITAMIN ADULT PO)     Omega-3 Fatty Acids (FISH OIL OMEGA-3 PO)     rosuvastatin (CRESTOR) 10 MG tablet     selegiline (EMSAM) 9 MG/24HR 24 hr patch     testosterone (ANDROGEL) 25 MG/2.5GM (1%) gel         Allergies   Allergen Reactions     Bee Pollen Other (See Comments)     Honey      Pollen Extract      Sulfa Drugs        Past Medical History:   Diagnosis Date     Asthma     Controlled     Depression      Depression       Hypercholesteremia      Hypertension      Hypertension      ZARIA (obstructive sleep apnea) Severe AHI 51 12/8/2017    HST 12/6/2017 Severe     PONV (postoperative nausea and vomiting)     Severe      Uncomplicated asthma         Patient Active Problem List   Diagnosis     Chromhidrosis     Environmental allergies     Gastroesophageal reflux disease     Hiatal hernia     Mixed hyperlipidemia     Hypertriglyceridemia     Insomnia     Testosterone deficiency     Gastric polyposis     Adiposity     Rib pain     Rosacea     Adjustment disorder with depressed mood     Low back strain, initial encounter     BMI 32.0-32.9,adult     Sleep disturbance     ZARIA (obstructive sleep apnea) Severe AHI 51     Mild intermittent asthma with acute exacerbation     Postsurgical malabsorption     Adult BMI 27.0-27.9 kg/sq m     Family history of skin cancer       Past Surgical History:   Procedure Laterality Date     APPENDECTOMY       APPENDECTOMY       ARTHROSCOPY KNEE Right 09/28/2016    Procedure: Arthroscopic partial medial meniscectomy, right knee. Surgeon:  Keith Ayala MD  Location: [facility]     AS REMOVAL OF TONSILS,<11 Y/O       BARIATRIC SURGERY  05/2018     C APPENDECTOMY      Description: Appendectomy;  Proc Date: 01/01/1984;     C SYMPATHECTOMY,CERVICAL  2003     C THORACOSCOPY, SURGICAL, W/ THORACIC SYMPATHECTOMY      Description: Thoracoscopy (Therapeutic) W/ Thoracic Sympathectomy;  Proc Date: 01/01/2004;  Comments: 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; ; endoscopic thoracic     COLONOSCOPY N/A 8/30/2019    Procedure: ANOSCOPY HIGH RESOLUTION OR WITH ANESTHESIA fulgeration of anal condyloma;  Surgeon: Nimisha Zuniga MD;  Location: [facility];  Service: General     HC KNEE SCOPE, DIAGNOSTIC      Description: Arthroscopy Knee Left;  Proc Date: 01/01/1993;     HC KNEE SCOPE, DIAGNOSTIC      Description: Arthroscopy Knee Right;  Proc Date: 01/01/2013;  Comments: for  R knee meniscal tear     HC REMOVE TONSILS/ADENOIDS,<14 Y/O      Description: Tonsillectomy With Adenoidectomy;  Proc Date: 01/01/2009;  Comments: Had sinus issues     LAPAROSCOPIC BYPASS GASTRIC N/A 5/21/2018    Procedure: LAPAROSCOPIC BYPASS GASTRIC;  LAPAROSCOPIC GASTRIC BYPASS    EBL: 7mL;  Surgeon: Noe Saunders MD;  Location: [facility]       Family History   Problem Relation Age of Onset     Diabetes Mother      Coronary Artery Disease Mother      Hypertension Mother      Depression Mother      Asthma Mother      Glaucoma Mother      Coronary Artery Disease Father      Hypertension Father      Hyperlipidemia Father      Depression Maternal Grandmother      Glaucoma Maternal Grandmother      Cerebrovascular Disease No family hx of      Breast Cancer No family hx of      Colon Cancer No family hx of      Prostate Cancer No family hx of      Other Cancer No family hx of      Anxiety Disorder No family hx of      Mental Illness No family hx of      Substance Abuse No family hx of      Anesthesia Reaction No family hx of      Osteoporosis No family hx of      Genetic Disorder No family hx of      Thyroid Disease No family hx of      Obesity No family hx of      Unknown/Adopted No family hx of      Macular Degeneration No family hx of      Heart Disease Mother      CABG Mother      Heart Disease Father      Osteoarthritis Father      CABG Father      Hypertension Sister      Hyperlipidemia Sister      Depression Sister      Breast Cancer Sister      Hypertension Sister      Hyperlipidemia Sister      Dementia Maternal Grandmother      Early Death Maternal Grandmother      Hypertension Maternal Grandmother      Cerebrovascular Disease Maternal Grandmother      Arthritis Maternal Grandfather      Hearing Loss Maternal Grandfather      Heart Disease Maternal Grandfather      Hyperlipidemia Maternal Grandfather      Hypertension Maternal Grandfather      Arthritis Paternal Grandmother      Depression Paternal  Grandmother      Heart Disease Paternal Grandmother      Hyperlipidemia Paternal Grandmother      Heart Disease Paternal Grandfather      Hyperlipidemia Paternal Grandfather      Asthma Other      Hypertension Other        Social history: Patient works for the [employer] for [employer]. He denies tobacco, alcohol, illicit drug use.       ROS:Positive for weight gain, joint pain, muscle pain, Depression. Specifically negative for bowel/bladder dysfunction, fevers,chills, appetite changes, unexplained weight loss.   Otherwise 13 systems reviewed are negative.  Please see the patient's intake questionnaire from today for details.      OBJECTIVE:  PHYSICAL EXAMINATION:    CONSTITUTIONAL:  Vital signs as above.  No acute distress.  The patient is well nourished and well groomed.  PSYCHIATRIC:  The patient is awake, alert, oriented to person, place, time and answering questions appropriately with clear speech.    HEENT: Normocephalic, atraumatic.  Sclera clear.  Neck is supple.  SKIN:  Skin over the face, bilateral lower extremities, and posterior torso is clean, dry, intact without rashes.    GAIT:  Gait is non-antalgic.  The patient is able to heel and toe walk without significant difficulty.    STANDING EXAMINATION: Mild tenderness palpation right lower lumbar paraspinous muscles. Lumbar flexion intact. Lumbar extension mildly restricted with increased pain. Lumbar facet loading maneuvers increased low back pain bilaterally.  MUSCLE STRENGTH:  The patient has 5/5 strength for the bilateral hip flexors, knee flexors/extensors, ankle dorsiflexors/plantar flexors, great toe extensors, ankle evertors/invertors.  NEUROLOGICAL: 1+ patellar, and 2+ achilles reflexes bilaterally. Negative Babinski's bilaterally.  No ankle clonus bilaterally. Sensation to light touch is intact in the bilateral L4, L5, and S1 dermatomes.  VASCULAR:  2/4 dorsalis pedis pulses bilaterally.  Bilateral lower extremities are warm.  There is no pitting  edema of the bilateral lower extremities.  ABDOMINAL:  Soft, non-distended, non-tender throughout all quadrants.  No pulsatile mass palpated in the left lower quadrant.  LYMPH NODES:  No palpable or tender inguinal lymph nodes.  MUSCULOSKELETAL: Straight leg raise on the right causes low back pain, but no leg pain. Straight leg raise negative left.    RESULTS: I reviewed the x-ray lumbar spine from [facility] dated February 24, 2020.  This shows subtle levocurvature lumbar spine.  There is mild disc degeneration L5-S1.  Mild facet arthropathy L5 is 1.  There is suggestion of mild bilateral foraminal stenosis at this level.        Again, thank you for allowing me to participate in the care of your patient.        Sincerely,        Eliana Puckett PA-C

## 2021-12-23 ENCOUNTER — HOSPITAL ENCOUNTER (OUTPATIENT)
Dept: RADIOLOGY | Facility: CLINIC | Age: 56
End: 2021-12-23
Attending: PHYSICIAN ASSISTANT
Payer: COMMERCIAL

## 2021-12-23 ENCOUNTER — HOSPITAL ENCOUNTER (OUTPATIENT)
Dept: MRI IMAGING | Facility: CLINIC | Age: 56
End: 2021-12-23
Attending: PHYSICIAN ASSISTANT
Payer: COMMERCIAL

## 2021-12-23 DIAGNOSIS — G89.29 CHRONIC BILATERAL LOW BACK PAIN WITHOUT SCIATICA: ICD-10-CM

## 2021-12-23 DIAGNOSIS — M54.50 CHRONIC BILATERAL LOW BACK PAIN WITHOUT SCIATICA: ICD-10-CM

## 2021-12-23 PROCEDURE — 72120 X-RAY BEND ONLY L-S SPINE: CPT

## 2021-12-23 PROCEDURE — 72148 MRI LUMBAR SPINE W/O DYE: CPT

## 2021-12-27 ENCOUNTER — MYC MEDICAL ADVICE (OUTPATIENT)
Dept: INTERNAL MEDICINE | Facility: CLINIC | Age: 56
End: 2021-12-27
Payer: COMMERCIAL

## 2021-12-27 DIAGNOSIS — N28.1 RENAL CYST: Primary | ICD-10-CM

## 2021-12-27 RX ORDER — DIAZEPAM 5 MG
5 TABLET ORAL EVERY 6 HOURS PRN
Qty: 2 TABLET | Refills: 0 | Status: SHIPPED | OUTPATIENT
Start: 2021-12-27 | End: 2022-02-14

## 2021-12-28 ENCOUNTER — HOSPITAL ENCOUNTER (OUTPATIENT)
Dept: MRI IMAGING | Facility: HOSPITAL | Age: 56
Discharge: HOME | End: 2021-12-28
Attending: NURSE PRACTITIONER | Admitting: NURSE PRACTITIONER
Payer: COMMERCIAL

## 2021-12-28 ENCOUNTER — LAB (OUTPATIENT)
Dept: LAB | Facility: CLINIC | Age: 56
End: 2021-12-28
Payer: COMMERCIAL

## 2021-12-28 DIAGNOSIS — R74.8 ELEVATED LIVER ENZYMES: ICD-10-CM

## 2021-12-28 DIAGNOSIS — N28.1 RENAL CYST: ICD-10-CM

## 2021-12-28 LAB
ALBUMIN SERPL-MCNC: 3.9 G/DL (ref 3.5–5)
ALP SERPL-CCNC: 61 U/L (ref 45–120)
ALT SERPL W P-5'-P-CCNC: 40 U/L (ref 0–45)
AST SERPL W P-5'-P-CCNC: 21 U/L (ref 0–40)
BILIRUB DIRECT SERPL-MCNC: 0.3 MG/DL
BILIRUB SERPL-MCNC: 0.8 MG/DL (ref 0–1)
PROT SERPL-MCNC: 6.5 G/DL (ref 6–8)

## 2021-12-28 PROCEDURE — 80076 HEPATIC FUNCTION PANEL: CPT

## 2021-12-28 PROCEDURE — 74183 MRI ABD W/O CNTR FLWD CNTR: CPT

## 2021-12-28 PROCEDURE — A9585 GADOBUTROL INJECTION: HCPCS | Performed by: NURSE PRACTITIONER

## 2021-12-28 PROCEDURE — 255N000002 HC RX 255 OP 636: Performed by: NURSE PRACTITIONER

## 2021-12-28 PROCEDURE — 36415 COLL VENOUS BLD VENIPUNCTURE: CPT

## 2021-12-28 RX ORDER — GADOBUTROL 604.72 MG/ML
10 INJECTION INTRAVENOUS ONCE
Status: COMPLETED | OUTPATIENT
Start: 2021-12-28 | End: 2021-12-28

## 2021-12-28 RX ADMIN — GADOBUTROL 10 ML: 604.72 INJECTION INTRAVENOUS at 12:27

## 2021-12-29 ENCOUNTER — TELEPHONE (OUTPATIENT)
Dept: GASTROENTEROLOGY | Facility: CLINIC | Age: 56
End: 2021-12-29
Payer: COMMERCIAL

## 2021-12-29 ENCOUNTER — TELEPHONE (OUTPATIENT)
Dept: PHYSICAL MEDICINE AND REHAB | Facility: CLINIC | Age: 56
End: 2021-12-29
Payer: COMMERCIAL

## 2021-12-29 NOTE — TELEPHONE ENCOUNTER
----- Message from Mich Perez DO sent at 12/27/2021 10:01 AM CST -----  Please call the patient let him know I reviewed MRI of his lumbar spine.  It does show wear-and-tear changes including arthritis of facet joints.  There is an incidental finding of the right kidney.  Recommend MRI of right kidney.  I do see that you contacted your primary care provider who could order this as well.

## 2021-12-29 NOTE — TELEPHONE ENCOUNTER
Phone call to patient to review results and provider's recommendations. Left message to return call.     Per review of chart and schedule, patient had kidney MRI completed yesterday as ordered per PCP.

## 2021-12-29 NOTE — CONFIDENTIAL NOTE
Advanced [facility]     Referring provider: Silvia Walker CNP    Referred to: [facility]     Provider Requested: none specified     Referral Received:      Records received: [facility]  MRI renal with contrast 12/28/21   IMPRESSION:  1.  Benign bilateral renal cysts with the largest and most complex categorized as a Bosniak 2 cyst in the left lower pole. No dedicated follow-up is required as outlined below.  2.  Diffuse hepatic steatosis.  3.  There is a 10 mm cyst in the head of the pancreas likely a side branch intraductal papillary mucinous neoplasm. Follow-up guidelines as below.    Images received: PACs    Evaluation for: Pancreas cyst, fatty liver     Clinical History (per RN review):   56 year old male with past medical history significant for obstructive sleep apnea, asthma, GERD, hyperlipidemia, insomnia, history of gastric bypass who presents today for new patient evaluation of chronic rate rhythm left low back pain.   MRI lumbar spine performed in response and there is an incidental finding of the right kidney.  Recommend MRI of right kidney.     MD review date:   MD Decision for clinic consultation/Orders:            Referral updates/Patient contacted:   '

## 2021-12-29 NOTE — TELEPHONE ENCOUNTER
Patient had returned call and left message on nurse navigation line at 1232 PM today. Called him back, but reached his voicemail. Left message to return call.

## 2021-12-30 NOTE — TELEPHONE ENCOUNTER
"Patient returned call, explained recommendation of PT. Patient stated \"I am not doing that\" and \"this is not helpful, Eliana needs to call me and have a discussion\". Patient hung up.       "

## 2021-12-30 NOTE — TELEPHONE ENCOUNTER
LM on patient's voicemail. He was given the number for care navigation to call back.  I also offered to answer questions via [employer] and I told him I would be happy to see him back in clinic to review MRI results.

## 2021-12-30 NOTE — TELEPHONE ENCOUNTER
Per PSP Eliana Puckett PA-C: Please offer this patient a referral for [facility] PT.  He can follow up with me after he tries PT for four weeks.  I am happy to see him sooner if he prefers to review the MRI.     Phone call to patient to discuss. Left message to return call.

## 2022-01-03 ENCOUNTER — PATIENT OUTREACH (OUTPATIENT)
Dept: GASTROENTEROLOGY | Facility: CLINIC | Age: 57
End: 2022-01-03
Payer: COMMERCIAL

## 2022-01-03 NOTE — PROGRESS NOTES
Called pt to discuss referral and Dr Oliva's recommendations.  Virtual clinic visit arranged for 2/14 at 8:40am. Message routed to clinic coordinators to schedule    Incidental pancreatic cyst.     OK for elective outpt consult visit. Can be virtual.     Pt has gastric bypass, therefore cannot have EUS for surveillance.     Will be needing MRI in 6 months and 12 months.    Vesta Beverly, RN, BSN,   [facility]  Care coordinator

## 2022-01-04 ENCOUNTER — OFFICE VISIT (OUTPATIENT)
Dept: PHYSICAL MEDICINE AND REHAB | Facility: CLINIC | Age: 57
End: 2022-01-04
Payer: COMMERCIAL

## 2022-01-04 VITALS — DIASTOLIC BLOOD PRESSURE: 90 MMHG | OXYGEN SATURATION: 97 % | SYSTOLIC BLOOD PRESSURE: 141 MMHG | HEART RATE: 64 BPM

## 2022-01-04 DIAGNOSIS — M47.816 LUMBAR FACET ARTHROPATHY: ICD-10-CM

## 2022-01-04 DIAGNOSIS — M51.369 DDD (DEGENERATIVE DISC DISEASE), LUMBAR: ICD-10-CM

## 2022-01-04 DIAGNOSIS — M53.3 SACROILIAC JOINT PAIN: Primary | ICD-10-CM

## 2022-01-04 PROCEDURE — 99214 OFFICE O/P EST MOD 30 MIN: CPT | Performed by: PHYSICIAN ASSISTANT

## 2022-01-04 ASSESSMENT — PAIN SCALES - GENERAL: PAINLEVEL: MILD PAIN (2)

## 2022-01-04 NOTE — PROGRESS NOTES
Assessment:   David Marino is a 56 year old y.o. male with past medical history significant for obstructive sleep apnea, asthma, GERD, hyperlipidemia, insomnia, history of gastric bypass  who presents today for follow-up regarding chronic right greater than left low back/upper buttock pain.  Pain is currently mild and stable.  My review of an MRI lumbar spine shows mild multilevel lumbar spondylosis.  On exam today he appears to be primarily symptomatic from sacroiliac joint dysfunction on the right.  He had reproduction of pain with SI joint provocative maneuvers x4.  Within the differential is lower lumbar facet arthropathy.  He is neurologically intact.       Plan:     A shared decision making plan was used.  The patient's values and choices were respected.  The following represents what was discussed and decided upon by the physician assistant and the patient.      1.  DIAGNOSTIC TESTS: I reviewed the MRI lumbar spine in detail with the patient with help of a spine model.  I also reviewed the lumbar spine flexion-extension x-rays.  No further diagnostic tests were ordered.    2.  PHYSICAL THERAPY: I entered a referral for medics physical therapy.    3.  MEDICATIONS: No changes are made to the patient's medications.  -Patient can continue topical pain relieving medications as needed.  -He takes NSAIDs sparingly due to history of gastric bypass surgery.  -He would like to avoid additional pain relieving medications because he is concerned they could worsen his depression.    4.  INTERVENTIONS: Patient would benefit from interventional pain management if pain flares up and does not respond to conservative measures, based on his exam today, I would begin with a right sacroiliac joint injection.  -If that did not help, I would recommend right L3, L4, L5 medial branch blocks as a work-up for radiofrequency ablation.    5.  PATIENT EDUCATION: Patient is in agreement the above plan.  All questions were  answered.  -We did also discuss additional options for sacroiliac joint pain including an SI belt and osteopathic manipulation.    6.  FOLLOW-UP: Patient will follow up with me as needed.  If he has questions or concerns, he should not hesitate to call.    Subjective:     David Marino is a 56 year old male who presents today for follow-up regarding chronic right greater than left low back/upper buttock pain.  I saw the patient in consultation December 22, 2021.  At that time I ordered an MRI lumbar spine.  He returns today to review the results.    Patient with mild pain at this time.  He is not currently in a flareup.  Pain is primarily located in the right lower back/upper buttock region but sometimes he feels it on the left.  He denies pain further down the leg.  Denies numbness, tingling, weakness down the leg.  He rates his pain today as a 2 out of 10.  At its worst it is a 10 out of 10.  At its best it is a 1 out of 10.  Pain can be aggravated by slight movements that sometimes last 3 to 4 weeks.  Pain is alleviated with heat.  Patient is dealing with some left knee pain which began after doing pigeon pose in yoga.    Patient has not had formal physical therapy for his lower back.  He does do yoga on a regular basis.  He takes ibuprofen and aspirin rarely for pain.    Review of Systems:  Positive for pain much worse at night.  Negative for numbness/tingling, weakness, loss of bowel/bladder control, inability to urinate, headache, trip/stumble/falls, difficulty swallowing, difficulty with hand skills, fevers, unintentional weight loss.     Objective:   CONSTITUTIONAL:  Vital signs as above.  No acute distress.  The patient is well nourished and well groomed.    PSYCHIATRIC:  The patient is awake, alert, oriented to person, place and time.  The patient is answering questions appropriately with clear speech.  Normal affect.  HEENT: Normocephalic, atraumatic.  Sclera clear.    SKIN:  Skin over the face,  posterior torso, bilateral upper and lower extremities is clean, dry, intact without rashes.  VASCULAR: No significant lower extremity edema.  MUSCULOSKELETAL:  Gait is non-antalgic.  The patient is able to heel and toe walk without any difficulty.  No significant tenderness over the bilateral lower lumbar paraspinal muscles.  Mild tenderness palpation right sacroiliac joint.   The patient has 5/5 strength for the bilateral hip flexors, knee flexors/extensors, ankle dorsiflexors/plantar flexors, ankle evertors/invertors.  Positive pelvic distraction test.  Positive thigh thrust right, negative thigh thrust left.  Positive Marco's test bilaterally reproducing pain localizing to the right SI joint, negative Marco's Gaenslen's test right, negative Gaenslen's test left, negative Yeomans test bilaterally.  Lumbar flexion within normal limits.  Lumbar extension significantly restricted with increased pain  NEUROLOGIC:Sensation to light touch is intact in the bilateral L4, L5, and S1 dermatomes.       RESULTS: I reviewed the MRI lumbar spine from [facility] dated December 23, 2021.  Patient has mild disc degeneration with mild disc bulges L2-3 through L5-S1.  There is mild facet arthropathy L3-4 and what I would describe is moderate facet arthropathy L4-5 and L5-S1.  There is mild thecal sac stenosis L3-4 and L4-5.  There is foraminal stenosis which is mild right L2-3, mild bilateral L3-4, mild to moderate left and minimal right L4-5, mild to moderate bilaterally L5-S1.  There are degenerative changes of the bilateral sacroiliac joints.  Please see report for further details.

## 2022-01-04 NOTE — LETTER
1/4/2022         RE: David Marino  [address]  [facility] [address]        Dear Colleague,    Thank you for referring your patient, David Marino, to the [facility] [place]. Please see a copy of my visit note below.    Assessment:   David Marino is a 56 year old y.o. male with past medical history significant for obstructive sleep apnea, asthma, GERD, hyperlipidemia, insomnia, history of gastric bypass  who presents today for follow-up regarding chronic right greater than left low back/upper buttock pain.  Pain is currently mild and stable.  My review of an MRI lumbar spine shows mild multilevel lumbar spondylosis.  On exam today he appears to be primarily symptomatic from sacroiliac joint dysfunction on the right.  He had reproduction of pain with SI joint provocative maneuvers x4.  Within the differential is lower lumbar facet arthropathy.  He is neurologically intact.       Plan:     A shared decision making plan was used.  The patient's values and choices were respected.  The following represents what was discussed and decided upon by the physician assistant and the patient.      1.  DIAGNOSTIC TESTS: I reviewed the MRI lumbar spine in detail with the patient with help of a spine model.  I also reviewed the lumbar spine flexion-extension x-rays.  No further diagnostic tests were ordered.    2.  PHYSICAL THERAPY: I entered a referral for medics physical therapy.    3.  MEDICATIONS: No changes are made to the patient's medications.  -Patient can continue topical pain relieving medications as needed.  -He takes NSAIDs sparingly due to history of gastric bypass surgery.  -He would like to avoid additional pain relieving medications because he is concerned they could worsen his depression.    4.  INTERVENTIONS: Patient would benefit from interventional pain management if pain flares up and does not respond to conservative measures, based on his exam today, I  would begin with a right sacroiliac joint injection.  -If that did not help, I would recommend right L3, L4, L5 medial branch blocks as a work-up for radiofrequency ablation.    5.  PATIENT EDUCATION: Patient is in agreement the above plan.  All questions were answered.  -We did also discuss additional options for sacroiliac joint pain including an SI belt and osteopathic manipulation.    6.  FOLLOW-UP: Patient will follow up with me as needed.  If he has questions or concerns, he should not hesitate to call.    Subjective:     David Marino is a 56 year old male who presents today for follow-up regarding chronic right greater than left low back/upper buttock pain.  I saw the patient in consultation December 22, 2021.  At that time I ordered an MRI lumbar spine.  He returns today to review the results.    Patient with mild pain at this time.  He is not currently in a flareup.  Pain is primarily located in the right lower back/upper buttock region but sometimes he feels it on the left.  He denies pain further down the leg.  Denies numbness, tingling, weakness down the leg.  He rates his pain today as a 2 out of 10.  At its worst it is a 10 out of 10.  At its best it is a 1 out of 10.  Pain can be aggravated by slight movements that sometimes last 3 to 4 weeks.  Pain is alleviated with heat.  Patient is dealing with some left knee pain which began after doing pigeon pose in yoga.    Patient has not had formal physical therapy for his lower back.  He does do yoga on a regular basis.  He takes ibuprofen and aspirin rarely for pain.    Review of Systems:  Positive for pain much worse at night.  Negative for numbness/tingling, weakness, loss of bowel/bladder control, inability to urinate, headache, trip/stumble/falls, difficulty swallowing, difficulty with hand skills, fevers, unintentional weight loss.     Objective:   CONSTITUTIONAL:  Vital signs as above.  No acute distress.  The patient is well nourished and well  groomed.    PSYCHIATRIC:  The patient is awake, alert, oriented to person, place and time.  The patient is answering questions appropriately with clear speech.  Normal affect.  HEENT: Normocephalic, atraumatic.  Sclera clear.    SKIN:  Skin over the face, posterior torso, bilateral upper and lower extremities is clean, dry, intact without rashes.  VASCULAR: No significant lower extremity edema.  MUSCULOSKELETAL:  Gait is non-antalgic.  The patient is able to heel and toe walk without any difficulty.  No significant tenderness over the bilateral lower lumbar paraspinal muscles.  Mild tenderness palpation right sacroiliac joint.   The patient has 5/5 strength for the bilateral hip flexors, knee flexors/extensors, ankle dorsiflexors/plantar flexors, ankle evertors/invertors.  Positive pelvic distraction test.  Positive thigh thrust right, negative thigh thrust left.  Positive Marco's test bilaterally reproducing pain localizing to the right SI joint, negative Marco's Gaenslen's test right, negative Gaenslen's test left, negative Yeomans test bilaterally.  Lumbar flexion within normal limits.  Lumbar extension significantly restricted with increased pain  NEUROLOGIC:Sensation to light touch is intact in the bilateral L4, L5, and S1 dermatomes.       RESULTS: I reviewed the MRI lumbar spine from [facility] dated December 23, 2021.  Patient has mild disc degeneration with mild disc bulges L2-3 through L5-S1.  There is mild facet arthropathy L3-4 and what I would describe is moderate facet arthropathy L4-5 and L5-S1.  There is mild thecal sac stenosis L3-4 and L4-5.  There is foraminal stenosis which is mild right L2-3, mild bilateral L3-4, mild to moderate left and minimal right L4-5, mild to moderate bilaterally L5-S1.  There are degenerative changes of the bilateral sacroiliac joints.  Please see report for further details.          Again, thank you for allowing me to participate in the care of your patient.         Sincerely,        Eliana Puckett PA-C

## 2022-01-04 NOTE — PATIENT INSTRUCTIONS
The nurse line is [phone].    An order for physicaltherapy has been provided today.  Someone will call you to schedule physical therapy or you can call [phone] to schedule physical therapy.  It will be very important for you to do your physical therapy exercises on aregular basis to decrease your pain and prevent future flares of pain.

## 2022-01-13 ENCOUNTER — MYC MEDICAL ADVICE (OUTPATIENT)
Dept: INTERNAL MEDICINE | Facility: CLINIC | Age: 57
End: 2022-01-13
Payer: COMMERCIAL

## 2022-01-13 DIAGNOSIS — G47.00 PERSISTENT INSOMNIA: Primary | ICD-10-CM

## 2022-01-17 RX ORDER — TRAZODONE HYDROCHLORIDE 50 MG/1
50 TABLET, FILM COATED ORAL AT BEDTIME
Qty: 90 TABLET | Refills: 3 | Status: SHIPPED | OUTPATIENT
Start: 2022-01-17 | End: 2023-01-24

## 2022-02-07 NOTE — TELEPHONE ENCOUNTER
RECORDS STATUS - ALL OTHER DIAGNOSIS      RECORDS RECEIVED FROM: [facility]   DATE RECEIVED: 2/7   NOTES STATUS DETAILS   OFFICE NOTE from referring provider Silvia Ortiz CNP in [facility]/OB: 12/1/21   DISCHARGE SUMMARY from hospital [facility] 8/30/19, 5/21/18, 6/7/16   OPERATIVE REPORT Epic 5/21/18: Gastric Bypass  6/7/16: Colonoscopy   MEDICATION LIST [facility] 1/17/22   LABS     ANYTHING RELATED TO DIAGNOSIS Epic 12/28/21   IMAGING (NEED IMAGES & REPORT)     XR PACS 12/23/21: Epic   MRI PACS 12/28/21, 12/23/21: Epic

## 2022-02-11 DIAGNOSIS — J06.9 VIRAL UPPER RESPIRATORY TRACT INFECTION: ICD-10-CM

## 2022-02-14 ENCOUNTER — TELEPHONE (OUTPATIENT)
Dept: GASTROENTEROLOGY | Facility: CLINIC | Age: 57
End: 2022-02-14

## 2022-02-14 ENCOUNTER — VIRTUAL VISIT (OUTPATIENT)
Dept: GASTROENTEROLOGY | Facility: CLINIC | Age: 57
End: 2022-02-14
Attending: INTERNAL MEDICINE
Payer: COMMERCIAL

## 2022-02-14 ENCOUNTER — PRE VISIT (OUTPATIENT)
Dept: GASTROENTEROLOGY | Facility: CLINIC | Age: 57
End: 2022-02-14
Payer: COMMERCIAL

## 2022-02-14 DIAGNOSIS — K86.2 PANCREAS CYST: ICD-10-CM

## 2022-02-14 DIAGNOSIS — K86.2 PANCREAS CYST: Primary | ICD-10-CM

## 2022-02-14 DIAGNOSIS — K76.0 FATTY LIVER: ICD-10-CM

## 2022-02-14 PROCEDURE — 99203 OFFICE O/P NEW LOW 30 MIN: CPT | Mod: TEL | Performed by: INTERNAL MEDICINE

## 2022-02-14 NOTE — NURSING NOTE
Patient confirms medications and allergies are accurate via patients echeck in completion, and or denies any changes since last reviewed/verified.     James Burr, Virtual Facilitator

## 2022-02-14 NOTE — TELEPHONE ENCOUNTER
Marley -    See clinic note.  Please arrange for MRI abdomen with contrast in 6 months and clinic visit same day to review.    SUZIE Oliva MD  Professor of Medicine  Division of Gastroenterology, Hepatology and Nutrition  [facility]

## 2022-02-14 NOTE — PROGRESS NOTES
David is a 57 year old who is being evaluated via a billable video visit.      How would you like to obtain your AVS? MyChart  If the video visit is dropped, the invitation should be resent by: Text to cell phone: [phone]   Will anyone else be joining your video visit? No      Video-Visit Details    Type of service:  Video Visit    Pt was scheduled for video visit, however was unable to get working connection. Thus converted to phone visit.    GI CLINIC VISIT - NEW PATIENT    CC/REFERRING MD:  Dr. Walker    REASON FOR CONSULTATION: Pancreatic cyst    ASSESSMENT/PLAN:  Pancreatic cyst: Discussed potential that this could be neoplastic/pre-malignant, however that likelihood of subsequent malignancy is low. Discussed that the RXY bypass will preclude EUS and fluid aspiration (which is not currently indicated anyway per consensus guidelines). Reviewed surveillance recommendations.  He has no high-risk or worrisome features at present.    Fatty liver: Normal LFTs therefore no need for further evaluation at this time. Has history of obesity and recent alcohol abstinence.      PLAN:  - MRI in 6 and 12 months, then annually x 2 years, then every 2 years if stable, based on consensus guidelines. Earlier evaluation prn symptoms (unexplained weight loss, diarrhea, jaundice or acute pancreatitis).    RTC 6 months same day as MRI    Thank you for this consultation.  It was a pleasure to participate in the care of this patient; please contact us with any further questions.  A total of  33 minutes was spent on the day of the visit, >50% of which was counseling regarding the above delineated issues. A total of 17 minutes was on the telephone. The remainder was review of records and imaging as well as documentation and coordination of care.      SUZIE Oliva MD  Professor of Medicine  Division of Gastroenterology, Hepatology and Nutrition  [facility]  [place]  -------------------------------------------------------------------------------------------------------------------  HPI:  The pt is a/n 57 year old male who I was asked to see in consultation at the request of Dr. Walker for evaluation of an incidentally-identified pancreatic cysts.    The pt underwent lumbar MRI for back pain evaluation 12/23/21. This incidentally identified left renal cystic lesions for which MRI was recommended. The renal MRI (with and without contrast) 12/28/21 showed:  IMPRESSION:  1.  Benign bilateral renal cysts with the largest and most complex categorized as a Bosniak 2 cyst in the left lower pole. No dedicated follow-up is required as outlined below.  2.  Diffuse hepatic steatosis.  3.  There is a 10 mm cyst in the head of the pancreas likely a side branch intraductal papillary mucinous neoplasm. Follow-up guidelines as below.    He was then referred to GI to discuss further cyst management.    Regarding the fatty liver, he had LFTs checked 12/28/21 which were normal. He has no history of alcohol misuse but did stop all alcohol use 11/27/21.    He quit smoking in 1997.    He has no history of acute pancreatitis. There is no family history of pancreatic disease. He has no had prior pancreatic imaging.    There is no jaundice, unexplained weight loss or diarrhea.  He had RXY gastric bypass 2018 and initially lost from baseline 245 lb to 170 lb, but has now regained to 220 lb. He is 5 ft 10 in tall.      ROS:  See history of present illness.    PERTINENT PAST MEDICAL HISTORY:  Past Medical History:   Diagnosis Date     Asthma     Controlled     Depression      Depression      Hypercholesteremia      Hypertension      Hypertension      ZARIA (obstructive sleep apnea) Severe AHI 51 12/8/2017    HST 12/6/2017 Severe     PONV (postoperative nausea and vomiting)     Severe      Uncomplicated asthma    Chromhidrosis    PREVIOUS SURGERIES:  Past Surgical History:   Procedure Laterality  Date     APPENDECTOMY       APPENDECTOMY       ARTHROSCOPY KNEE Right 09/28/2016    Procedure: Arthroscopic partial medial meniscectomy, right knee. Surgeon:  Keith Ayala MD  Location: [facility]     AS REMOVAL OF TONSILS,<11 Y/O       BARIATRIC SURGERY  05/2018     COLONOSCOPY N/A 8/30/2019    Procedure: ANOSCOPY HIGH RESOLUTION OR WITH ANESTHESIA fulgeration of anal condyloma;  Surgeon: Nimisha Zuniga MD;  Location: [facility];  Service: General     HC KNEE SCOPE, DIAGNOSTIC      Description: Arthroscopy Knee Left;  Proc Date: 01/01/1993;     HC KNEE SCOPE, DIAGNOSTIC      Description: Arthroscopy Knee Right;  Proc Date: 01/01/2013;  Comments: for R knee meniscal tear     HC REMOVE TONSILS/ADENOIDS,<11 Y/O      Description: Tonsillectomy With Adenoidectomy;  Proc Date: 01/01/2009;  Comments: Had sinus issues     LAPAROSCOPIC BYPASS GASTRIC N/A 5/21/2018    Procedure: LAPAROSCOPIC BYPASS GASTRIC;  LAPAROSCOPIC GASTRIC BYPASS    EBL: 7mL;  Surgeon: Noe Saunders MD;  Location:  OR     [facility] APPENDECTOMY      Description: Appendectomy;  Proc Date: 01/01/1984;     [facility] SYMPATHECTOMY,CERVICAL  2003     [facility] THORACOSCOPY, SURGICAL, W/ THORACIC SYMPATHECTOMY      Description: Thoracoscopy (Therapeutic) W/ Thoracic Sympathectomy;  Proc Date: 01/01/2004;  Comments: 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; ; endoscopic thoracic       PREVIOUS ENDOSCOPY:  Colonoscopy 6/11/19 [facility]    ALLERGIES:     Allergies   Allergen Reactions     Bee Pollen Other (See Comments)     Honey      Pollen Extract      Sulfa Drugs        PERTINENT MEDICATIONS:    Current Outpatient Medications:      albuterol (PROAIR HFA/PROVENTIL HFA/VENTOLIN HFA) 108 (90 Base) MCG/ACT inhaler, Inhale 2 puffs into the lungs, Disp: , Rfl:      CALCIUM-VITAMIN D PO, Take 1 chew tab by mouth 3 times daily 1 = 250 mg, Disp: , Rfl:      diazepam (VALIUM) 5 MG tablet, Take 1 tablet (5 mg) by mouth every 6  hours as needed for anxiety (Prior to MRI), Disp: 2 tablet, Rfl: 0     diazepam (VALIUM) 5 MG tablet, Take 1 tab 2 hrs before MRI, take 1 tab 1 hr before MRI only if needed, Disp: 2 tablet, Rfl: 0     doxycycline monohydrate (MONODOX) 50 MG capsule, 2 x daily for 1 month then once daily for 2 months., Disp: 60 capsule, Rfl: 2     fluticasone-salmeterol (ADVAIR) 100-50 MCG/DOSE inhaler, Inhale 1 puff into the lungs every 12 hours, Disp: 1 each, Rfl: 3     Glycopyrrolate POWD, Compounded. Apply pea size amount every morning. 0.5% cream. For chromhidrosis, 30 gm jar, Disp: 30 g, Rfl: 11     lisinopril (ZESTRIL) 20 MG tablet, Take 1 tablet (20 mg) by mouth daily, Disp: 90 tablet, Rfl: 3     metroNIDAZOLE (METROGEL) 0.75 % topical gel, Apply topically 2 times daily Apply to AA on face and scalp bid, Disp: 90 g, Rfl: 11     montelukast (SINGULAIR) 10 MG tablet, TAKE 1 TAB BY MOUTH AT BEDTIME, Disp: 90 tablet, Rfl: 3     Multiple Vitamins-Minerals (MULTIVITAMIN ADULT PO), Take 2 tablets by mouth every morning , Disp: , Rfl:      Omega-3 Fatty Acids (FISH OIL OMEGA-3 PO), Take 1 capsule by mouth At Bedtime , Disp: , Rfl:      rosuvastatin (CRESTOR) 10 MG tablet, Take 1 tablet (10 mg) by mouth daily, Disp: 90 tablet, Rfl: 3     selegiline (EMSAM) 9 MG/24HR 24 hr patch, Place 1 patch onto the skin daily, Disp: 30 patch, Rfl: 11     testosterone (ANDROGEL) 25 MG/2.5GM (1%) gel, Place 2 packets (50 mg) onto the skin daily Apply to the skin daily. Please dispense a full package, Disp: 180 packet, Rfl: 3     traZODone (DESYREL) 50 MG tablet, Take 1 tablet (50 mg) by mouth At Bedtime, Disp: 90 tablet, Rfl: 3    SOCIAL HISTORY:  Social History     Socioeconomic History     Marital status:      Spouse name: Not on file     Number of children: Not on file     Years of education: Not on file     Highest education level: Not on file   Occupational History     Not on file   Tobacco Use     Smoking status: Former Smoker     Types:  Cigarettes, Cigarettes     Start date: 1984     Quit date: 1997     Years since quittin.1     Smokeless tobacco: Never Used     Tobacco comment: smoker 30+ years ago   Substance and Sexual Activity     Alcohol use: Yes     Alcohol/week: 0.0 standard drinks     Comment: Alcoholic Drinks/day: OC     Drug use: No     Sexual activity: Not Currently     Partners: Male   Other Topics Concern     Parent/sibling w/ CABG, MI or angioplasty before 65F 55M? No   Social History Narrative     Not on file     Social Determinants of Health     Financial Resource Strain: Not on file   Food Insecurity: Not on file   Transportation Needs: Not on file   Physical Activity: Not on file   Stress: Not on file   Social Connections: Not on file   Intimate Partner Violence: Not on file   Housing Stability: Not on file     Quit alcohol 22    FAMILY HISTORY:  Family History   Problem Relation Age of Onset     Diabetes Mother      Coronary Artery Disease Mother      Hypertension Mother      Depression Mother      Asthma Mother      Glaucoma Mother      Coronary Artery Disease Father      Hypertension Father      Hyperlipidemia Father      Depression Maternal Grandmother      Glaucoma Maternal Grandmother      Cerebrovascular Disease No family hx of      Breast Cancer No family hx of      Colon Cancer No family hx of      Prostate Cancer No family hx of      Other Cancer No family hx of      Anxiety Disorder No family hx of      Mental Illness No family hx of      Substance Abuse No family hx of      Anesthesia Reaction No family hx of      Osteoporosis No family hx of      Genetic Disorder No family hx of      Thyroid Disease No family hx of      Obesity No family hx of      Unknown/Adopted No family hx of      Macular Degeneration No family hx of      Heart Disease Mother      CABG Mother      Heart Disease Father      Osteoarthritis Father      CABG Father      Hypertension Sister      Hyperlipidemia Sister      Depression  Sister      Breast Cancer Sister      Hypertension Sister      Hyperlipidemia Sister      Dementia Maternal Grandmother      Early Death Maternal Grandmother      Hypertension Maternal Grandmother      Cerebrovascular Disease Maternal Grandmother      Arthritis Maternal Grandfather      Hearing Loss Maternal Grandfather      Heart Disease Maternal Grandfather      Hyperlipidemia Maternal Grandfather      Hypertension Maternal Grandfather      Arthritis Paternal Grandmother      Depression Paternal Grandmother      Heart Disease Paternal Grandmother      Hyperlipidemia Paternal Grandmother      Heart Disease Paternal Grandfather      Hyperlipidemia Paternal Grandfather      Asthma Other      Hypertension Other          PHYSICAL EXAMINATION:  Telephone visit.    PERTINENT STUDIES:    Lab on 12/28/2021   Component Date Value Ref Range Status     Bilirubin Total 12/28/2021 0.8  0.0 - 1.0 mg/dL Final     Bilirubin Direct 12/28/2021 0.3  <=0.5 mg/dL Final     Protein Total 12/28/2021 6.5  6.0 - 8.0 g/dL Final     Albumin 12/28/2021 3.9  3.5 - 5.0 g/dL Final     Alkaline Phosphatase 12/28/2021 61  45 - 120 U/L Final     AST 12/28/2021 21  0 - 40 U/L Final     ALT 12/28/2021 40  0 - 45 U/L Final

## 2022-02-14 NOTE — LETTER
2/14/2022         RE: David Marino  [address]  [facility] [address]        Dear Colleague,    Thank you for referring your patient, David Marino, to the [facility]. Please see a copy of my visit note below.          GI CLINIC VISIT - NEW PATIENT    CC/REFERRING MD:  Dr. Walker    REASON FOR CONSULTATION: Pancreatic cyst    ASSESSMENT/PLAN:  Pancreatic cyst: Discussed potential that this could be neoplastic/pre-malignant, however that likelihood of subsequent malignancy is low. Discussed that the RXY bypass will preclude EUS and fluid aspiration (which is not currently indicated anyway per consensus guidelines). Reviewed surveillance recommendations.  He has no high-risk or worrisome features at present.    Fatty liver: Normal LFTs therefore no need for further evaluation at this time. Has history of obesity and recent alcohol abstinence.      PLAN:  - MRI in 6 and 12 months, then annually x 2 years, then every 2 years if stable, based on consensus guidelines. Earlier evaluation prn symptoms (unexplained weight loss, diarrhea, jaundice or acute pancreatitis).    RTC 6 months same day as MRI    Thank you for this consultation.  It was a pleasure to participate in the care of this patient; please contact us with any further questions.  A total of  33 minutes was spent on the day of the visit, >50% of which was counseling regarding the above delineated issues. A total of 17 minutes was on the telephone. The remainder was review of records and imaging as well as documentation and coordination of care.      SUZIE Oliva MD  Professor of Medicine  Division of Gastroenterology, Hepatology and Nutrition  [facility]  -------------------------------------------------------------------------------------------------------------------  HPI:  The pt is a/n 57 year old male who I was asked to see in consultation at the request of Dr. Walker for  evaluation of an incidentally-identified pancreatic cysts.    The pt underwent lumbar MRI for back pain evaluation 12/23/21. This incidentally identified left renal cystic lesions for which MRI was recommended. The renal MRI (with and without contrast) 12/28/21 showed:  IMPRESSION:  1.  Benign bilateral renal cysts with the largest and most complex categorized as a Bosniak 2 cyst in the left lower pole. No dedicated follow-up is required as outlined below.  2.  Diffuse hepatic steatosis.  3.  There is a 10 mm cyst in the head of the pancreas likely a side branch intraductal papillary mucinous neoplasm. Follow-up guidelines as below.    He was then referred to GI to discuss further cyst management.    Regarding the fatty liver, he had LFTs checked 12/28/21 which were normal. He has no history of alcohol misuse but did stop all alcohol use 11/27/21.    He quit smoking in 1997.    He has no history of acute pancreatitis. There is no family history of pancreatic disease. He has no had prior pancreatic imaging.    There is no jaundice, unexplained weight loss or diarrhea.  He had RXY gastric bypass 2018 and initially lost from baseline 245 lb to 170 lb, but has now regained to 220 lb. He is 5 ft 10 in tall.      ROS:  See history of present illness.    PERTINENT PAST MEDICAL HISTORY:  Past Medical History:   Diagnosis Date     Asthma     Controlled     Depression      Depression      Hypercholesteremia      Hypertension      Hypertension      ZARIA (obstructive sleep apnea) Severe AHI 51 12/8/2017    HST 12/6/2017 Severe     PONV (postoperative nausea and vomiting)     Severe      Uncomplicated asthma    Chromhidrosis    PREVIOUS SURGERIES:  Past Surgical History:   Procedure Laterality Date     APPENDECTOMY       APPENDECTOMY       ARTHROSCOPY KNEE Right 09/28/2016    Procedure: Arthroscopic partial medial meniscectomy, right knee. Surgeon:  Keith Ayala MD  Location: [facility]     AS REMOVAL OF  TONSILS,<11 Y/O       BARIATRIC SURGERY  05/2018     COLONOSCOPY N/A 8/30/2019    Procedure: ANOSCOPY HIGH RESOLUTION OR WITH ANESTHESIA fulgeration of anal condyloma;  Surgeon: Nimisha Zuniga MD;  Location: [facility];  Service: General     HC KNEE SCOPE, DIAGNOSTIC      Description: Arthroscopy Knee Left;  Proc Date: 01/01/1993;     HC KNEE SCOPE, DIAGNOSTIC      Description: Arthroscopy Knee Right;  Proc Date: 01/01/2013;  Comments: for R knee meniscal tear     HC REMOVE TONSILS/ADENOIDS,<12 Y/O      Description: Tonsillectomy With Adenoidectomy;  Proc Date: 01/01/2009;  Comments: Had sinus issues     LAPAROSCOPIC BYPASS GASTRIC N/A 5/21/2018    Procedure: LAPAROSCOPIC BYPASS GASTRIC;  LAPAROSCOPIC GASTRIC BYPASS    EBL: 7mL;  Surgeon: Noe Saunders MD;  Location: [facility] APPENDECTOMY      Description: Appendectomy;  Proc Date: 01/01/1984;     [facility] SYMPATHECTOMY,CERVICAL  2003     [facility] THORACOSCOPY, SURGICAL, W/ THORACIC SYMPATHECTOMY      Description: Thoracoscopy (Therapeutic) W/ Thoracic Sympathectomy;  Proc Date: 01/01/2004;  Comments: 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; ; endoscopic thoracic       PREVIOUS ENDOSCOPY:  Colonoscopy 6/11/19 [facility]    ALLERGIES:     Allergies   Allergen Reactions     Bee Pollen Other (See Comments)     Honey      Pollen Extract      Sulfa Drugs        PERTINENT MEDICATIONS:    Current Outpatient Medications:      albuterol (PROAIR HFA/PROVENTIL HFA/VENTOLIN HFA) 108 (90 Base) MCG/ACT inhaler, Inhale 2 puffs into the lungs, Disp: , Rfl:      CALCIUM-VITAMIN D PO, Take 1 chew tab by mouth 3 times daily 1 = 250 mg, Disp: , Rfl:      diazepam (VALIUM) 5 MG tablet, Take 1 tablet (5 mg) by mouth every 6 hours as needed for anxiety (Prior to MRI), Disp: 2 tablet, Rfl: 0     diazepam (VALIUM) 5 MG tablet, Take 1 tab 2 hrs before MRI, take 1 tab 1 hr before MRI only if needed, Disp: 2 tablet, Rfl: 0     doxycycline monohydrate (MONODOX) 50 MG  capsule, 2 x daily for 1 month then once daily for 2 months., Disp: 60 capsule, Rfl: 2     fluticasone-salmeterol (ADVAIR) 100-50 MCG/DOSE inhaler, Inhale 1 puff into the lungs every 12 hours, Disp: 1 each, Rfl: 3     Glycopyrrolate POWD, Compounded. Apply pea size amount every morning. 0.5% cream. For chromhidrosis, 30 gm jar, Disp: 30 g, Rfl: 11     lisinopril (ZESTRIL) 20 MG tablet, Take 1 tablet (20 mg) by mouth daily, Disp: 90 tablet, Rfl: 3     metroNIDAZOLE (METROGEL) 0.75 % topical gel, Apply topically 2 times daily Apply to AA on face and scalp bid, Disp: 90 g, Rfl: 11     montelukast (SINGULAIR) 10 MG tablet, TAKE 1 TAB BY MOUTH AT BEDTIME, Disp: 90 tablet, Rfl: 3     Multiple Vitamins-Minerals (MULTIVITAMIN ADULT PO), Take 2 tablets by mouth every morning , Disp: , Rfl:      Omega-3 Fatty Acids (FISH OIL OMEGA-3 PO), Take 1 capsule by mouth At Bedtime , Disp: , Rfl:      rosuvastatin (CRESTOR) 10 MG tablet, Take 1 tablet (10 mg) by mouth daily, Disp: 90 tablet, Rfl: 3     selegiline (EMSAM) 9 MG/24HR 24 hr patch, Place 1 patch onto the skin daily, Disp: 30 patch, Rfl: 11     testosterone (ANDROGEL) 25 MG/2.5GM (1%) gel, Place 2 packets (50 mg) onto the skin daily Apply to the skin daily. Please dispense a full package, Disp: 180 packet, Rfl: 3     traZODone (DESYREL) 50 MG tablet, Take 1 tablet (50 mg) by mouth At Bedtime, Disp: 90 tablet, Rfl: 3    SOCIAL HISTORY:  Social History     Socioeconomic History     Marital status:      Spouse name: Not on file     Number of children: Not on file     Years of education: Not on file     Highest education level: Not on file   Occupational History     Not on file   Tobacco Use     Smoking status: Former Smoker     Types: Cigarettes, Cigarettes     Start date: 1984     Quit date: 1997     Years since quittin.1     Smokeless tobacco: Never Used     Tobacco comment: smoker 30+ years ago   Substance and Sexual Activity     Alcohol use: Yes      Alcohol/week: 0.0 standard drinks     Comment: Alcoholic Drinks/day: OC     Drug use: No     Sexual activity: Not Currently     Partners: Male   Other Topics Concern     Parent/sibling w/ CABG, MI or angioplasty before 65F 55M? No   Social History Narrative     Not on file     Social Determinants of Health     Financial Resource Strain: Not on file   Food Insecurity: Not on file   Transportation Needs: Not on file   Physical Activity: Not on file   Stress: Not on file   Social Connections: Not on file   Intimate Partner Violence: Not on file   Housing Stability: Not on file     Quit alcohol 11/27/21    FAMILY HISTORY:  Family History   Problem Relation Age of Onset     Diabetes Mother      Coronary Artery Disease Mother      Hypertension Mother      Depression Mother      Asthma Mother      Glaucoma Mother      Coronary Artery Disease Father      Hypertension Father      Hyperlipidemia Father      Depression Maternal Grandmother      Glaucoma Maternal Grandmother      Cerebrovascular Disease No family hx of      Breast Cancer No family hx of      Colon Cancer No family hx of      Prostate Cancer No family hx of      Other Cancer No family hx of      Anxiety Disorder No family hx of      Mental Illness No family hx of      Substance Abuse No family hx of      Anesthesia Reaction No family hx of      Osteoporosis No family hx of      Genetic Disorder No family hx of      Thyroid Disease No family hx of      Obesity No family hx of      Unknown/Adopted No family hx of      Macular Degeneration No family hx of      Heart Disease Mother      CABG Mother      Heart Disease Father      Osteoarthritis Father      CABG Father      Hypertension Sister      Hyperlipidemia Sister      Depression Sister      Breast Cancer Sister      Hypertension Sister      Hyperlipidemia Sister      Dementia Maternal Grandmother      Early Death Maternal Grandmother      Hypertension Maternal Grandmother      Cerebrovascular Disease Maternal  Grandmother      Arthritis Maternal Grandfather      Hearing Loss Maternal Grandfather      Heart Disease Maternal Grandfather      Hyperlipidemia Maternal Grandfather      Hypertension Maternal Grandfather      Arthritis Paternal Grandmother      Depression Paternal Grandmother      Heart Disease Paternal Grandmother      Hyperlipidemia Paternal Grandmother      Heart Disease Paternal Grandfather      Hyperlipidemia Paternal Grandfather      Asthma Other      Hypertension Other          PHYSICAL EXAMINATION:  Telephone visit.    PERTINENT STUDIES:    Lab on 12/28/2021   Component Date Value Ref Range Status     Bilirubin Total 12/28/2021 0.8  0.0 - 1.0 mg/dL Final     Bilirubin Direct 12/28/2021 0.3  <=0.5 mg/dL Final     Protein Total 12/28/2021 6.5  6.0 - 8.0 g/dL Final     Albumin 12/28/2021 3.9  3.5 - 5.0 g/dL Final     Alkaline Phosphatase 12/28/2021 61  45 - 120 U/L Final     AST 12/28/2021 21  0 - 40 U/L Final     ALT 12/28/2021 40  0 - 45 U/L Final       Again, thank you for allowing me to participate in the care of your patient.        Sincerely,        Vish Oliva MD

## 2022-02-26 ENCOUNTER — MYC MEDICAL ADVICE (OUTPATIENT)
Dept: INTERNAL MEDICINE | Facility: CLINIC | Age: 57
End: 2022-02-26
Payer: COMMERCIAL

## 2022-02-26 DIAGNOSIS — F33.1 MODERATE EPISODE OF RECURRENT MAJOR DEPRESSIVE DISORDER (H): Primary | ICD-10-CM

## 2022-02-28 RX ORDER — DULOXETIN HYDROCHLORIDE 30 MG/1
CAPSULE, DELAYED RELEASE ORAL
Qty: 173 CAPSULE | Refills: 0 | Status: SHIPPED | OUTPATIENT
Start: 2022-02-28 | End: 2022-07-04

## 2022-05-12 ENCOUNTER — MYC MEDICAL ADVICE (OUTPATIENT)
Dept: INTERNAL MEDICINE | Facility: CLINIC | Age: 57
End: 2022-05-12
Payer: COMMERCIAL

## 2022-05-12 DIAGNOSIS — G47.9 SLEEP DISTURBANCE: Primary | ICD-10-CM

## 2022-05-13 ENCOUNTER — PATIENT OUTREACH (OUTPATIENT)
Dept: GASTROENTEROLOGY | Facility: CLINIC | Age: 57
End: 2022-05-13
Payer: COMMERCIAL

## 2022-05-13 RX ORDER — SCOLOPAMINE TRANSDERMAL SYSTEM 1 MG/1
1 PATCH, EXTENDED RELEASE TRANSDERMAL
Qty: 10 PATCH | Refills: 4 | Status: SHIPPED | OUTPATIENT
Start: 2022-05-13

## 2022-05-13 NOTE — CONFIDENTIAL NOTE
Called pt to reschedule 8/1 appt d/t conflict for Dr Oliva's attending schedule. One remaining available spot on 8/8 at 9:20am, will hold for now.  Left VM     Vesta Beverly, RN, BSN,   [facility]  Care coordinator

## 2022-05-19 DIAGNOSIS — H02.889 MEIBOMIAN GLAND DYSFUNCTION: ICD-10-CM

## 2022-05-20 RX ORDER — DOXYCYCLINE 50 MG/1
CAPSULE ORAL
Qty: 60 CAPSULE | Refills: 2 | Status: SHIPPED | OUTPATIENT
Start: 2022-05-20

## 2022-05-20 NOTE — TELEPHONE ENCOUNTER
Routing refill request to provider for review/approval because:  Drug not on the [facility] refill protocol     Last Written Prescription Date:  12/1/21  Last Fill Quantity: 60,  # refills: 2   Last office visit provider:  12/1/21     Requested Prescriptions   Pending Prescriptions Disp Refills     doxycycline monohydrate (MONODOX) 50 MG capsule [Pharmacy Med Name: DOXYCYCLINE MONO 50 MG CAP] 60 capsule 2     Sig: TAKE 1 CAPSULE BY MOUTH 2 TIMES DAILY FOR 1 MONTH, THEN 1 CAPSULE ONCE DAILY FOR 2 MONTHS.       There is no refill protocol information for this order          Mee Smith, RN 05/19/22 7:41 PM

## 2022-07-03 DIAGNOSIS — F33.1 MODERATE EPISODE OF RECURRENT MAJOR DEPRESSIVE DISORDER (H): ICD-10-CM

## 2022-07-04 NOTE — TELEPHONE ENCOUNTER
"Routing refill request to provider for review/approval because:  BP not in range.  PHQ 9 score - not on file/out of date.  Patient needs to be seen because it has been more than 6 months since last office visit.    Last Written Prescription Date:  2/28/22  Last Fill Quantity: 173,  # refills: 0   Last office visit provider:  12/1/22     Requested Prescriptions   Pending Prescriptions Disp Refills     DULoxetine (CYMBALTA) 30 MG capsule [Pharmacy Med Name: DULOXETINE HCL DR 30 MG CAP] 173 capsule 0     Sig: TAKE 1 CAPSULE (30 MG) BY MOUTH DAILY FOR 7 DAYS, THEN 2 CAPSULES (60 MG) DAILY.       Serotonin-Norepinephrine Reuptake Inhibitors  Failed - 7/4/2022  8:00 AM        Failed - Blood pressure under 140/90 in past 12 months     BP Readings from Last 3 Encounters:   01/04/22 (!) 141/90   12/22/21 133/83   12/01/21 128/82                 Failed - PHQ-9 score of less than 5 in past 6 months     Please review last PHQ-9 score.           Failed - Recent (6 mo) or future (30 days) visit within the authorizing provider's specialty     Patient had office visit in the last 6 months or has a visit in the next 30 days with authorizing provider or within the authorizing provider's specialty.  See \"Patient Info\" tab in inbasket, or \"Choose Columns\" in Meds & Orders section of the refill encounter.            Passed - Medication is active on med list        Passed - Patient is age 18 or older             Cachorro Minor RN 07/04/22 8:01 AM  "

## 2022-07-05 RX ORDER — DULOXETIN HYDROCHLORIDE 30 MG/1
CAPSULE, DELAYED RELEASE ORAL
Qty: 180 CAPSULE | Refills: 0 | Status: SHIPPED | OUTPATIENT
Start: 2022-07-05 | End: 2022-07-26

## 2022-07-13 NOTE — PROGRESS NOTES
Called pt and left VM to update that we have moved his clinic date back to 8/8 at 9:20am, and reminded of MRI still scheduled for 8/1

## 2022-07-18 DIAGNOSIS — L71.9 ROSACEA: ICD-10-CM

## 2022-07-18 NOTE — TELEPHONE ENCOUNTER
Routing refill request to provider for review/approval because:  Labs not current:    bp out of range    Last Written Prescription Date:  10/7/021  Last Fill Quantity: 180,  # refills: 3   Last office visit provider:  12/1/21     Requested Prescriptions   Pending Prescriptions Disp Refills     testosterone (ANDROGEL) 25 MG/2.5GM (1%) gel [Pharmacy Med Name: TESTOSTERONE 1% (25MG/2.5G) PK] 450 packet      Sig: PLACE 2 PACKETS (50 MG) ONTO THE SKIN DAILY       Androgen Agents Failed - 7/18/2022  5:57 PM        Failed - HCT less than 54% on file within past 12 mos     Recent Labs   Lab Test 12/01/21  1226   HCT 46.2             Failed - Refills for this classification require provider review        Failed - Blood pressure under 140/90 in past 6 months     BP Readings from Last 3 Encounters:   01/04/22 (!) 141/90   12/22/21 133/83   12/01/21 128/82                 Failed - Recent (6 mo) or future (30 days) visit within the authorizing provider's specialty        Passed - Patient is of age 12 and older        Passed - Lipid panel on file in past 12 mos     Recent Labs   Lab Test 09/15/21  0827 05/15/19  0922 09/13/16  0721   CHOL 226*   < > 226*   TRIG 270*   < > 262*   HDL 48   < > 40      < > 134*   NHDL  --   --  186*    < > = values in this interval not displayed.               Passed - ALT on file within past 12 mos     Recent Labs   Lab Test 12/28/21  0740   ALT 40             Passed - Medication is active on med list        Passed - Serum Testosterone on file within past 12 mos     Recent Labs   Lab Test 12/01/21  1226   TESTOSTTOTAL 310             Passed - Serum PSA on file within past 12 mos     Lab Results   Component Value Date    PSA 1.98 12/01/2021             Passed - Patient is not pregnant        Passed - No positive pregnancy test on file within past 12 mos        Passed - AST on file within past 12 mos     Recent Labs   Lab Test 12/28/21  0740   AST 21                  Mee Smith, RN 07/18/22  6:54 PM

## 2022-07-20 RX ORDER — TESTOSTERONE 25 MG/2.5G
GEL TRANSDERMAL
Qty: 450 PACKET | Refills: 3 | Status: SHIPPED | OUTPATIENT
Start: 2022-07-20 | End: 2022-07-27

## 2022-07-21 ENCOUNTER — PATIENT OUTREACH (OUTPATIENT)
Dept: GASTROENTEROLOGY | Facility: CLINIC | Age: 57
End: 2022-07-21

## 2022-07-21 NOTE — PROGRESS NOTES
Pt called to confirm timing of Dr Oliva's clinic on 8/8. Left VM    Called pt back to confirm timing and MRI timing the week prior on 8/1. Left     Vesta Beverly, RN, BSN,   [facility]  Care coordinator

## 2022-07-23 ENCOUNTER — NURSE TRIAGE (OUTPATIENT)
Dept: NURSING | Facility: CLINIC | Age: 57
End: 2022-07-23

## 2022-07-24 NOTE — TELEPHONE ENCOUNTER
"\"I've had diarrhea since yesterday.\" David states there is no consistency to the stool at all. Other symptoms include: \"really bad stomach cramps\" and current temp 101.6 (oral). David denies feeling faint/dizzy/ lightheaded or severe abdominal pain currently. 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 David describes his stool as \"feathery/ fluffy\" in consistency. Patient had stool x7 since this morning. Patient also having intermittent, severe lower abdominal pain when cramping is present. Currently no cramping and patient states there is no pain. At home Covid test was negative. Patient has been taking Pepto-Bismol and OTC fever reducer. Patient has been drinking fluids, but urine has been concentrated. Otherwise, David is awake, alert, and responding appropriately.     Triage guidelines recommend to see a provider within 24 hours. FNA RN advised on home care measures (i.e. increase fluid intake, monitor symptoms/ fever) and when to       RN advised on urgent care locations and hours for tomorrow. RN advised to call back with any changes, worsening of symptoms, and questions or concerns. Patient verbalized understanding of and agreement with plan and had no further questions.      Reason for Disposition    Fever > 101 F (38.3 C)    Additional Information    Negative: Shock suspected (e.g., cold/pale/clammy skin, too weak to stand, low BP, rapid pulse)    Negative: Difficult to awaken or acting confused (e.g., disoriented, slurred speech)    Negative: Sounds like a life-threatening emergency to the triager    Negative: [1] SEVERE abdominal pain (e.g., excruciating) AND [2] present > 1 hour    Negative: [1] SEVERE abdominal pain AND [2] age > 60    Negative: [1] Blood in the stool AND [2] moderate or large amount of blood    Negative: Black or tarry bowel movements  (Exception: chronic-unchanged  black-grey bowel movements AND is taking iron pills or " Pepto-bismol)    Negative: [1] SEVERE diarrhea (e.g., 7 or more times / day more than normal) AND [2] age > 60 years    Negative: [1] Constant abdominal pain AND [2] present > 2 hours    Negative: [1] Fever > 103 F (39.4 C) AND [2] not able to get the fever down using Fever Care Advice    Negative: [1] SEVERE diarrhea (e.g., 7 or more times / day more than normal) AND [2] present > 24 hours (1 day)    Negative: [1] MODERATE diarrhea (e.g., 4-6 times / day more than normal) AND [2] present > 48 hours (2 days)    Negative: [1] MODERATE diarrhea (e.g., 4-6 times / day more than normal) AND [2] age > 70 years    Protocols used: DIARRHEA-A-    Natalia Love RN-BSN  [facility]

## 2022-07-25 ENCOUNTER — MYC MEDICAL ADVICE (OUTPATIENT)
Dept: INTERNAL MEDICINE | Facility: CLINIC | Age: 57
End: 2022-07-25

## 2022-07-25 DIAGNOSIS — F40.240 CLAUSTROPHOBIA: Primary | ICD-10-CM

## 2022-07-25 RX ORDER — DIAZEPAM 5 MG
5-10 TABLET ORAL ONCE
Qty: 2 TABLET | Refills: 0 | Status: SHIPPED | OUTPATIENT
Start: 2022-07-25 | End: 2022-07-25

## 2022-07-25 NOTE — TELEPHONE ENCOUNTER
Silvia,     Please see patient message requesting Rx for anxiety medication for upcoming MRI.   Please review/advise when able.    Thank you,   Bella Oseguera RN, BSN  [facility]

## 2022-07-26 ENCOUNTER — OFFICE VISIT (OUTPATIENT)
Dept: FAMILY MEDICINE | Facility: CLINIC | Age: 57
End: 2022-07-26
Payer: COMMERCIAL

## 2022-07-26 VITALS
OXYGEN SATURATION: 99 % | BODY MASS INDEX: 30.92 KG/M2 | DIASTOLIC BLOOD PRESSURE: 83 MMHG | TEMPERATURE: 98.6 F | HEIGHT: 70 IN | SYSTOLIC BLOOD PRESSURE: 121 MMHG | WEIGHT: 216 LBS | HEART RATE: 82 BPM | RESPIRATION RATE: 17 BRPM

## 2022-07-26 DIAGNOSIS — R50.9 FEVER, UNSPECIFIED FEVER CAUSE: ICD-10-CM

## 2022-07-26 DIAGNOSIS — R19.7 DIARRHEA, UNSPECIFIED TYPE: Primary | ICD-10-CM

## 2022-07-26 LAB
ALBUMIN SERPL-MCNC: 3.5 G/DL (ref 3.4–5)
ALP SERPL-CCNC: 72 U/L (ref 40–150)
ALT SERPL W P-5'-P-CCNC: 33 U/L (ref 0–70)
ANION GAP SERPL CALCULATED.3IONS-SCNC: 4 MMOL/L (ref 3–14)
AST SERPL W P-5'-P-CCNC: 21 U/L (ref 0–45)
BILIRUB SERPL-MCNC: 0.7 MG/DL (ref 0.2–1.3)
BUN SERPL-MCNC: 12 MG/DL (ref 7–30)
C DIFF TOX B STL QL: NEGATIVE
CALCIUM SERPL-MCNC: 8.7 MG/DL (ref 8.5–10.1)
CHLORIDE BLD-SCNC: 106 MMOL/L (ref 94–109)
CO2 SERPL-SCNC: 27 MMOL/L (ref 20–32)
CREAT SERPL-MCNC: 1.17 MG/DL (ref 0.66–1.25)
ERYTHROCYTE [DISTWIDTH] IN BLOOD BY AUTOMATED COUNT: 13.4 % (ref 10–15)
FLUAV AG SPEC QL IA: NEGATIVE
FLUBV AG SPEC QL IA: NEGATIVE
GFR SERPL CREATININE-BSD FRML MDRD: 73 ML/MIN/1.73M2
GLUCOSE BLD-MCNC: 96 MG/DL (ref 70–99)
HCT VFR BLD AUTO: 43.7 % (ref 40–53)
HGB BLD-MCNC: 14.4 G/DL (ref 13.3–17.7)
LIPASE SERPL-CCNC: 118 U/L (ref 73–393)
MCH RBC QN AUTO: 28.1 PG (ref 26.5–33)
MCHC RBC AUTO-ENTMCNC: 33 G/DL (ref 31.5–36.5)
MCV RBC AUTO: 85 FL (ref 78–100)
PLATELET # BLD AUTO: 192 10E3/UL (ref 150–450)
POTASSIUM BLD-SCNC: 4.5 MMOL/L (ref 3.4–5.3)
PROT SERPL-MCNC: 7.2 G/DL (ref 6.8–8.8)
RBC # BLD AUTO: 5.12 10E6/UL (ref 4.4–5.9)
SARS-COV-2 RNA RESP QL NAA+PROBE: NEGATIVE
SODIUM SERPL-SCNC: 137 MMOL/L (ref 133–144)
WBC # BLD AUTO: 5.5 10E3/UL (ref 4–11)

## 2022-07-26 PROCEDURE — 85027 COMPLETE CBC AUTOMATED: CPT | Performed by: PHYSICIAN ASSISTANT

## 2022-07-26 PROCEDURE — 83690 ASSAY OF LIPASE: CPT | Performed by: PHYSICIAN ASSISTANT

## 2022-07-26 PROCEDURE — U0005 INFEC AGEN DETEC AMPLI PROBE: HCPCS | Performed by: PHYSICIAN ASSISTANT

## 2022-07-26 PROCEDURE — 80053 COMPREHEN METABOLIC PANEL: CPT | Performed by: PHYSICIAN ASSISTANT

## 2022-07-26 PROCEDURE — 87493 C DIFF AMPLIFIED PROBE: CPT | Mod: 59 | Performed by: PHYSICIAN ASSISTANT

## 2022-07-26 PROCEDURE — 87804 INFLUENZA ASSAY W/OPTIC: CPT | Performed by: PHYSICIAN ASSISTANT

## 2022-07-26 PROCEDURE — 99213 OFFICE O/P EST LOW 20 MIN: CPT | Mod: CS | Performed by: PHYSICIAN ASSISTANT

## 2022-07-26 PROCEDURE — U0003 INFECTIOUS AGENT DETECTION BY NUCLEIC ACID (DNA OR RNA); SEVERE ACUTE RESPIRATORY SYNDROME CORONAVIRUS 2 (SARS-COV-2) (CORONAVIRUS DISEASE [COVID-19]), AMPLIFIED PROBE TECHNIQUE, MAKING USE OF HIGH THROUGHPUT TECHNOLOGIES AS DESCRIBED BY CMS-2020-01-R: HCPCS | Performed by: PHYSICIAN ASSISTANT

## 2022-07-26 PROCEDURE — 87506 IADNA-DNA/RNA PROBE TQ 6-11: CPT | Performed by: PHYSICIAN ASSISTANT

## 2022-07-26 PROCEDURE — 36415 COLL VENOUS BLD VENIPUNCTURE: CPT | Performed by: PHYSICIAN ASSISTANT

## 2022-07-26 ASSESSMENT — ASTHMA QUESTIONNAIRES
QUESTION_4 LAST FOUR WEEKS HOW OFTEN HAVE YOU USED YOUR RESCUE INHALER OR NEBULIZER MEDICATION (SUCH AS ALBUTEROL): ONCE A WEEK OR LESS
QUESTION_5 LAST FOUR WEEKS HOW WOULD YOU RATE YOUR ASTHMA CONTROL: COMPLETELY CONTROLLED
ACT_TOTALSCORE: 24
QUESTION_2 LAST FOUR WEEKS HOW OFTEN HAVE YOU HAD SHORTNESS OF BREATH: NOT AT ALL
ACT_TOTALSCORE: 24
QUESTION_1 LAST FOUR WEEKS HOW MUCH OF THE TIME DID YOUR ASTHMA KEEP YOU FROM GETTING AS MUCH DONE AT WORK, SCHOOL OR AT HOME: NONE OF THE TIME
QUESTION_3 LAST FOUR WEEKS HOW OFTEN DID YOUR ASTHMA SYMPTOMS (WHEEZING, COUGHING, SHORTNESS OF BREATH, CHEST TIGHTNESS OR PAIN) WAKE YOU UP AT NIGHT OR EARLIER THAN USUAL IN THE MORNING: NOT AT ALL

## 2022-07-26 ASSESSMENT — PAIN SCALES - GENERAL: PAINLEVEL: NO PAIN (0)

## 2022-07-26 ASSESSMENT — PATIENT HEALTH QUESTIONNAIRE - PHQ9
SUM OF ALL RESPONSES TO PHQ QUESTIONS 1-9: 8
SUM OF ALL RESPONSES TO PHQ QUESTIONS 1-9: 8
10. IF YOU CHECKED OFF ANY PROBLEMS, HOW DIFFICULT HAVE THESE PROBLEMS MADE IT FOR YOU TO DO YOUR WORK, TAKE CARE OF THINGS AT HOME, OR GET ALONG WITH OTHER PEOPLE: SOMEWHAT DIFFICULT

## 2022-07-26 NOTE — PATIENT INSTRUCTIONS
Metamucil fiber three times a day   [place] diet  White sticky rice   Drink more water

## 2022-07-26 NOTE — PROGRESS NOTES
"  Assessment & Plan   Problem List Items Addressed This Visit    None     Visit Diagnoses     Diarrhea, unspecified type    -  Primary    Relevant Orders    Enteric Bacteria and Virus Panel by ADAN Stool    Comprehensive metabolic panel (BMP + Alb, Alk Phos, ALT, AST, Total. Bili, TP)    Lipase    Clostridium difficile Toxin B PCR    CBC with platelets (Completed)    Influenza A/B antigen (Completed)    Symptomatic; Yes; 7/22/2022 COVID-19 Virus (Coronavirus) by PCR Nose    Fever, unspecified fever cause        Relevant Orders    Enteric Bacteria and Virus Panel by ADAN Stool    Comprehensive metabolic panel (BMP + Alb, Alk Phos, ALT, AST, Total. Bili, TP)    Lipase    CBC with platelets (Completed)    Influenza A/B antigen (Completed)    Symptomatic; Yes; 7/22/2022 COVID-19 Virus (Coronavirus) by PCR Nose         Improving   Metamucil fiber three times a day   [place] diet  White sticky rice   Drink more water     20 minutes spent on the date of the encounter doing chart review, history and exam, documentation and further activities per the note       BMI:   Estimated body mass index is 30.77 kg/m  as calculated from the following:    Height as of this encounter: 1.784 m (5' 10.25\").    Weight as of this encounter: 98 kg (216 lb).           No follow-ups on file.    Paris Salinas PA-C  [facility] JOCE Hernandez is a 57 year old, presenting for the following health issues:  Fever, Diarrhea, and Laceration (On left leg wants provider to look at)      History of Present Illness       Reason for visit:  Diarrhea & Temp - five days  Symptom onset:  3-7 days ago  Symptoms include:  Watery diarrhea, headache, temp between 101.6 & 102  Symptom intensity:  Moderate  Symptom progression:  Staying the same  Had these symptoms before:  No  What makes it worse:  Food  What makes it better:  Rest    He eats 2-3 servings of fruits and vegetables daily.He consumes 0 sweetened beverage(s) " "daily.He exercises with enough effort to increase his heart rate 20 to 29 minutes per day.  He exercises with enough effort to increase his heart rate 3 or less days per week.   He is taking medications regularly.    Today's PHQ-9         PHQ-9 Total Score: 8    PHQ-9 Q9 Thoughts of better off dead/self-harm past 2 weeks :   Not at all    How difficult have these problems made it for you to do your work, take care of things at home, or get along with other people: Somewhat difficult       Diarrhea  Onset/Duration: Friday  Description:       Consistency of stool: watery       Blood in stool: N/A- patient is color blind       Number of loose stools past 24 hours: 7  Progression of Symptoms: improving very slightly  Accompanying signs and symptoms:       Fever: YES had temp friday-Sunday and headache        Nausea/Vomiting: No       Abdominal pain: YES- cramping before he has to go now.        Weight loss: YES- 5 lbs since friday       Episodes of constipation: No  History   Ill contacts: No  Recent use of antibiotics: No  [place]: [place] [facility] [place] [facility]  Recent medication-new or changes(Rx or OTC): No   Precipitating or alleviating factors: None  Therapies tried and outcome: Drinking fluids, bland diet, and resting  Pepto,         Review of Systems   Constitutional, HEENT, cardiovascular, pulmonary, gi and gu systems are negative, except as otherwise noted.      Objective    /83 (BP Location: Left arm, Patient Position: Sitting, Cuff Size: Adult Regular)   Pulse 82   Temp 98.6  F (37  C) (Tympanic)   Resp 17   Ht 1.784 m (5' 10.25\")   Wt 98 kg (216 lb)   SpO2 99%   BMI 30.77 kg/m    Body mass index is 30.77 kg/m .  Physical Exam   GENERAL: healthy, alert and no distress  EYES: Eyes grossly normal to inspection, PERRL and conjunctivae and sclerae normal  HENT: ear canals and TM's normal, nose and mouth without ulcers or lesions  NECK: no adenopathy, no asymmetry, " masses, or scars and thyroid normal to palpation  RESP: lungs clear to auscultation - no rales, rhonchi or wheezes  CV: regular rate and rhythm, normal S1 S2, no S3 or S4, no murmur, click or rub, no peripheral edema and peripheral pulses strong  ABDOMEN: soft, nontender, no hepatosplenomegaly, no masses and bowel sounds normal  MS: no gross musculoskeletal defects noted, no edema      Diagnostic Test Results:  Results for orders placed or performed in visit on 07/26/22 (from the past 24 hour(s))   Influenza A/B antigen    Specimen: Nose; Swab   Result Value Ref Range    Influenza A antigen Negative Negative    Influenza B antigen Negative Negative    Narrative    Test results must be correlated with clinical data. If necessary, results should be confirmed by a molecular assay or viral culture.   CBC with platelets   Result Value Ref Range    WBC Count 5.5 4.0 - 11.0 10e3/uL    RBC Count 5.12 4.40 - 5.90 10e6/uL    Hemoglobin 14.4 13.3 - 17.7 g/dL    Hematocrit 43.7 40.0 - 53.0 %    MCV 85 78 - 100 fL    MCH 28.1 26.5 - 33.0 pg    MCHC 33.0 31.5 - 36.5 g/dL    RDW 13.4 10.0 - 15.0 %    Platelet Count 192 150 - 450 10e3/uL                   .  ..

## 2022-07-27 ENCOUNTER — MYC MEDICAL ADVICE (OUTPATIENT)
Dept: FAMILY MEDICINE | Facility: CLINIC | Age: 57
End: 2022-07-27

## 2022-07-27 DIAGNOSIS — A04.5 CAMPYLOBACTER GASTROENTERITIS: Primary | ICD-10-CM

## 2022-07-27 DIAGNOSIS — L71.9 ROSACEA: ICD-10-CM

## 2022-07-27 DIAGNOSIS — E34.9 TESTOSTERONE DEFICIENCY: Primary | ICD-10-CM

## 2022-07-27 RX ORDER — TESTOSTERONE 25 MG/2.5G
GEL TRANSDERMAL
Qty: 450 PACKET | Refills: 3 | Status: SHIPPED | OUTPATIENT
Start: 2022-07-27 | End: 2023-01-25

## 2022-07-27 RX ORDER — AZITHROMYCIN 250 MG/1
500 TABLET, FILM COATED ORAL DAILY
Qty: 6 TABLET | Refills: 0 | Status: SHIPPED | OUTPATIENT
Start: 2022-07-27 | End: 2022-08-01

## 2022-07-27 NOTE — TELEPHONE ENCOUNTER
Patient + for campyobacter and wants to be advised what to do.    Routing to provider to advise.  Roxanna Bello BSN, RN

## 2022-07-27 NOTE — TELEPHONE ENCOUNTER
I received a PA request for Testosterone and noticed that the dx was Rosacea. I believe this was the the reason behind the PA. Order pended again.

## 2022-08-01 ENCOUNTER — HOSPITAL ENCOUNTER (OUTPATIENT)
Dept: MRI IMAGING | Facility: CLINIC | Age: 57
Discharge: HOME | End: 2022-08-01
Attending: INTERNAL MEDICINE | Admitting: INTERNAL MEDICINE
Payer: COMMERCIAL

## 2022-08-01 DIAGNOSIS — K86.2 PANCREAS CYST: ICD-10-CM

## 2022-08-01 PROCEDURE — 74183 MRI ABD W/O CNTR FLWD CNTR: CPT

## 2022-08-01 PROCEDURE — 255N000002 HC RX 255 OP 636: Performed by: INTERNAL MEDICINE

## 2022-08-01 PROCEDURE — 74183 MRI ABD W/O CNTR FLWD CNTR: CPT | Mod: 26 | Performed by: RADIOLOGY

## 2022-08-01 PROCEDURE — A9585 GADOBUTROL INJECTION: HCPCS | Performed by: INTERNAL MEDICINE

## 2022-08-01 RX ORDER — GADOBUTROL 604.72 MG/ML
10 INJECTION INTRAVENOUS ONCE
Status: COMPLETED | OUTPATIENT
Start: 2022-08-01 | End: 2022-08-01

## 2022-08-01 RX ORDER — AZITHROMYCIN 250 MG/1
500 TABLET, FILM COATED ORAL DAILY
Qty: 6 TABLET | Refills: 0 | Status: SHIPPED | OUTPATIENT
Start: 2022-08-01 | End: 2023-03-08

## 2022-08-01 RX ADMIN — GADOBUTROL 10 ML: 604.72 INJECTION INTRAVENOUS at 08:43

## 2022-08-01 NOTE — TELEPHONE ENCOUNTER
Routing to provider as FYI. See [employer] message below.     Mariella Ma RN, BSN  [facility]

## 2022-08-07 LAB
C COLI+JEJUNI+LARI FUSA STL QL NAA+PROBE: DETECTED
EC STX1 GENE STL QL NAA+PROBE: NOT DETECTED
EC STX2 GENE STL QL NAA+PROBE: NOT DETECTED
NOROV GI+II ORF1-ORF2 JNC STL QL NAA+PR: NOT DETECTED
RVA NSP5 STL QL NAA+PROBE: NOT DETECTED
SALMONELLA SP RPOD STL QL NAA+PROBE: NOT DETECTED
SHIGELLA SP+EIEC IPAH STL QL NAA+PROBE: NOT DETECTED
V CHOL+PARA RFBL+TRKH+TNAA STL QL NAA+PR: NOT DETECTED
Y ENTERO RECN STL QL NAA+PROBE: NOT DETECTED

## 2022-08-08 ENCOUNTER — OFFICE VISIT (OUTPATIENT)
Dept: GASTROENTEROLOGY | Facility: CLINIC | Age: 57
End: 2022-08-08
Attending: INTERNAL MEDICINE
Payer: COMMERCIAL

## 2022-08-08 VITALS
HEIGHT: 70 IN | TEMPERATURE: 97.7 F | SYSTOLIC BLOOD PRESSURE: 114 MMHG | RESPIRATION RATE: 16 BRPM | DIASTOLIC BLOOD PRESSURE: 74 MMHG | HEART RATE: 81 BPM | OXYGEN SATURATION: 98 % | BODY MASS INDEX: 31.12 KG/M2 | WEIGHT: 217.4 LBS

## 2022-08-08 DIAGNOSIS — K86.2 PANCREAS CYST: Primary | ICD-10-CM

## 2022-08-08 PROCEDURE — 99213 OFFICE O/P EST LOW 20 MIN: CPT | Performed by: INTERNAL MEDICINE

## 2022-08-08 PROCEDURE — G0463 HOSPITAL OUTPT CLINIC VISIT: HCPCS

## 2022-08-08 ASSESSMENT — PAIN SCALES - GENERAL: PAINLEVEL: NO PAIN (0)

## 2022-08-08 NOTE — NURSING NOTE
"Oncology Rooming Note    August 8, 2022 9:35 AM   David Marino is a 57 year old male who presents for:    Chief Complaint   Patient presents with     Oncology Clinic Visit     [facility] RETURN - PANCREATIC CYST     Initial Vitals: /74   Pulse 81   Temp 97.7  F (36.5  C)   Resp 16   Ht 1.784 m (5' 10.24\")   Wt 98.6 kg (217 lb 6.4 oz)   SpO2 98%   BMI 30.98 kg/m   Estimated body mass index is 30.98 kg/m  as calculated from the following:    Height as of this encounter: 1.784 m (5' 10.24\").    Weight as of this encounter: 98.6 kg (217 lb 6.4 oz). Body surface area is 2.21 meters squared.  No Pain (0) Comment: Data Unavailable   No LMP for male patient.  Allergies reviewed: Yes  Medications reviewed: Yes    Medications: Medication refills not needed today.  Pharmacy name entered into [facility]:    [id number] [place] [facility] - [place], [place] - [address] [facility] - [place], [place] - [address]    Clinical concerns: No new concerns. Pj was notified.      Simone Le LPN            "

## 2022-08-08 NOTE — LETTER
8/8/2022         RE: David Marino  [address]  [place] [address]        Dear Colleague,    Thank you for referring your patient, David Marino, to the [facility]. Please see a copy of my visit note below.    [facility]  GASTROENTEROLOGY PROGRESS NOTE  David Marino [id number]     SUBJECTIVE:    56 yo male being seen for surveillance of an incidentally-identified pancreatic cyst. This is in the setting of RXY gastric bypass which precludes effective assessment by EUS.    He was initially seen in consultation 2/14/22. Note from that visit is as follows:    GI CLINIC VISIT - NEW PATIENT     CC/REFERRING MD:  Dr. Walker     REASON FOR CONSULTATION: Pancreatic cyst     ASSESSMENT/PLAN:  Pancreatic cyst: Discussed potential that this could be neoplastic/pre-malignant, however that likelihood of subsequent malignancy is low. Discussed that the RXY bypass will preclude EUS and fluid aspiration (which is not currently indicated anyway per consensus guidelines). Reviewed surveillance recommendations.  He has no high-risk or worrisome features at present.     Fatty liver: Normal LFTs therefore no need for further evaluation at this time. Has history of obesity and recent alcohol abstinence.        PLAN:  - MRI in 6 and 12 months, then annually x 2 years, then every 2 years if stable, based on consensus guidelines. Earlier evaluation prn symptoms (unexplained weight loss, diarrhea, jaundice or acute pancreatitis).     RTC 6 months same day as MRI     Thank you for this consultation.  It was a pleasure to participate in the care of this patient; please contact us with any further questions.  A total of  33 minutes was spent on the day of the visit, >50% of which was counseling regarding the above delineated issues. A total of 17 minutes was on the telephone. The remainder was review of records and imaging as well as documentation and coordination of  pratima.        SUZIE Oliva MD  Professor of Medicine  Division of Gastroenterology, Hepatology and Nutrition  [facility]  -------------------------------------------------------------------------------------------------------------------  HPI:  The pt is a/n 57 year old male who I was asked to see in consultation at the request of Dr. Walker for evaluation of an incidentally-identified pancreatic cysts.     The pt underwent lumbar MRI for back pain evaluation 12/23/21. This incidentally identified left renal cystic lesions for which MRI was recommended. The renal MRI (with and without contrast) 12/28/21 showed:  IMPRESSION:  1.  Benign bilateral renal cysts with the largest and most complex categorized as a Bosniak 2 cyst in the left lower pole. No dedicated follow-up is required as outlined below.  2.  Diffuse hepatic steatosis.  3.  There is a 10 mm cyst in the head of the pancreas likely a side branch intraductal papillary mucinous neoplasm. Follow-up guidelines as below.     He was then referred to GI to discuss further cyst management.     Regarding the fatty liver, he had LFTs checked 12/28/21 which were normal. He has no history of alcohol misuse but did stop all alcohol use 11/27/21.     He quit smoking in 1997.     He has no history of acute pancreatitis. There is no family history of pancreatic disease. He has no had prior pancreatic imaging.     There is no jaundice, unexplained weight loss or diarrhea.  He had RXY gastric bypass 2018 and initially lost from baseline 245 lb to 170 lb, but has now regained to 220 lb. He is 5 ft 10 in tall.        ROS:  See history of present illness.     PERTINENT PAST MEDICAL HISTORY:  Past Medical History        Past Medical History:   Diagnosis Date     Asthma       Controlled     Depression       Depression       Hypercholesteremia       Hypertension       Hypertension       ZARIA (obstructive sleep apnea) Severe AHI 51 12/8/2017     HST 12/6/2017  Severe     PONV (postoperative nausea and vomiting)       Severe      Uncomplicated asthma        Chromhidrosis     PREVIOUS SURGERIES:  Past Surgical History         Past Surgical History:   Procedure Laterality Date     APPENDECTOMY         APPENDECTOMY         ARTHROSCOPY KNEE Right 09/28/2016     Procedure: Arthroscopic partial medial meniscectomy, right knee. Surgeon:  Keith Ayala MD  Location: [facility]     AS REMOVAL OF TONSILS,<11 Y/O         BARIATRIC SURGERY   05/2018     COLONOSCOPY N/A 8/30/2019     Procedure: ANOSCOPY HIGH RESOLUTION OR WITH ANESTHESIA fulgeration of anal condyloma;  Surgeon: Nimisha Zuniga MD;  Location: [facility];  Service: General     HC KNEE SCOPE, DIAGNOSTIC         Description: Arthroscopy Knee Left;  Proc Date: 01/01/1993;     HC KNEE SCOPE, DIAGNOSTIC         Description: Arthroscopy Knee Right;  Proc Date: 01/01/2013;  Comments: for R knee meniscal tear     HC REMOVE TONSILS/ADENOIDS,<12 Y/O         Description: Tonsillectomy With Adenoidectomy;  Proc Date: 01/01/2009;  Comments: Had sinus issues     LAPAROSCOPIC BYPASS GASTRIC N/A 5/21/2018     Procedure: LAPAROSCOPIC BYPASS GASTRIC;  LAPAROSCOPIC GASTRIC BYPASS     EBL: 7mL;  Surgeon: Noe Saunders MD;  Location: [facility] APPENDECTOMY         Description: Appendectomy;  Proc Date: 01/01/1984;     [facility] SYMPATHECTOMY,CERVICAL   2003     [facility] THORACOSCOPY, SURGICAL, W/ THORACIC SYMPATHECTOMY         Description: Thoracoscopy (Therapeutic) W/ Thoracic Sympathectomy;  Proc Date: 01/01/2004;  Comments: 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; ; endoscopic thoracic            PREVIOUS ENDOSCOPY:  Colonoscopy 6/11/19 [facility]     ALLERGIES:          Allergies   Allergen Reactions     Bee Pollen Other (See Comments)     Honey       Pollen Extract       Sulfa Drugs           PERTINENT MEDICATIONS:     Current Outpatient Medications:      albuterol (PROAIR HFA/PROVENTIL  HFA/VENTOLIN HFA) 108 (90 Base) MCG/ACT inhaler, Inhale 2 puffs into the lungs, Disp: , Rfl:      CALCIUM-VITAMIN D PO, Take 1 chew tab by mouth 3 times daily 1 = 250 mg, Disp: , Rfl:      diazepam (VALIUM) 5 MG tablet, Take 1 tablet (5 mg) by mouth every 6 hours as needed for anxiety (Prior to MRI), Disp: 2 tablet, Rfl: 0     diazepam (VALIUM) 5 MG tablet, Take 1 tab 2 hrs before MRI, take 1 tab 1 hr before MRI only if needed, Disp: 2 tablet, Rfl: 0     doxycycline monohydrate (MONODOX) 50 MG capsule, 2 x daily for 1 month then once daily for 2 months., Disp: 60 capsule, Rfl: 2     fluticasone-salmeterol (ADVAIR) 100-50 MCG/DOSE inhaler, Inhale 1 puff into the lungs every 12 hours, Disp: 1 each, Rfl: 3     Glycopyrrolate POWD, Compounded. Apply pea size amount every morning. 0.5% cream. For chromhidrosis, 30 gm jar, Disp: 30 g, Rfl: 11     lisinopril (ZESTRIL) 20 MG tablet, Take 1 tablet (20 mg) by mouth daily, Disp: 90 tablet, Rfl: 3     metroNIDAZOLE (METROGEL) 0.75 % topical gel, Apply topically 2 times daily Apply to AA on face and scalp bid, Disp: 90 g, Rfl: 11     montelukast (SINGULAIR) 10 MG tablet, TAKE 1 TAB BY MOUTH AT BEDTIME, Disp: 90 tablet, Rfl: 3     Multiple Vitamins-Minerals (MULTIVITAMIN ADULT PO), Take 2 tablets by mouth every morning , Disp: , Rfl:      Omega-3 Fatty Acids (FISH OIL OMEGA-3 PO), Take 1 capsule by mouth At Bedtime , Disp: , Rfl:      rosuvastatin (CRESTOR) 10 MG tablet, Take 1 tablet (10 mg) by mouth daily, Disp: 90 tablet, Rfl: 3     selegiline (EMSAM) 9 MG/24HR 24 hr patch, Place 1 patch onto the skin daily, Disp: 30 patch, Rfl: 11     testosterone (ANDROGEL) 25 MG/2.5GM (1%) gel, Place 2 packets (50 mg) onto the skin daily Apply to the skin daily. Please dispense a full package, Disp: 180 packet, Rfl: 3     traZODone (DESYREL) 50 MG tablet, Take 1 tablet (50 mg) by mouth At Bedtime, Disp: 90 tablet, Rfl: 3     SOCIAL HISTORY:  Social History   Social History             Socioeconomic History     Marital status:        Spouse name: Not on file     Number of children: Not on file     Years of education: Not on file     Highest education level: Not on file   Occupational History     Not on file   Tobacco Use     Smoking status: Former Smoker       Types: Cigarettes, Cigarettes       Start date: 1984       Quit date: 1997       Years since quittin.1     Smokeless tobacco: Never Used     Tobacco comment: smoker 30+ years ago   Substance and Sexual Activity     Alcohol use: Yes       Alcohol/week: 0.0 standard drinks       Comment: Alcoholic Drinks/day: OC     Drug use: No     Sexual activity: Not Currently       Partners: Male   Other Topics Concern     Parent/sibling w/ CABG, MI or angioplasty before 65F 55M? No   Social History Narrative     Not on file      Social Determinants of Health      Financial Resource Strain: Not on file   Food Insecurity: Not on file   Transportation Needs: Not on file   Physical Activity: Not on file   Stress: Not on file   Social Connections: Not on file   Intimate Partner Violence: Not on file   Housing Stability: Not on file         Quit alcohol 22     FAMILY HISTORY:  Family History         Family History   Problem Relation Age of Onset     Diabetes Mother       Coronary Artery Disease Mother       Hypertension Mother       Depression Mother       Asthma Mother       Glaucoma Mother       Coronary Artery Disease Father       Hypertension Father       Hyperlipidemia Father       Depression Maternal Grandmother       Glaucoma Maternal Grandmother       Cerebrovascular Disease No family hx of       Breast Cancer No family hx of       Colon Cancer No family hx of       Prostate Cancer No family hx of       Other Cancer No family hx of       Anxiety Disorder No family hx of       Mental Illness No family hx of       Substance Abuse No family hx of       Anesthesia Reaction No family hx of       Osteoporosis No family hx of        Genetic Disorder No family hx of       Thyroid Disease No family hx of       Obesity No family hx of       Unknown/Adopted No family hx of       Macular Degeneration No family hx of       Heart Disease Mother       CABG Mother       Heart Disease Father       Osteoarthritis Father       CABG Father       Hypertension Sister       Hyperlipidemia Sister       Depression Sister       Breast Cancer Sister       Hypertension Sister       Hyperlipidemia Sister       Dementia Maternal Grandmother       Early Death Maternal Grandmother       Hypertension Maternal Grandmother       Cerebrovascular Disease Maternal Grandmother       Arthritis Maternal Grandfather       Hearing Loss Maternal Grandfather       Heart Disease Maternal Grandfather       Hyperlipidemia Maternal Grandfather       Hypertension Maternal Grandfather       Arthritis Paternal Grandmother       Depression Paternal Grandmother       Heart Disease Paternal Grandmother       Hyperlipidemia Paternal Grandmother       Heart Disease Paternal Grandfather       Hyperlipidemia Paternal Grandfather       Asthma Other       Hypertension Other                 PHYSICAL EXAMINATION:  Telephone visit.     PERTINENT STUDIES:             Lab on 12/28/2021   Component Date Value Ref Range Status     Bilirubin Total 12/28/2021 0.8  0.0 - 1.0 mg/dL Final     Bilirubin Direct 12/28/2021 0.3  <=0.5 mg/dL Final     Protein Total 12/28/2021 6.5  6.0 - 8.0 g/dL Final     Albumin 12/28/2021 3.9  3.5 - 5.0 g/dL Final     Alkaline Phosphatase 12/28/2021 61  45 - 120 U/L Final     AST 12/28/2021 21  0 - 40 U/L Final     ALT 12/28/2021 40  0 - 45 U/L Final                           He had surveillance MRI 8/1/22. This showed:    IMPRESSION:   Allowing for differences in imaging technique, unchanged pancreatic  head cysts measuring up to 10 mm compared to 12/28/2021 MRI. Recommend  follow-up as described below.      Of note, this report mentions two cysts, whereas prior did not. In  review, I do not see the second smaller cyst on the prior scan, but this may represent differences in technique (there were no T2 blade images on the prior scan).    He is feeling well. He did recently develop infectious colitis due to campylobacter with diarrhea x 5 days. This has resolved. Otherwise he denies abdominal pain, vomiting, diarrhea, jaundice or unexplained weight loss.        OBJECTIVE:  VS: There were no vitals taken for this visit.   GEN: A&Ox3, NAD, comfortable  Anicteric, no jaundice.    IMPRESSION:  David Marino is a 57 year old male with incidentally-identified small pancreatic cysts without history of acute pancreatitis. Asymptomatic. Stable on imaging (suspect the smaller cyst was not seen previously due to technical issues due to the very small size).    Statistically likely to represent branch-duct variant IPMN. If so, there are no high-risk or worrisome features to suggest a need for surgical intervention. We discussed published guidelines and recommendations.    RECOMMENDATIONS:  Repeat MRI abdomen with IV contrast in 6 months. Follow-up at that time. Then if stable change to MRI annually x 2 years, then longterm every 2 yrs.    Earlier follow-up prn abdominal pain, pancreatitis, jaundice, chronic diarrhea for > 2 weeks or unexplained weight loss.    It was a pleasure to participate in the care of this patient; please contact us with any further questions.  A total of 21 minutes was spent on the day of the visit, >50% of which was counseling regarding the above delineated issues. The remainder was review of records and imaging as well as documentation and coordination of care.      SUZIE Oliva MD  Professor of Medicine  Division of Gastroenterology, Hepatology and Nutrition  [facility]  8/7/2022

## 2022-08-08 NOTE — PROGRESS NOTES
[facility]  GASTROENTEROLOGY PROGRESS NOTE  David Marino [id number]     SUBJECTIVE:    56 yo male being seen for surveillance of an incidentally-identified pancreatic cyst. This is in the setting of RXY gastric bypass which precludes effective assessment by EUS.    He was initially seen in consultation 2/14/22. Note from that visit is as follows:    GI CLINIC VISIT - NEW PATIENT     CC/REFERRING MD:  Dr. Walker     REASON FOR CONSULTATION: Pancreatic cyst     ASSESSMENT/PLAN:  Pancreatic cyst: Discussed potential that this could be neoplastic/pre-malignant, however that likelihood of subsequent malignancy is low. Discussed that the RXY bypass will preclude EUS and fluid aspiration (which is not currently indicated anyway per consensus guidelines). Reviewed surveillance recommendations.  He has no high-risk or worrisome features at present.     Fatty liver: Normal LFTs therefore no need for further evaluation at this time. Has history of obesity and recent alcohol abstinence.        PLAN:  - MRI in 6 and 12 months, then annually x 2 years, then every 2 years if stable, based on consensus guidelines. Earlier evaluation prn symptoms (unexplained weight loss, diarrhea, jaundice or acute pancreatitis).     RTC 6 months same day as MRI     Thank you for this consultation.  It was a pleasure to participate in the care of this patient; please contact us with any further questions.  A total of  33 minutes was spent on the day of the visit, >50% of which was counseling regarding the above delineated issues. A total of 17 minutes was on the telephone. The remainder was review of records and imaging as well as documentation and coordination of care.        SUZIE Oliva MD  Professor of Medicine  Division of Gastroenterology, Hepatology and Nutrition  [facility]  -------------------------------------------------------------------------------------------------------------------  HPI:  The pt is a/n 57  year old male who I was asked to see in consultation at the request of Dr. Walker for evaluation of an incidentally-identified pancreatic cysts.     The pt underwent lumbar MRI for back pain evaluation 12/23/21. This incidentally identified left renal cystic lesions for which MRI was recommended. The renal MRI (with and without contrast) 12/28/21 showed:  IMPRESSION:  1.  Benign bilateral renal cysts with the largest and most complex categorized as a Bosniak 2 cyst in the left lower pole. No dedicated follow-up is required as outlined below.  2.  Diffuse hepatic steatosis.  3.  There is a 10 mm cyst in the head of the pancreas likely a side branch intraductal papillary mucinous neoplasm. Follow-up guidelines as below.     He was then referred to GI to discuss further cyst management.     Regarding the fatty liver, he had LFTs checked 12/28/21 which were normal. He has no history of alcohol misuse but did stop all alcohol use 11/27/21.     He quit smoking in 1997.     He has no history of acute pancreatitis. There is no family history of pancreatic disease. He has no had prior pancreatic imaging.     There is no jaundice, unexplained weight loss or diarrhea.  He had RXY gastric bypass 2018 and initially lost from baseline 245 lb to 170 lb, but has now regained to 220 lb. He is 5 ft 10 in tall.        ROS:  See history of present illness.     PERTINENT PAST MEDICAL HISTORY:  Past Medical History        Past Medical History:   Diagnosis Date     Asthma       Controlled     Depression       Depression       Hypercholesteremia       Hypertension       Hypertension       ZARIA (obstructive sleep apnea) Severe AHI 51 12/8/2017     HST 12/6/2017 Severe     PONV (postoperative nausea and vomiting)       Severe      Uncomplicated asthma        Chromhidrosis     PREVIOUS SURGERIES:  Past Surgical History   Past Surgical History:   Procedure Laterality Date     APPENDECTOMY         APPENDECTOMY         ARTHROSCOPY KNEE Right  09/28/2016     Procedure: Arthroscopic partial medial meniscectomy, right knee. Surgeon:  Keith Ayala MD  Location: [facility]     AS REMOVAL OF TONSILS,<13 Y/O         BARIATRIC SURGERY   05/2018     COLONOSCOPY N/A 8/30/2019     Procedure: ANOSCOPY HIGH RESOLUTION OR WITH ANESTHESIA fulgeration of anal condyloma;  Surgeon: Nimisha Zuniga MD;  Location: [facility];  Service: General     HC KNEE SCOPE, DIAGNOSTIC         Description: Arthroscopy Knee Left;  Proc Date: 01/01/1993;     HC KNEE SCOPE, DIAGNOSTIC         Description: Arthroscopy Knee Right;  Proc Date: 01/01/2013;  Comments: for R knee meniscal tear     HC REMOVE TONSILS/ADENOIDS,<14 Y/O         Description: Tonsillectomy With Adenoidectomy;  Proc Date: 01/01/2009;  Comments: Had sinus issues     LAPAROSCOPIC BYPASS GASTRIC N/A 5/21/2018     Procedure: LAPAROSCOPIC BYPASS GASTRIC;  LAPAROSCOPIC GASTRIC BYPASS     EBL: 7mL;  Surgeon: Noe Saunders MD;  Location: [facility] APPENDECTOMY         Description: Appendectomy;  Proc Date: 01/01/1984;     [facility] SYMPATHECTOMY,CERVICAL   2003     [facility] THORACOSCOPY, SURGICAL, W/ THORACIC SYMPATHECTOMY         Description: Thoracoscopy (Therapeutic) W/ Thoracic Sympathectomy;  Proc Date: 01/01/2004;  Comments: 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; ; endoscopic thoracic            PREVIOUS ENDOSCOPY:  Colonoscopy 6/11/19 [facility]     ALLERGIES:          Allergies   Allergen Reactions     Bee Pollen Other (See Comments)     Honey       Pollen Extract       Sulfa Drugs           PERTINENT MEDICATIONS:     Current Outpatient Medications:      albuterol (PROAIR HFA/PROVENTIL HFA/VENTOLIN HFA) 108 (90 Base) MCG/ACT inhaler, Inhale 2 puffs into the lungs, Disp: , Rfl:      CALCIUM-VITAMIN D PO, Take 1 chew tab by mouth 3 times daily 1 = 250 mg, Disp: , Rfl:      diazepam (VALIUM) 5 MG tablet, Take 1 tablet (5 mg) by mouth every 6 hours as needed for anxiety  (Prior to MRI), Disp: 2 tablet, Rfl: 0     diazepam (VALIUM) 5 MG tablet, Take 1 tab 2 hrs before MRI, take 1 tab 1 hr before MRI only if needed, Disp: 2 tablet, Rfl: 0     doxycycline monohydrate (MONODOX) 50 MG capsule, 2 x daily for 1 month then once daily for 2 months., Disp: 60 capsule, Rfl: 2     fluticasone-salmeterol (ADVAIR) 100-50 MCG/DOSE inhaler, Inhale 1 puff into the lungs every 12 hours, Disp: 1 each, Rfl: 3     Glycopyrrolate POWD, Compounded. Apply pea size amount every morning. 0.5% cream. For chromhidrosis, 30 gm jar, Disp: 30 g, Rfl: 11     lisinopril (ZESTRIL) 20 MG tablet, Take 1 tablet (20 mg) by mouth daily, Disp: 90 tablet, Rfl: 3     metroNIDAZOLE (METROGEL) 0.75 % topical gel, Apply topically 2 times daily Apply to AA on face and scalp bid, Disp: 90 g, Rfl: 11     montelukast (SINGULAIR) 10 MG tablet, TAKE 1 TAB BY MOUTH AT BEDTIME, Disp: 90 tablet, Rfl: 3     Multiple Vitamins-Minerals (MULTIVITAMIN ADULT PO), Take 2 tablets by mouth every morning , Disp: , Rfl:      Omega-3 Fatty Acids (FISH OIL OMEGA-3 PO), Take 1 capsule by mouth At Bedtime , Disp: , Rfl:      rosuvastatin (CRESTOR) 10 MG tablet, Take 1 tablet (10 mg) by mouth daily, Disp: 90 tablet, Rfl: 3     selegiline (EMSAM) 9 MG/24HR 24 hr patch, Place 1 patch onto the skin daily, Disp: 30 patch, Rfl: 11     testosterone (ANDROGEL) 25 MG/2.5GM (1%) gel, Place 2 packets (50 mg) onto the skin daily Apply to the skin daily. Please dispense a full package, Disp: 180 packet, Rfl: 3     traZODone (DESYREL) 50 MG tablet, Take 1 tablet (50 mg) by mouth At Bedtime, Disp: 90 tablet, Rfl: 3     SOCIAL HISTORY:  Social History   Social History            Socioeconomic History     Marital status:        Spouse name: Not on file     Number of children: Not on file     Years of education: Not on file     Highest education level: Not on file   Occupational History     Not on file   Tobacco Use     Smoking status: Former Smoker        Types: Cigarettes, Cigarettes       Start date: 1984       Quit date: 1997       Years since quittin.1     Smokeless tobacco: Never Used     Tobacco comment: smoker 30+ years ago   Substance and Sexual Activity     Alcohol use: Yes       Alcohol/week: 0.0 standard drinks       Comment: Alcoholic Drinks/day: OC     Drug use: No     Sexual activity: Not Currently       Partners: Male   Other Topics Concern     Parent/sibling w/ CABG, MI or angioplasty before 65F 55M? No   Social History Narrative     Not on file      Social Determinants of Health      Financial Resource Strain: Not on file   Food Insecurity: Not on file   Transportation Needs: Not on file   Physical Activity: Not on file   Stress: Not on file   Social Connections: Not on file   Intimate Partner Violence: Not on file   Housing Stability: Not on file         Quit alcohol 22     FAMILY HISTORY:  Family History         Family History   Problem Relation Age of Onset     Diabetes Mother       Coronary Artery Disease Mother       Hypertension Mother       Depression Mother       Asthma Mother       Glaucoma Mother       Coronary Artery Disease Father       Hypertension Father       Hyperlipidemia Father       Depression Maternal Grandmother       Glaucoma Maternal Grandmother       Cerebrovascular Disease No family hx of       Breast Cancer No family hx of       Colon Cancer No family hx of       Prostate Cancer No family hx of       Other Cancer No family hx of       Anxiety Disorder No family hx of       Mental Illness No family hx of       Substance Abuse No family hx of       Anesthesia Reaction No family hx of       Osteoporosis No family hx of       Genetic Disorder No family hx of       Thyroid Disease No family hx of       Obesity No family hx of       Unknown/Adopted No family hx of       Macular Degeneration No family hx of       Heart Disease Mother       CABG Mother       Heart Disease Father       Osteoarthritis Father        CABG Father       Hypertension Sister       Hyperlipidemia Sister       Depression Sister       Breast Cancer Sister       Hypertension Sister       Hyperlipidemia Sister       Dementia Maternal Grandmother       Early Death Maternal Grandmother       Hypertension Maternal Grandmother       Cerebrovascular Disease Maternal Grandmother       Arthritis Maternal Grandfather       Hearing Loss Maternal Grandfather       Heart Disease Maternal Grandfather       Hyperlipidemia Maternal Grandfather       Hypertension Maternal Grandfather       Arthritis Paternal Grandmother       Depression Paternal Grandmother       Heart Disease Paternal Grandmother       Hyperlipidemia Paternal Grandmother       Heart Disease Paternal Grandfather       Hyperlipidemia Paternal Grandfather       Asthma Other       Hypertension Other                 PHYSICAL EXAMINATION:  Telephone visit.     PERTINENT STUDIES:             Lab on 12/28/2021   Component Date Value Ref Range Status     Bilirubin Total 12/28/2021 0.8  0.0 - 1.0 mg/dL Final     Bilirubin Direct 12/28/2021 0.3  <=0.5 mg/dL Final     Protein Total 12/28/2021 6.5  6.0 - 8.0 g/dL Final     Albumin 12/28/2021 3.9  3.5 - 5.0 g/dL Final     Alkaline Phosphatase 12/28/2021 61  45 - 120 U/L Final     AST 12/28/2021 21  0 - 40 U/L Final     ALT 12/28/2021 40  0 - 45 U/L Final                           He had surveillance MRI 8/1/22. This showed:    IMPRESSION:   Allowing for differences in imaging technique, unchanged pancreatic  head cysts measuring up to 10 mm compared to 12/28/2021 MRI. Recommend  follow-up as described below.      Of note, this report mentions two cysts, whereas prior did not. In review, I do not see the second smaller cyst on the prior scan, but this may represent differences in technique (there were no T2 blade images on the prior scan).    He is feeling well. He did recently develop infectious colitis due to campylobacter with diarrhea x 5 days. This has  resolved. Otherwise he denies abdominal pain, vomiting, diarrhea, jaundice or unexplained weight loss.        OBJECTIVE:  VS: There were no vitals taken for this visit.   GEN: A&Ox3, NAD, comfortable  Anicteric, no jaundice.    IMPRESSION:  David Marino is a 57 year old male with incidentally-identified small pancreatic cysts without history of acute pancreatitis. Asymptomatic. Stable on imaging (suspect the smaller cyst was not seen previously due to technical issues due to the very small size).    Statistically likely to represent branch-duct variant IPMN. If so, there are no high-risk or worrisome features to suggest a need for surgical intervention. We discussed published guidelines and recommendations.    RECOMMENDATIONS:  Repeat MRI abdomen with IV contrast in 6 months. Follow-up at that time. Then if stable change to MRI annually x 2 years, then longterm every 2 yrs.    Earlier follow-up prn abdominal pain, pancreatitis, jaundice, chronic diarrhea for > 2 weeks or unexplained weight loss.    It was a pleasure to participate in the care of this patient; please contact us with any further questions.  A total of 21 minutes was spent on the day of the visit, >50% of which was counseling regarding the above delineated issues. The remainder was review of records and imaging as well as documentation and coordination of care.    SUZIE Oliva MD  Professor of Medicine  Division of Gastroenterology, Hepatology and Nutrition  [facility]  8/7/2022

## 2022-08-09 DIAGNOSIS — K86.2 PANCREAS CYST: Primary | ICD-10-CM

## 2022-08-12 ENCOUNTER — MYC MEDICAL ADVICE (OUTPATIENT)
Dept: INTERNAL MEDICINE | Facility: CLINIC | Age: 57
End: 2022-08-12

## 2022-08-12 DIAGNOSIS — L71.9 ROSACEA: ICD-10-CM

## 2022-08-12 RX ORDER — METRONIDAZOLE 7.5 MG/G
GEL TOPICAL 2 TIMES DAILY
Qty: 90 G | Refills: 0 | Status: SHIPPED | OUTPATIENT
Start: 2022-08-12 | End: 2022-09-17

## 2022-08-12 NOTE — TELEPHONE ENCOUNTER
Prescription approved per [facility] Refill Protocol.  CHRISTINE ThompsonN, RN  [facility]

## 2022-09-11 ENCOUNTER — HEALTH MAINTENANCE LETTER (OUTPATIENT)
Age: 57
End: 2022-09-11

## 2022-09-17 DIAGNOSIS — L71.9 ROSACEA: ICD-10-CM

## 2022-09-17 RX ORDER — METRONIDAZOLE 7.5 MG/G
GEL TOPICAL
Qty: 90 G | Refills: 0 | Status: SHIPPED | OUTPATIENT
Start: 2022-09-17 | End: 2022-10-27

## 2022-09-18 NOTE — TELEPHONE ENCOUNTER
"Last Written Prescription Date: 8/12/2022  Last Fill Quantity: 90 g,  # refills: 0   Last office visit provider: 12/1/2021 with PCP MARSHA Diane CNP      Requested Prescriptions   Pending Prescriptions Disp Refills     metroNIDAZOLE (METROGEL) 0.75 % external gel [Pharmacy Med Name: METRONIDAZOLE TOPICAL 0.75% GL] 90 g 0     Sig: APPLY TOPICALLY 2 TIMES PER DAY TO AFFECTED AREAS ON FACE AND SCALP       Topical Acne Medications Protocol Passed - 9/17/2022  8:16 AM        Passed - Patient is 12 years of age or older        Passed - Recent (12 mo) or future (30 days) visit within the authorizing provider's specialty     Patient has had an office visit with the authorizing provider or a provider within the authorizing providers department within the previous 12 mos or has a future within next 30 days. See \"Patient Info\" tab in inbasket, or \"Choose Columns\" in Meds & Orders section of the refill encounter.              Passed - Medication is active on med list             Trudy Ovalles RN 09/17/22 10:54 PM  "

## 2022-09-26 DIAGNOSIS — E78.2 MIXED HYPERLIPIDEMIA: ICD-10-CM

## 2022-09-26 RX ORDER — ROSUVASTATIN CALCIUM 10 MG/1
10 TABLET, COATED ORAL DAILY
Qty: 90 TABLET | Refills: 3 | Status: SHIPPED | OUTPATIENT
Start: 2022-09-26 | End: 2023-11-20

## 2022-10-25 DIAGNOSIS — L71.9 ROSACEA: ICD-10-CM

## 2022-10-27 RX ORDER — METRONIDAZOLE 7.5 MG/G
GEL TOPICAL
Qty: 90 G | Refills: 0 | Status: SHIPPED | OUTPATIENT
Start: 2022-10-27 | End: 2022-12-05

## 2022-10-27 NOTE — TELEPHONE ENCOUNTER
"Routing refill request to provider for review/approval because:  Early refill requested.    Last Written Prescription Date:  9/17/22  Last Fill Quantity: 90 g,  # refills: 0   Last office visit provider:  12/1/21     Requested Prescriptions   Pending Prescriptions Disp Refills     metroNIDAZOLE (METROGEL) 0.75 % external gel [Pharmacy Med Name: METRONIDAZOLE TOPICAL 0.75% GL] 90 g 0     Sig: APPLY TOPICALLY 2 TIMES PER DAY TO AFFECTED AREAS ON FACE AND SCALP       Topical Acne Medications Protocol Passed - 10/27/2022 10:28 AM        Passed - Patient is 12 years of age or older        Passed - Recent (12 mo) or future (30 days) visit within the authorizing provider's specialty     Patient has had an office visit with the authorizing provider or a provider within the authorizing providers department within the previous 12 mos or has a future within next 30 days. See \"Patient Info\" tab in inbasket, or \"Choose Columns\" in Meds & Orders section of the refill encounter.              Passed - Medication is active on med list             Cachorro Minor RN 10/27/22 10:28 AM  "

## 2022-12-05 ENCOUNTER — MYC MEDICAL ADVICE (OUTPATIENT)
Dept: INTERNAL MEDICINE | Facility: CLINIC | Age: 57
End: 2022-12-05

## 2022-12-05 DIAGNOSIS — J45.20 MILD INTERMITTENT ASTHMA WITHOUT COMPLICATION: ICD-10-CM

## 2022-12-05 DIAGNOSIS — L71.9 ROSACEA: ICD-10-CM

## 2022-12-05 DIAGNOSIS — L75.1 CHROMHIDROSIS: ICD-10-CM

## 2022-12-05 DIAGNOSIS — I10 BENIGN ESSENTIAL HYPERTENSION: ICD-10-CM

## 2022-12-05 DIAGNOSIS — F33.1 MODERATE EPISODE OF RECURRENT MAJOR DEPRESSIVE DISORDER (H): ICD-10-CM

## 2022-12-06 NOTE — TELEPHONE ENCOUNTER
"Routing refill request to provider for review/approval because:  Drug not on the FM refill protocol   Patient needs to be seen because it has been more than 1 year since last office visit.    Last Written Prescription Date:  12/1/21  Last Fill Quantity: 30,  # refills: 11   Last office visit provider:  12/1/21     Requested Prescriptions   Pending Prescriptions Disp Refills     metroNIDAZOLE (METROGEL) 0.75 % external gel 90 g 0       Topical Acne Medications Protocol Failed - 12/5/2022  3:57 PM        Failed - Recent (12 mo) or future (30 days) visit within the authorizing provider's specialty     Patient has had an office visit with the authorizing provider or a provider within the authorizing providers department within the previous 12 mos or has a future within next 30 days. See \"Patient Info\" tab in inbasket, or \"Choose Columns\" in Meds & Orders section of the refill encounter.              Passed - Patient is 12 years of age or older        Passed - Medication is active on med list           selegiline (EMSAM) 9 MG/24HR 24 hr patch 30 patch 11     Sig: Place 1 patch onto the skin daily       There is no refill protocol information for this order          Mee Smith RN 12/06/22 2:51 PM  "

## 2022-12-06 NOTE — TELEPHONE ENCOUNTER
"Routing refill request to provider for review/approval because:  Patient needs to be seen because it has been more than 1 year since last office visit.    Last Written Prescription Date:  12/1/21  Last Fill Quantity: 90,  # refills: 3   Last office visit provider:  12/1/21     Requested Prescriptions   Pending Prescriptions Disp Refills     EMSAM 9 MG/24HR 24 hr patch [Pharmacy Med Name: EMSAM 9 MG/24 HOURS PATCH] 30 patch 11     Sig: APPLY 1 PATCH ONTO THE SKIN EVERY DAY       There is no refill protocol information for this order        lisinopril (ZESTRIL) 20 MG tablet [Pharmacy Med Name: LISINOPRIL 20 MG TABLET] 90 tablet 3     Sig: TAKE 1 TABLET BY MOUTH EVERY DAY       ACE Inhibitors (Including Combos) Protocol Failed - 12/5/2022  3:15 PM        Failed - Recent (12 mo) or future (30 days) visit within the authorizing provider's specialty     Patient has had an office visit with the authorizing provider or a provider within the authorizing providers department within the previous 12 mos or has a future within next 30 days. See \"Patient Info\" tab in inbasket, or \"Choose Columns\" in Meds & Orders section of the refill encounter.              Passed - Blood pressure under 140/90 in past 12 months     BP Readings from Last 3 Encounters:   08/08/22 114/74   07/26/22 121/83   01/04/22 (!) 141/90                 Passed - Medication is active on med list        Passed - Patient is age 18 or older        Passed - Normal serum creatinine on file in past 12 months     Recent Labs   Lab Test 07/26/22  1007   CR 1.17       Ok to refill medication if creatinine is low          Passed - Normal serum potassium on file in past 12 months     Recent Labs   Lab Test 07/26/22  1007   POTASSIUM 4.5                montelukast (SINGULAIR) 10 MG tablet [Pharmacy Med Name: MONTELUKAST SOD 10 MG TABLET] 90 tablet 3     Sig: TAKE 1 TABLET BY MOUTH EVERYDAY AT BEDTIME       Leukotriene Inhibitors Protocol Failed - 12/5/2022  3:15 PM        " "Failed - Recent (6 mo) or future (30 days) visit within the authorizing provider's specialty     Patient had office visit in the last 6 months or has a visit in the next 30 days with authorizing provider or within the authorizing provider's specialty.  See \"Patient Info\" tab in inbasket, or \"Choose Columns\" in Meds & Orders section of the refill encounter.            Passed - Patient is age 12 or older     If patient is under 16, ok to refill using age based dosing.           Passed - Asthma control assessment score within normal limits in last 6 months     Please review ACT score.           Passed - Medication is active on med list             Mee Smith RN 12/06/22 2:49 PM  "

## 2022-12-07 RX ORDER — SELEGILINE 9 MG/24H
PATCH TRANSDERMAL
Qty: 30 PATCH | Refills: 11 | Status: SHIPPED | OUTPATIENT
Start: 2022-12-07 | End: 2023-12-22

## 2022-12-07 RX ORDER — GLYCOPYRROLATE 100 %
POWDER (GRAM) MISCELLANEOUS
Qty: 30 G | Refills: 11 | Status: SHIPPED | OUTPATIENT
Start: 2022-12-07 | End: 2023-12-13

## 2022-12-07 RX ORDER — LISINOPRIL 20 MG/1
TABLET ORAL
Qty: 90 TABLET | Refills: 3 | Status: SHIPPED | OUTPATIENT
Start: 2022-12-07 | End: 2023-11-20

## 2022-12-07 RX ORDER — MONTELUKAST SODIUM 10 MG/1
TABLET ORAL
Qty: 90 TABLET | Refills: 3 | Status: SHIPPED | OUTPATIENT
Start: 2022-12-07 | End: 2023-12-13

## 2022-12-07 RX ORDER — METRONIDAZOLE 7.5 MG/G
GEL TOPICAL
Qty: 90 G | Refills: 3 | Status: SHIPPED | OUTPATIENT
Start: 2022-12-07 | End: 2023-04-13

## 2022-12-07 NOTE — TELEPHONE ENCOUNTER
Routing refill request to provider for review/approval because:  A break in medication. Last prescription was written on 3/6/2018 per medication list.    Pending Prescriptions:                       Disp   Refills    Glycopyrrolate POWD                       30 g   11           Sig: Compounded. Apply pea size amount every morning.           0.5% cream. For chromhidrosis, 30 gm jar    Signed Prescriptions:                        Disp   Refills    metroNIDAZOLE (METROGEL) 0.75 % external g*90 g   3        Sig: APPLY TOPICALLY 2 TIMES PER DAY TO AFFECTED AREAS ON           FACE AND SCALP Strength: 0.75 %  Authorizing Provider: WILLIAM CHILD    selegiline (EMSAM) 9 MG/24HR 24 hr patch   30 pat*11       Sig: Place 1 patch onto the skin daily  Authorizing Provider: WILLIAM CHILD    Last Written Prescription Date:  3/6/18  Last Fill Quantity: 30g,  # refills: 11   Last office visitwith prescribing provider: 12/1/2021   Future Office Visit:  None scheduled.      Mo Mao, BSN, RN  [facility]

## 2023-01-03 ENCOUNTER — TRANSFERRED RECORDS (OUTPATIENT)
Dept: HEALTH INFORMATION MANAGEMENT | Facility: CLINIC | Age: 58
End: 2023-01-03

## 2023-01-22 ENCOUNTER — HEALTH MAINTENANCE LETTER (OUTPATIENT)
Age: 58
End: 2023-01-22

## 2023-01-23 ENCOUNTER — MYC MEDICAL ADVICE (OUTPATIENT)
Dept: INTERNAL MEDICINE | Facility: CLINIC | Age: 58
End: 2023-01-23
Payer: COMMERCIAL

## 2023-01-23 DIAGNOSIS — G47.00 PERSISTENT INSOMNIA: ICD-10-CM

## 2023-01-23 DIAGNOSIS — E34.9 TESTOSTERONE DEFICIENCY: ICD-10-CM

## 2023-01-24 DIAGNOSIS — E34.9 TESTOSTERONE DEFICIENCY: ICD-10-CM

## 2023-01-24 RX ORDER — TESTOSTERONE 25 MG/2.5G
GEL TRANSDERMAL
Qty: 450 PACKET | Refills: 3 | OUTPATIENT
Start: 2023-01-24

## 2023-01-24 NOTE — TELEPHONE ENCOUNTER
"Routing refill request to provider for review/approval because:  Patient needs to be seen because it has been more than 1 year since last office visit.    Last Written Prescription Date:  1/17/22  Last Fill Quantity: 90,  # refills: 3   Last office visit provider:  12/1/21     Requested Prescriptions   Pending Prescriptions Disp Refills     traZODone (DESYREL) 50 MG tablet [Pharmacy Med Name: TRAZODONE 50 MG TABLET] 90 tablet 3     Sig: TAKE 1 TABLET BY MOUTH AT BEDTIME       Serotonin Modulators Failed - 1/23/2023 10:14 AM        Failed - Recent (12 mo) or future (30 days) visit within the authorizing provider's specialty     Patient has had an office visit with the authorizing provider or a provider within the authorizing providers department within the previous 12 mos or has a future within next 30 days. See \"Patient Info\" tab in inbasket, or \"Choose Columns\" in Meds & Orders section of the refill encounter.              Passed - Medication is active on med list        Passed - Patient is age 18 or older         Refused Prescriptions Disp Refills     testosterone (ANDROGEL) 25 MG/2.5GM (1%) gel [Pharmacy Med Name: TESTOSTERONE 1% (25MG/2.5G) PK] 450 packet 3     Sig: PLACE 2 PACKETS (50 MG) ONTO THE SKIN DAILY       Androgen Agents Failed - 1/24/2023  7:21 AM        Failed - Lipid panel on file in past 12 mos     Recent Labs   Lab Test 09/15/21  0827 05/15/19  0922 09/13/16  0721   CHOL 226*   < > 226*   TRIG 270*   < > 262*   HDL 48   < > 40      < > 134*   NHDL  --   --  186*    < > = values in this interval not displayed.               Failed - HCT less than 54% on file within past 12 mos     Recent Labs   Lab Test 07/26/22  1007   HCT 43.7             Failed - Serum Testosterone on file within past 12 mos     Recent Labs   Lab Test 12/01/21  1226   TESTOSTTOTAL 310             Failed - Serum PSA on file within past 12 mos     Lab Results   Component Value Date    PSA 1.98 12/01/2021             Failed " - Refills for this classification require provider review        Failed - Recent (6 mo) or future (30 days) visit within the authorizing provider's specialty        Passed - Patient is of age 12 and older        Passed - ALT on file within past 12 mos     Recent Labs   Lab Test 07/26/22  1007   ALT 33             Passed - Medication is active on med list        Passed - Blood pressure under 140/90 in past 6 months     BP Readings from Last 3 Encounters:   08/08/22 114/74   07/26/22 121/83   01/04/22 (!) 141/90                 Passed - Patient is not pregnant        Passed - No positive pregnancy test on file within past 12 mos        Passed - AST on file within past 12 mos     Recent Labs   Lab Test 07/26/22  1007   AST 21                  Cachorro Minor RN 01/24/23 7:23 AM

## 2023-01-25 RX ORDER — TESTOSTERONE 25 MG/2.5G
GEL TRANSDERMAL
Qty: 450 PACKET | Refills: 0 | Status: SHIPPED | OUTPATIENT
Start: 2023-01-25 | End: 2023-05-12

## 2023-01-25 RX ORDER — TRAZODONE HYDROCHLORIDE 50 MG/1
50 TABLET, FILM COATED ORAL AT BEDTIME
Qty: 90 TABLET | Refills: 0 | Status: SHIPPED | OUTPATIENT
Start: 2023-01-25 | End: 2023-04-13

## 2023-01-27 ENCOUNTER — DOCUMENTATION ONLY (OUTPATIENT)
Dept: OTHER | Age: 58
End: 2023-01-27

## 2023-01-31 ENCOUNTER — MYC MEDICAL ADVICE (OUTPATIENT)
Dept: INTERNAL MEDICINE | Facility: CLINIC | Age: 58
End: 2023-01-31
Payer: COMMERCIAL

## 2023-01-31 ENCOUNTER — PATIENT OUTREACH (OUTPATIENT)
Dept: GASTROENTEROLOGY | Facility: CLINIC | Age: 58
End: 2023-01-31
Payer: COMMERCIAL

## 2023-01-31 DIAGNOSIS — N52.9 ERECTILE DYSFUNCTION, UNSPECIFIED ERECTILE DYSFUNCTION TYPE: Primary | ICD-10-CM

## 2023-01-31 NOTE — PROGRESS NOTES
Colon Screen Outreach Status: INACTIVE: patient is receiving colon screening and follow up outside of [facility] [place].     Monika Muir RN on 1/31/2023 at 11:32 AM

## 2023-02-01 RX ORDER — SILDENAFIL 50 MG/1
50 TABLET, FILM COATED ORAL DAILY PRN
Qty: 10 TABLET | Refills: 0 | Status: SHIPPED | OUTPATIENT
Start: 2023-02-01

## 2023-03-06 ENCOUNTER — MYC MEDICAL ADVICE (OUTPATIENT)
Dept: INTERNAL MEDICINE | Facility: CLINIC | Age: 58
End: 2023-03-06
Payer: COMMERCIAL

## 2023-03-08 ENCOUNTER — OFFICE VISIT (OUTPATIENT)
Dept: INTERNAL MEDICINE | Facility: CLINIC | Age: 58
End: 2023-03-08
Payer: COMMERCIAL

## 2023-03-08 VITALS
RESPIRATION RATE: 16 BRPM | HEART RATE: 73 BPM | TEMPERATURE: 97.4 F | SYSTOLIC BLOOD PRESSURE: 121 MMHG | DIASTOLIC BLOOD PRESSURE: 70 MMHG | OXYGEN SATURATION: 98 % | WEIGHT: 205.1 LBS | HEIGHT: 70 IN | BODY MASS INDEX: 29.36 KG/M2

## 2023-03-08 DIAGNOSIS — M77.11 LATERAL EPICONDYLITIS OF RIGHT ELBOW: ICD-10-CM

## 2023-03-08 DIAGNOSIS — J01.41 ACUTE RECURRENT PANSINUSITIS: Primary | ICD-10-CM

## 2023-03-08 PROBLEM — F33.1 MODERATE EPISODE OF RECURRENT MAJOR DEPRESSIVE DISORDER (H): Status: ACTIVE | Noted: 2023-03-08

## 2023-03-08 PROCEDURE — 99213 OFFICE O/P EST LOW 20 MIN: CPT | Performed by: NURSE PRACTITIONER

## 2023-03-08 ASSESSMENT — ASTHMA QUESTIONNAIRES
ACT_TOTALSCORE: 18
QUESTION_3 LAST FOUR WEEKS HOW OFTEN DID YOUR ASTHMA SYMPTOMS (WHEEZING, COUGHING, SHORTNESS OF BREATH, CHEST TIGHTNESS OR PAIN) WAKE YOU UP AT NIGHT OR EARLIER THAN USUAL IN THE MORNING: TWO OR THREE NIGHTS A WEEK
QUESTION_5 LAST FOUR WEEKS HOW WOULD YOU RATE YOUR ASTHMA CONTROL: WELL CONTROLLED
QUESTION_1 LAST FOUR WEEKS HOW MUCH OF THE TIME DID YOUR ASTHMA KEEP YOU FROM GETTING AS MUCH DONE AT WORK, SCHOOL OR AT HOME: NONE OF THE TIME
QUESTION_4 LAST FOUR WEEKS HOW OFTEN HAVE YOU USED YOUR RESCUE INHALER OR NEBULIZER MEDICATION (SUCH AS ALBUTEROL): TWO OR THREE TIMES PER WEEK
ACT_TOTALSCORE: 18
QUESTION_2 LAST FOUR WEEKS HOW OFTEN HAVE YOU HAD SHORTNESS OF BREATH: ONCE OR TWICE A WEEK

## 2023-03-08 ASSESSMENT — ENCOUNTER SYMPTOMS: COUGH: 1

## 2023-03-08 ASSESSMENT — PATIENT HEALTH QUESTIONNAIRE - PHQ9
SUM OF ALL RESPONSES TO PHQ QUESTIONS 1-9: 8
10. IF YOU CHECKED OFF ANY PROBLEMS, HOW DIFFICULT HAVE THESE PROBLEMS MADE IT FOR YOU TO DO YOUR WORK, TAKE CARE OF THINGS AT HOME, OR GET ALONG WITH OTHER PEOPLE: SOMEWHAT DIFFICULT
SUM OF ALL RESPONSES TO PHQ QUESTIONS 1-9: 8

## 2023-03-08 NOTE — PROGRESS NOTES
"  Assessment & Plan     Acute recurrent pansinusitis: Continued sinus symptoms, will treat with Augmentin (not a smoker). Rest, push fluids. Follow up as scheduled.   - amoxicillin-clavulanate (AUGMENTIN) 875-125 MG tablet  Dispense: 20 tablet; Refill: 0    Lateral epicondylitis of right elbow: related to overuse, new job serving. Discussed using a hard tennis like ball to help massage area, heat, topical Volatren, or Icy hot/biofreeze. Follow up if symptoms persist.       BMI:   Estimated body mass index is 29.43 kg/m  as calculated from the following:    Height as of this encounter: 1.778 m (5' 10\").    Weight as of this encounter: 93 kg (205 lb 1.6 oz).       Return in about 19 days (around 3/27/2023) for Follow up.    [facility]    Brooke Hernandez is a 58 year old presenting for the following health issues:  Cough (Cough Over A Month)      The patient presents today for follow up. He reports having a chronic cough, he relates to post nasal drip and sinus issues that started over one month ago. He was treated on 01/09/23 with antibiotics, got better, but his symptoms have returned. He reports coughing so hard that he is gagging, this happens in spells from time to time. He reports nasal congestion, nasal pressure, and sinus pressure. He denies a fever, chills, nausea, or vomiting.     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 Since he started this job, he reports having right elbow pain, he thinks might be due to overuse. Discussed home supportive measures for this today.    He denies other concerns.    Cough    History of Present Illness       Reason for visit:  Cough/sinus infection  Symptom onset:  More than a month  Symptoms include:  Cough/stuffiness/sinus drainage  Symptom intensity:  Moderate  Symptom progression:  Staying the same  Had these " "symptoms before:  Yes  Has tried/received treatment for these symptoms:  Yes  Previous treatment was successful:  No  What makes it worse:  Night time  What makes it better:  Hot shower    He eats 2-3 servings of fruits and vegetables daily.He consumes 1 sweetened beverage(s) daily.He exercises with enough effort to increase his heart rate 10 to 19 minutes per day.  He exercises with enough effort to increase his heart rate 3 or less days per week. He is missing 1 dose(s) of medications per week.    Today's PHQ-9         PHQ-9 Total Score: 8    PHQ-9 Q9 Thoughts of better off dead/self-harm past 2 weeks :   Not at all    How difficult have these problems made it for you to do your work, take care of things at home, or get along with other people: Somewhat difficult     Review of Systems   Respiratory: Positive for cough.       Constitutional, HEENT, cardiovascular, pulmonary, GI, , musculoskeletal, neuro, skin, endocrine and psych systems are negative, except as otherwise noted.      Objective    /70   Pulse 73   Temp 97.4  F (36.3  C)   Resp 16   Ht 1.778 m (5' 10\")   Wt 93 kg (205 lb 1.6 oz)   SpO2 98%   BMI 29.43 kg/m    Body mass index is 29.43 kg/m .  Physical Exam   GENERAL: healthy, alert and no distress  EYES: Eyes grossly normal to inspection, PERRL and conjunctivae and sclerae normal  HENT: normal cephalic/atraumatic, ear canals and TM's normal, nasal mucosa edematous , sinuses: maxillary, frontal tenderness on bilateral and pharynx is red, swollen.   NECK: no adenopathy, no asymmetry, masses, or scars and thyroid normal to palpation  RESP: lungs clear to auscultation - no rales, rhonchi or wheezes  CV: regular rate and rhythm, normal S1 S2, no S3 or S4, no murmur, click or rub, no peripheral edema and peripheral pulses strong  MS: RUE exam shows muscle inflammation and tenderness near elbow.  SKIN: no suspicious lesions or rashes  NEURO: Normal strength and tone, mentation intact and speech " normal  PSYCH: mentation appears normal, affect normal/bright

## 2023-03-08 NOTE — PATIENT INSTRUCTIONS
Augmentin one tablet twice a day for 10 days for your sinuses.    Make sure to eat when you take this.  Take a good over-the-counter probiotic daily or eat yogurt, with probiotics in it.    For your tennis elbow try using a tennis ball or hard ball to roll on the top of your muscle.  Heat 15 minutes 4 times per day, Voltaren gel or IcyHot, Biofreeze topically.  Tylenol as needed for pain control.    If I need to send you to physical therapy for your elbow please let me know.    I will plan on seeing you back as scheduled, before then if anything comes up.

## 2023-03-27 ENCOUNTER — OFFICE VISIT (OUTPATIENT)
Dept: INTERNAL MEDICINE | Facility: CLINIC | Age: 58
End: 2023-03-27
Payer: COMMERCIAL

## 2023-03-27 VITALS
BODY MASS INDEX: 29.63 KG/M2 | WEIGHT: 207 LBS | HEIGHT: 70 IN | HEART RATE: 83 BPM | RESPIRATION RATE: 16 BRPM | SYSTOLIC BLOOD PRESSURE: 110 MMHG | DIASTOLIC BLOOD PRESSURE: 80 MMHG | OXYGEN SATURATION: 98 % | TEMPERATURE: 97 F

## 2023-03-27 DIAGNOSIS — G47.9 SLEEP DISTURBANCE: ICD-10-CM

## 2023-03-27 DIAGNOSIS — Z13.0 SCREENING FOR ENDOCRINE, NUTRITIONAL, METABOLIC AND IMMUNITY DISORDER: ICD-10-CM

## 2023-03-27 DIAGNOSIS — R97.20 ELEVATED PROSTATE SPECIFIC ANTIGEN (PSA): ICD-10-CM

## 2023-03-27 DIAGNOSIS — J32.4 CHRONIC PANSINUSITIS: ICD-10-CM

## 2023-03-27 DIAGNOSIS — G47.19 EXCESSIVE DAYTIME SLEEPINESS: ICD-10-CM

## 2023-03-27 DIAGNOSIS — Z13.21 SCREENING FOR ENDOCRINE, NUTRITIONAL, METABOLIC AND IMMUNITY DISORDER: ICD-10-CM

## 2023-03-27 DIAGNOSIS — M54.50 CHRONIC BILATERAL LOW BACK PAIN WITHOUT SCIATICA: ICD-10-CM

## 2023-03-27 DIAGNOSIS — F33.1 MODERATE EPISODE OF RECURRENT MAJOR DEPRESSIVE DISORDER (H): ICD-10-CM

## 2023-03-27 DIAGNOSIS — L71.9 ROSACEA: ICD-10-CM

## 2023-03-27 DIAGNOSIS — J45.20 MILD INTERMITTENT ASTHMA WITHOUT COMPLICATION: ICD-10-CM

## 2023-03-27 DIAGNOSIS — G89.29 CHRONIC BILATERAL LOW BACK PAIN WITHOUT SCIATICA: ICD-10-CM

## 2023-03-27 DIAGNOSIS — M77.11 LATERAL EPICONDYLITIS OF RIGHT ELBOW: ICD-10-CM

## 2023-03-27 DIAGNOSIS — Z13.228 SCREENING FOR ENDOCRINE, NUTRITIONAL, METABOLIC AND IMMUNITY DISORDER: ICD-10-CM

## 2023-03-27 DIAGNOSIS — Z00.00 ENCOUNTER FOR PHYSICAL EXAMINATION: Primary | ICD-10-CM

## 2023-03-27 DIAGNOSIS — Z13.29 SCREENING FOR ENDOCRINE, NUTRITIONAL, METABOLIC AND IMMUNITY DISORDER: ICD-10-CM

## 2023-03-27 PROCEDURE — 96127 BRIEF EMOTIONAL/BEHAV ASSMT: CPT | Performed by: NURSE PRACTITIONER

## 2023-03-27 PROCEDURE — 99396 PREV VISIT EST AGE 40-64: CPT | Mod: 25 | Performed by: NURSE PRACTITIONER

## 2023-03-27 PROCEDURE — 99214 OFFICE O/P EST MOD 30 MIN: CPT | Mod: 25 | Performed by: NURSE PRACTITIONER

## 2023-03-27 PROCEDURE — 90677 PCV20 VACCINE IM: CPT | Performed by: NURSE PRACTITIONER

## 2023-03-27 PROCEDURE — 90471 IMMUNIZATION ADMIN: CPT | Performed by: NURSE PRACTITIONER

## 2023-03-27 RX ORDER — EMTRICITABINE 200 MG/1
200 CAPSULE ORAL DAILY
COMMUNITY

## 2023-03-27 ASSESSMENT — ENCOUNTER SYMPTOMS
FEVER: 0
HEADACHES: 0
COUGH: 1
NERVOUS/ANXIOUS: 1
EYE PAIN: 0
MYALGIAS: 1
HEMATOCHEZIA: 0
DIARRHEA: 0
DIZZINESS: 0
HEARTBURN: 0
ARTHRALGIAS: 1
WEAKNESS: 0
SHORTNESS OF BREATH: 0
ABDOMINAL PAIN: 0
PARESTHESIAS: 0
HEMATURIA: 0
JOINT SWELLING: 0
DYSURIA: 0
FREQUENCY: 0
CONSTIPATION: 0
PALPITATIONS: 0
SORE THROAT: 0
CHILLS: 0
NAUSEA: 0

## 2023-03-27 ASSESSMENT — ASTHMA QUESTIONNAIRES
QUESTION_2 LAST FOUR WEEKS HOW OFTEN HAVE YOU HAD SHORTNESS OF BREATH: NOT AT ALL
QUESTION_3 LAST FOUR WEEKS HOW OFTEN DID YOUR ASTHMA SYMPTOMS (WHEEZING, COUGHING, SHORTNESS OF BREATH, CHEST TIGHTNESS OR PAIN) WAKE YOU UP AT NIGHT OR EARLIER THAN USUAL IN THE MORNING: ONCE A WEEK
QUESTION_5 LAST FOUR WEEKS HOW WOULD YOU RATE YOUR ASTHMA CONTROL: WELL CONTROLLED
ACT_TOTALSCORE: 21
ACT_TOTALSCORE: 21
QUESTION_4 LAST FOUR WEEKS HOW OFTEN HAVE YOU USED YOUR RESCUE INHALER OR NEBULIZER MEDICATION (SUCH AS ALBUTEROL): ONCE A WEEK OR LESS
QUESTION_1 LAST FOUR WEEKS HOW MUCH OF THE TIME DID YOUR ASTHMA KEEP YOU FROM GETTING AS MUCH DONE AT WORK, SCHOOL OR AT HOME: NONE OF THE TIME

## 2023-03-27 ASSESSMENT — PATIENT HEALTH QUESTIONNAIRE - PHQ9
SUM OF ALL RESPONSES TO PHQ QUESTIONS 1-9: 9
SUM OF ALL RESPONSES TO PHQ QUESTIONS 1-9: 9
10. IF YOU CHECKED OFF ANY PROBLEMS, HOW DIFFICULT HAVE THESE PROBLEMS MADE IT FOR YOU TO DO YOUR WORK, TAKE CARE OF THINGS AT HOME, OR GET ALONG WITH OTHER PEOPLE: SOMEWHAT DIFFICULT

## 2023-03-27 NOTE — PROGRESS NOTES
SUBJECTIVE:       Additional Questions 3/27/2023   Roomed by Marley RIDER   Patient has been advised of split billing requirements and indicates understanding: Yes  CC: David is an 58 year old who presents for preventative health visit.     David is here for his annual wellness visit.     He reports that he watches his diet closely and incorporates fruit, vegetables, fish, shrimp, and drinks a lot of water. For exercise, he typically walks or runs and will intermittently do weight training.     He reports that he is up to date with both the dentist and eye doctor.     He would like the pneumonia vaccine today.     He reports that he had a uvulectomy and his tonsils removed and this greatly helped his sleep apnea. He also reports that he has had increased daytime sleepiness and reports that he has fallen asleep while driving or while in a movie. We discussed the option of a sleep medicine referral, but he doesn't want to proceed at this time.     He reports that he has had ongoing issues with lateral epicondylitis of his right elbow, likely related to his job as a . He reports that it is especially bad in the morning. He has used a brace and topical creams, but it seems to be more bothersome as of lately.     He has ongoing chronic low back pain and pain in his right upper back, also likely related to his job as a . Discussed the option of a physical therapy and massage therapy referral.     He reports that his asthma has been unchanged and is managing well. He uses his advair in the morning and evening. He uses his rescue inhaler less than once per week.     He still takes Prilosec for his GERD and does not have any complaints.     He reports his depression is manageable and he is starting with a new therapist in the upcoming week.     He reports that he has a follow up MRI for his pancreatic cyst in May. He follows up for this every 6 months.     He has a sinus infection that is ongoing. He was treated with  augmentin around 3 weeks ago and still is having long lasting symptoms. He reports that this happens every year and seems to have a difficult time clearing it.     Healthy Habits:     Getting at least 3 servings of Calcium per day:  Yes    Bi-annual eye exam:  Yes    Dental care twice a year:  Yes    Sleep apnea or symptoms of sleep apnea:  Daytime drowsiness    Diet:  Regular (no restrictions)    Frequency of exercise:  None    Medication side effects:  Muscle aches    PHQ-2 Total Score: 2    Additional concerns today:  Yes      Today's PHQ-2 Score:   PHQ-2 (  [employer]) 3/27/2023   Q1: Little interest or pleasure in doing things 1   Q2: Feeling down, depressed or hopeless 1   PHQ-2 Score 2   PHQ-2 Total Score (12-17 Years)- Positive if 3 or more points; Administer PHQ-A if positive -   Q1: Little interest or pleasure in doing things Several days   Q2: Feeling down, depressed or hopeless Several days   PHQ-2 Score 2           Social History     Tobacco Use     Smoking status: Former     Types: Cigarettes     Start date: 1984     Quit date: 1997     Years since quittin.2     Smokeless tobacco: Never     Tobacco comments:     smoker 30+ years ago   Substance Use Topics     Alcohol use: Not Currently     Comment: No history of abuse. Quit 22         Alcohol Use 3/27/2023   Prescreen: >3 drinks/day or >7 drinks/week? Not Applicable       Last PSA:   Prostate Specific Antigen Screen   Date Value Ref Range Status   2021 1.98 0.00 - 3.50 ug/L Final       Reviewed orders with patient. Reviewed health maintenance and updated orders accordingly - Yes  Lab work is in process  Labs reviewed in EPIC    Reviewed and updated as needed this visit by clinical staff   Tobacco  Allergies  Meds              Reviewed and updated as needed this visit by Provider                  Review of Systems   Constitutional: Negative for chills and fever.   HENT: Positive for congestion. Negative for ear pain, hearing  "loss and sore throat.    Eyes: Negative for pain and visual disturbance.   Respiratory: Positive for cough. Negative for shortness of breath.    Cardiovascular: Negative for chest pain, palpitations and peripheral edema.   Gastrointestinal: Negative for abdominal pain, constipation, diarrhea, heartburn, hematochezia and nausea.   Genitourinary: Positive for impotence. Negative for dysuria, frequency, genital sores, hematuria, penile discharge and urgency.   Musculoskeletal: Positive for arthralgias and myalgias. Negative for joint swelling.   Skin: Negative for rash.   Neurological: Negative for dizziness, weakness, headaches and paresthesias.   Psychiatric/Behavioral: Negative for mood changes. The patient is nervous/anxious.        OBJECTIVE:   /80 (BP Location: Right arm, Patient Position: Sitting)   Pulse 83   Temp 97  F (36.1  C)   Resp 16   Ht 1.765 m (5' 9.5\")   Wt 93.9 kg (207 lb)   SpO2 98%   BMI 30.13 kg/m      Physical Exam  GENERAL: healthy, alert and no distress  EYES: Eyes grossly normal to inspection, PERRL and conjunctivae and sclerae normal  HENT: ear canals and TM's normal, nose and mouth without ulcers or lesions  NECK: no adenopathy, no asymmetry, masses, or scars and thyroid normal to palpation  RESP: lungs clear to auscultation - no rales, rhonchi or wheezes  CV: regular rate and rhythm, normal S1 S2, no S3 or S4, no murmur, click or rub, no peripheral edema and peripheral pulses strong  ABDOMEN: soft, nontender, no hepatosplenomegaly, no masses and bowel sounds normal  MS: no gross musculoskeletal defects noted, no edema  SKIN: no suspicious lesions or rashes  NEURO: Normal strength and tone, mentation intact and speech normal  PSYCH: mentation appears normal, affect normal/bright    Diagnostic Test Results:  Labs reviewed in Epic    ASSESSMENT/PLAN:   1. Encounter for physical examination  Completed today.     2. Moderate episode of recurrent major depressive disorder " (H)  Reports that he feels his depression has gotten better and will be seeing a new therapist in a week or so. PHQ today 9, previously 8. Stable.     3. Chronic bilateral low back pain without sciatica  Referral sent for PT and massage therapy for his chronic ongoing lower back pain. Continue supportive therapies. Unchanged and ongoing. Stable.   - Physical Therapy Referral; Future  - Massage Therapy Referral; Future    4. Chronic pansinusitis  Occurrence happens yearly. Patient still has symptoms after completing augmentin and doxycycline in the past. ENT referral sent for further workup.   - Adult ENT  Referral; Future    5. Rosacea  Ongoing rosacea and takes low dose doxycycline and uses metronidazole topically for management.     6. Sleep disturbance  Has had ongoing periods of falling asleep during the day. Discussed sleep referral, patient declined referral at this time.     7. Lateral epicondylitis of right elbow  Has had ongoing epicondylitis in his right elbow, likely due to his job as a  on the weekends. Sent referral for orthopedics and physical therapy. Also recommended ice, rest, heat, and topical therapies.   - Orthopedic  Referral; Future  - Physical Therapy Referral; Future    8. Mild intermittent asthma without complication  Continues Advair in am and pm. Uses rescue inhaler less than once per week. Stable and unchanged.     9. Excessive daytime sleepiness  Has had ongoing daytime sleepiness, will update TSH and testosterone levels.   - TSH with free T4 reflex; Future  - T3, total; Future  - Testosterone Free and Total; Future    10. Elevated prostate specific antigen (PSA)  Routine lab.   - PSA, screen; Future    11. Screening for endocrine, nutritional, metabolic and immunity disorder  - CBC with platelets and differential; Future  - Lipid panel reflex to direct LDL Fasting; Future  - Comprehensive metabolic panel (BMP + Alb, Alk Phos, ALT, AST, Total. Bili, TP); Future  -  "PSA, screen; Future      COUNSELING:   Reviewed preventive health counseling, as reflected in patient instructions       Regular exercise       Healthy diet/nutrition       Vision screening       BMI:   Estimated body mass index is 29.43 kg/m  as calculated from the following:    Height as of 3/8/23: 1.778 m (5' 10\").    Weight as of 3/8/23: 93 kg (205 lb 1.6 oz).       He reports that he quit smoking about 26 years ago. His smoking use included cigarettes. He started smoking about 39 years ago. He has never used smokeless tobacco.            Silvia Diane [facility]    Kelli Sanders, Nurse Practitioner Student  [facility]     Answers for HPI/ROS submitted by the patient on 3/27/2023  If you checked off any problems, how difficult have these problems made it for you to do your work, take care of things at home, or get along with other people?: Somewhat difficult  PHQ9 TOTAL SCORE: 9      "

## 2023-03-27 NOTE — PATIENT INSTRUCTIONS
It was great to meet you today! Great job with keeping up to date on the dentist, eye doctor, and with your diet and exercise.     We will let you know what your lab result back as once you come back and have them drawn.     You received your pneumonia vaccine and hepatitis B vaccines today.     We sent in a referral to ENT. They should reach out to you, otherwise, you can call them at [phone].    We sent a referral for ortho. They should call you, but if you don't hear from them, you can reach them at [phone].     If you decide you do want to see sleep therapy, their phone number is [phone].     Physical therapy should be reaching out to you to schedule, otherwise call them at [phone] .     Please call us if anything else comes up in the meantime.   
not examined

## 2023-03-28 NOTE — TELEPHONE ENCOUNTER
DIAGNOSIS: Lateral epicondylitis of right elbow   APPOINTMENT DATE: 4.6.23   NOTES STATUS DETAILS   OFFICE NOTE from referring provider Internal 3.27.23- Silvia Diane CNP    OFFICE NOTE from other specialist Internal 3.27.23- Silvia Diane CNP   MEDICATION LIST Internal

## 2023-04-05 ASSESSMENT — ENCOUNTER SYMPTOMS
MYALGIAS: 1
MUSCLE WEAKNESS: 0
NECK PAIN: 0
JOINT SWELLING: 0
PANIC: 0
ARTHRALGIAS: 1
STIFFNESS: 1
DECREASED CONCENTRATION: 1
DEPRESSION: 1
BACK PAIN: 1
NERVOUS/ANXIOUS: 0
INSOMNIA: 0
MUSCLE CRAMPS: 1

## 2023-04-05 NOTE — PROGRESS NOTES
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 Symptoms started in late December of 2022. There was not a precipitating event. The pain is located is underneath the scapula and posterior lateral elbow. Worst pain is rated a 10 of 10, and current pain is rated at 0 of 10 as he is not moving. Symptoms are worsened by movement and  Typing. Symptoms are improved with avoidance and rest and lidocaine patches and gabriella pad. Patient has tried bracing and topical creams with some relief. Associated symptoms include being achey and shrap. Notably, the patient has had a bariatric surgery in the past and has low back pain. No other concerns or complaints at this time.     PAST MEDICAL HISTORY:  Past Medical History:   Diagnosis Date     Asthma     Controlled     Depression      Depression      Hypercholesteremia      Hypertension      Hypertension      ZARIA (obstructive sleep apnea) Severe AHI 51 12/8/2017    HST 12/6/2017 Severe     PONV (postoperative nausea and vomiting)     Severe      Uncomplicated asthma        PAST SURGICAL HISTORY:  Past Surgical History:   Procedure Laterality Date     APPENDECTOMY       APPENDECTOMY       ARTHROSCOPY KNEE Right 09/28/2016    Procedure: Arthroscopic partial medial meniscectomy, right knee. Surgeon:  Keith Ayala MD  Location: [facility]     AS REMOVAL OF TONSILS,<11 Y/O       BARIATRIC SURGERY  05/2018     COLONOSCOPY N/A 8/30/2019    Procedure: ANOSCOPY HIGH RESOLUTION OR WITH ANESTHESIA fulgeration of anal condyloma;  Surgeon: Nimisha Zuniga MD;  Location: [facility];  Service: General     HC KNEE SCOPE, DIAGNOSTIC      Description: Arthroscopy  Knee Left;  Proc Date: 01/01/1993;     HC KNEE SCOPE, DIAGNOSTIC      Description: Arthroscopy Knee Right;  Proc Date: 01/01/2013;  Comments: for R knee meniscal tear      REMOVE TONSILS/ADENOIDS,<13 Y/O      Description: Tonsillectomy With Adenoidectomy;  Proc Date: 01/01/2009;  Comments: Had sinus issues     LAPAROSCOPIC BYPASS GASTRIC N/A 5/21/2018    Procedure: LAPAROSCOPIC BYPASS GASTRIC;  LAPAROSCOPIC GASTRIC BYPASS    EBL: 7mL;  Surgeon: Noe Saunders MD;  Location:  OR     [facility] APPENDECTOMY      Description: Appendectomy;  Proc Date: 01/01/1984;     [facility] SYMPATHECTOMY,CERVICAL  2003     [facility] THORACOSCOPY, SURGICAL, W/ THORACIC SYMPATHECTOMY      Description: Thoracoscopy (Therapeutic) W/ Thoracic Sympathectomy;  Proc Date: 01/01/2004;  Comments: 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 60 capsule 2     EMSAM 9 MG/24HR 24 hr patch APPLY 1 PATCH ONTO THE SKIN EVERY DAY 30 patch 11     emtricitabine (EMTRIVA) 200 MG capsule Take 200 mg by mouth daily       fluticasone-salmeterol (ADVAIR) 100-50 MCG/DOSE inhaler Inhale 1 puff into the lungs every 12 hours 1 each 3     Glycopyrrolate POWD Compounded. Apply pea size amount every morning. 0.5% cream. For chromhidrosis, 30 gm jar 30 g 11     lisinopril (ZESTRIL) 20 MG tablet TAKE 1 TABLET BY MOUTH EVERY DAY 90 tablet 3     metroNIDAZOLE (METROGEL) 0.75 % external gel APPLY TOPICALLY 2 TIMES PER DAY TO AFFECTED AREAS ON FACE AND SCALP Strength: 0.75 % 90 g 3     montelukast (SINGULAIR) 10 MG tablet  TAKE 1 TABLET BY MOUTH EVERYDAY AT BEDTIME 90 tablet 3     Multiple Vitamins-Minerals (MULTIVITAMIN ADULT PO) Take 2 tablets by mouth every morning        Omega-3 Fatty Acids (FISH OIL OMEGA-3 PO) Take 1 capsule by mouth At Bedtime        rosuvastatin (CRESTOR) 10 MG tablet Take 1 tablet (10 mg) by mouth daily 90 tablet 3     scopolamine (TRANSDERM) 1 MG/3DAYS 72 hr patch Place 1 patch onto the skin every 72 hours 10 patch 4     sildenafil (VIAGRA) 50 MG tablet Take 1 tablet (50 mg) by mouth daily as needed (erectile dysfunciton) 10 tablet 0     testosterone (ANDROGEL) 25 MG/2.5GM (1%) gel PLACE 2 PACKETS (50 MG) ONTO THE SKIN DAILY 450 packet 0     traZODone (DESYREL) 50 MG tablet Take 1 tablet (50 mg) by mouth At Bedtime 90 tablet 0       ALLERGIES:      Allergies   Allergen Reactions     Bee Pollen Other (See Comments)     Honey      Pollen Extract      Sulfa Drugs          FAMILY HISTORY: No pertinent family history, reviewed in EMR.    SOCIAL HISTORY:   Social History     Socioeconomic History     Marital status:      Spouse name: Not on file     Number of children: Not on file     Years of education: Not on file     Highest education level: Not on file   Occupational History     Not on file   Tobacco Use     Smoking status: Former     Types: Cigarettes     Start date: 1984     Quit date: 1997     Years since quittin.2     Smokeless tobacco: Never     Tobacco comments:     smoker 30+ years ago   Vaping Use     Vaping status: Never Used   Substance and Sexual Activity     Alcohol use: Not Currently     Comment: No history of abuse. Quit 22     Drug use: No     Sexual activity: Not Currently     Partners: Male   Other Topics Concern     Parent/sibling w/ CABG, MI or angioplasty before 65F 55M? No   Social History Narrative     Not on file     Social Determinants of Health     Financial Resource Strain: Not on file   Food Insecurity: Not on file   Transportation Needs: Not on file    Physical Activity: Not on file   Stress: Not on file   Social Connections: Not on file   Intimate Partner Violence: Not on file   Housing Stability: Not on file       REVIEW OF SYSTEMS: Positive for that noted in past medical history and history of present illness and otherwise reviewed in EMR    PHYSICAL EXAM:  Patient is Data Unavailable and weighs 0 lbs 0 oz. There were no vitals taken for this visit.  Constitutional: Well-developed, well-nourished, healthy appearing male.  Skin: Warm, dry   HEENT: Normal  Cardiac: Well perfused extremities, strong 2+ peripheral pulses. No edema.   Pulmonary: Breathing room air    Musculoskeletal:   Right Elbow:  Full passive ROM 0-140, full supination and pronation  Tender to palpation over lateral epicondyle, nontender medial epicondyle/distal biceps/olecranon  Pain with resisted ECRB and wrist extension  Neurovascular exam and cervical spine exam are normal.    Right shoulder  Full active range of motion about the shoulder with forward flexion of 170 degrees, external rotation of 60 degrees, internal rotation to T12  Tender to palpation over the scapulothoracic region  No scapular winging  5-5 supraspinatus, infraspinatus, subscapularis    IMAGING:  AP and lateral radiographs of the right elbow and AP, lateral, Zanca, and axillary radiographs of the right shoulder were ordered and reviewed by me personally showing no acute osseous abnormality     ASSESSMENT/SURGICAL DIAGNOSIS: 58 year old-year-old right hand dominant male with right elbow lateral epicondylitis (Tennis elbow)   And right scapulothoracic bursitis     PLAN:  I had a nice discussion with David today. I reviewed the diagnosis and prognosis of lateral epicondylitis (AKA Tennis Elbow). I explained that the first-line treatment of lateral epicondylitis is appropriate physiotherapy including epicondylar muscle strengthening exercises, mobilization, stretching, and deep friction massage. I explained that these  exercises should be taught by an occupational hand therapist experienced in treating lateral epicondylitis. I would ask that these exercises are done at least five times per day.  I also explained that no significant benefits have been demonstrated with shockwave, low frequency nerve stimulation, ultrasound, or pulsed EMG wave therapy for this problem.      If first-line treatment with physiotherapy fails, a PRP injection can be considered but does incur an out of pocket expense to the patient.  I explained the process by which this is done.  Current evidence and my personal experience dictates that corticosteroid injections should not be used for this diagnosis as significant rebound pain and worse 1 year outcomes are a known problem with corticosteroids for lateral epicondylitis. I also explained that the natural history studies show that most cases of lateral epicondylitis resolve within 1 to 2 years if left alone entirely.  In my practice, I will only offer surgery if no improvement or if symptoms worsen after 6 months to 1 year of appropriate and dedicated nonoperative treatment course exhausting therapy, tennis elbow counterforce bracing, NSAIDs, and PRP injections.  I did also explain that there is some uncertainty regarding the efficacy of the surgical procedures and that there are significant risks with surgery. David demonstrated excellent understanding of this and stated that he was in agreement with the treatment plan as outlined above.      We similarly discussed with the shoulder that I believe the pain is coming from scapulothoracic bursitis.  We discussed continued conservative treatment measures including physical therapy, Voltaren gel, lidocaine patches.  We discussed the importance of dynamic balancing of the scapular muscles.     David may return to see me in clinic in three to four months to evaluate his progress with therapy. If symptoms are well controlled and doing well at that time, it would  be ok to call ahead and cancel the appointment. David was in complete agreement with the treatment plan.     At the conclusion of the office visit, David verbally acknowledged that I answered all questions satisfactorily.

## 2023-04-06 ENCOUNTER — ANCILLARY PROCEDURE (OUTPATIENT)
Dept: GENERAL RADIOLOGY | Facility: CLINIC | Age: 58
End: 2023-04-06
Attending: ORTHOPAEDIC SURGERY
Payer: COMMERCIAL

## 2023-04-06 ENCOUNTER — OFFICE VISIT (OUTPATIENT)
Dept: ORTHOPEDICS | Facility: CLINIC | Age: 58
End: 2023-04-06
Attending: NURSE PRACTITIONER
Payer: COMMERCIAL

## 2023-04-06 ENCOUNTER — PRE VISIT (OUTPATIENT)
Dept: ORTHOPEDICS | Facility: CLINIC | Age: 58
End: 2023-04-06

## 2023-04-06 DIAGNOSIS — M25.511 RIGHT SHOULDER PAIN, UNSPECIFIED CHRONICITY: ICD-10-CM

## 2023-04-06 DIAGNOSIS — M25.511 RIGHT SHOULDER PAIN, UNSPECIFIED CHRONICITY: Primary | ICD-10-CM

## 2023-04-06 DIAGNOSIS — M77.11 LATERAL EPICONDYLITIS OF RIGHT ELBOW: ICD-10-CM

## 2023-04-06 DIAGNOSIS — M25.521 RIGHT ELBOW PAIN: ICD-10-CM

## 2023-04-06 DIAGNOSIS — M75.51 SCAPULOTHORACIC BURSITIS OF RIGHT SHOULDER: ICD-10-CM

## 2023-04-06 DIAGNOSIS — M25.521 RIGHT ELBOW PAIN: Primary | ICD-10-CM

## 2023-04-06 PROCEDURE — 73070 X-RAY EXAM OF ELBOW: CPT | Mod: RT | Performed by: RADIOLOGY

## 2023-04-06 PROCEDURE — 73030 X-RAY EXAM OF SHOULDER: CPT | Mod: RT | Performed by: RADIOLOGY

## 2023-04-06 PROCEDURE — 99204 OFFICE O/P NEW MOD 45 MIN: CPT | Performed by: ORTHOPAEDIC SURGERY

## 2023-04-06 NOTE — LETTER
4/6/2023         RE: David Marino  [address]  [place] [address]        Dear Colleague,    Thank you for referring your patient, David Marino, to the [facility]. Please see a copy of my visit note below.    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 Symptoms started in late December of 2022. There was not a precipitating event. The pain is located is underneath the scapula and posterior lateral elbow. Worst pain is rated a 10 of 10, and current pain is rated at 0 of 10 as he is not moving. Symptoms are worsened by movement and  Typing. Symptoms are improved with avoidance and rest and lidocaine patches and gabriella pad. Patient has tried bracing and topical creams with some relief. Associated symptoms include being achey and shrap. Notably, the patient has had a bariatric surgery in the past and has low back pain. No other concerns or complaints at this time.     PAST MEDICAL HISTORY:  Past Medical History:   Diagnosis Date    Asthma     Controlled    Depression     Depression     Hypercholesteremia     Hypertension     Hypertension     ZARIA (obstructive sleep apnea) Severe AHI 51 12/8/2017    HST 12/6/2017 Severe    PONV (postoperative nausea and vomiting)     Severe     Uncomplicated asthma        PAST SURGICAL HISTORY:  Past Surgical History:   Procedure Laterality Date    APPENDECTOMY      APPENDECTOMY      ARTHROSCOPY KNEE Right 09/28/2016    Procedure: Arthroscopic partial medial meniscectomy, right knee. Surgeon:  Keith Ayala MD  Location: [facility]    AS REMOVAL OF TONSILS,<14 Y/O      BARIATRIC SURGERY  05/2018     COLONOSCOPY N/A 8/30/2019    Procedure: ANOSCOPY HIGH RESOLUTION OR WITH ANESTHESIA fulgeration of anal condyloma;  Surgeon: Nimisha Zuniga MD;  Location: [facility];  Service: General    HC KNEE SCOPE, DIAGNOSTIC      Description: Arthroscopy Knee Left;  Proc Date: 01/01/1993;    HC KNEE SCOPE, DIAGNOSTIC      Description: Arthroscopy Knee Right;  Proc Date: 01/01/2013;  Comments: for R knee meniscal tear    HC REMOVE TONSILS/ADENOIDS,<11 Y/O      Description: Tonsillectomy With Adenoidectomy;  Proc Date: 01/01/2009;  Comments: Had sinus issues    LAPAROSCOPIC BYPASS GASTRIC N/A 5/21/2018    Procedure: LAPAROSCOPIC BYPASS GASTRIC;  LAPAROSCOPIC GASTRIC BYPASS    EBL: 7mL;  Surgeon: Noe Saunders MD;  Location: [facility] APPENDECTOMY      Description: Appendectomy;  Proc Date: 01/01/1984;    [facility] SYMPATHECTOMY,CERVICAL  2003    [facility] THORACOSCOPY, SURGICAL, W/ THORACIC SYMPATHECTOMY      Description: Thoracoscopy (Therapeutic) W/ Thoracic Sympathectomy;  Proc Date: 01/01/2004;  Comments: 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 60 capsule 2    EMSAM 9 MG/24HR 24 hr patch APPLY 1 PATCH ONTO THE SKIN EVERY DAY 30 patch 11    emtricitabine (EMTRIVA) 200 MG capsule Take 200 mg by mouth daily      fluticasone-salmeterol (ADVAIR) 100-50 MCG/DOSE inhaler Inhale 1 puff into the lungs every 12 hours 1 each 3    Glycopyrrolate POWD Compounded. Apply pea size amount every morning. 0.5% cream. For chromhidrosis, 30 gm jar 30 g 11    lisinopril  (ZESTRIL) 20 MG tablet TAKE 1 TABLET BY MOUTH EVERY DAY 90 tablet 3    metroNIDAZOLE (METROGEL) 0.75 % external gel APPLY TOPICALLY 2 TIMES PER DAY TO AFFECTED AREAS ON FACE AND SCALP Strength: 0.75 % 90 g 3    montelukast (SINGULAIR) 10 MG tablet TAKE 1 TABLET BY MOUTH EVERYDAY AT BEDTIME 90 tablet 3    Multiple Vitamins-Minerals (MULTIVITAMIN ADULT PO) Take 2 tablets by mouth every morning       Omega-3 Fatty Acids (FISH OIL OMEGA-3 PO) Take 1 capsule by mouth At Bedtime       rosuvastatin (CRESTOR) 10 MG tablet Take 1 tablet (10 mg) by mouth daily 90 tablet 3    scopolamine (TRANSDERM) 1 MG/3DAYS 72 hr patch Place 1 patch onto the skin every 72 hours 10 patch 4    sildenafil (VIAGRA) 50 MG tablet Take 1 tablet (50 mg) by mouth daily as needed (erectile dysfunciton) 10 tablet 0    testosterone (ANDROGEL) 25 MG/2.5GM (1%) gel PLACE 2 PACKETS (50 MG) ONTO THE SKIN DAILY 450 packet 0    traZODone (DESYREL) 50 MG tablet Take 1 tablet (50 mg) by mouth At Bedtime 90 tablet 0       ALLERGIES:      Allergies   Allergen Reactions    Bee Pollen Other (See Comments)    Honey     Pollen Extract     Sulfa Drugs          FAMILY HISTORY: No pertinent family history, reviewed in EMR.    SOCIAL HISTORY:   Social History     Socioeconomic History    Marital status:      Spouse name: Not on file    Number of children: Not on file    Years of education: Not on file    Highest education level: Not on file   Occupational History    Not on file   Tobacco Use    Smoking status: Former     Types: Cigarettes     Start date: 1984     Quit date: 1997     Years since quittin.2    Smokeless tobacco: Never    Tobacco comments:     smoker 30+ years ago   Vaping Use    Vaping status: Never Used   Substance and Sexual Activity    Alcohol use: Not Currently     Comment: No history of abuse. Quit 22    Drug use: No    Sexual activity: Not Currently     Partners: Male   Other Topics Concern    Parent/sibling w/ CABG, MI or  angioplasty before 65F 55M? No   Social History Narrative    Not on file     Social Determinants of Health     Financial Resource Strain: Not on file   Food Insecurity: Not on file   Transportation Needs: Not on file   Physical Activity: Not on file   Stress: Not on file   Social Connections: Not on file   Intimate Partner Violence: Not on file   Housing Stability: Not on file       REVIEW OF SYSTEMS: Positive for that noted in past medical history and history of present illness and otherwise reviewed in EMR    PHYSICAL EXAM:  Patient is Data Unavailable and weighs 0 lbs 0 oz. There were no vitals taken for this visit.  Constitutional: Well-developed, well-nourished, healthy appearing male.  Skin: Warm, dry   HEENT: Normal  Cardiac: Well perfused extremities, strong 2+ peripheral pulses. No edema.   Pulmonary: Breathing room air    Musculoskeletal:   Right Elbow:  Full passive ROM 0-140, full supination and pronation  Tender to palpation over lateral epicondyle, nontender medial epicondyle/distal biceps/olecranon  Pain with resisted ECRB and wrist extension  Neurovascular exam and cervical spine exam are normal.    Right shoulder  Full active range of motion about the shoulder with forward flexion of 170 degrees, external rotation of 60 degrees, internal rotation to T12  Tender to palpation over the scapulothoracic region  No scapular winging  5-5 supraspinatus, infraspinatus, subscapularis    IMAGING:  AP and lateral radiographs of the right elbow and AP, lateral, Zanca, and axillary radiographs of the right shoulder were ordered and reviewed by me personally showing no acute osseous abnormality     ASSESSMENT/SURGICAL DIAGNOSIS: 58 year old-year-old right hand dominant male with right elbow lateral epicondylitis (Tennis elbow)   And right scapulothoracic bursitis     PLAN:  I had a nice discussion with David brooks. I reviewed the diagnosis and prognosis of lateral epicondylitis (AKA Tennis Elbow). I explained that  the first-line treatment of lateral epicondylitis is appropriate physiotherapy including epicondylar muscle strengthening exercises, mobilization, stretching, and deep friction massage. I explained that these exercises should be taught by an occupational hand therapist experienced in treating lateral epicondylitis. I would ask that these exercises are done at least five times per day.  I also explained that no significant benefits have been demonstrated with shockwave, low frequency nerve stimulation, ultrasound, or pulsed EMG wave therapy for this problem.      If first-line treatment with physiotherapy fails, a PRP injection can be considered but does incur an out of pocket expense to the patient.  I explained the process by which this is done.  Current evidence and my personal experience dictates that corticosteroid injections should not be used for this diagnosis as significant rebound pain and worse 1 year outcomes are a known problem with corticosteroids for lateral epicondylitis. I also explained that the natural history studies show that most cases of lateral epicondylitis resolve within 1 to 2 years if left alone entirely.  In my practice, I will only offer surgery if no improvement or if symptoms worsen after 6 months to 1 year of appropriate and dedicated nonoperative treatment course exhausting therapy, tennis elbow counterforce bracing, NSAIDs, and PRP injections.  I did also explain that there is some uncertainty regarding the efficacy of the surgical procedures and that there are significant risks with surgery. David demonstrated excellent understanding of this and stated that he was in agreement with the treatment plan as outlined above.      We similarly discussed with the shoulder that I believe the pain is coming from scapulothoracic bursitis.  We discussed continued conservative treatment measures including physical therapy, Voltaren gel, lidocaine patches.  We discussed the importance of dynamic  balancing of the scapular muscles.     David may return to see me in clinic in three to four months to evaluate his progress with therapy. If symptoms are well controlled and doing well at that time, it would be ok to call ahead and cancel the appointment. David was in complete agreement with the treatment plan.     At the conclusion of the office visit, David verbally acknowledged that I answered all questions satisfactorily.      SEAN KUHN MD

## 2023-04-10 ENCOUNTER — MYC MEDICAL ADVICE (OUTPATIENT)
Dept: INTERNAL MEDICINE | Facility: CLINIC | Age: 58
End: 2023-04-10

## 2023-04-10 DIAGNOSIS — E66.811 CLASS 1 OBESITY DUE TO EXCESS CALORIES WITH SERIOUS COMORBIDITY AND BODY MASS INDEX (BMI) OF 30.0 TO 30.9 IN ADULT: Primary | ICD-10-CM

## 2023-04-10 DIAGNOSIS — E78.2 MIXED HYPERLIPIDEMIA: ICD-10-CM

## 2023-04-10 DIAGNOSIS — E66.09 CLASS 1 OBESITY DUE TO EXCESS CALORIES WITH SERIOUS COMORBIDITY AND BODY MASS INDEX (BMI) OF 30.0 TO 30.9 IN ADULT: Primary | ICD-10-CM

## 2023-04-12 ENCOUNTER — TELEPHONE (OUTPATIENT)
Dept: INTERNAL MEDICINE | Facility: CLINIC | Age: 58
End: 2023-04-12
Payer: COMMERCIAL

## 2023-04-12 DIAGNOSIS — G47.00 PERSISTENT INSOMNIA: ICD-10-CM

## 2023-04-12 DIAGNOSIS — L71.9 ROSACEA: ICD-10-CM

## 2023-04-12 NOTE — TELEPHONE ENCOUNTER
Please start PA for Wegovy 0.25/0.5 ml pen and for 0.5mg/0.5 ml pen. Pharmacy is [facility]

## 2023-04-12 NOTE — TELEPHONE ENCOUNTER
Semaglutide-Weight Management (WEGOVY) 0.25 MG/0.5ML pen  Missing / illegible information on RX Further clairification, or start a pa

## 2023-04-13 DIAGNOSIS — E66.09 CLASS 1 OBESITY DUE TO EXCESS CALORIES WITH SERIOUS COMORBIDITY AND BODY MASS INDEX (BMI) OF 30.0 TO 30.9 IN ADULT: ICD-10-CM

## 2023-04-13 DIAGNOSIS — E66.811 CLASS 1 OBESITY DUE TO EXCESS CALORIES WITH SERIOUS COMORBIDITY AND BODY MASS INDEX (BMI) OF 30.0 TO 30.9 IN ADULT: ICD-10-CM

## 2023-04-13 DIAGNOSIS — E78.2 MIXED HYPERLIPIDEMIA: ICD-10-CM

## 2023-04-13 RX ORDER — TRAZODONE HYDROCHLORIDE 50 MG/1
TABLET, FILM COATED ORAL
Qty: 90 TABLET | Refills: 3 | Status: SHIPPED | OUTPATIENT
Start: 2023-04-13

## 2023-04-13 RX ORDER — METRONIDAZOLE 7.5 MG/G
GEL TOPICAL
Qty: 90 G | Refills: 3 | Status: SHIPPED | OUTPATIENT
Start: 2023-04-13 | End: 2023-08-25

## 2023-04-13 NOTE — TELEPHONE ENCOUNTER
"Last Written Prescription Date:  12/7/2022  Last Fill Quantity: 90 g,  # refills: 3   Last office visit provider:  3/27/2023     Requested Prescriptions   Pending Prescriptions Disp Refills     metroNIDAZOLE (METROGEL) 0.75 % external gel [Pharmacy Med Name: METRONIDAZOLE TOPICAL 0.75% GL] 90 g 3     Sig: APPLY TOPICALLY 2 TIMES PER DAY TO AFFECTED AREAS ON FACE AND SCALP STRENGTH: 0.75 %       Topical Acne Medications Protocol Passed - 4/12/2023  1:43 PM        Passed - Patient is 12 years of age or older        Passed - Recent (12 mo) or future (30 days) visit within the authorizing provider's specialty     Patient has had an office visit with the authorizing provider or a provider within the authorizing providers department within the previous 12 mos or has a future within next 30 days. See \"Patient Info\" tab in inbasket, or \"Choose Columns\" in Meds & Orders section of the refill encounter.              Passed - Medication is active on med list        Last Written Prescription Date:  1/25/2023  Last Fill Quantity: 90 ,  # refills: 0   Last office visit provider:  3/27/2023        traZODone (DESYREL) 50 MG tablet [Pharmacy Med Name: TRAZODONE 50 MG TABLET] 90 tablet 0     Sig: TAKE 1 TABLET BY MOUTH AT BEDTIME       Serotonin Modulators Passed - 4/12/2023  1:43 PM        Passed - Recent (12 mo) or future (30 days) visit within the authorizing provider's specialty     Patient has had an office visit with the authorizing provider or a provider within the authorizing providers department within the previous 12 mos or has a future within next 30 days. See \"Patient Info\" tab in inbasket, or \"Choose Columns\" in Meds & Orders section of the refill encounter.              Passed - Medication is active on med list        Passed - Patient is age 18 or older             Tamra García RN 04/13/23 11:30 AM  "

## 2023-04-27 ENCOUNTER — DOCUMENTATION ONLY (OUTPATIENT)
Dept: GASTROENTEROLOGY | Facility: CLINIC | Age: 58
End: 2023-04-27
Payer: COMMERCIAL

## 2023-04-27 NOTE — PROGRESS NOTES
Called PT and left VM.    Called to remind patient of their upcoming appointment with our GI clinic, on 05/05/23 at 9:20 AM with Dr. Jacob Oliva. This appointment is scheduled as a video visit. You will receive a call approximately 30 minutes prior to check you in, you must be in [place] for this visit., if your appointment is virtual (video or telephone) you need to be in [place] for the visit. To reschedule or cancel patient to call [phone].    Imaging on 05/05/23 at 7:00 AM (6:30 AM check-in)      SK

## 2023-05-01 NOTE — PROGRESS NOTES
Patient called and confirmed their upcoming appointment with our GI clinic, on 05/05/23 at 9:20 AM with Dr. Jacob Oliva. This appointment is scheduled as a video visit. You will receive a call approximately 30 minutes prior to check you in, you must be in [place] for this visit., if your appointment is virtual (video or telephone) you need to be in [place] for the visit. To reschedule or cancel patient to call [phone].     Imaging on 05/05/23 at 7:00 AM (6:30 AM check-in)        SK

## 2023-05-02 ENCOUNTER — MYC MEDICAL ADVICE (OUTPATIENT)
Dept: INTERNAL MEDICINE | Facility: CLINIC | Age: 58
End: 2023-05-02
Payer: COMMERCIAL

## 2023-05-02 DIAGNOSIS — G89.29 CHRONIC BILATERAL LOW BACK PAIN WITHOUT SCIATICA: Primary | ICD-10-CM

## 2023-05-02 DIAGNOSIS — M54.50 CHRONIC BILATERAL LOW BACK PAIN WITHOUT SCIATICA: Primary | ICD-10-CM

## 2023-05-03 RX ORDER — DIAZEPAM 5 MG
5 TABLET ORAL SEE ADMIN INSTRUCTIONS
Qty: 2 TABLET | Refills: 0 | Status: SHIPPED | OUTPATIENT
Start: 2023-05-03

## 2023-05-09 ENCOUNTER — PATIENT OUTREACH (OUTPATIENT)
Dept: GASTROENTEROLOGY | Facility: CLINIC | Age: 58
End: 2023-05-09
Payer: COMMERCIAL

## 2023-05-09 DIAGNOSIS — F40.240 CLAUSTROPHOBIA: Primary | ICD-10-CM

## 2023-05-09 RX ORDER — DIAZEPAM 5 MG
TABLET ORAL
Qty: 2 TABLET | Refills: 0
Start: 2023-05-09

## 2023-05-09 NOTE — TELEPHONE ENCOUNTER
Pt called in response to [employer] message, would like valium prescribed for the upcoming MRI that he has rescheduled on 5/18. Medication ordered and called in to the [facility] in [facility].  Message routed to Dr Oliva to discuss whether pt needs to have clinic rescheduled or if can be reviewed over [employer].    Vesta Beverly, RN, BSN,   [facility]  Care coordinator

## 2023-05-11 ENCOUNTER — MYC MEDICAL ADVICE (OUTPATIENT)
Dept: INTERNAL MEDICINE | Facility: CLINIC | Age: 58
End: 2023-05-11
Payer: COMMERCIAL

## 2023-05-11 DIAGNOSIS — E34.9 TESTOSTERONE DEFICIENCY: ICD-10-CM

## 2023-05-11 DIAGNOSIS — E66.811 CLASS 1 OBESITY DUE TO EXCESS CALORIES WITH SERIOUS COMORBIDITY AND BODY MASS INDEX (BMI) OF 30.0 TO 30.9 IN ADULT: Primary | ICD-10-CM

## 2023-05-11 DIAGNOSIS — E66.09 CLASS 1 OBESITY DUE TO EXCESS CALORIES WITH SERIOUS COMORBIDITY AND BODY MASS INDEX (BMI) OF 30.0 TO 30.9 IN ADULT: Primary | ICD-10-CM

## 2023-05-12 RX ORDER — TESTOSTERONE 25 MG/2.5G
GEL TRANSDERMAL
Qty: 450 PACKET | Refills: 0 | Status: SHIPPED | OUTPATIENT
Start: 2023-05-12 | End: 2023-08-25

## 2023-05-18 ENCOUNTER — ANCILLARY PROCEDURE (OUTPATIENT)
Dept: MRI IMAGING | Facility: CLINIC | Age: 58
End: 2023-05-18
Attending: INTERNAL MEDICINE
Payer: COMMERCIAL

## 2023-05-18 PROCEDURE — A9585 GADOBUTROL INJECTION: HCPCS | Performed by: RADIOLOGY

## 2023-05-18 PROCEDURE — 74183 MRI ABD W/O CNTR FLWD CNTR: CPT | Performed by: RADIOLOGY

## 2023-05-18 RX ORDER — GADOBUTROL 604.72 MG/ML
10 INJECTION INTRAVENOUS ONCE
Status: COMPLETED | OUTPATIENT
Start: 2023-05-18 | End: 2023-05-18

## 2023-05-18 RX ADMIN — GADOBUTROL 9.5 ML: 604.72 INJECTION INTRAVENOUS at 13:08

## 2023-05-18 NOTE — DISCHARGE INSTRUCTIONS
MRI Contrast Discharge Instructions    The IV contrast you received today will pass out of your body in your  urine. This will happen in the next 24 hours. You will not feel this process.  Your urine will not change color.    Drink at least 4 extra glasses of water or juice today (unless your doctor  has restricted your fluids). This reduces the stress on your kidneys.  You may take your regular medicines.    If you are on dialysis: It is best to have dialysis today.    If you have a reaction: Most reactions happen right away. If you have  any new symptoms after leaving the hospital (such as hives or swelling),  call your hospital at the correct number below. Or call your family doctor.  If you have breathing distress or wheezing, call 911.    Special instructions: ***    I have read and understand the above information.    Signature:______________________________________ Date:___________    Staff:__________________________________________ Date:___________     Time:__________    [facility] Departments:    ___[place]: [phone]  ___[facility]: [phone]  ___[place]: [phone] ___[facility]: [phone]  ___[facility]: [phone]  ___[facility]: [phone]  ___[phone]  ___[facility]: [phone]  ___[phone]

## 2023-05-23 DIAGNOSIS — R10.9 ABDOMINAL PAIN: Primary | ICD-10-CM

## 2023-05-23 DIAGNOSIS — M25.519 SHOULDER PAIN, ACUTE: ICD-10-CM

## 2023-05-23 DIAGNOSIS — R93.89 ABNORMAL FINDING ON IMAGING: ICD-10-CM

## 2023-05-25 NOTE — TELEPHONE ENCOUNTER
REFERRAL INFORMATION:    Referring Provider:  Dr. Vish Oliva    Referring Clinic:  [facility]    Reason for Visit/Diagnosis: gallbladder/gallstones       FUTURE VISIT INFORMATION:    Appointment Date: 05-26-23    Appointment Time: @ 8am      NOTES RECORD STATUS  DETAILS   OFFICE NOTE from Referring Provider Internal 05-23-23 Dr. Vish Oliva   OFFICE NOTE from Other Specialists Internal 07-26-22 Paris Salinas PA-C   HOSPITAL DISCHARGE SUMMARY/ ED VISITS      OPERATIVE REPORT     ENDOSCOPY (EGD)      PERTINENT LABS Internal C-diff: 03-27-23, 12-01-21, 05-15-19, 05-15-18   PATHOLOGY REPORTS (RELATED)     IMAGING (CT, MRI, US, XR)  Internal MRI abd: 05-18-23, 08-01-22

## 2023-05-26 ENCOUNTER — PRE VISIT (OUTPATIENT)
Dept: SURGERY | Facility: CLINIC | Age: 58
End: 2023-05-26

## 2023-05-26 ENCOUNTER — OFFICE VISIT (OUTPATIENT)
Dept: SURGERY | Facility: CLINIC | Age: 58
End: 2023-05-26
Payer: COMMERCIAL

## 2023-05-26 VITALS
DIASTOLIC BLOOD PRESSURE: 95 MMHG | BODY MASS INDEX: 29.68 KG/M2 | HEART RATE: 73 BPM | HEIGHT: 70 IN | WEIGHT: 207.3 LBS | OXYGEN SATURATION: 98 % | SYSTOLIC BLOOD PRESSURE: 141 MMHG

## 2023-05-26 DIAGNOSIS — R93.89 ABNORMAL FINDING ON IMAGING: ICD-10-CM

## 2023-05-26 DIAGNOSIS — K80.20 CALCULUS OF GALLBLADDER WITHOUT CHOLECYSTITIS WITHOUT OBSTRUCTION: Primary | ICD-10-CM

## 2023-05-26 PROCEDURE — 99203 OFFICE O/P NEW LOW 30 MIN: CPT | Performed by: SURGERY

## 2023-05-26 ASSESSMENT — ENCOUNTER SYMPTOMS
BACK PAIN: 1
SINUS CONGESTION: 0
HYPERTENSION: 0
LIGHT-HEADEDNESS: 0
NAUSEA: 0
RESPIRATORY PAIN: 0
DYSURIA: 1
WHEEZING: 0
TREMORS: 0
SPUTUM PRODUCTION: 0
VOMITING: 0
MEMORY LOSS: 0
BRUISES/BLEEDS EASILY: 0
ORTHOPNEA: 0
EXERCISE INTOLERANCE: 0
HEMOPTYSIS: 0
HEMATURIA: 0
DECREASED APPETITE: 1
FATIGUE: 1
BLOOD IN STOOL: 0
SWOLLEN GLANDS: 0
LEG SWELLING: 0
HEADACHES: 0
POLYPHAGIA: 0
SYNCOPE: 0
NUMBNESS: 0
TROUBLE SWALLOWING: 0
DIFFICULTY URINATING: 0
MYALGIAS: 1
ABDOMINAL PAIN: 0
RECTAL PAIN: 0
SKIN CHANGES: 0
POOR WOUND HEALING: 0
EXTREMITY NUMBNESS: 0
SEIZURES: 0
EYE PAIN: 0
FLANK PAIN: 0
HEARTBURN: 0
POLYDIPSIA: 0
COUGH DISTURBING SLEEP: 0
WEAKNESS: 0
SLEEP DISTURBANCES DUE TO BREATHING: 0
SHORTNESS OF BREATH: 0
EYE REDNESS: 0
LEG PAIN: 0
SORE THROAT: 0
DIZZINESS: 0
BOWEL INCONTINENCE: 0
BLOATING: 0
WEIGHT GAIN: 0
DECREASED CONCENTRATION: 0
FEVER: 0
PARALYSIS: 0
CONSTIPATION: 0
EYE IRRITATION: 0
PALPITATIONS: 0
LOSS OF CONSCIOUSNESS: 0
NIGHT SWEATS: 0
WEIGHT LOSS: 0
TINGLING: 0
SNORES LOUDLY: 0
SINUS PAIN: 0
RECTAL BLEEDING: 0
TACHYCARDIA: 0
NERVOUS/ANXIOUS: 0
NAIL CHANGES: 0
NECK PAIN: 1
INSOMNIA: 0
POSTURAL DYSPNEA: 0
SMELL DISTURBANCE: 0
NECK MASS: 0
DOUBLE VISION: 0
EYE WATERING: 0
CHILLS: 0
COUGH: 0
DEPRESSION: 0
CLAUDICATION: 0
HYPOTENSION: 0
ALTERED TEMPERATURE REGULATION: 0
PANIC: 0
TASTE DISTURBANCE: 0
DYSPNEA ON EXERTION: 0
JAUNDICE: 0
DIARRHEA: 0
HOARSE VOICE: 0
SPEECH CHANGE: 0
DISTURBANCES IN COORDINATION: 0

## 2023-05-26 ASSESSMENT — PAIN SCALES - GENERAL: PAINLEVEL: MILD PAIN (2)

## 2023-05-26 NOTE — PROGRESS NOTES
[facility] General [facility] Consultation Clinic Note        David Marino  [id number]  1965  May 26, 2023     Requesting Provider: Vish Oliva     Dear Dr Diane [facility],     I had the pleasure of seeing your patient, David Marino.  He is a 58 year old male who is being seen in consultation for the following concern(s):     CHIEF COMPLAINT:       View : No data to display.              Gallstones    HISTORY OF PRESENT ILLNESS:  David Marino is a 58 year old male with a history of gastric bypass who presents with concern for gallstones.    He has a history of pancreatic cysts for which he follows with Dr. Castaneda.  On his most recent MRI he was noted to have gallstones.    He reports that since February he has had back pain in his mid back and occasionally right shoulder blade.  He denies any association of the pain with eating or specific foods, no nausea or emesis.  He feels his pain does worsen with standing. He is unsure of any episodes of jaundice or acholic stools.      Review of Systems     Constitutional:  Positive for fatigue and decreased appetite. Negative for fever, chills, weight loss, weight gain, night sweats, recent stressors, height loss, post-operative complications and incisional pain.   HENT:  Negative for ear pain, hearing loss, tinnitus, nosebleeds, trouble swallowing, hoarse voice, mouth sores, sore throat, ear discharge, tooth pain, gum tenderness, taste disturbance, smell disturbance, hearing aid, bleeding gums, dry mouth, sinus pain, sinus congestion and neck mass.    Eyes:  Negative for double vision, pain, redness, eye pain, decreased vision, eye watering, eye bulging, eye dryness, flashing lights, spots, floaters, strabismus, tunnel vision, jaundice and eye irritation.   Respiratory:   Negative for cough, hemoptysis, sputum production, shortness of breath, wheezing, sleep disturbances due to breathing, snores loudly, respiratory pain, dyspnea on exertion, cough  disturbing sleep and postural dyspnea.    Cardiovascular:  Negative for chest pain, dyspnea on exertion, palpitations, orthopnea, claudication, leg swelling, fingers/toes turn blue, hypertension, hypotension, syncope, history of heart murmur, chest pain on exertion, chest pain at rest, pacemaker, few scattered varicosities, leg pain, sleep disturbances due to breathing, tachycardia, light-headedness, exercise intolerance and edema.   Gastrointestinal:  Negative for heartburn, nausea, vomiting, abdominal pain, diarrhea, constipation, blood in stool, melena, rectal pain, bloating, hemorrhoids, bowel incontinence, jaundice, rectal bleeding, coffee ground emesis and change in stool.   Genitourinary:  Positive for dysuria. Negative for bladder incontinence, urgency, hematuria, flank pain, difficulty urinating, nocturia, voiding less frequently, scrotal pain, ulcerations, penile discharge, male genitourinary complaint and reduced libido.   Musculoskeletal:  Positive for myalgias, back pain and neck pain.   Skin:  Negative for nail changes, itching, poor wound healing, rash, hair changes, skin changes, acne, warts, poor wound healing, scarring, flaky skin, Raynaud's phenomenon, sensitivity to sunlight and skin thickening.   Neurological:  Negative for dizziness, tingling, tremors, speech change, seizures, loss of consciousness, weakness, light-headedness, numbness, headaches, disturbances in coordination, extremity numbness, memory loss, difficulty walking and paralysis.   Endo/Heme:  Negative for anemia, swollen glands and bruises/bleeds easily.   Psychiatric/Behavioral:  Negative for depression, memory loss, decreased concentration, mood swings and panic attacks.    Endocrine:  Negative for altered temperature regulation, polyphagia, polydipsia, unwanted hair growth and change in facial hair.      PAST MEDICAL HISTORY:  Past Medical History:   Diagnosis Date     Asthma     Controlled     Depression      Depression       Hypercholesteremia      Hypertension      Hypertension      ZARIA (obstructive sleep apnea) Severe AHI 51 12/8/2017    HST 12/6/2017 Severe     PONV (postoperative nausea and vomiting)     Severe      Uncomplicated asthma         PAST SURGICAL HISTORY:    Past Surgical History:   Procedure Laterality Date     APPENDECTOMY       APPENDECTOMY       ARTHROSCOPY KNEE Right 09/28/2016    Procedure: Arthroscopic partial medial meniscectomy, right knee. Surgeon:  Keith Ayala MD  Location: [facility]     AS REMOVAL OF TONSILS,<11 Y/O       BARIATRIC SURGERY  05/2018     COLONOSCOPY N/A 8/30/2019    Procedure: ANOSCOPY HIGH RESOLUTION OR WITH ANESTHESIA fulgeration of anal condyloma;  Surgeon: Nimisha Zuniga MD;  Location: [facility];  Service: General     HC KNEE SCOPE, DIAGNOSTIC      Description: Arthroscopy Knee Left;  Proc Date: 01/01/1993;     HC KNEE SCOPE, DIAGNOSTIC      Description: Arthroscopy Knee Right;  Proc Date: 01/01/2013;  Comments: for R knee meniscal tear     HC REMOVE TONSILS/ADENOIDS,<12 Y/O      Description: Tonsillectomy With Adenoidectomy;  Proc Date: 01/01/2009;  Comments: Had sinus issues     LAPAROSCOPIC BYPASS GASTRIC N/A 5/21/2018    Procedure: LAPAROSCOPIC BYPASS GASTRIC;  LAPAROSCOPIC GASTRIC BYPASS    EBL: 7mL;  Surgeon: Noe Saunders MD;  Location: [facility] APPENDECTOMY      Description: Appendectomy;  Proc Date: 01/01/1984;     [facility] SYMPATHECTOMY,CERVICAL  2003     [facility] THORACOSCOPY, SURGICAL, W/ THORACIC SYMPATHECTOMY      Description: Thoracoscopy (Therapeutic) W/ Thoracic Sympathectomy;  Proc Date: 01/01/2004;  Comments: 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; ; endoscopic thoracic        MEDICATIONS:    Current Outpatient Medications   Medication     albuterol (PROAIR HFA/PROVENTIL HFA/VENTOLIN HFA) 108 (90 Base) MCG/ACT inhaler     CALCIUM-VITAMIN D PO     diazepam (VALIUM) 5 MG tablet     diazepam (VALIUM) 5 MG tablet      doxycycline monohydrate (MONODOX) 50 MG capsule     EMSAM 9 MG/24HR 24 hr patch     emtricitabine (EMTRIVA) 200 MG capsule     fluticasone-salmeterol (ADVAIR) 100-50 MCG/DOSE inhaler     Glycopyrrolate POWD     lisinopril (ZESTRIL) 20 MG tablet     metroNIDAZOLE (METROGEL) 0.75 % external gel     montelukast (SINGULAIR) 10 MG tablet     Multiple Vitamins-Minerals (MULTIVITAMIN ADULT PO)     Omega-3 Fatty Acids (FISH OIL OMEGA-3 PO)     rosuvastatin (CRESTOR) 10 MG tablet     scopolamine (TRANSDERM) 1 MG/3DAYS 72 hr patch     Semaglutide-Weight Management (WEGOVY) 0.25 MG/0.5ML pen     Semaglutide-Weight Management (WEGOVY) 0.5 MG/0.5ML pen     Semaglutide-Weight Management (WEGOVY) 1 MG/0.5ML pen     sildenafil (VIAGRA) 50 MG tablet     testosterone (ANDROGEL) 25 MG/2.5GM (1%) gel     traZODone (DESYREL) 50 MG tablet     No current facility-administered medications for this visit.     Facility-Administered Medications Ordered in Other Visits   Medication     gadobutrol (GADAVIST) injection 10 mL        ALLERGIES:  Allergies   Allergen Reactions     Bee Pollen Other (See Comments)     Honey      Pollen Extract      Sulfa Antibiotics         SOCIAL HISTORY:    Social History     Socioeconomic History     Marital status:    Tobacco Use     Smoking status: Former     Types: Cigarettes     Start date: 1984     Quit date: 1997     Years since quittin.4     Smokeless tobacco: Never     Tobacco comments:     smoker 30+ years ago   Vaping Use     Vaping status: Never Used   Substance and Sexual Activity     Alcohol use: Not Currently     Comment: No history of abuse. Quit 22     Drug use: No     Sexual activity: Not Currently     Partners: Male   Other Topics Concern     Parent/sibling w/ CABG, MI or angioplasty before 65F 55M? No       FAMILY HISTORY:  No family history of problems with bleeding, blood clots, or anesthesia.  +Hx of gallbladder disease    Family History   Problem Relation Age of  Onset     Diabetes Mother      Coronary Artery Disease Mother      Hypertension Mother      Depression Mother      Asthma Mother      Glaucoma Mother      Coronary Artery Disease Father      Hypertension Father      Hyperlipidemia Father      Depression Maternal Grandmother      Glaucoma Maternal Grandmother      Cerebrovascular Disease No family hx of      Breast Cancer No family hx of      Colon Cancer No family hx of      Prostate Cancer No family hx of      Other Cancer No family hx of      Anxiety Disorder No family hx of      Mental Illness No family hx of      Substance Abuse No family hx of      Anesthesia Reaction No family hx of      Osteoporosis No family hx of      Genetic Disorder No family hx of      Thyroid Disease No family hx of      Obesity No family hx of      Unknown/Adopted No family hx of      Macular Degeneration No family hx of      Heart Disease Mother      CABG Mother      Heart Disease Father      Osteoarthritis Father      CABG Father      Hypertension Sister      Hyperlipidemia Sister      Depression Sister      Breast Cancer Sister      Hypertension Sister      Hyperlipidemia Sister      Dementia Maternal Grandmother      Early Death Maternal Grandmother      Hypertension Maternal Grandmother      Cerebrovascular Disease Maternal Grandmother      Arthritis Maternal Grandfather      Hearing Loss Maternal Grandfather      Heart Disease Maternal Grandfather      Hyperlipidemia Maternal Grandfather      Hypertension Maternal Grandfather      Arthritis Paternal Grandmother      Depression Paternal Grandmother      Heart Disease Paternal Grandmother      Hyperlipidemia Paternal Grandmother      Heart Disease Paternal Grandfather      Hyperlipidemia Paternal Grandfather      Asthma Other      Hypertension Other         PHYSICAL EXAM:  Vital Signs: There were no vitals taken for this visit.  There is no height or weight on file to calculate BMI.     General: Well-appearing sitting in  NAD  HEENT: NCAT; MMM  Neck: Supple, trachea midline  Lungs: Breathing unlabored  Abdomen: Soft, nondistended, nontender, well healed incision  Extremities: no edema  Psych: Appropriate affect, congruent mood      PERTINENT IMAGING/TESTING:  Exam: MR ABDOMEN W/O & W CONTRAST, 5/18/2023 2:22 PM     Indication: 6 month surveillance of pancreatic cysts; Pancreas cyst     Comparison: 8/1/2022, 12/28/2021     Technique: Images were acquired with and without intravenous contrast  through the abdomen. The following MR images were acquired: TrueFISP,  multiplanar T2 weighted, axial T1 in/out of phase, axial fat-saturated  T1, diffusion-weighted. Multiplanar T1-weighted images with fat  saturation were before contrast administration and at multiple time  points following the administration of intravenous contrast. Contrast  dose: 9.5mL Gadavist     FINDINGS:     Liver: Normal     Gallbladder/Bile Ducts: Gallstones, no pericholecystic fluid or wall  thickening. Normal bile ducts.     Spleen: Borderline splenomegaly, unchanged compared to prior. Splenule  in the hilum.     Pancreas: Stable 0.9 x 0.8 x 1.0 cm posterior pancreatic neck cyst  (MRCP T2 series 15, image 36 and series 21, image 13). Unchanged thin  T2 hypointense septation inferiorly. No nodularity or enhancement.     No significant atrophy. No dilation of the main duct.     Adrenal glands: Normal     Kidneys: Bilateral cysts, largest measuring up to 8.8 cm (series 4,  image 17). No hydronephrosis.     Bowel: Nondilated     Lymph nodes: No adenopathy     Blood vessels: Patent portal, splenic and superior mesenteric veins  without thrombus, Conventional hepatic arterial anatomy     Lung bases: Unremarkable     Bones and soft tissues: No acute osseous abnormality     Mesentery and abdominal wall: No hernia     Ascites: No                                                                      IMPRESSION:   Stable 1.0 cm pancreatic neck cyst, likely side branch IPMN  compared  to 8/1/2022 and 12/28/2021. Consider follow-up as detailed below.  Unchanged borderline splenomegaly.  Gallstones.     [facility] recommendations for asymptomatic pancreatic  cysts, modified from international consensus guidelines*   Size of largest cyst:   Less than 1 cm: Follow-up imaging in 6 months to 1 year, then lengthen  interval to 2-3 years if no change.  1-2 cm: Follow-up imaging at 6 months, then yearly for 2 years, then  lengthen interval if no change.   The optimal initial study or first follow-up exam is MRI/MRCP  performed with intravenous gadolinium contrast to allow full cyst  characterization. Once characterized, contrast is optional on  follow-up MRIs. If MRI is contraindicated, CT with contrast is  recommended.   GI consultation for possible endoscopic ultrasound is recommended for  cysts with the following features: Size > 2 cm, >20% growth on  followup, wall thickening or enhancement, mural nodule, duct > 5 mm,  or abrupt change in duct caliber with distal atrophy. GI or surgical  consultation is also recommended for symptomatic cysts.   *Reference: International Consensus Guidelines for Management of IPMN  and MCN of the pancreas. Pancreatology: 12:(2012); 183-197.     I have personally reviewed the examination and initial interpretation  and I agree with the findings.    LABORATORY TESTING:  Recent Labs   Lab Test 07/26/22  1007   WBC 5.5   HGB 14.4          Recent Labs   Lab Test 07/26/22  1007 08/07/17  1435 09/13/16  0721      < > 138   POTASSIUM 4.5   < > 3.9   CHLORIDE 106   < > 107   CO2 27   < > 29   BUN 12   < > 12   CR 1.17   < > 1.00   GLC 96   < > 108*   JADEN 8.7   < > 8.8   PHOS  --   --  2.6    < > = values in this interval not displayed.       Recent Labs   Lab Test 07/26/22  1007   AST 21   ALT 33   ALKPHOS 72   BILITOTAL 0.7   ALBUMIN 3.5        Recent Labs   Lab Test 07/26/22  1007   LIPASE 118         ASSESSMENT/PLAN:   58yoM with a history  of gastric bypass presenting with back pain and imaging findings of gallstones.  We discussed that gallstones are very common within the population and that the majority remain asymptomatic from them.  We did discuss that it is very unlikely that his back pain is related to his gallstones and that he should not expect that to improve with cholecystectomy.  We also discussed that in the very unlikely event that he were to develop choledocholithiasis the management would be more complicated given his prior gastric bypass, but in the absense of symptoms attributable to gallstones there would be minimal benefit to prophylactic cholecystectomy.    He will continue to monitor for symptoms, and any relation to food and can follow up as needed.    No orders of the defined types were placed in this encounter.      Sincerely,     Joesph Hua MD  General Surgery PGY-5    Seen with staff surgeon Dr. Solorio.

## 2023-05-26 NOTE — PATIENT INSTRUCTIONS
You met with Dr. Joesph Solorio.      Today's visit instructions:    Call the Nurse Advice line listed below if you decide you would like to proceed with gallbladder removal surgery.       If you have questions please contact Jenae RN or Dalila RN during regular clinic hours, Monday through Friday 7:30 AM - 4:00 PM, or you can contact us via [employer] at anytime.       If you have urgent needs after-hours, weekends, or holidays please call the hospital at [phone] and ask to speak with our on-call General Surgery Team.    Appointment [phone]  Nurse Advice (Jenae or Dalila): [phone]   Surgery Scheduler (Lian): [phone]  Fax: [phone]

## 2023-05-26 NOTE — NURSING NOTE
"Chief Complaint   Patient presents with     New Patient     Gallbladder issues, gallstones       Vitals:    05/26/23 0758   BP: (!) 141/95   BP Location: Left arm   Patient Position: Sitting   Cuff Size: Adult Large   Pulse: 73   SpO2: 98%   Weight: 94 kg (207 lb 4.8 oz)   Height: 1.765 m (5' 9.5\")       Body mass index is 30.17 kg/m .                          Jamarcus Orourke, EMT    "

## 2023-05-26 NOTE — LETTER
5/26/2023       RE: David Marino  [address]  [facility] [address]       Dear Colleague,    Thank you for referring your patient, David Marino, to the [facility]. Please see a copy of my visit note below.    New General Surgery Consultation Clinic Note        David Marino  [id number]  1965  May 26, 2023     Requesting Provider: Vish Oliva     Dear Silvia Queen,     I had the pleasure of seeing your patient, David Marino.  He is a 58 year old male who is being seen in consultation for the following concern(s):     CHIEF COMPLAINT:       View : No data to display.              Gallstones    HISTORY OF PRESENT ILLNESS:  David Marino is a 58 year old male with a history of gastric bypass who presents with concern for gallstones.    He has a history of pancreatic cysts for which he follows with Dr. Castaneda.  On his most recent MRI he was noted to have gallstones.    He reports that since February he has had back pain in his mid back and occasionally right shoulder blade.  He denies any association of the pain with eating or specific foods, no nausea or emesis.  He feels his pain does worsen with standing. He is unsure of any episodes of jaundice or acholic stools.      Review of Systems     Constitutional:  Positive for fatigue and decreased appetite. Negative for fever, chills, weight loss, weight gain, night sweats, recent stressors, height loss, post-operative complications and incisional pain.   HENT:  Negative for ear pain, hearing loss, tinnitus, nosebleeds, trouble swallowing, hoarse voice, mouth sores, sore throat, ear discharge, tooth pain, gum tenderness, taste disturbance, smell disturbance, hearing aid, bleeding gums, dry mouth, sinus pain, sinus congestion and neck mass.    Eyes:  Negative for double vision, pain, redness, eye pain, decreased vision,  eye watering, eye bulging, eye dryness, flashing lights, spots, floaters, strabismus, tunnel vision, jaundice and eye irritation.   Respiratory:   Negative for cough, hemoptysis, sputum production, shortness of breath, wheezing, sleep disturbances due to breathing, snores loudly, respiratory pain, dyspnea on exertion, cough disturbing sleep and postural dyspnea.    Cardiovascular:  Negative for chest pain, dyspnea on exertion, palpitations, orthopnea, claudication, leg swelling, fingers/toes turn blue, hypertension, hypotension, syncope, history of heart murmur, chest pain on exertion, chest pain at rest, pacemaker, few scattered varicosities, leg pain, sleep disturbances due to breathing, tachycardia, light-headedness, exercise intolerance and edema.   Gastrointestinal:  Negative for heartburn, nausea, vomiting, abdominal pain, diarrhea, constipation, blood in stool, melena, rectal pain, bloating, hemorrhoids, bowel incontinence, jaundice, rectal bleeding, coffee ground emesis and change in stool.   Genitourinary:  Positive for dysuria. Negative for bladder incontinence, urgency, hematuria, flank pain, difficulty urinating, nocturia, voiding less frequently, scrotal pain, ulcerations, penile discharge, male genitourinary complaint and reduced libido.   Musculoskeletal:  Positive for myalgias, back pain and neck pain.   Skin:  Negative for nail changes, itching, poor wound healing, rash, hair changes, skin changes, acne, warts, poor wound healing, scarring, flaky skin, Raynaud's phenomenon, sensitivity to sunlight and skin thickening.   Neurological:  Negative for dizziness, tingling, tremors, speech change, seizures, loss of consciousness, weakness, light-headedness, numbness, headaches, disturbances in coordination, extremity numbness, memory loss, difficulty walking and paralysis.   Endo/Heme:  Negative for anemia, swollen glands and bruises/bleeds easily.   Psychiatric/Behavioral:  Negative for depression,  memory loss, decreased concentration, mood swings and panic attacks.    Endocrine:  Negative for altered temperature regulation, polyphagia, polydipsia, unwanted hair growth and change in facial hair.      PAST MEDICAL HISTORY:  Past Medical History:   Diagnosis Date    Asthma     Controlled    Depression     Depression     Hypercholesteremia     Hypertension     Hypertension     ZARIA (obstructive sleep apnea) Severe AHI 51 12/8/2017    HST 12/6/2017 Severe    PONV (postoperative nausea and vomiting)     Severe     Uncomplicated asthma         PAST SURGICAL HISTORY:    Past Surgical History:   Procedure Laterality Date    APPENDECTOMY      APPENDECTOMY      ARTHROSCOPY KNEE Right 09/28/2016    Procedure: Arthroscopic partial medial meniscectomy, right knee. Surgeon:  Keith Ayala MD  Location: [facility]    AS REMOVAL OF TONSILS,<11 Y/O      BARIATRIC SURGERY  05/2018    COLONOSCOPY N/A 8/30/2019    Procedure: ANOSCOPY HIGH RESOLUTION OR WITH ANESTHESIA fulgeration of anal condyloma;  Surgeon: Nimisha Zuniga MD;  Location: [facility];  Service: General    HC KNEE SCOPE, DIAGNOSTIC      Description: Arthroscopy Knee Left;  Proc Date: 01/01/1993;    HC KNEE SCOPE, DIAGNOSTIC      Description: Arthroscopy Knee Right;  Proc Date: 01/01/2013;  Comments: for R knee meniscal tear    HC REMOVE TONSILS/ADENOIDS,<12 Y/O      Description: Tonsillectomy With Adenoidectomy;  Proc Date: 01/01/2009;  Comments: Had sinus issues    LAPAROSCOPIC BYPASS GASTRIC N/A 5/21/2018    Procedure: LAPAROSCOPIC BYPASS GASTRIC;  LAPAROSCOPIC GASTRIC BYPASS    EBL: 7mL;  Surgeon: Noe Saunders MD;  Location: [facility] APPENDECTOMY      Description: Appendectomy;  Proc Date: 01/01/1984;    [facility] SYMPATHECTOMY,CERVICAL  2003    [facility] THORACOSCOPY, SURGICAL, W/ THORACIC SYMPATHECTOMY      Description: Thoracoscopy (Therapeutic) W/ Thoracic Sympathectomy;  Proc Date: 01/01/2004;  Comments: 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; ; endoscopic thoracic        MEDICATIONS:    Current Outpatient Medications   Medication    albuterol (PROAIR HFA/PROVENTIL HFA/VENTOLIN HFA) 108 (90 Base) MCG/ACT inhaler    CALCIUM-VITAMIN D PO    diazepam (VALIUM) 5 MG tablet    diazepam (VALIUM) 5 MG tablet    doxycycline monohydrate (MONODOX) 50 MG capsule    EMSAM 9 MG/24HR 24 hr patch    emtricitabine (EMTRIVA) 200 MG capsule    fluticasone-salmeterol (ADVAIR) 100-50 MCG/DOSE inhaler    Glycopyrrolate POWD    lisinopril (ZESTRIL) 20 MG tablet    metroNIDAZOLE (METROGEL) 0.75 % external gel    montelukast (SINGULAIR) 10 MG tablet    Multiple Vitamins-Minerals (MULTIVITAMIN ADULT PO)    Omega-3 Fatty Acids (FISH OIL OMEGA-3 PO)    rosuvastatin (CRESTOR) 10 MG tablet    scopolamine (TRANSDERM) 1 MG/3DAYS 72 hr patch    Semaglutide-Weight Management (WEGOVY) 0.25 MG/0.5ML pen    Semaglutide-Weight Management (WEGOVY) 0.5 MG/0.5ML pen    Semaglutide-Weight Management (WEGOVY) 1 MG/0.5ML pen    sildenafil (VIAGRA) 50 MG tablet    testosterone (ANDROGEL) 25 MG/2.5GM (1%) gel    traZODone (DESYREL) 50 MG tablet     No current facility-administered medications for this visit.     Facility-Administered Medications Ordered in Other Visits   Medication    gadobutrol (GADAVIST) injection 10 mL        ALLERGIES:  Allergies   Allergen Reactions    Bee Pollen Other (See Comments)    Honey     Pollen Extract     Sulfa Antibiotics         SOCIAL HISTORY:    Social History     Socioeconomic History    Marital status:    Tobacco Use    Smoking status: Former     Types: Cigarettes     Start date: 1984     Quit date: 1997     Years since quittin.4    Smokeless tobacco: Never    Tobacco comments:     smoker 30+ years ago   Vaping Use    Vaping status: Never Used   Substance and Sexual Activity    Alcohol use: Not Currently     Comment: No history of abuse. Quit 22    Drug use: No    Sexual activity: Not Currently      Partners: Male   Other Topics Concern    Parent/sibling w/ CABG, MI or angioplasty before 65F 55M? No       FAMILY HISTORY:  No family history of problems with bleeding, blood clots, or anesthesia.  +Hx of gallbladder disease    Family History   Problem Relation Age of Onset    Diabetes Mother     Coronary Artery Disease Mother     Hypertension Mother     Depression Mother     Asthma Mother     Glaucoma Mother     Coronary Artery Disease Father     Hypertension Father     Hyperlipidemia Father     Depression Maternal Grandmother     Glaucoma Maternal Grandmother     Cerebrovascular Disease No family hx of     Breast Cancer No family hx of     Colon Cancer No family hx of     Prostate Cancer No family hx of     Other Cancer No family hx of     Anxiety Disorder No family hx of     Mental Illness No family hx of     Substance Abuse No family hx of     Anesthesia Reaction No family hx of     Osteoporosis No family hx of     Genetic Disorder No family hx of     Thyroid Disease No family hx of     Obesity No family hx of     Unknown/Adopted No family hx of     Macular Degeneration No family hx of     Heart Disease Mother     CABG Mother     Heart Disease Father     Osteoarthritis Father     CABG Father     Hypertension Sister     Hyperlipidemia Sister     Depression Sister     Breast Cancer Sister     Hypertension Sister     Hyperlipidemia Sister     Dementia Maternal Grandmother     Early Death Maternal Grandmother     Hypertension Maternal Grandmother     Cerebrovascular Disease Maternal Grandmother     Arthritis Maternal Grandfather     Hearing Loss Maternal Grandfather     Heart Disease Maternal Grandfather     Hyperlipidemia Maternal Grandfather     Hypertension Maternal Grandfather     Arthritis Paternal Grandmother     Depression Paternal Grandmother     Heart Disease Paternal Grandmother     Hyperlipidemia Paternal Grandmother     Heart Disease Paternal Grandfather     Hyperlipidemia Paternal Grandfather      Asthma Other     Hypertension Other         PHYSICAL EXAM:  Vital Signs: There were no vitals taken for this visit.  There is no height or weight on file to calculate BMI.     General: Well-appearing sitting in NAD  HEENT: NCAT; MMM  Neck: Supple, trachea midline  Lungs: Breathing unlabored  Abdomen: Soft, nondistended, nontender, well healed incision  Extremities: no edema  Psych: Appropriate affect, congruent mood      PERTINENT IMAGING/TESTING:  Exam: MR ABDOMEN W/O & W CONTRAST, 5/18/2023 2:22 PM     Indication: 6 month surveillance of pancreatic cysts; Pancreas cyst     Comparison: 8/1/2022, 12/28/2021     Technique: Images were acquired with and without intravenous contrast  through the abdomen. The following MR images were acquired: TrueFISP,  multiplanar T2 weighted, axial T1 in/out of phase, axial fat-saturated  T1, diffusion-weighted. Multiplanar T1-weighted images with fat  saturation were before contrast administration and at multiple time  points following the administration of intravenous contrast. Contrast  dose: 9.5mL Gadavist     FINDINGS:     Liver: Normal     Gallbladder/Bile Ducts: Gallstones, no pericholecystic fluid or wall  thickening. Normal bile ducts.     Spleen: Borderline splenomegaly, unchanged compared to prior. Splenule  in the hilum.     Pancreas: Stable 0.9 x 0.8 x 1.0 cm posterior pancreatic neck cyst  (MRCP T2 series 15, image 36 and series 21, image 13). Unchanged thin  T2 hypointense septation inferiorly. No nodularity or enhancement.     No significant atrophy. No dilation of the main duct.     Adrenal glands: Normal     Kidneys: Bilateral cysts, largest measuring up to 8.8 cm (series 4,  image 17). No hydronephrosis.     Bowel: Nondilated     Lymph nodes: No adenopathy     Blood vessels: Patent portal, splenic and superior mesenteric veins  without thrombus, Conventional hepatic arterial anatomy     Lung bases: Unremarkable     Bones and soft tissues: No acute osseous  abnormality     Mesentery and abdominal wall: No hernia     Ascites: No                                                                      IMPRESSION:   Stable 1.0 cm pancreatic neck cyst, likely side branch IPMN compared  to 8/1/2022 and 12/28/2021. Consider follow-up as detailed below.  Unchanged borderline splenomegaly.  Gallstones.     [facility] recommendations for asymptomatic pancreatic  cysts, modified from international consensus guidelines*   Size of largest cyst:   Less than 1 cm: Follow-up imaging in 6 months to 1 year, then lengthen  interval to 2-3 years if no change.  1-2 cm: Follow-up imaging at 6 months, then yearly for 2 years, then  lengthen interval if no change.   The optimal initial study or first follow-up exam is MRI/MRCP  performed with intravenous gadolinium contrast to allow full cyst  characterization. Once characterized, contrast is optional on  follow-up MRIs. If MRI is contraindicated, CT with contrast is  recommended.   GI consultation for possible endoscopic ultrasound is recommended for  cysts with the following features: Size > 2 cm, >20% growth on  followup, wall thickening or enhancement, mural nodule, duct > 5 mm,  or abrupt change in duct caliber with distal atrophy. GI or surgical  consultation is also recommended for symptomatic cysts.   *Reference: International Consensus Guidelines for Management of IPMN  and MCN of the pancreas. Pancreatology: 12:(2012); 183-197.     I have personally reviewed the examination and initial interpretation  and I agree with the findings.    LABORATORY TESTING:  Recent Labs   Lab Test 07/26/22  1007   WBC 5.5   HGB 14.4          Recent Labs   Lab Test 07/26/22  1007 08/07/17  1435 09/13/16  0721      < > 138   POTASSIUM 4.5   < > 3.9   CHLORIDE 106   < > 107   CO2 27   < > 29   BUN 12   < > 12   CR 1.17   < > 1.00   GLC 96   < > 108*   JADEN 8.7   < > 8.8   PHOS  --   --  2.6    < > = values in this interval not  displayed.       Recent Labs   Lab Test 07/26/22  1007   AST 21   ALT 33   ALKPHOS 72   BILITOTAL 0.7   ALBUMIN 3.5        Recent Labs   Lab Test 07/26/22  1007   LIPASE 118         ASSESSMENT/PLAN:   58yoM with a history of gastric bypass presenting with back pain and imaging findings of gallstones.  We discussed that gallstones are very common within the population and that the majority remain asymptomatic from them.  We did discuss that it is very unlikely that his back pain is related to his gallstones and that he should not expect that to improve with cholecystectomy.  We also discussed that in the very unlikely event that he were to develop choledocholithiasis the management would be more complicated given his prior gastric bypass, but in the absense of symptoms attributable to gallstones there would be minimal benefit to prophylactic cholecystectomy.    He will continue to monitor for symptoms, and any relation to food and can follow up as needed.    No orders of the defined types were placed in this encounter.    Seen with staff surgeon Dr. Solorio.        Attestation signed by Joesph Solorio MD at 5/26/2023  9:44 AM:  I have seen and examined the patient, reviewed pertinent labs, vital, and radiographs and discussed the case and plan with the residents.            Again, thank you for allowing me to participate in the care of your patient.      Sincerely,    Joesph Solorio MD

## 2023-06-06 ENCOUNTER — MYC MEDICAL ADVICE (OUTPATIENT)
Dept: INTERNAL MEDICINE | Facility: CLINIC | Age: 58
End: 2023-06-06
Payer: COMMERCIAL

## 2023-06-06 DIAGNOSIS — E66.811 CLASS 1 OBESITY DUE TO EXCESS CALORIES WITH SERIOUS COMORBIDITY AND BODY MASS INDEX (BMI) OF 30.0 TO 30.9 IN ADULT: Primary | ICD-10-CM

## 2023-06-06 DIAGNOSIS — E66.09 CLASS 1 OBESITY DUE TO EXCESS CALORIES WITH SERIOUS COMORBIDITY AND BODY MASS INDEX (BMI) OF 30.0 TO 30.9 IN ADULT: Primary | ICD-10-CM

## 2023-06-13 ENCOUNTER — TELEPHONE (OUTPATIENT)
Dept: INTERNAL MEDICINE | Facility: CLINIC | Age: 58
End: 2023-06-13
Payer: COMMERCIAL

## 2023-07-06 ENCOUNTER — MYC MEDICAL ADVICE (OUTPATIENT)
Dept: INTERNAL MEDICINE | Facility: CLINIC | Age: 58
End: 2023-07-06
Payer: COMMERCIAL

## 2023-07-06 DIAGNOSIS — E66.09 CLASS 1 OBESITY DUE TO EXCESS CALORIES WITH SERIOUS COMORBIDITY AND BODY MASS INDEX (BMI) OF 30.0 TO 30.9 IN ADULT: Primary | ICD-10-CM

## 2023-07-06 DIAGNOSIS — E66.811 CLASS 1 OBESITY DUE TO EXCESS CALORIES WITH SERIOUS COMORBIDITY AND BODY MASS INDEX (BMI) OF 30.0 TO 30.9 IN ADULT: Primary | ICD-10-CM

## 2023-08-25 DIAGNOSIS — L71.9 ROSACEA: ICD-10-CM

## 2023-08-25 DIAGNOSIS — E34.9 TESTOSTERONE DEFICIENCY: ICD-10-CM

## 2023-08-25 RX ORDER — METRONIDAZOLE 7.5 MG/G
GEL TOPICAL
Qty: 90 G | Refills: 3 | Status: SHIPPED | OUTPATIENT
Start: 2023-08-25

## 2023-08-25 RX ORDER — TESTOSTERONE 25 MG/2.5G
GEL TRANSDERMAL
Qty: 450 PACKET | Refills: 0 | Status: SHIPPED | OUTPATIENT
Start: 2023-08-25 | End: 2023-12-13

## 2023-10-24 ENCOUNTER — MYC MEDICAL ADVICE (OUTPATIENT)
Dept: INTERNAL MEDICINE | Facility: CLINIC | Age: 58
End: 2023-10-24
Payer: COMMERCIAL

## 2023-10-24 DIAGNOSIS — E78.2 MIXED HYPERLIPIDEMIA: ICD-10-CM

## 2023-10-24 DIAGNOSIS — E66.811 CLASS 1 OBESITY DUE TO EXCESS CALORIES WITH SERIOUS COMORBIDITY AND BODY MASS INDEX (BMI) OF 30.0 TO 30.9 IN ADULT: Primary | ICD-10-CM

## 2023-10-24 DIAGNOSIS — E66.09 CLASS 1 OBESITY DUE TO EXCESS CALORIES WITH SERIOUS COMORBIDITY AND BODY MASS INDEX (BMI) OF 30.0 TO 30.9 IN ADULT: Primary | ICD-10-CM

## 2023-11-01 ENCOUNTER — TRANSFERRED RECORDS (OUTPATIENT)
Dept: HEALTH INFORMATION MANAGEMENT | Facility: CLINIC | Age: 58
End: 2023-11-01
Payer: COMMERCIAL

## 2023-11-18 DIAGNOSIS — E78.2 MIXED HYPERLIPIDEMIA: ICD-10-CM

## 2023-11-18 DIAGNOSIS — I10 BENIGN ESSENTIAL HYPERTENSION: ICD-10-CM

## 2023-11-20 RX ORDER — LISINOPRIL 20 MG/1
TABLET ORAL
Qty: 90 TABLET | Refills: 3 | Status: SHIPPED | OUTPATIENT
Start: 2023-11-20

## 2023-11-20 RX ORDER — ROSUVASTATIN CALCIUM 10 MG/1
10 TABLET, COATED ORAL DAILY
Qty: 90 TABLET | Refills: 3 | Status: SHIPPED | OUTPATIENT
Start: 2023-11-20

## 2023-12-11 ENCOUNTER — PATIENT OUTREACH (OUTPATIENT)
Dept: GASTROENTEROLOGY | Facility: CLINIC | Age: 58
End: 2023-12-11
Payer: COMMERCIAL

## 2023-12-13 ENCOUNTER — MYC MEDICAL ADVICE (OUTPATIENT)
Dept: INTERNAL MEDICINE | Facility: CLINIC | Age: 58
End: 2023-12-13
Payer: COMMERCIAL

## 2023-12-13 DIAGNOSIS — E34.9 TESTOSTERONE DEFICIENCY: ICD-10-CM

## 2023-12-13 DIAGNOSIS — J45.20 MILD INTERMITTENT ASTHMA WITHOUT COMPLICATION: ICD-10-CM

## 2023-12-13 DIAGNOSIS — L75.1 CHROMHIDROSIS: ICD-10-CM

## 2023-12-15 RX ORDER — TESTOSTERONE 25 MG/2.5G
GEL TRANSDERMAL
Qty: 450 PACKET | Refills: 0 | Status: SHIPPED | OUTPATIENT
Start: 2023-12-15

## 2023-12-15 RX ORDER — GLYCOPYRROLATE 100 %
POWDER (GRAM) MISCELLANEOUS
Qty: 30 G | Refills: 11 | Status: SHIPPED | OUTPATIENT
Start: 2023-12-15

## 2023-12-15 RX ORDER — MONTELUKAST SODIUM 10 MG/1
TABLET ORAL
Qty: 90 TABLET | Refills: 3 | Status: SHIPPED | OUTPATIENT
Start: 2023-12-15

## 2023-12-22 DIAGNOSIS — F33.1 MODERATE EPISODE OF RECURRENT MAJOR DEPRESSIVE DISORDER (H): ICD-10-CM

## 2023-12-22 RX ORDER — SELEGILINE 9 MG/24H
PATCH TRANSDERMAL
Qty: 30 PATCH | Refills: 11 | Status: SHIPPED | OUTPATIENT
Start: 2023-12-22

## 2024-01-10 ENCOUNTER — TELEPHONE (OUTPATIENT)
Dept: GASTROENTEROLOGY | Facility: CLINIC | Age: 59
End: 2024-01-10
Payer: COMMERCIAL

## 2024-01-10 NOTE — TELEPHONE ENCOUNTER
Left Voicemail (1st Attempt) and Sent Mychart (1st Attempt) for the patient to call back and schedule the following:    Appointment type: Return Patient  Provider: Dr Oliva  Return date: next available  Specialty phone number: [phone] (left direct line)  Additional appointment(s) needed: will need MRI, Dr Oliva requested appt be booked first  Additonal Notes: N/A

## 2024-01-15 ENCOUNTER — TELEPHONE (OUTPATIENT)
Dept: GASTROENTEROLOGY | Facility: CLINIC | Age: 59
End: 2024-01-15
Payer: COMMERCIAL

## 2024-07-13 ENCOUNTER — HEALTH MAINTENANCE LETTER (OUTPATIENT)
Age: 59
End: 2024-07-13

## 2024-08-30 ENCOUNTER — TRANSFERRED RECORDS (OUTPATIENT)
Dept: HEALTH INFORMATION MANAGEMENT | Facility: CLINIC | Age: 59
End: 2024-08-30
Payer: COMMERCIAL

## 2025-02-13 DIAGNOSIS — I10 BENIGN ESSENTIAL HYPERTENSION: ICD-10-CM

## 2025-02-18 RX ORDER — LISINOPRIL 20 MG/1
TABLET ORAL
Qty: 30 TABLET | Refills: 0 | Status: SHIPPED | OUTPATIENT
Start: 2025-02-18

## 2025-02-19 NOTE — TELEPHONE ENCOUNTER
Phone number not in service. Attempted to reach out to alternate number and [email or web address]. Please relay provider's message below if patient calls back and assist with establish care appointment.

## 2025-07-19 ENCOUNTER — HEALTH MAINTENANCE LETTER (OUTPATIENT)
Age: 60
End: 2025-07-19

## (undated) DEVICE — SU VICRYL 0 TIE 12X18" J906G

## (undated) DEVICE — SOL WATER IRRIG 1000ML BOTTLE 2F7114

## (undated) DEVICE — SOL NACL 0.9% IRRIG 1000ML BOTTLE 07138-09

## (undated) DEVICE — BNDG ABDOMINAL BINDER 9X62-84" 79-89210

## (undated) DEVICE — SU VICRYL 4-0 PS-2 18" UND J496H

## (undated) DEVICE — STPL CIRCULAR XL 21MM W/TILT TIP EEAXL21

## (undated) DEVICE — ENDO TROCAR OPTICAL 12MM VERSAPORT PLUS W/FIX CAN ONB12STF

## (undated) DEVICE — SPONGE RAY-TEC 4X8" 7318

## (undated) DEVICE — STPL ENDO RELOAD SIG GIA REINFORCED 60MM MED SIGTRS60AMT

## (undated) DEVICE — STPL ENDO RELOAD GIA 60X3.5MM ROTIC 030458

## (undated) DEVICE — INTRODUCER EEA STPL TRANS ANAL/ABD 21MM EEATAID21D

## (undated) DEVICE — SOL NACL 0.9% INJ 1000ML BAG 2B1324X

## (undated) DEVICE — DRAIN PENROSE 0.50"X18" LATEX FREE GR203

## (undated) DEVICE — SU SILK 2-0 SH 30" K833H

## (undated) DEVICE — STPL ENDO HANDLE GIA ULTRA UNIVERSAL STD EGIAUSTND

## (undated) DEVICE — ENDO TROCAR FIRST ENTRY KII FIOS Z-THRD 11X100MM CTF33

## (undated) DEVICE — ESU HOLDER LAP INST DISP PURPLE LONG 330MM H-PRO-330

## (undated) DEVICE — SUCTION IRR STRYKERFLOW II W/TIP 250-070-520

## (undated) DEVICE — ESU HARMONIC ACE LAP SHEARS STRYKER ACE+ 7 5MMX36CM HARH36

## (undated) DEVICE — DRSG STERI STRIP 1/2X4" R1547

## (undated) DEVICE — CLIP APPLIER ENDO 10MM M/L 176657

## (undated) DEVICE — DRSG GAUZE 4X4" 3033

## (undated) DEVICE — DRSG BANDAID 3/4X3"

## (undated) DEVICE — BLADE CLIPPER 4406

## (undated) DEVICE — ESU GROUND PAD UNIVERSAL W/O CORD

## (undated) DEVICE — ENDO TROCAR OPTICAL 12MM VERSAONE W/STD FIX CAN UNVCA12STF

## (undated) DEVICE — PREP CHLORAPREP 26ML TINTED ORANGE  260815

## (undated) DEVICE — PACK LAP CHOLE SLC15LCFSD

## (undated) DEVICE — STPL ENDO RELOAD GIA 30 X 3.5MM ROTIC 030452

## (undated) DEVICE — SU VICRYL 2-0 SH 27" J317H

## (undated) DEVICE — LINEN TOWEL PACK X5 5464

## (undated) DEVICE — TUBE GASTRIC EVACUATOR STOMACH 32FR LATEX

## (undated) DEVICE — GLOVE GAMMEX DERMAPRENE ULTRA SZ 8.5 LF 8517

## (undated) DEVICE — SYR 50ML CATH TIP W/O NDL 309620

## (undated) RX ORDER — NEOSTIGMINE METHYLSULFATE 1 MG/ML
VIAL (ML) INJECTION
Status: DISPENSED
Start: 2018-05-21

## (undated) RX ORDER — PROPOFOL 10 MG/ML
INJECTION, EMULSION INTRAVENOUS
Status: DISPENSED
Start: 2018-05-21

## (undated) RX ORDER — HYDROMORPHONE HYDROCHLORIDE 1 MG/ML
INJECTION, SOLUTION INTRAMUSCULAR; INTRAVENOUS; SUBCUTANEOUS
Status: DISPENSED
Start: 2018-05-21

## (undated) RX ORDER — CEFAZOLIN SODIUM 2 G/100ML
INJECTION, SOLUTION INTRAVENOUS
Status: DISPENSED
Start: 2018-05-21

## (undated) RX ORDER — GLYCOPYRROLATE 0.2 MG/ML
INJECTION, SOLUTION INTRAMUSCULAR; INTRAVENOUS
Status: DISPENSED
Start: 2018-05-21

## (undated) RX ORDER — FENTANYL CITRATE 50 UG/ML
INJECTION, SOLUTION INTRAMUSCULAR; INTRAVENOUS
Status: DISPENSED
Start: 2018-05-21

## (undated) RX ORDER — HEPARIN SODIUM 5000 [USP'U]/.5ML
INJECTION, SOLUTION INTRAVENOUS; SUBCUTANEOUS
Status: DISPENSED
Start: 2018-05-21

## (undated) RX ORDER — DEXAMETHASONE SODIUM PHOSPHATE 4 MG/ML
INJECTION, SOLUTION INTRA-ARTICULAR; INTRALESIONAL; INTRAMUSCULAR; INTRAVENOUS; SOFT TISSUE
Status: DISPENSED
Start: 2018-05-21

## (undated) RX ORDER — SCOLOPAMINE TRANSDERMAL SYSTEM 1 MG/1
PATCH, EXTENDED RELEASE TRANSDERMAL
Status: DISPENSED
Start: 2018-05-21

## (undated) RX ORDER — CEFAZOLIN SODIUM 1 G/3ML
INJECTION, POWDER, FOR SOLUTION INTRAMUSCULAR; INTRAVENOUS
Status: DISPENSED
Start: 2018-05-21

## (undated) RX ORDER — ONDANSETRON 2 MG/ML
INJECTION INTRAMUSCULAR; INTRAVENOUS
Status: DISPENSED
Start: 2018-05-21

## (undated) RX ORDER — BUPIVACAINE HYDROCHLORIDE 2.5 MG/ML
INJECTION, SOLUTION EPIDURAL; INFILTRATION; INTRACAUDAL
Status: DISPENSED
Start: 2018-05-21

## (undated) RX ORDER — LIDOCAINE HYDROCHLORIDE 20 MG/ML
INJECTION, SOLUTION EPIDURAL; INFILTRATION; INTRACAUDAL; PERINEURAL
Status: DISPENSED
Start: 2018-05-21

## (undated) RX ORDER — EPINEPHRINE 1 MG/ML
INJECTION, SOLUTION INTRAMUSCULAR; SUBCUTANEOUS
Status: DISPENSED
Start: 2018-05-21